# Patient Record
Sex: FEMALE | Race: BLACK OR AFRICAN AMERICAN | Employment: UNEMPLOYED | URBAN - METROPOLITAN AREA
[De-identification: names, ages, dates, MRNs, and addresses within clinical notes are randomized per-mention and may not be internally consistent; named-entity substitution may affect disease eponyms.]

---

## 2017-04-14 PROBLEM — S36.899A: Status: ACTIVE | Noted: 2017-04-14

## 2017-04-14 PROBLEM — S36.400A: Status: ACTIVE | Noted: 2017-04-14

## 2017-04-14 PROBLEM — T79.4XXA TRAUMATIC HEMORRHAGIC SHOCK (HCC): Status: ACTIVE | Noted: 2017-04-14

## 2017-04-14 PROBLEM — D62 ACUTE BLOOD LOSS ANEMIA: Status: ACTIVE | Noted: 2017-04-14

## 2017-04-14 PROBLEM — S36.899A INJURY OF MESENTERY: Status: ACTIVE | Noted: 2017-04-14

## 2017-04-14 PROBLEM — S36.501A: Status: ACTIVE | Noted: 2017-04-14
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
## 2017-04-14 PROBLEM — S32.009A CLOSED FRACTURE OF LUMBAR VERTEBRAL BODY (HCC): Status: ACTIVE | Noted: 2017-04-14

## 2017-06-11 PROBLEM — I26.92 SADDLE EMBOLUS OF PULMONARY ARTERY WITHOUT ACUTE COR PULMONALE (HCC): Status: ACTIVE | Noted: 2017-06-11

## 2017-06-11 PROBLEM — I82.402 DEEP VEIN THROMBOSIS (DVT) OF LEFT LOWER EXTREMITY (HCC): Status: ACTIVE | Noted: 2017-06-11

## 2017-06-24 PROBLEM — S37.10XA RIGHT URETERAL INJURY: Status: ACTIVE | Noted: 2017-06-24

## 2017-07-25 PROBLEM — K56.609 BOWEL OBSTRUCTION (HCC): Status: ACTIVE | Noted: 2017-07-25

## 2017-07-26 PROBLEM — I82.409 DEEP VENOUS THROMBOSIS (HCC): Status: ACTIVE | Noted: 2017-06-11

## 2017-08-25 PROBLEM — E43 SEVERE MALNUTRITION (HCC): Status: ACTIVE | Noted: 2017-08-25

## 2017-09-07 PROBLEM — Z98.890 POST-OPERATIVE STATE: Status: ACTIVE | Noted: 2017-09-07

## 2018-01-29 PROBLEM — Z93.3 COLOSTOMY IN PLACE (HCC): Status: ACTIVE | Noted: 2018-01-29
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
## 2018-02-02 PROBLEM — S36.400A: Status: RESOLVED | Noted: 2017-04-14 | Resolved: 2018-02-02

## 2018-02-02 PROBLEM — S32.009A CLOSED FRACTURE OF LUMBAR VERTEBRAL BODY (HCC): Status: RESOLVED | Noted: 2017-04-14 | Resolved: 2018-02-02

## 2018-02-02 PROBLEM — S36.899A: Status: RESOLVED | Noted: 2017-04-14 | Resolved: 2018-02-02

## 2018-02-02 PROBLEM — T79.4XXA TRAUMATIC HEMORRHAGIC SHOCK (HCC): Status: RESOLVED | Noted: 2017-04-14 | Resolved: 2018-02-02

## 2018-02-02 PROBLEM — I26.92 SADDLE EMBOLUS OF PULMONARY ARTERY WITHOUT ACUTE COR PULMONALE (HCC): Status: RESOLVED | Noted: 2017-06-11 | Resolved: 2018-02-02

## 2018-02-02 PROBLEM — S37.10XA RIGHT URETERAL INJURY: Status: RESOLVED | Noted: 2017-06-24 | Resolved: 2018-02-02

## 2018-02-02 PROBLEM — S36.899A INJURY OF MESENTERY: Status: RESOLVED | Noted: 2017-04-14 | Resolved: 2018-02-02

## 2018-02-02 PROBLEM — K56.609 BOWEL OBSTRUCTION (HCC): Status: RESOLVED | Noted: 2017-07-25 | Resolved: 2018-02-02

## 2018-02-02 PROBLEM — Z93.3 COLOSTOMY IN PLACE (HCC): Status: RESOLVED | Noted: 2018-01-29 | Resolved: 2018-02-02

## 2018-02-02 PROBLEM — S36.501A: Status: RESOLVED | Noted: 2017-04-14 | Resolved: 2018-02-02

## 2018-02-02 PROBLEM — R00.0 SINUS TACHYCARDIA: Status: ACTIVE | Noted: 2018-02-02

## 2018-02-02 PROBLEM — I82.409 DEEP VENOUS THROMBOSIS (HCC): Status: RESOLVED | Noted: 2017-06-11 | Resolved: 2018-02-02

## 2018-03-15 ENCOUNTER — OFFICE VISIT (OUTPATIENT)
Dept: SURGERY | Age: 28
End: 2018-03-15
Payer: MEDICAID

## 2018-03-15 VITALS
TEMPERATURE: 98 F | BODY MASS INDEX: 23.07 KG/M2 | RESPIRATION RATE: 12 BRPM | SYSTOLIC BLOOD PRESSURE: 112 MMHG | OXYGEN SATURATION: 99 % | HEIGHT: 67 IN | DIASTOLIC BLOOD PRESSURE: 76 MMHG | WEIGHT: 147 LBS | HEART RATE: 101 BPM

## 2018-03-15 DIAGNOSIS — Z98.890 POST-OPERATIVE STATE: Primary | ICD-10-CM

## 2018-03-15 PROCEDURE — 99211 OFF/OP EST MAY X REQ PHY/QHP: CPT

## 2018-03-15 PROCEDURE — 99024 POSTOP FOLLOW-UP VISIT: CPT | Performed by: SURGERY

## 2018-04-11 PROBLEM — Z98.890 POST-OPERATIVE STATE: Status: RESOLVED | Noted: 2017-09-07 | Resolved: 2018-04-11

## 2018-07-10 ENCOUNTER — TELEPHONE (OUTPATIENT)
Dept: FAMILY MEDICINE CLINIC | Age: 28
End: 2018-07-10

## 2018-07-16 ENCOUNTER — OFFICE VISIT (OUTPATIENT)
Dept: FAMILY MEDICINE CLINIC | Age: 28
End: 2018-07-16
Payer: MEDICAID

## 2018-07-16 ENCOUNTER — HOSPITAL ENCOUNTER (OUTPATIENT)
Age: 28
Discharge: HOME | End: 2018-07-18
Payer: MEDICAID

## 2018-07-16 VITALS
SYSTOLIC BLOOD PRESSURE: 115 MMHG | RESPIRATION RATE: 18 BRPM | OXYGEN SATURATION: 98 % | TEMPERATURE: 98.4 F | BODY MASS INDEX: 21.97 KG/M2 | HEIGHT: 67 IN | DIASTOLIC BLOOD PRESSURE: 80 MMHG | WEIGHT: 140 LBS | HEART RATE: 84 BPM

## 2018-07-16 DIAGNOSIS — R60.0 EDEMA OF LEG: ICD-10-CM

## 2018-07-16 DIAGNOSIS — I10 HYPERTENSION, UNSPECIFIED TYPE: ICD-10-CM

## 2018-07-16 DIAGNOSIS — R25.2 MUSCLE CRAMP: Primary | ICD-10-CM

## 2018-07-16 DIAGNOSIS — R25.2 MUSCLE CRAMP: ICD-10-CM

## 2018-07-16 LAB
ANION GAP SERPL CALCULATED.3IONS-SCNC: 13 MMOL/L (ref 7–16)
BUN BLDV-MCNC: 11 MG/DL (ref 6–20)
CALCIUM SERPL-MCNC: 9.1 MG/DL (ref 8.6–10.2)
CHLORIDE BLD-SCNC: 103 MMOL/L (ref 98–107)
CO2: 24 MMOL/L (ref 22–29)
CREAT SERPL-MCNC: 0.8 MG/DL (ref 0.5–1)
GFR AFRICAN AMERICAN: >60
GFR NON-AFRICAN AMERICAN: >60 ML/MIN/1.73
GLUCOSE BLD-MCNC: 84 MG/DL (ref 74–109)
MAGNESIUM: 2.3 MG/DL (ref 1.6–2.6)
POTASSIUM SERPL-SCNC: 4.6 MMOL/L (ref 3.5–5)
SODIUM BLD-SCNC: 140 MMOL/L (ref 132–146)
TOTAL CK: 196 U/L (ref 20–180)

## 2018-07-16 PROCEDURE — 36415 COLL VENOUS BLD VENIPUNCTURE: CPT | Performed by: FAMILY MEDICINE

## 2018-07-16 PROCEDURE — G8427 DOCREV CUR MEDS BY ELIG CLIN: HCPCS | Performed by: STUDENT IN AN ORGANIZED HEALTH CARE EDUCATION/TRAINING PROGRAM

## 2018-07-16 PROCEDURE — 80048 BASIC METABOLIC PNL TOTAL CA: CPT

## 2018-07-16 PROCEDURE — 1036F TOBACCO NON-USER: CPT | Performed by: STUDENT IN AN ORGANIZED HEALTH CARE EDUCATION/TRAINING PROGRAM

## 2018-07-16 PROCEDURE — 83735 ASSAY OF MAGNESIUM: CPT

## 2018-07-16 PROCEDURE — 82550 ASSAY OF CK (CPK): CPT

## 2018-07-16 PROCEDURE — 99213 OFFICE O/P EST LOW 20 MIN: CPT | Performed by: STUDENT IN AN ORGANIZED HEALTH CARE EDUCATION/TRAINING PROGRAM

## 2018-07-16 PROCEDURE — 99212 OFFICE O/P EST SF 10 MIN: CPT | Performed by: STUDENT IN AN ORGANIZED HEALTH CARE EDUCATION/TRAINING PROGRAM

## 2018-07-16 PROCEDURE — G8420 CALC BMI NORM PARAMETERS: HCPCS | Performed by: STUDENT IN AN ORGANIZED HEALTH CARE EDUCATION/TRAINING PROGRAM

## 2018-07-16 RX ORDER — NAPROXEN 500 MG/1
500 TABLET ORAL 2 TIMES DAILY WITH MEALS
Qty: 60 TABLET | Refills: 3 | Status: SHIPPED | OUTPATIENT
Start: 2018-07-16 | End: 2019-01-11

## 2018-07-16 RX ORDER — METOPROLOL TARTRATE 50 MG/1
50 TABLET, FILM COATED ORAL 2 TIMES DAILY
Qty: 60 TABLET | Refills: 3 | Status: CANCELLED | OUTPATIENT
Start: 2018-07-16

## 2018-07-16 ASSESSMENT — PATIENT HEALTH QUESTIONNAIRE - PHQ9
SUM OF ALL RESPONSES TO PHQ QUESTIONS 1-9: 0
1. LITTLE INTEREST OR PLEASURE IN DOING THINGS: 0
SUM OF ALL RESPONSES TO PHQ9 QUESTIONS 1 & 2: 0
2. FEELING DOWN, DEPRESSED OR HOPELESS: 0

## 2018-07-16 ASSESSMENT — ENCOUNTER SYMPTOMS
COUGH: 0
BLURRED VISION: 0
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
## 2018-07-18 PROBLEM — E43 SEVERE MALNUTRITION (HCC): Status: RESOLVED | Noted: 2017-08-25 | Resolved: 2018-07-18

## 2018-09-05 ENCOUNTER — TELEPHONE (OUTPATIENT)
Dept: ADMINISTRATIVE | Age: 28
End: 2018-09-05

## 2018-09-18 ENCOUNTER — OFFICE VISIT (OUTPATIENT)
Dept: FAMILY MEDICINE CLINIC | Age: 28
End: 2018-09-18
Payer: MEDICAID

## 2018-09-18 VITALS
HEART RATE: 87 BPM | TEMPERATURE: 98.6 F | HEIGHT: 67 IN | DIASTOLIC BLOOD PRESSURE: 84 MMHG | SYSTOLIC BLOOD PRESSURE: 126 MMHG | BODY MASS INDEX: 20.72 KG/M2 | OXYGEN SATURATION: 99 % | WEIGHT: 132 LBS

## 2018-09-18 DIAGNOSIS — M54.50 ACUTE MIDLINE LOW BACK PAIN WITHOUT SCIATICA: ICD-10-CM

## 2018-09-18 DIAGNOSIS — Z32.00 POSSIBLE PREGNANCY: Primary | ICD-10-CM

## 2018-09-18 DIAGNOSIS — R11.0 NAUSEA: ICD-10-CM

## 2018-09-18 LAB
CONTROL: POSITIVE
PREGNANCY TEST URINE, POC: NEGATIVE

## 2018-09-18 PROCEDURE — 1036F TOBACCO NON-USER: CPT | Performed by: STUDENT IN AN ORGANIZED HEALTH CARE EDUCATION/TRAINING PROGRAM

## 2018-09-18 PROCEDURE — 99212 OFFICE O/P EST SF 10 MIN: CPT | Performed by: STUDENT IN AN ORGANIZED HEALTH CARE EDUCATION/TRAINING PROGRAM

## 2018-09-18 PROCEDURE — 81025 URINE PREGNANCY TEST: CPT | Performed by: STUDENT IN AN ORGANIZED HEALTH CARE EDUCATION/TRAINING PROGRAM

## 2018-09-18 PROCEDURE — G8427 DOCREV CUR MEDS BY ELIG CLIN: HCPCS | Performed by: STUDENT IN AN ORGANIZED HEALTH CARE EDUCATION/TRAINING PROGRAM

## 2018-09-18 PROCEDURE — 99213 OFFICE O/P EST LOW 20 MIN: CPT | Performed by: STUDENT IN AN ORGANIZED HEALTH CARE EDUCATION/TRAINING PROGRAM

## 2018-09-18 PROCEDURE — G8420 CALC BMI NORM PARAMETERS: HCPCS | Performed by: STUDENT IN AN ORGANIZED HEALTH CARE EDUCATION/TRAINING PROGRAM

## 2018-09-18 RX ORDER — IBUPROFEN 400 MG/1
400 TABLET ORAL EVERY 6 HOURS PRN
COMMUNITY
End: 2018-09-18 | Stop reason: SDUPTHER

## 2018-09-18 RX ORDER — IBUPROFEN 400 MG/1
400 TABLET ORAL EVERY 6 HOURS PRN
Qty: 120 TABLET | Refills: 0 | Status: SHIPPED | OUTPATIENT
Start: 2018-09-18 | End: 2019-01-11

## 2018-09-18 RX ORDER — ONDANSETRON 4 MG/1
4 TABLET, FILM COATED ORAL DAILY PRN
Qty: 30 TABLET | Refills: 0 | Status: SHIPPED | OUTPATIENT
Start: 2018-09-18 | End: 2019-01-11

## 2018-09-18 RX ORDER — TIZANIDINE 4 MG/1
4 TABLET ORAL 3 TIMES DAILY
Qty: 60 TABLET | Refills: 0 | Status: SHIPPED | OUTPATIENT
Start: 2018-09-18 | End: 2019-01-11
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
## 2018-09-18 NOTE — PROGRESS NOTES
SpO2 99%   BMI 20.67 kg/m²   General Appearance: Well developed, awake, alert, oriented, no acute distress  HEENT: Normocephalic, atraumatic. Chest wall/Lung: Clear to auscultation bilaterally,  respirations unlabored. No ronchi/wheezing/rales  Heart: Regular rate and rhythm, S1 and S2 normal, no murmur, rub or gallop. Abdomen: Soft, Tender over surgical area including midline scar. RUQ scar noted. Extremities:    No edema. Neurologic:   Alert&Oriented. Assessment and Plan     Acute midline low back pain without sciatica  -Secondary to trauma. No red flag symptoms. Orders below. - ibuprofen (IBU) 400 MG tablet; Take 1 tablet by mouth every 6 hours as needed for Pain  Dispense: 120 tablet; Refill: 0  - tiZANidine (ZANAFLEX) 4 MG tablet; Take 1 tablet by mouth three times daily  Dispense: 60 tablet; Refill: 0  - XR THORACIC SPINE (2 VIEWS); Future  - XR LUMBAR SPINE (2-3 VIEWS); Future    Nausea  - Pregnancy test is negative. Food intolerance vs exacerbation of previous abdominal injury and colostomy reversal.  -If does not get better will do CT Abdomen  -Will recheck pregnancy test during her next visit  - ondansetron (ZOFRAN) 4 MG tablet; Take 1 tablet by mouth daily as needed for Nausea or Vomiting  Dispense: 30 tablet; Refill: 0    Possible pregnancy  - POCT urine pregnancy today-Negative  -Will repeat pregnancy test during her next visit    Left leg Pain  -secondary to chronic DVT  -can follow with Dr. Sunny Olmstead if needed        Return to Office: Return in about 2 weeks (around 10/2/2018) for Nausea, Back Pain.       Medication List:    Current Outpatient Prescriptions   Medication Sig Dispense Refill    ibuprofen (IBU) 400 MG tablet Take 400 mg by mouth every 6 hours as needed for Pain      naproxen (NAPROXEN) 500 MG EC tablet Take 1 tablet by mouth 2 times daily (with meals) 60 tablet 3    acetaminophen (TYLENOL) 325 MG tablet Take 1 tablet by mouth every 4 hours as needed for Pain 120 tablet

## 2018-09-25 ASSESSMENT — ENCOUNTER SYMPTOMS: SHORTNESS OF BREATH: 0

## 2018-10-18 ENCOUNTER — TELEPHONE (OUTPATIENT)
Dept: ADMINISTRATIVE | Age: 28
End: 2018-10-18

## 2018-10-22 ENCOUNTER — TELEPHONE (OUTPATIENT)
Dept: ADMINISTRATIVE | Age: 28
End: 2018-10-22

## 2019-01-11 ENCOUNTER — HOSPITAL ENCOUNTER (EMERGENCY)
Age: 29
Discharge: HOME | End: 2019-01-11
Payer: MEDICAID

## 2019-01-11 VITALS
SYSTOLIC BLOOD PRESSURE: 139 MMHG | RESPIRATION RATE: 14 BRPM | HEART RATE: 80 BPM | HEIGHT: 67 IN | DIASTOLIC BLOOD PRESSURE: 96 MMHG | BODY MASS INDEX: 20.72 KG/M2 | OXYGEN SATURATION: 100 % | WEIGHT: 132 LBS | TEMPERATURE: 97.8 F

## 2019-01-11 DIAGNOSIS — J02.9 ACUTE PHARYNGITIS, UNSPECIFIED ETIOLOGY: Primary | ICD-10-CM

## 2019-01-11 LAB — STREP GRP A PCR: NEGATIVE

## 2019-01-11 PROCEDURE — 99283 EMERGENCY DEPT VISIT LOW MDM: CPT

## 2019-01-11 PROCEDURE — 87880 STREP A ASSAY W/OPTIC: CPT

## 2019-01-11 RX ORDER — IBUPROFEN 800 MG/1
800 TABLET ORAL EVERY 6 HOURS PRN
Qty: 21 TABLET | Refills: 0 | Status: SHIPPED | OUTPATIENT
Start: 2019-01-11 | End: 2019-06-01

## 2019-01-11 RX ORDER — BROMPHENIRAMINE MALEATE, PSEUDOEPHEDRINE HYDROCHLORIDE, AND DEXTROMETHORPHAN HYDROBROMIDE 2; 30; 10 MG/5ML; MG/5ML; MG/5ML
5 SYRUP ORAL 4 TIMES DAILY PRN
Qty: 120 ML | Refills: 0 | Status: SHIPPED | OUTPATIENT
Start: 2019-01-11 | End: 2019-01-16

## 2019-01-11 ASSESSMENT — PAIN DESCRIPTION - FREQUENCY: FREQUENCY: CONTINUOUS

## 2019-01-11 ASSESSMENT — PAIN DESCRIPTION - PAIN TYPE: TYPE: ACUTE PAIN

## 2019-01-11 ASSESSMENT — PAIN DESCRIPTION - PROGRESSION: CLINICAL_PROGRESSION: GRADUALLY WORSENING

## 2019-01-11 ASSESSMENT — PAIN DESCRIPTION - LOCATION: LOCATION: CHEST

## 2019-01-11 ASSESSMENT — PAIN SCALES - GENERAL: PAINLEVEL_OUTOF10: 5

## 2019-01-11 ASSESSMENT — PAIN DESCRIPTION - DESCRIPTORS: DESCRIPTORS: ACHING

## 2019-02-25 ENCOUNTER — HOSPITAL ENCOUNTER (OUTPATIENT)
Age: 29
Discharge: HOME | End: 2019-02-27
Payer: MEDICAID

## 2019-02-25 ENCOUNTER — OFFICE VISIT (OUTPATIENT)
Dept: FAMILY MEDICINE CLINIC | Age: 29
End: 2019-02-25
Payer: MEDICAID

## 2019-02-25 VITALS
WEIGHT: 132 LBS | OXYGEN SATURATION: 100 % | DIASTOLIC BLOOD PRESSURE: 83 MMHG | HEART RATE: 70 BPM | BODY MASS INDEX: 20.72 KG/M2 | TEMPERATURE: 98.3 F | HEIGHT: 67 IN | RESPIRATION RATE: 18 BRPM | SYSTOLIC BLOOD PRESSURE: 120 MMHG

## 2019-02-25 DIAGNOSIS — J06.9 VIRAL URI: ICD-10-CM

## 2019-02-25 DIAGNOSIS — N76.0 ACUTE VAGINITIS: ICD-10-CM

## 2019-02-25 DIAGNOSIS — N76.0 ACUTE VAGINITIS: Primary | ICD-10-CM

## 2019-02-25 PROCEDURE — G8484 FLU IMMUNIZE NO ADMIN: HCPCS | Performed by: FAMILY MEDICINE

## 2019-02-25 PROCEDURE — G8420 CALC BMI NORM PARAMETERS: HCPCS | Performed by: FAMILY MEDICINE

## 2019-02-25 PROCEDURE — 4004F PT TOBACCO SCREEN RCVD TLK: CPT | Performed by: FAMILY MEDICINE

## 2019-02-25 PROCEDURE — 99213 OFFICE O/P EST LOW 20 MIN: CPT | Performed by: FAMILY MEDICINE

## 2019-02-25 PROCEDURE — 99212 OFFICE O/P EST SF 10 MIN: CPT | Performed by: FAMILY MEDICINE

## 2019-02-25 PROCEDURE — G8427 DOCREV CUR MEDS BY ELIG CLIN: HCPCS | Performed by: FAMILY MEDICINE

## 2019-02-25 RX ORDER — FLUCONAZOLE 150 MG/1
150 TABLET ORAL ONCE
Qty: 2 TABLET | Refills: 1 | Status: SHIPPED | OUTPATIENT
Start: 2019-02-25 | End: 2019-02-25

## 2019-02-25 RX ORDER — BENZONATATE 100 MG/1
100-200 CAPSULE ORAL 3 TIMES DAILY PRN
Qty: 60 CAPSULE | Refills: 0 | Status: SHIPPED | OUTPATIENT
Start: 2019-02-25 | End: 2019-03-04

## 2019-02-25 RX ORDER — FLUTICASONE PROPIONATE 50 MCG
2 SPRAY, SUSPENSION (ML) NASAL DAILY
Qty: 1 BOTTLE | Refills: 1 | Status: SHIPPED | OUTPATIENT
Start: 2019-02-25 | End: 2019-06-01

## 2019-02-25 ASSESSMENT — PATIENT HEALTH QUESTIONNAIRE - PHQ9
SUM OF ALL RESPONSES TO PHQ QUESTIONS 1-9: 0
SUM OF ALL RESPONSES TO PHQ9 QUESTIONS 1 & 2: 0
SUM OF ALL RESPONSES TO PHQ QUESTIONS 1-9: 0
1. LITTLE INTEREST OR PLEASURE IN DOING THINGS: 0
2. FEELING DOWN, DEPRESSED OR HOPELESS: 0

## 2019-02-26 ENCOUNTER — TELEPHONE (OUTPATIENT)
Dept: FAMILY MEDICINE CLINIC | Age: 29
End: 2019-02-26

## 2019-02-26 ASSESSMENT — ENCOUNTER SYMPTOMS
VOMITING: 0
ABDOMINAL PAIN: 0
SORE THROAT: 1
CHEST TIGHTNESS: 0
NAUSEA: 0
CONSTIPATION: 0
SHORTNESS OF BREATH: 1
DIARRHEA: 0
SINUS PRESSURE: 0
PHOTOPHOBIA: 0
SINUS PAIN: 0
ABDOMINAL DISTENTION: 0
COUGH: 1
WHEEZING: 0
RHINORRHEA: 1

## 2019-06-01 ENCOUNTER — HOSPITAL ENCOUNTER (EMERGENCY)
Age: 29
Discharge: HOME | End: 2019-06-01
Attending: EMERGENCY MEDICINE
Payer: MEDICAID

## 2019-06-01 ENCOUNTER — APPOINTMENT (OUTPATIENT)
Dept: GENERAL RADIOLOGY | Age: 29
End: 2019-06-01
Payer: MEDICAID

## 2019-06-01 VITALS
BODY MASS INDEX: 20.09 KG/M2 | OXYGEN SATURATION: 98 % | HEART RATE: 70 BPM | SYSTOLIC BLOOD PRESSURE: 109 MMHG | HEIGHT: 67 IN | RESPIRATION RATE: 16 BRPM | TEMPERATURE: 98.4 F | WEIGHT: 128 LBS | DIASTOLIC BLOOD PRESSURE: 64 MMHG

## 2019-06-01 DIAGNOSIS — R07.81 PLEURITIC CHEST PAIN: Primary | ICD-10-CM

## 2019-06-01 LAB
ANION GAP SERPL CALCULATED.3IONS-SCNC: 10 MMOL/L (ref 7–16)
BASOPHILS ABSOLUTE: 0.03 E9/L (ref 0–0.2)
BASOPHILS RELATIVE PERCENT: 0.8 % (ref 0–2)
BUN BLDV-MCNC: 9 MG/DL (ref 6–20)
CALCIUM SERPL-MCNC: 8.8 MG/DL (ref 8.6–10.2)
CHLORIDE BLD-SCNC: 103 MMOL/L (ref 98–107)
CO2: 27 MMOL/L (ref 22–29)
CREAT SERPL-MCNC: 0.7 MG/DL (ref 0.5–1)
D DIMER: <200 NG/ML DDU
EKG ATRIAL RATE: 79 BPM
EKG P AXIS: 65 DEGREES
EKG P-R INTERVAL: 156 MS
EKG Q-T INTERVAL: 366 MS
EKG QRS DURATION: 80 MS
EKG QTC CALCULATION (BAZETT): 419 MS
EKG R AXIS: 85 DEGREES
EKG T AXIS: 61 DEGREES
EKG VENTRICULAR RATE: 79 BPM
EOSINOPHILS ABSOLUTE: 0.05 E9/L (ref 0.05–0.5)
EOSINOPHILS RELATIVE PERCENT: 1.3 % (ref 0–6)
GFR AFRICAN AMERICAN: >60
GFR NON-AFRICAN AMERICAN: >60 ML/MIN/1.73
GLUCOSE BLD-MCNC: 100 MG/DL (ref 74–99)
HCT VFR BLD CALC: 38.8 % (ref 34–48)
HEMOGLOBIN: 12.7 G/DL (ref 11.5–15.5)
IMMATURE GRANULOCYTES #: 0.01 E9/L
IMMATURE GRANULOCYTES %: 0.3 % (ref 0–5)
LYMPHOCYTES ABSOLUTE: 2.18 E9/L (ref 1.5–4)
LYMPHOCYTES RELATIVE PERCENT: 54.6 % (ref 20–42)
MCH RBC QN AUTO: 30.6 PG (ref 26–35)
MCHC RBC AUTO-ENTMCNC: 32.7 % (ref 32–34.5)
MCV RBC AUTO: 93.5 FL (ref 80–99.9)
MONOCYTES ABSOLUTE: 0.37 E9/L (ref 0.1–0.95)
MONOCYTES RELATIVE PERCENT: 9.3 % (ref 2–12)
NEUTROPHILS ABSOLUTE: 1.35 E9/L (ref 1.8–7.3)
NEUTROPHILS RELATIVE PERCENT: 33.7 % (ref 43–80)
PDW BLD-RTO: 14 FL (ref 11.5–15)
PLATELET # BLD: 198 E9/L (ref 130–450)
PMV BLD AUTO: 11.9 FL (ref 7–12)
POTASSIUM SERPL-SCNC: 4 MMOL/L (ref 3.5–5)
RBC # BLD: 4.15 E12/L (ref 3.5–5.5)
SODIUM BLD-SCNC: 140 MMOL/L (ref 132–146)
TROPONIN: <0.01 NG/ML (ref 0–0.03)
WBC # BLD: 4 E9/L (ref 4.5–11.5)

## 2019-06-01 PROCEDURE — 80048 BASIC METABOLIC PNL TOTAL CA: CPT

## 2019-06-01 PROCEDURE — 85378 FIBRIN DEGRADE SEMIQUANT: CPT

## 2019-06-01 PROCEDURE — 84484 ASSAY OF TROPONIN QUANT: CPT

## 2019-06-01 PROCEDURE — 99285 EMERGENCY DEPT VISIT HI MDM: CPT

## 2019-06-01 PROCEDURE — 71045 X-RAY EXAM CHEST 1 VIEW: CPT

## 2019-06-01 PROCEDURE — 6360000002 HC RX W HCPCS: Performed by: EMERGENCY MEDICINE

## 2019-06-01 PROCEDURE — 93010 ELECTROCARDIOGRAM REPORT: CPT | Performed by: INTERNAL MEDICINE

## 2019-06-01 PROCEDURE — 85025 COMPLETE CBC W/AUTO DIFF WBC: CPT

## 2019-06-01 PROCEDURE — 93005 ELECTROCARDIOGRAM TRACING: CPT | Performed by: EMERGENCY MEDICINE

## 2019-06-01 PROCEDURE — 96374 THER/PROPH/DIAG INJ IV PUSH: CPT

## 2019-06-01 RX ORDER — HALOPERIDOL 5 MG/ML
2.5 INJECTION INTRAMUSCULAR ONCE
Status: DISCONTINUED | OUTPATIENT
Start: 2019-06-01 | End: 2019-06-01

## 2019-06-01 RX ORDER — KETOROLAC TROMETHAMINE 10 MG/1
10 TABLET, FILM COATED ORAL EVERY 6 HOURS PRN
Qty: 20 TABLET | Refills: 0 | Status: SHIPPED | OUTPATIENT
Start: 2019-06-01 | End: 2019-09-13

## 2019-06-01 RX ORDER — KETOROLAC TROMETHAMINE 30 MG/ML
15 INJECTION, SOLUTION INTRAMUSCULAR; INTRAVENOUS ONCE
Status: COMPLETED | OUTPATIENT
Start: 2019-06-01 | End: 2019-06-01

## 2019-06-01 RX ORDER — ONDANSETRON 4 MG/1
8 TABLET, ORALLY DISINTEGRATING ORAL EVERY 8 HOURS PRN
Qty: 10 TABLET | Refills: 0 | Status: SHIPPED | OUTPATIENT
Start: 2019-06-01 | End: 2019-09-13

## 2019-06-01 RX ADMIN — KETOROLAC TROMETHAMINE 15 MG: 30 INJECTION, SOLUTION INTRAMUSCULAR; INTRAVENOUS at 10:22

## 2019-06-01 RX ADMIN — HALOPERIDOL LACTATE 2.5 MG: 5 INJECTION, SOLUTION INTRAMUSCULAR at 07:45

## 2019-06-01 ASSESSMENT — ENCOUNTER SYMPTOMS
NAUSEA: 1
SORE THROAT: 0
SINUS PRESSURE: 0
COUGH: 0
SHORTNESS OF BREATH: 0
EYE REDNESS: 0
ABDOMINAL DISTENTION: 0
BACK PAIN: 0
WHEEZING: 0
VOMITING: 1
DIARRHEA: 0
EYE PAIN: 0
EYE DISCHARGE: 0

## 2019-06-01 ASSESSMENT — PAIN SCALES - GENERAL
PAINLEVEL_OUTOF10: 5
PAINLEVEL_OUTOF10: 5

## 2019-06-01 ASSESSMENT — PAIN DESCRIPTION - PROGRESSION: CLINICAL_PROGRESSION: GRADUALLY WORSENING

## 2019-06-01 ASSESSMENT — PAIN DESCRIPTION - ORIENTATION: ORIENTATION: LEFT;UPPER

## 2019-06-01 ASSESSMENT — PAIN DESCRIPTION - PAIN TYPE: TYPE: ACUTE PAIN

## 2019-06-01 ASSESSMENT — PAIN DESCRIPTION - LOCATION: LOCATION: CHEST

## 2019-06-01 ASSESSMENT — PAIN DESCRIPTION - ONSET: ONSET: GRADUAL

## 2019-06-01 ASSESSMENT — PAIN - FUNCTIONAL ASSESSMENT: PAIN_FUNCTIONAL_ASSESSMENT: ACTIVITIES ARE NOT PREVENTED

## 2019-06-01 ASSESSMENT — PAIN DESCRIPTION - FREQUENCY: FREQUENCY: CONTINUOUS

## 2019-06-01 ASSESSMENT — PAIN DESCRIPTION - DESCRIPTORS: DESCRIPTORS: BURNING;DISCOMFORT
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
## 2019-09-13 ENCOUNTER — OFFICE VISIT (OUTPATIENT)
Dept: FAMILY MEDICINE CLINIC | Age: 29
End: 2019-09-13
Payer: MEDICAID

## 2019-09-13 VITALS
TEMPERATURE: 98.7 F | OXYGEN SATURATION: 96 % | HEART RATE: 100 BPM | RESPIRATION RATE: 12 BRPM | DIASTOLIC BLOOD PRESSURE: 70 MMHG | HEIGHT: 67 IN | SYSTOLIC BLOOD PRESSURE: 106 MMHG | BODY MASS INDEX: 20.25 KG/M2 | WEIGHT: 129 LBS

## 2019-09-13 DIAGNOSIS — R20.2 NUMBNESS AND TINGLING OF LEFT LOWER EXTREMITY: ICD-10-CM

## 2019-09-13 DIAGNOSIS — L74.510 AXILLARY HYPERHIDROSIS: ICD-10-CM

## 2019-09-13 DIAGNOSIS — R20.0 NUMBNESS AND TINGLING OF LEFT LOWER EXTREMITY: ICD-10-CM

## 2019-09-13 DIAGNOSIS — Z86.718 HISTORY OF DVT (DEEP VEIN THROMBOSIS): ICD-10-CM

## 2019-09-13 DIAGNOSIS — M79.2 NEUROPATHIC PAIN: Primary | ICD-10-CM

## 2019-09-13 PROCEDURE — G8420 CALC BMI NORM PARAMETERS: HCPCS | Performed by: STUDENT IN AN ORGANIZED HEALTH CARE EDUCATION/TRAINING PROGRAM

## 2019-09-13 PROCEDURE — 99212 OFFICE O/P EST SF 10 MIN: CPT | Performed by: STUDENT IN AN ORGANIZED HEALTH CARE EDUCATION/TRAINING PROGRAM

## 2019-09-13 PROCEDURE — 1036F TOBACCO NON-USER: CPT | Performed by: STUDENT IN AN ORGANIZED HEALTH CARE EDUCATION/TRAINING PROGRAM

## 2019-09-13 PROCEDURE — 99213 OFFICE O/P EST LOW 20 MIN: CPT | Performed by: STUDENT IN AN ORGANIZED HEALTH CARE EDUCATION/TRAINING PROGRAM

## 2019-09-13 PROCEDURE — G8427 DOCREV CUR MEDS BY ELIG CLIN: HCPCS | Performed by: STUDENT IN AN ORGANIZED HEALTH CARE EDUCATION/TRAINING PROGRAM

## 2019-09-13 RX ORDER — GABAPENTIN 100 MG/1
100 CAPSULE ORAL 3 TIMES DAILY PRN
Qty: 90 CAPSULE | Refills: 0 | Status: SHIPPED
Start: 2019-09-13 | End: 2021-03-01 | Stop reason: CLARIF
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
## 2019-09-16 ASSESSMENT — ENCOUNTER SYMPTOMS
SHORTNESS OF BREATH: 0
ABDOMINAL PAIN: 0
COUGH: 0

## 2019-09-20 ENCOUNTER — HOSPITAL ENCOUNTER (OUTPATIENT)
Dept: ULTRASOUND IMAGING | Age: 29
Discharge: HOME | End: 2019-09-22
Payer: MEDICAID

## 2019-09-20 DIAGNOSIS — R20.2 NUMBNESS AND TINGLING OF LEFT LOWER EXTREMITY: ICD-10-CM

## 2019-09-20 DIAGNOSIS — R20.0 NUMBNESS AND TINGLING OF LEFT LOWER EXTREMITY: ICD-10-CM

## 2019-09-20 DIAGNOSIS — Z86.718 HISTORY OF DVT (DEEP VEIN THROMBOSIS): ICD-10-CM

## 2019-09-20 PROCEDURE — 93971 EXTREMITY STUDY: CPT

## 2019-10-02 ENCOUNTER — TELEPHONE (OUTPATIENT)
Dept: FAMILY MEDICINE CLINIC | Age: 29
End: 2019-10-02

## 2019-10-02 DIAGNOSIS — I82.512 CHRONIC DEEP VEIN THROMBOSIS (DVT) OF LEFT FEMORAL VEIN (HCC): Primary | ICD-10-CM

## 2019-10-29 ENCOUNTER — OFFICE VISIT (OUTPATIENT)
Dept: VASCULAR SURGERY | Age: 29
End: 2019-10-29
Payer: MEDICAID

## 2019-10-29 VITALS
WEIGHT: 130 LBS | BODY MASS INDEX: 20.4 KG/M2 | HEART RATE: 93 BPM | HEIGHT: 67 IN | SYSTOLIC BLOOD PRESSURE: 110 MMHG | OXYGEN SATURATION: 99 % | DIASTOLIC BLOOD PRESSURE: 70 MMHG

## 2019-10-29 DIAGNOSIS — I82.512 CHRONIC DEEP VEIN THROMBOSIS (DVT) OF FEMORAL VEIN OF LEFT LOWER EXTREMITY (HCC): Primary | ICD-10-CM

## 2019-10-29 PROCEDURE — G8484 FLU IMMUNIZE NO ADMIN: HCPCS | Performed by: SURGERY

## 2019-10-29 PROCEDURE — 99213 OFFICE O/P EST LOW 20 MIN: CPT | Performed by: SURGERY

## 2019-10-29 PROCEDURE — 4004F PT TOBACCO SCREEN RCVD TLK: CPT | Performed by: SURGERY

## 2019-10-29 PROCEDURE — G8420 CALC BMI NORM PARAMETERS: HCPCS | Performed by: SURGERY

## 2019-10-29 PROCEDURE — G8427 DOCREV CUR MEDS BY ELIG CLIN: HCPCS | Performed by: SURGERY

## 2019-10-29 RX ORDER — AMOXICILLIN 500 MG/1
CAPSULE ORAL
Refills: 0 | COMMUNITY
Start: 2019-08-20 | End: 2021-03-01 | Stop reason: CLARIF

## 2021-01-20 ENCOUNTER — CLINICAL DOCUMENTATION (OUTPATIENT)
Dept: GENERAL RADIOLOGY | Age: 31
End: 2021-01-20

## 2021-03-01 ENCOUNTER — NURSE TRIAGE (OUTPATIENT)
Dept: OTHER | Facility: CLINIC | Age: 31
End: 2021-03-01

## 2021-03-01 ENCOUNTER — OFFICE VISIT (OUTPATIENT)
Dept: FAMILY MEDICINE CLINIC | Age: 31
End: 2021-03-01
Payer: MEDICAID

## 2021-03-01 ENCOUNTER — TELEPHONE (OUTPATIENT)
Dept: ADMINISTRATIVE | Age: 31
End: 2021-03-01

## 2021-03-01 VITALS
WEIGHT: 122 LBS | OXYGEN SATURATION: 98 % | DIASTOLIC BLOOD PRESSURE: 82 MMHG | HEART RATE: 103 BPM | TEMPERATURE: 98.7 F | SYSTOLIC BLOOD PRESSURE: 108 MMHG | BODY MASS INDEX: 19.15 KG/M2 | HEIGHT: 67 IN

## 2021-03-01 DIAGNOSIS — R68.89 COLD INTOLERANCE: Primary | ICD-10-CM

## 2021-03-01 DIAGNOSIS — R73.9 HYPERGLYCEMIA: ICD-10-CM

## 2021-03-01 DIAGNOSIS — R68.89 COLD INTOLERANCE: ICD-10-CM

## 2021-03-01 DIAGNOSIS — D64.9 ANEMIA, UNSPECIFIED TYPE: ICD-10-CM
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
## 2021-03-01 LAB
BASOPHILS ABSOLUTE: 0.02 E9/L (ref 0–0.2)
BASOPHILS RELATIVE PERCENT: 0.4 % (ref 0–2)
EOSINOPHILS ABSOLUTE: 0.02 E9/L (ref 0.05–0.5)
EOSINOPHILS RELATIVE PERCENT: 0.4 % (ref 0–6)
HCT VFR BLD CALC: 40.4 % (ref 34–48)
HEMOGLOBIN: 13.5 G/DL (ref 11.5–15.5)
IMMATURE GRANULOCYTES #: 0.01 E9/L
IMMATURE GRANULOCYTES %: 0.2 % (ref 0–5)
LYMPHOCYTES ABSOLUTE: 1.66 E9/L (ref 1.5–4)
LYMPHOCYTES RELATIVE PERCENT: 31.6 % (ref 20–42)
MCH RBC QN AUTO: 31.9 PG (ref 26–35)
MCHC RBC AUTO-ENTMCNC: 33.4 % (ref 32–34.5)
MCV RBC AUTO: 95.5 FL (ref 80–99.9)
MONOCYTES ABSOLUTE: 0.41 E9/L (ref 0.1–0.95)
MONOCYTES RELATIVE PERCENT: 7.8 % (ref 2–12)
NEUTROPHILS ABSOLUTE: 3.14 E9/L (ref 1.8–7.3)
NEUTROPHILS RELATIVE PERCENT: 59.6 % (ref 43–80)
PDW BLD-RTO: 13.1 FL (ref 11.5–15)
PLATELET # BLD: 175 E9/L (ref 130–450)
PMV BLD AUTO: 12.7 FL (ref 7–12)
RBC # BLD: 4.23 E12/L (ref 3.5–5.5)
WBC # BLD: 5.3 E9/L (ref 4.5–11.5)

## 2021-03-01 PROCEDURE — G8484 FLU IMMUNIZE NO ADMIN: HCPCS | Performed by: STUDENT IN AN ORGANIZED HEALTH CARE EDUCATION/TRAINING PROGRAM

## 2021-03-01 PROCEDURE — 36415 COLL VENOUS BLD VENIPUNCTURE: CPT | Performed by: STUDENT IN AN ORGANIZED HEALTH CARE EDUCATION/TRAINING PROGRAM

## 2021-03-01 PROCEDURE — G8420 CALC BMI NORM PARAMETERS: HCPCS | Performed by: STUDENT IN AN ORGANIZED HEALTH CARE EDUCATION/TRAINING PROGRAM

## 2021-03-01 PROCEDURE — 4004F PT TOBACCO SCREEN RCVD TLK: CPT | Performed by: STUDENT IN AN ORGANIZED HEALTH CARE EDUCATION/TRAINING PROGRAM

## 2021-03-01 PROCEDURE — 99213 OFFICE O/P EST LOW 20 MIN: CPT | Performed by: STUDENT IN AN ORGANIZED HEALTH CARE EDUCATION/TRAINING PROGRAM

## 2021-03-01 PROCEDURE — G8427 DOCREV CUR MEDS BY ELIG CLIN: HCPCS | Performed by: STUDENT IN AN ORGANIZED HEALTH CARE EDUCATION/TRAINING PROGRAM

## 2021-03-01 SDOH — ECONOMIC STABILITY: TRANSPORTATION INSECURITY
IN THE PAST 12 MONTHS, HAS THE LACK OF TRANSPORTATION KEPT YOU FROM MEDICAL APPOINTMENTS OR FROM GETTING MEDICATIONS?: NO

## 2021-03-01 SDOH — ECONOMIC STABILITY: FOOD INSECURITY: WITHIN THE PAST 12 MONTHS, THE FOOD YOU BOUGHT JUST DIDN'T LAST AND YOU DIDN'T HAVE MONEY TO GET MORE.: NEVER TRUE

## 2021-03-01 ASSESSMENT — PATIENT HEALTH QUESTIONNAIRE - PHQ9
1. LITTLE INTEREST OR PLEASURE IN DOING THINGS: 0
SUM OF ALL RESPONSES TO PHQ QUESTIONS 1-9: 0
2. FEELING DOWN, DEPRESSED OR HOPELESS: 0
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
## 2021-03-01 NOTE — LETTER
[facility]  [address] [place]  Phone: [phone]  Fax: [phone]    Raj Paige MD        March 1, 2021     Patient: Bill Lopez   YOB: 1990   Date of Visit: 3/1/2021       To Whom It May Concern:    Ms. Bill Lopez should be excused from work on 2/26/2021, and 3/1/2021. If you have any questions or concerns, please don't hesitate to call.     Sincerely,        Raj Paige MD

## 2021-03-01 NOTE — TELEPHONE ENCOUNTER
Patient called in c/o of being lightheaded and a fall. Scheduled same day appt in office for 3/1/21 at 3:40 Tx to nurse Devine for review.

## 2021-03-01 NOTE — TELEPHONE ENCOUNTER
Reason for Disposition   [1] MILD dizziness (e.g., walking normally) AND [2] has NOT been evaluated by physician for this  (Exception: dizziness caused by heat exposure, sudden standing, or poor fluid intake)    Answer Assessment - Initial Assessment Questions  1. DESCRIPTION: \"Describe your dizziness. \"      experienced 3 days ago and has since passed. Caller did fall on Friday. 2. LIGHTHEADED: \"Do you feel lightheaded? \" (e.g., somewhat faint, woozy, weak upon standing)      No longer experiencing    3. VERTIGO: \"Do you feel like either you or the room is spinning or tilting? \" (i.e. vertigo)      No    4. SEVERITY: \"How bad is it? \"  \"Do you feel like you are going to faint? \" \"Can you stand and walk? \"    - MILD - walking normally    - MODERATE - interferes with normal activities (e.g., work, school)     - SEVERE - unable to stand, requires support to walk, feels like passing out now. Was severe when experiencing has now passed    5. ONSET:  \"When did the dizziness begin? \"      3 days ago    6. AGGRAVATING FACTORS: \"Does anything make it worse? \" (e.g., standing, change in head position)      No    7. HEART RATE: \"Can you tell me your heart rate? \" \"How many beats in 15 seconds? \"  (Note: not all patients can do this)        NA    8. CAUSE: \"What do you think is causing the dizziness? \"      Unknown    9. RECURRENT SYMPTOM: \"Have you had dizziness before? \" If so, ask: \"When was the last time? \" \"What happened that time? \"      No    10. OTHER SYMPTOMS: \"Do you have any other symptoms? \" (e.g., fever, chest pain, vomiting, diarrhea, bleeding)        None    11. PREGNANCY: \"Is there any chance you are pregnant? \" \"When was your last menstrual period? \"        No    Protocols used: [facility]    Patient [facility])  with red flag complaint. Brief description of triage: Caller feeling dizzy 3 days ago and fell, no injuries.  Caller reports no longer experiencing dizziness. Triage indicates for patient to be seen in 3 days. Caller does have a same day appointment scheduled/     Care advice provided, patient verbalizes understanding; denies any other questions or concerns; instructed to call back for any new or worsening symptoms. Attention Provider: Thank you for allowing me to participate in the care of your patient. The patient was connected to triage in response to information provided to the E[facility]. Please do not respond through this encounter as the response is not directed to a shared pool.
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
## 2021-03-02 LAB
ALBUMIN SERPL-MCNC: 4.4 G/DL (ref 3.5–5.2)
ALP BLD-CCNC: 66 U/L (ref 35–104)
ALT SERPL-CCNC: 24 U/L (ref 0–32)
ANION GAP SERPL CALCULATED.3IONS-SCNC: 7 MMOL/L (ref 7–16)
AST SERPL-CCNC: 28 U/L (ref 0–31)
BILIRUB SERPL-MCNC: 0.4 MG/DL (ref 0–1.2)
BUN BLDV-MCNC: 18 MG/DL (ref 6–20)
CALCIUM SERPL-MCNC: 9.2 MG/DL (ref 8.6–10.2)
CHLORIDE BLD-SCNC: 102 MMOL/L (ref 98–107)
CO2: 28 MMOL/L (ref 22–29)
CREAT SERPL-MCNC: 0.7 MG/DL (ref 0.5–1)
GFR AFRICAN AMERICAN: >60
GFR NON-AFRICAN AMERICAN: >60 ML/MIN/1.73
GLUCOSE BLD-MCNC: 98 MG/DL (ref 74–99)
HBA1C MFR BLD: 5.5 % (ref 4–5.6)
POTASSIUM SERPL-SCNC: 4.2 MMOL/L (ref 3.5–5)
SODIUM BLD-SCNC: 137 MMOL/L (ref 132–146)
TOTAL PROTEIN: 7.2 G/DL (ref 6.4–8.3)
TSH SERPL DL<=0.05 MIU/L-ACNC: 1.64 UIU/ML (ref 0.27–4.2)

## 2021-03-08 ENCOUNTER — OFFICE VISIT (OUTPATIENT)
Dept: FAMILY MEDICINE CLINIC | Age: 31
End: 2021-03-08
Payer: MEDICAID

## 2021-03-08 VITALS
HEART RATE: 89 BPM | SYSTOLIC BLOOD PRESSURE: 126 MMHG | BODY MASS INDEX: 19.46 KG/M2 | WEIGHT: 124 LBS | OXYGEN SATURATION: 99 % | HEIGHT: 67 IN | TEMPERATURE: 98.1 F | DIASTOLIC BLOOD PRESSURE: 83 MMHG

## 2021-03-08 DIAGNOSIS — M54.50 ACUTE MIDLINE LOW BACK PAIN WITHOUT SCIATICA: ICD-10-CM

## 2021-03-08 DIAGNOSIS — M79.605 CHRONIC PAIN OF LEFT LOWER EXTREMITY: Primary | ICD-10-CM

## 2021-03-08 DIAGNOSIS — G89.29 CHRONIC PAIN OF LEFT LOWER EXTREMITY: Primary | ICD-10-CM

## 2021-03-08 PROCEDURE — 99212 OFFICE O/P EST SF 10 MIN: CPT | Performed by: STUDENT IN AN ORGANIZED HEALTH CARE EDUCATION/TRAINING PROGRAM

## 2021-03-08 PROCEDURE — G8420 CALC BMI NORM PARAMETERS: HCPCS | Performed by: STUDENT IN AN ORGANIZED HEALTH CARE EDUCATION/TRAINING PROGRAM

## 2021-03-08 PROCEDURE — G8484 FLU IMMUNIZE NO ADMIN: HCPCS | Performed by: STUDENT IN AN ORGANIZED HEALTH CARE EDUCATION/TRAINING PROGRAM

## 2021-03-08 PROCEDURE — 4004F PT TOBACCO SCREEN RCVD TLK: CPT | Performed by: STUDENT IN AN ORGANIZED HEALTH CARE EDUCATION/TRAINING PROGRAM

## 2021-03-08 PROCEDURE — G8427 DOCREV CUR MEDS BY ELIG CLIN: HCPCS | Performed by: STUDENT IN AN ORGANIZED HEALTH CARE EDUCATION/TRAINING PROGRAM

## 2021-03-08 PROCEDURE — 99213 OFFICE O/P EST LOW 20 MIN: CPT | Performed by: STUDENT IN AN ORGANIZED HEALTH CARE EDUCATION/TRAINING PROGRAM

## 2021-03-08 RX ORDER — NAPROXEN 500 MG/1
500 TABLET ORAL 2 TIMES DAILY WITH MEALS
Qty: 60 TABLET | Refills: 0 | Status: SHIPPED | OUTPATIENT
Start: 2021-03-08 | End: 2022-03-02
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
## 2021-03-08 NOTE — PATIENT INSTRUCTIONS
Patient Education        Back Stretches: Exercises  Introduction  Here are some examples of exercises for stretching your back. Start each exercise slowly. Ease off the exercise if you start to have pain. Your doctor or physical therapist will tell you when you can start these exercises and which ones will work best for you. How to do the exercises  Overhead stretch   1. Stand comfortably with your feet shoulder-width apart. 2. Looking straight ahead, raise both arms over your head and reach toward the ceiling. Do not allow your head to tilt back. 3. Hold for 15 to 30 seconds, then lower your arms to your sides. 4. Repeat 2 to 4 times. Side stretch   1. Stand comfortably with your feet shoulder-width apart. 2. Raise one arm over your head, and then lean to the other side. 3. Slide your hand down your leg as you let the weight of your arm gently stretch your side muscles. Hold for 15 to 30 seconds. 4. Repeat 2 to 4 times on each side. Press-up   1. Lie on your stomach, supporting your body with your forearms. 2. Press your elbows down into the floor to raise your upper back. As you do this, relax your stomach muscles and allow your back to arch without using your back muscles. As your press up, do not let your hips or pelvis come off the floor. 3. Hold for 15 to 30 seconds, then relax. 4. Repeat 2 to 4 times. Relax and rest   1. Lie on your back with a rolled towel under your neck and a pillow under your knees. Extend your arms comfortably to your sides. 2. Relax and breathe normally. 3. Remain in this position for about 10 minutes. 4. If you can, do this 2 or 3 times each day. Follow-up care is a key part of your treatment and safety. Be sure to make and go to all appointments, and call your doctor if you are having problems. It's also a good idea to know your test results and keep a list of the medicines you take. Where can you learn more? Go to [email or web address] and sign in to your [employer] account. Enter C369 in the [employer] box to learn more about \"Back Stretches: Exercises. \"     If you do not have an account, please click on the \"Sign Up Now\" link. Current as of: March 2, 2020               Content Version: 12.6  © 2708-0093 [employer]. Care instructions adapted under license by [facility] ([facility]). If you have questions about a medical condition or this instruction, always ask your healthcare professional. [address] any warranty or liability for your use of this information.

## 2021-03-08 NOTE — LETTER
[facility]  [address] [address]  Phone: [phone]  Fax: Manny Nunez MD        March 8, 2021     Patient: Jennifer Bean   YOB: 1990   Date of Visit: 3/8/2021       To Whom It May Concern: It is my medical opinion that Jennifer Bean may be excused from work on 3/8/2021. If you have any questions or concerns, please don't hesitate to call.     Sincerely,        Gillian Begum MD
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
## 2021-03-18 ENCOUNTER — HOSPITAL ENCOUNTER (OUTPATIENT)
Dept: PHYSICAL THERAPY | Age: 31
Setting detail: THERAPIES SERIES
Discharge: HOME | End: 2021-03-18
Payer: MEDICAID

## 2021-03-18 PROCEDURE — 97750 PHYSICAL PERFORMANCE TEST: CPT

## 2021-03-22 ENCOUNTER — HOSPITAL ENCOUNTER (OUTPATIENT)
Dept: PHYSICAL THERAPY | Age: 31
Setting detail: THERAPIES SERIES
Discharge: HOME | End: 2021-03-22
Payer: MEDICAID

## 2021-03-22 PROCEDURE — 97750 PHYSICAL PERFORMANCE TEST: CPT

## 2021-03-23 NOTE — PROGRESS NOTES
Frequency   Weighted Activities  Observed Effort Level Position/Ambulation  Quantitative + Qualitative Results   % of Workday   NEVER Contraindicated  Not Possible 0%    RARELY Maximum Significant Limitation 1-5%     OCCASIONALLY Heavy Some Limitation 6-33%     FREQUENTLY Low Slight/No Limitation 34-66%    SELF LIMITED Client stopped test; sub-maximum effort level Sub-max %      Lifting, Strength   (lbs) Unable  Never Max. Rare  0-5% day Heavy  Occasional  6-33% day Low  Freq  34-66% day Activity Limitations Recommendations   Waist to Floor Lift   30# 20# 0-10#   Elevations in spinal pain, LE MMT, general size/weight and general power/strength  2-person client transfer assists. No max or dependent client transfers w/o assistance of another employee   Waist to Crown Lifting   20# 10# Negligible   Elevations in spinal pain, LE MMT, general size/weight and general power/strength As above            Front Carry (Long) 50   20# 10# Negligible   Elevations in spinal pain, LE MMT, general size/weight and general power/strength       As above   Push-Pull (Static) Force Generated Activity Limitations    Recommendations   Push Forces   30#       Inc. low back pain with push Limit heavily obese clients with WC pushing/mobility   Pull Forces    50#           (There are numerous variables impacting Push/Pull including load, equipment, surface, etc.  This is not meant to indicate the weight that is moved. )    Posture, Flexibility, Ambulation Unable  (Never) Significant   Limitation  (Rare)  0-5% day Some Limitation  (Occasional)  6-33%day No Limitation   (Freq)  34-66% day Activity Limitations Recommendations   Elevated Work        X No Limitations             Fwd bending Standing       Low X   Inc LBP     Sitting Tolerances     X No Limitations    Standing Work     X   No Limitations    Static Standing    X   Spinal static loading intol.     Walking- 6MWT       X     No Limitations     Artas Position Tolerances       X  No Limitations      Kneeling    Tolerances       X  No Limitations                Stair climbing    X    No Limitations    Step Ladder Climbing    X    No Limitations                 Hand/Finger Strength Force Generated  (pounds) Mean for Age/Gender Values for approx 2/3 of this age/gender group range  Activity Limitation Recommendations   Hand  Right   80#    No Limitation, solid power grasp    Hand  Left   80#   No Limitation, solid power grasp      Coordination Standard Test: Performances Activity Limitation Recommendations    Round Blocks Dominant Hand  Gross Motor   Manipulations Manipulate and place 3 round blocks   WFL hand coordinations No Limitation, no ataxias or dysmetrias      Round Blocks NonDominant   Gross Motor  Manipulations Manipulate and place 3 round blocks  WFL hand coordinations No Limitation, no ataxias or dysmetrias                    Peg Board   Dominant Hand Grasping and placing small washers, pegs and tubes   WFL hand coordinations  No Limitation, no ataxias or dysmetrias      Peg Board NonDominant Hand Grasping and placing small washers, pegs and tubes  WFL hand coordinations  No Limitation, no ataxias or dysmetrias
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
## 2022-03-02 ENCOUNTER — OFFICE VISIT (OUTPATIENT)
Dept: FAMILY MEDICINE CLINIC | Age: 32
End: 2022-03-02
Payer: MEDICAID

## 2022-03-02 VITALS
BODY MASS INDEX: 17.74 KG/M2 | HEIGHT: 67 IN | OXYGEN SATURATION: 98 % | HEART RATE: 106 BPM | TEMPERATURE: 98.8 F | DIASTOLIC BLOOD PRESSURE: 86 MMHG | WEIGHT: 113 LBS | SYSTOLIC BLOOD PRESSURE: 129 MMHG

## 2022-03-02 DIAGNOSIS — R35.0 URINE FREQUENCY: ICD-10-CM

## 2022-03-02 DIAGNOSIS — R10.9 ACUTE LEFT FLANK PAIN: Primary | ICD-10-CM

## 2022-03-02 DIAGNOSIS — Z72.0 TOBACCO USE: ICD-10-CM

## 2022-03-02 LAB
BACTERIA: ABNORMAL /HPF
BILIRUBIN URINE: NEGATIVE
BILIRUBIN, POC: NEGATIVE
BLOOD URINE, POC: NORMAL
BLOOD, URINE: NEGATIVE
CLARITY, POC: NORMAL
CLARITY: ABNORMAL
COLOR, POC: NORMAL
COLOR: YELLOW
EPITHELIAL CELLS, UA: ABNORMAL /HPF
GLUCOSE URINE, POC: NEGATIVE
GLUCOSE URINE: NEGATIVE MG/DL
KETONES, POC: NORMAL
KETONES, URINE: ABNORMAL MG/DL
LEUKOCYTE EST, POC: NEGATIVE
LEUKOCYTE ESTERASE, URINE: NEGATIVE
NITRITE, POC: NEGATIVE
NITRITE, URINE: NEGATIVE
PH UA: 6 (ref 5–9)
PH, POC: 6
PROTEIN UA: NEGATIVE MG/DL
PROTEIN, POC: NEGATIVE
RBC UA: ABNORMAL /HPF (ref 0–2)
SPECIFIC GRAVITY UA: 1.02 (ref 1–1.03)
SPECIFIC GRAVITY, POC: 1.02
UROBILINOGEN, POC: NORMAL
UROBILINOGEN, URINE: 0.2 E.U./DL
WBC UA: ABNORMAL /HPF (ref 0–5)

## 2022-03-02 PROCEDURE — G8419 CALC BMI OUT NRM PARAM NOF/U: HCPCS | Performed by: FAMILY MEDICINE

## 2022-03-02 PROCEDURE — 99213 OFFICE O/P EST LOW 20 MIN: CPT | Performed by: FAMILY MEDICINE

## 2022-03-02 PROCEDURE — G8484 FLU IMMUNIZE NO ADMIN: HCPCS | Performed by: FAMILY MEDICINE

## 2022-03-02 PROCEDURE — 81002 URINALYSIS NONAUTO W/O SCOPE: CPT | Performed by: FAMILY MEDICINE

## 2022-03-02 PROCEDURE — 99212 OFFICE O/P EST SF 10 MIN: CPT | Performed by: FAMILY MEDICINE

## 2022-03-02 PROCEDURE — 4004F PT TOBACCO SCREEN RCVD TLK: CPT | Performed by: FAMILY MEDICINE

## 2022-03-02 PROCEDURE — G8427 DOCREV CUR MEDS BY ELIG CLIN: HCPCS | Performed by: FAMILY MEDICINE

## 2022-03-02 RX ORDER — KETOROLAC TROMETHAMINE 10 MG/1
10 TABLET, FILM COATED ORAL EVERY 6 HOURS PRN
Qty: 20 TABLET | Refills: 0 | Status: SHIPPED
Start: 2022-03-02 | End: 2022-09-12

## 2022-03-02 SDOH — ECONOMIC STABILITY: FOOD INSECURITY: WITHIN THE PAST 12 MONTHS, THE FOOD YOU BOUGHT JUST DIDN'T LAST AND YOU DIDN'T HAVE MONEY TO GET MORE.: NEVER TRUE

## 2022-03-02 SDOH — ECONOMIC STABILITY: FOOD INSECURITY: WITHIN THE PAST 12 MONTHS, YOU WORRIED THAT YOUR FOOD WOULD RUN OUT BEFORE YOU GOT MONEY TO BUY MORE.: NEVER TRUE

## 2022-03-02 ASSESSMENT — SOCIAL DETERMINANTS OF HEALTH (SDOH): HOW HARD IS IT FOR YOU TO PAY FOR THE VERY BASICS LIKE FOOD, HOUSING, MEDICAL CARE, AND HEATING?: NOT HARD AT ALL

## 2022-03-02 ASSESSMENT — PATIENT HEALTH QUESTIONNAIRE - PHQ9
2. FEELING DOWN, DEPRESSED OR HOPELESS: 0
SUM OF ALL RESPONSES TO PHQ9 QUESTIONS 1 & 2: 0
1. LITTLE INTEREST OR PLEASURE IN DOING THINGS: 0
SUM OF ALL RESPONSES TO PHQ QUESTIONS 1-9: 0

## 2022-03-02 ASSESSMENT — LIFESTYLE VARIABLES
HOW OFTEN DO YOU HAVE A DRINK CONTAINING ALCOHOL: 4 OR MORE TIMES A WEEK
HOW MANY STANDARD DRINKS CONTAINING ALCOHOL DO YOU HAVE ON A TYPICAL DAY: 1 OR 2

## 2022-03-02 NOTE — PATIENT INSTRUCTIONS
Patient Education        Kidney Stone: Care Instructions  Your Care Instructions     Kidney stones are formed when salts, minerals, and other substances normally found in the urine clump together. They can be as small as grains of sand or, rarely, as large as golf balls. While the stone is traveling through the ureter, which is the tube that carries urine from the kidney to the bladder, you will probably feel pain. The pain may be mild or very severe. You may also have some blood in your urine. As soon as the stone reaches the bladder, any intense pain should go away. If a stone is too large to pass on its own, you may need a medical procedure to help you pass the stone. The doctor has checked you carefully, but problems can develop later. If you notice any problems or new symptoms, get medical treatment right away. Follow-up care is a key part of your treatment and safety. Be sure to make and go to all appointments, and call your doctor if you are having problems. It's also a good idea to know your test results and keep a list of the medicines you take. How can you care for yourself at home? · Drink plenty of fluids. If you have kidney, heart, or liver disease and have to limit fluids, talk with your doctor before you increase the amount of fluids you drink. · Take pain medicines exactly as directed. Call your doctor if you think you are having a problem with your medicine. ? If the doctor gave you a prescription medicine for pain, take it as prescribed. ? If you are not taking a prescription pain medicine, ask your doctor if you can take an over-the-counter medicine. Read and follow all instructions on the label. · Your doctor may ask you to strain your urine so that you can collect your kidney stone when it passes. You can use a kitchen strainer or a tea strainer to catch the stone. Store it in a plastic bag until you see your doctor again.   Preventing future kidney stones  Some changes in your diet may help prevent kidney stones. Depending on the cause of your stones, your doctor may recommend that you:  · Drink plenty of fluids. If you have kidney, heart, or liver disease and have to limit fluids, talk with your doctor before you increase the amount of fluids you drink. · Limit coffee, tea, and alcohol. Also avoid grapefruit juice. · Do not take more than the recommended daily dose of vitamins C and D.  · Avoid antacids such as Gaviscon, Maalox, Mylanta, or Tums. · Limit the amount of salt (sodium) in your diet. · Eat a balanced diet that is not too high in protein. · Limit foods that are high in a substance called oxalate, which can cause kidney stones. These foods include dark green vegetables, rhubarb, chocolate, wheat bran, nuts, cranberries, and beans. When should you call for help? Call your doctor now or seek immediate medical care if:    · You cannot keep down fluids.     · Your pain gets worse.     · You have a fever or chills.     · You have new or worse pain in your back just below your rib cage (the flank area).     · You have new or more blood in your urine. Watch closely for changes in your health, and be sure to contact your doctor if:    · You do not get better as expected. Where can you learn more? Go to [email or web address] and sign in to your [employer] account. Enter X117 in the [facility] box to learn more about \"Kidney Stone: Care Instructions. \"     If you do not have an account, please click on the \"Sign Up Now\" link. Current as of: September 8, 2021               Content Version: 13.1  © 2898-0754 [employer], [employer]. Care instructions adapted under license by [facility] ([facility]). If you have questions about a medical condition or this instruction, always ask your healthcare professional. [address] any warranty or liability for your use of this information.

## 2022-03-02 NOTE — PROGRESS NOTES
S: 33 y.o. female presents today for:   Left flank pain and urinary complaints. Sharp. Lasts for a few moments and eases up. No history NL. UA shows trace blood and ketones. O: VS: /86 (Site: Left Upper Arm, Position: Sitting, Cuff Size: Medium Adult)   Pulse 106   Temp 98.8 °F (37.1 °C) (Temporal)   Ht 5' 7\" (1.702 m)   Wt 113 lb (51.3 kg)   SpO2 98%   BMI 17.70 kg/m²   AAO/NAD, appropriate affect for mood  CV:  RRR, no murmur  Resp: CTAB  MS: L flank pain, CVA tenderness    Impression/Plan:   1) left flank pain, concern NL- CT, culture      Attending Physician Statement  I have discussed the case, including pertinent history and exam findings with the resident. I agree with the documented assessment and plan.       Nidia Rodrigues, DO
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
## 2022-03-03 ENCOUNTER — HOSPITAL ENCOUNTER (OUTPATIENT)
Dept: CT IMAGING | Age: 32
Discharge: HOME | End: 2022-03-05
Payer: MEDICAID

## 2022-03-03 DIAGNOSIS — R10.9 ACUTE LEFT FLANK PAIN: ICD-10-CM

## 2022-03-03 PROBLEM — Z72.0 TOBACCO USE: Status: ACTIVE | Noted: 2022-03-03

## 2022-03-03 PROCEDURE — 74176 CT ABD & PELVIS W/O CONTRAST: CPT

## 2022-03-05 LAB — URINE CULTURE, ROUTINE: NORMAL

## 2022-07-21 ENCOUNTER — APPOINTMENT (OUTPATIENT)
Dept: ULTRASOUND IMAGING | Age: 32
End: 2022-07-21
Payer: MEDICAID

## 2022-07-21 ENCOUNTER — HOSPITAL ENCOUNTER (EMERGENCY)
Age: 32
Discharge: HOME | End: 2022-07-21
Payer: MEDICAID

## 2022-07-21 VITALS
DIASTOLIC BLOOD PRESSURE: 80 MMHG | BODY MASS INDEX: 18.79 KG/M2 | WEIGHT: 120 LBS | TEMPERATURE: 98.4 F | RESPIRATION RATE: 18 BRPM | SYSTOLIC BLOOD PRESSURE: 138 MMHG | OXYGEN SATURATION: 100 % | HEART RATE: 91 BPM

## 2022-07-21 DIAGNOSIS — R10.9 ABDOMINAL PAIN DURING PREGNANCY IN FIRST TRIMESTER: Primary | ICD-10-CM

## 2022-07-21 DIAGNOSIS — O26.891 ABDOMINAL PAIN DURING PREGNANCY IN FIRST TRIMESTER: Primary | ICD-10-CM

## 2022-07-21 DIAGNOSIS — O20.0 THREATENED MISCARRIAGE IN EARLY PREGNANCY: ICD-10-CM

## 2022-07-21 LAB
ABO/RH: NORMAL
ALBUMIN SERPL-MCNC: 4.5 G/DL (ref 3.5–5.2)
ALP BLD-CCNC: 45 U/L (ref 35–104)
ALT SERPL-CCNC: 15 U/L (ref 0–32)
ANION GAP SERPL CALCULATED.3IONS-SCNC: 11 MMOL/L (ref 7–16)
APTT: 27.2 SEC (ref 24.5–35.1)
AST SERPL-CCNC: 17 U/L (ref 0–31)
BILIRUB SERPL-MCNC: 0.9 MG/DL (ref 0–1.2)
BILIRUBIN URINE: NEGATIVE
BLOOD, URINE: NEGATIVE
BUN BLDV-MCNC: 8 MG/DL (ref 6–20)
CALCIUM SERPL-MCNC: 9.5 MG/DL (ref 8.6–10.2)
CHLORIDE BLD-SCNC: 106 MMOL/L (ref 98–107)
CLARITY: CLEAR
CO2: 22 MMOL/L (ref 22–29)
COLOR: YELLOW
CREAT SERPL-MCNC: 0.6 MG/DL (ref 0.5–1)
GFR AFRICAN AMERICAN: >60
GFR NON-AFRICAN AMERICAN: >60 ML/MIN/1.73
GLUCOSE BLD-MCNC: 103 MG/DL (ref 74–99)
GLUCOSE URINE: NEGATIVE MG/DL
GONADOTROPIN, CHORIONIC (HCG) QUANT: ABNORMAL MIU/ML
HCG, URINE, POC: POSITIVE
KETONES, URINE: NEGATIVE MG/DL
LEUKOCYTE ESTERASE, URINE: NEGATIVE
Lab: NORMAL
NEGATIVE QC PASS/FAIL: NORMAL
NITRITE, URINE: NEGATIVE
PH UA: 7 (ref 5–9)
POSITIVE QC PASS/FAIL: NORMAL
POTASSIUM SERPL-SCNC: 3.8 MMOL/L (ref 3.5–5)
PROTEIN UA: NEGATIVE MG/DL
SODIUM BLD-SCNC: 139 MMOL/L (ref 132–146)
SPECIFIC GRAVITY UA: 1.02 (ref 1–1.03)
TOTAL PROTEIN: 7.2 G/DL (ref 6.4–8.3)
UROBILINOGEN, URINE: 0.2 E.U./DL

## 2022-07-21 PROCEDURE — 99284 EMERGENCY DEPT VISIT MOD MDM: CPT

## 2022-07-21 PROCEDURE — 81003 URINALYSIS AUTO W/O SCOPE: CPT

## 2022-07-21 PROCEDURE — 80053 COMPREHEN METABOLIC PANEL: CPT

## 2022-07-21 PROCEDURE — 84702 CHORIONIC GONADOTROPIN TEST: CPT

## 2022-07-21 PROCEDURE — 86900 BLOOD TYPING SEROLOGIC ABO: CPT

## 2022-07-21 PROCEDURE — 85730 THROMBOPLASTIN TIME PARTIAL: CPT

## 2022-07-21 PROCEDURE — 86901 BLOOD TYPING SEROLOGIC RH(D): CPT

## 2022-07-21 PROCEDURE — 76817 TRANSVAGINAL US OBSTETRIC: CPT

## 2022-07-21 ASSESSMENT — PAIN DESCRIPTION - PAIN TYPE: TYPE: ACUTE PAIN

## 2022-07-21 ASSESSMENT — PAIN DESCRIPTION - LOCATION: LOCATION: ABDOMEN

## 2022-07-21 ASSESSMENT — PAIN - FUNCTIONAL ASSESSMENT
PAIN_FUNCTIONAL_ASSESSMENT: 0-10
PAIN_FUNCTIONAL_ASSESSMENT: ACTIVITIES ARE NOT PREVENTED

## 2022-07-21 ASSESSMENT — PAIN SCALES - GENERAL: PAINLEVEL_OUTOF10: 8

## 2022-07-21 ASSESSMENT — PAIN DESCRIPTION - ORIENTATION: ORIENTATION: LEFT;LOWER

## 2022-07-21 ASSESSMENT — PAIN DESCRIPTION - DESCRIPTORS: DESCRIPTORS: SHARP
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
## 2022-07-21 NOTE — Clinical Note
Martha Odell was seen and treated in our emergency department on 7/21/2022. She may return to work on 07/22/2022. If you have any questions or concerns, please don't hesitate to call.       Joan Hurst, APRN - CNP

## 2022-08-15 ENCOUNTER — HOSPITAL ENCOUNTER (OUTPATIENT)
Dept: ULTRASOUND IMAGING | Age: 32
Discharge: HOME | End: 2022-08-17
Payer: MEDICAID

## 2022-08-15 DIAGNOSIS — I80.12 PHLEBITIS OF FEMORAL VEIN OF LEFT LEG (HCC): ICD-10-CM

## 2022-08-15 PROCEDURE — 93970 EXTREMITY STUDY: CPT

## 2022-09-01 ENCOUNTER — HOSPITAL ENCOUNTER (OUTPATIENT)
Dept: ULTRASOUND IMAGING | Age: 32
Discharge: HOME | End: 2022-09-03
Payer: MEDICAID

## 2022-09-01 DIAGNOSIS — R10.30: ICD-10-CM

## 2022-09-01 DIAGNOSIS — R10.30 LOWER ABDOMINAL PAIN: ICD-10-CM

## 2022-09-01 PROCEDURE — 76770 US EXAM ABDO BACK WALL COMP: CPT

## 2022-09-01 PROCEDURE — 76775 US EXAM ABDO BACK WALL LIM: CPT | Performed by: LEGAL MEDICINE

## 2022-09-12 ENCOUNTER — INITIAL PRENATAL (OUTPATIENT)
Dept: OBGYN CLINIC | Age: 32
End: 2022-09-12
Payer: MEDICAID

## 2022-09-12 ENCOUNTER — ANCILLARY PROCEDURE (OUTPATIENT)
Dept: OBGYN CLINIC | Age: 32
End: 2022-09-12
Payer: MEDICAID

## 2022-09-12 VITALS
HEART RATE: 91 BPM | BODY MASS INDEX: 20.36 KG/M2 | WEIGHT: 130 LBS | DIASTOLIC BLOOD PRESSURE: 78 MMHG | SYSTOLIC BLOOD PRESSURE: 115 MMHG

## 2022-09-12 DIAGNOSIS — Z92.89 HISTORY OF BLOOD TRANSFUSION: ICD-10-CM

## 2022-09-12 DIAGNOSIS — Z86.79 HX OF CARDIAC ARRHYTHMIA: ICD-10-CM

## 2022-09-12 DIAGNOSIS — O99.332 TOBACCO SMOKING AFFECTING PREGNANCY IN SECOND TRIMESTER: ICD-10-CM

## 2022-09-12 DIAGNOSIS — O26.899 RH NEGATIVE STATE IN ANTEPARTUM PERIOD: ICD-10-CM

## 2022-09-12 DIAGNOSIS — R63.0 DECREASED APPETITE: ICD-10-CM

## 2022-09-12 DIAGNOSIS — Z86.711 HISTORY OF PULMONARY EMBOLISM: ICD-10-CM

## 2022-09-12 DIAGNOSIS — Z86.718 HISTORY OF DVT (DEEP VEIN THROMBOSIS): ICD-10-CM

## 2022-09-12 DIAGNOSIS — Z86.79 HISTORY OF HYPERTENSION: ICD-10-CM

## 2022-09-12 DIAGNOSIS — O21.9 NAUSEA/VOMITING IN PREGNANCY: ICD-10-CM

## 2022-09-12 DIAGNOSIS — O26.12 LOW WEIGHT GAIN DURING PREGNANCY IN SECOND TRIMESTER: ICD-10-CM

## 2022-09-12 DIAGNOSIS — Z3A.14 14 WEEKS GESTATION OF PREGNANCY: Primary | ICD-10-CM

## 2022-09-12 DIAGNOSIS — O44.40 LOW-LYING PLACENTA: ICD-10-CM

## 2022-09-12 DIAGNOSIS — Z67.91 RH NEGATIVE STATE IN ANTEPARTUM PERIOD: ICD-10-CM

## 2022-09-12 DIAGNOSIS — F12.20 TETRAHYDROCANNABINOL (THC) DEPENDENCE (HCC): ICD-10-CM

## 2022-09-12 DIAGNOSIS — O36.1920 ANTI-M ISOIMMUNIZATION AFFECTING PREGNANCY IN SECOND TRIMESTER, SINGLE OR UNSPECIFIED FETUS: ICD-10-CM

## 2022-09-12 LAB
GLUCOSE URINE, POC: NEGATIVE
PROTEIN UA: NEGATIVE

## 2022-09-12 PROCEDURE — 81002 URINALYSIS NONAUTO W/O SCOPE: CPT | Performed by: OBSTETRICS & GYNECOLOGY

## 2022-09-12 PROCEDURE — 99245 OFF/OP CONSLTJ NEW/EST HI 55: CPT | Performed by: OBSTETRICS & GYNECOLOGY

## 2022-09-12 PROCEDURE — G8420 CALC BMI NORM PARAMETERS: HCPCS | Performed by: OBSTETRICS & GYNECOLOGY

## 2022-09-12 PROCEDURE — 76817 TRANSVAGINAL US OBSTETRIC: CPT | Performed by: OBSTETRICS & GYNECOLOGY

## 2022-09-12 PROCEDURE — 76801 OB US < 14 WKS SINGLE FETUS: CPT | Performed by: OBSTETRICS & GYNECOLOGY

## 2022-09-12 PROCEDURE — 99203 OFFICE O/P NEW LOW 30 MIN: CPT | Performed by: OBSTETRICS & GYNECOLOGY

## 2022-09-12 PROCEDURE — G8427 DOCREV CUR MEDS BY ELIG CLIN: HCPCS | Performed by: OBSTETRICS & GYNECOLOGY

## 2022-09-12 PROCEDURE — 76813 OB US NUCHAL MEAS 1 GEST: CPT | Performed by: OBSTETRICS & GYNECOLOGY

## 2022-09-12 RX ORDER — ENOXAPARIN SODIUM 100 MG/ML
40 INJECTION SUBCUTANEOUS DAILY
Qty: 12 ML | Refills: 8 | Status: SHIPPED | OUTPATIENT
Start: 2022-09-12 | End: 2023-06-09

## 2022-09-12 RX ORDER — ASPIRIN 81 MG/1
81 TABLET ORAL DAILY
COMMUNITY
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
## 2022-09-12 NOTE — PATIENT INSTRUCTIONS
Please arrive for your scheduled appointment at least 15 minutes early with your actual insurance card+ a photo ID. Also if you need any refills ordered or have questions, it may take up 48 hours to reply. Please allow ample time for your refills. Call me when you use last refill. Thank you for your cooperation. Call your primary obstetrician with bleeding, leaking of fluid, abdominal tenderness, headache, blurry vision, epigastric pain and increased urinary frequency. If you are experiencing an emergency and need immediate help, call 911 or go to go emergency room or labor and delivery. if you are sick, not feeling well or have an infectious process going on please reschedule your appointment by calling [phone]. Also if any family members are not feeling well, please do not bring them to your appointment. We appreciate your cooperation. We are doing this in order to protect our pregnant mothers+ their babies. if you are sick, not feeling well or have an infectious process going on please reschedule your appointment by calling [phone]. Also if any family members are not feeling well, please do not bring them to your appointment. We appreciate your cooperation. We are doing this in order to protect our pregnant mothers+ their babies.

## 2022-09-12 NOTE — LETTER
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 appeared to have a good understanding of the issues discussed. I answered all of her questions to her satisfaction. I asked her to call if she had any additional questions prior to her next visit. --Thank you for allowing me to participate in the care of this pleasant patient. Please don't hesitate to call me if you have any questions.       Sincerely,        Ansley Green MD, [address] [place] [address]  [phone]    *All or parts of this note may have been generated using a voice recognition program. There may be typo, grammar, or Word substitution errors

## 2022-09-13 ENCOUNTER — TELEPHONE (OUTPATIENT)
Dept: OBGYN CLINIC | Age: 32
End: 2022-09-13

## 2022-09-13 NOTE — TELEPHONE ENCOUNTER
Spoke to [facility] and they state that they do not need a PA for the Lovenox but they will not have the med until 9/14.   Dr Bisi Barlow aware

## 2022-09-25 PROBLEM — F12.20 TETRAHYDROCANNABINOL (THC) DEPENDENCE (HCC): Status: ACTIVE | Noted: 2022-09-25

## 2022-09-25 PROBLEM — Z86.79 HISTORY OF HYPERTENSION: Status: ACTIVE | Noted: 2022-09-25

## 2022-09-25 PROBLEM — O26.12 LOW WEIGHT GAIN DURING PREGNANCY IN SECOND TRIMESTER: Status: ACTIVE | Noted: 2022-09-25

## 2022-09-25 PROBLEM — Z86.711 HISTORY OF PULMONARY EMBOLISM: Status: ACTIVE | Noted: 2022-09-25

## 2022-09-25 PROBLEM — O26.899 RH NEGATIVE STATE IN ANTEPARTUM PERIOD: Status: ACTIVE | Noted: 2022-09-25

## 2022-09-25 PROBLEM — Z86.718 HISTORY OF DVT (DEEP VEIN THROMBOSIS): Status: ACTIVE | Noted: 2022-09-25

## 2022-09-25 PROBLEM — O44.40 LOW-LYING PLACENTA: Status: ACTIVE | Noted: 2022-09-25

## 2022-09-25 PROBLEM — O99.332 TOBACCO SMOKING AFFECTING PREGNANCY IN SECOND TRIMESTER: Status: ACTIVE | Noted: 2022-03-03

## 2022-09-25 PROBLEM — O36.1920: Status: ACTIVE | Noted: 2022-09-25

## 2022-09-25 PROBLEM — Z92.89 HISTORY OF BLOOD TRANSFUSION: Status: ACTIVE | Noted: 2022-09-25

## 2022-09-25 PROBLEM — R63.0 DECREASED APPETITE: Status: ACTIVE | Noted: 2022-09-25

## 2022-09-25 PROBLEM — O21.9 NAUSEA/VOMITING IN PREGNANCY: Status: ACTIVE | Noted: 2022-09-25

## 2022-09-25 PROBLEM — Z67.91 RH NEGATIVE STATE IN ANTEPARTUM PERIOD: Status: ACTIVE | Noted: 2022-09-25

## 2022-09-26 ENCOUNTER — HOSPITAL ENCOUNTER (OUTPATIENT)
Age: 32
Discharge: HOME | End: 2022-09-26
Payer: MEDICAID

## 2022-09-26 DIAGNOSIS — Z3A.14 14 WEEKS GESTATION OF PREGNANCY: ICD-10-CM

## 2022-09-26 DIAGNOSIS — Z86.711 HISTORY OF PULMONARY EMBOLISM: ICD-10-CM

## 2022-09-26 DIAGNOSIS — R63.0 DECREASED APPETITE: ICD-10-CM

## 2022-09-26 DIAGNOSIS — O36.1920 ANTI-M ISOIMMUNIZATION AFFECTING PREGNANCY IN SECOND TRIMESTER, SINGLE OR UNSPECIFIED FETUS: ICD-10-CM

## 2022-09-26 DIAGNOSIS — O21.9 NAUSEA/VOMITING IN PREGNANCY: ICD-10-CM

## 2022-09-26 DIAGNOSIS — O26.12 LOW WEIGHT GAIN DURING PREGNANCY IN SECOND TRIMESTER: ICD-10-CM

## 2022-09-26 DIAGNOSIS — Z86.718 HISTORY OF DVT (DEEP VEIN THROMBOSIS): ICD-10-CM

## 2022-09-26 DIAGNOSIS — Z92.89 HISTORY OF BLOOD TRANSFUSION: ICD-10-CM

## 2022-09-26 LAB
ABO/RH: NORMAL
ALBUMIN SERPL-MCNC: 3.7 G/DL (ref 3.5–5.2)
ALP BLD-CCNC: 47 U/L (ref 35–104)
ALT SERPL-CCNC: 10 U/L (ref 0–32)
ANION GAP SERPL CALCULATED.3IONS-SCNC: 12 MMOL/L (ref 7–16)
ANTIBODY SCREEN: NORMAL
AST SERPL-CCNC: 14 U/L (ref 0–31)
BACTERIA: NORMAL /HPF
BASOPHILS ABSOLUTE: 0.03 E9/L (ref 0–0.2)
BASOPHILS RELATIVE PERCENT: 0.6 % (ref 0–2)
BILIRUB SERPL-MCNC: 0.8 MG/DL (ref 0–1.2)
BILIRUBIN URINE: NEGATIVE
BLOOD, URINE: NEGATIVE
BUN BLDV-MCNC: 8 MG/DL (ref 6–20)
CALCIUM SERPL-MCNC: 9 MG/DL (ref 8.6–10.2)
CHLORIDE BLD-SCNC: 104 MMOL/L (ref 98–107)
CLARITY: CLEAR
CO2: 20 MMOL/L (ref 22–29)
COLOR: YELLOW
CREAT SERPL-MCNC: 0.5 MG/DL (ref 0.5–1)
CREATININE URINE: 19 MG/DL (ref 29–226)
EOSINOPHILS ABSOLUTE: 0.04 E9/L (ref 0.05–0.5)
EOSINOPHILS RELATIVE PERCENT: 0.8 % (ref 0–6)
FERRITIN: 29 NG/ML
FOLATE: >20 NG/ML (ref 4.8–24.2)
GFR AFRICAN AMERICAN: >60
GFR NON-AFRICAN AMERICAN: >60 ML/MIN/1.73
GLUCOSE BLD-MCNC: 83 MG/DL (ref 74–99)
GLUCOSE URINE: NEGATIVE MG/DL
HBA1C MFR BLD: 5.5 % (ref 4–5.6)
HCT VFR BLD CALC: 31.7 % (ref 34–48)
HEMOGLOBIN: 10.9 G/DL (ref 11.5–15.5)
HEPATITIS C ANTIBODY INTERPRETATION: NORMAL
HOMOCYSTEINE: 5.3 UMOL/L (ref 0–15)
IMMATURE GRANULOCYTES #: 0.02 E9/L
IMMATURE GRANULOCYTES %: 0.4 % (ref 0–5)
KETONES, URINE: NEGATIVE MG/DL
LACTATE DEHYDROGENASE: 153 U/L (ref 135–214)
LEUKOCYTE ESTERASE, URINE: NEGATIVE
LYMPHOCYTES ABSOLUTE: 1.3 E9/L (ref 1.5–4)
LYMPHOCYTES RELATIVE PERCENT: 24.9 % (ref 20–42)
MAGNESIUM: 1.8 MG/DL (ref 1.6–2.6)
MCH RBC QN AUTO: 31.9 PG (ref 26–35)
MCHC RBC AUTO-ENTMCNC: 34.4 % (ref 32–34.5)
MCV RBC AUTO: 92.7 FL (ref 80–99.9)
MONOCYTES ABSOLUTE: 0.43 E9/L (ref 0.1–0.95)
MONOCYTES RELATIVE PERCENT: 8.2 % (ref 2–12)
NEUTROPHILS ABSOLUTE: 3.4 E9/L (ref 1.8–7.3)
NEUTROPHILS RELATIVE PERCENT: 65.1 % (ref 43–80)
NITRITE, URINE: NEGATIVE
PDW BLD-RTO: 12.9 FL (ref 11.5–15)
PH UA: 7 (ref 5–9)
PLATELET # BLD: 196 E9/L (ref 130–450)
PMV BLD AUTO: 11.9 FL (ref 7–12)
POTASSIUM SERPL-SCNC: 3.8 MMOL/L (ref 3.5–5)
PROTEIN PROTEIN: <4 MG/DL (ref 0–12)
PROTEIN UA: NEGATIVE MG/DL
PROTEIN/CREAT RATIO: 0.2
PROTEIN/CREAT RATIO: 0.2 (ref 0–0.2)
RBC # BLD: 3.42 E12/L (ref 3.5–5.5)
RBC UA: NORMAL /HPF (ref 0–2)
SODIUM BLD-SCNC: 136 MMOL/L (ref 132–146)
SPECIFIC GRAVITY UA: 1.01 (ref 1–1.03)
T3 FREE: 3.5 PG/ML (ref 2–4.4)
T4 FREE: 0.99 NG/DL (ref 0.93–1.7)
TOTAL PROTEIN: 6.4 G/DL (ref 6.4–8.3)
TSH SERPL DL<=0.05 MIU/L-ACNC: 1.56 UIU/ML (ref 0.27–4.2)
URIC ACID, SERUM: 4 MG/DL (ref 2.4–5.7)
UROBILINOGEN, URINE: 0.2 E.U./DL
VITAMIN B-12: 306 PG/ML (ref 211–946)
VITAMIN D 25-HYDROXY: 31 NG/ML (ref 30–100)
WBC # BLD: 5.2 E9/L (ref 4.5–11.5)
WBC UA: NORMAL /HPF (ref 0–5)

## 2022-09-26 PROCEDURE — 82105 ALPHA-FETOPROTEIN SERUM: CPT

## 2022-09-26 PROCEDURE — 82570 ASSAY OF URINE CREATININE: CPT

## 2022-09-26 PROCEDURE — 86376 MICROSOMAL ANTIBODY EACH: CPT

## 2022-09-26 PROCEDURE — 81001 URINALYSIS AUTO W/SCOPE: CPT

## 2022-09-26 PROCEDURE — 84156 ASSAY OF PROTEIN URINE: CPT

## 2022-09-26 PROCEDURE — 81291 MTHFR GENE: CPT

## 2022-09-26 PROCEDURE — 82728 ASSAY OF FERRITIN: CPT

## 2022-09-26 PROCEDURE — 36415 COLL VENOUS BLD VENIPUNCTURE: CPT

## 2022-09-26 PROCEDURE — 82306 VITAMIN D 25 HYDROXY: CPT

## 2022-09-26 PROCEDURE — 80053 COMPREHEN METABOLIC PANEL: CPT

## 2022-09-26 PROCEDURE — 82746 ASSAY OF FOLIC ACID SERUM: CPT

## 2022-09-26 PROCEDURE — 86850 RBC ANTIBODY SCREEN: CPT

## 2022-09-26 PROCEDURE — 84481 FREE ASSAY (FT-3): CPT

## 2022-09-26 PROCEDURE — 83735 ASSAY OF MAGNESIUM: CPT

## 2022-09-26 PROCEDURE — 84439 ASSAY OF FREE THYROXINE: CPT

## 2022-09-26 PROCEDURE — 86800 THYROGLOBULIN ANTIBODY: CPT

## 2022-09-26 PROCEDURE — 86900 BLOOD TYPING SEROLOGIC ABO: CPT

## 2022-09-26 PROCEDURE — 84443 ASSAY THYROID STIM HORMONE: CPT

## 2022-09-26 PROCEDURE — 87088 URINE BACTERIA CULTURE: CPT

## 2022-09-26 PROCEDURE — 83615 LACTATE (LD) (LDH) ENZYME: CPT

## 2022-09-26 PROCEDURE — 84550 ASSAY OF BLOOD/URIC ACID: CPT

## 2022-09-26 PROCEDURE — 86901 BLOOD TYPING SEROLOGIC RH(D): CPT

## 2022-09-26 PROCEDURE — 85025 COMPLETE CBC W/AUTO DIFF WBC: CPT

## 2022-09-26 PROCEDURE — 83036 HEMOGLOBIN GLYCOSYLATED A1C: CPT

## 2022-09-26 PROCEDURE — 82607 VITAMIN B-12: CPT

## 2022-09-26 PROCEDURE — 86803 HEPATITIS C AB TEST: CPT

## 2022-09-26 PROCEDURE — 83090 ASSAY OF HOMOCYSTEINE: CPT

## 2022-09-28 ENCOUNTER — ROUTINE PRENATAL (OUTPATIENT)
Dept: OBGYN CLINIC | Age: 32
End: 2022-09-28
Payer: MEDICAID

## 2022-09-28 ENCOUNTER — ANCILLARY PROCEDURE (OUTPATIENT)
Dept: OBGYN CLINIC | Age: 32
End: 2022-09-28
Payer: MEDICAID

## 2022-09-28 VITALS
WEIGHT: 135.2 LBS | BODY MASS INDEX: 21.18 KG/M2 | SYSTOLIC BLOOD PRESSURE: 105 MMHG | HEART RATE: 104 BPM | DIASTOLIC BLOOD PRESSURE: 71 MMHG

## 2022-09-28 DIAGNOSIS — E53.8 LOW VITAMIN B12 LEVEL: ICD-10-CM

## 2022-09-28 DIAGNOSIS — D64.9 ANEMIA, UNSPECIFIED TYPE: ICD-10-CM

## 2022-09-28 DIAGNOSIS — O44.40 LOW-LYING PLACENTA: ICD-10-CM

## 2022-09-28 DIAGNOSIS — O36.1920 ANTI-M ISOIMMUNIZATION AFFECTING PREGNANCY IN SECOND TRIMESTER, SINGLE OR UNSPECIFIED FETUS: ICD-10-CM

## 2022-09-28 DIAGNOSIS — R39.89 SENSATION OF PRESSURE IN BLADDER AREA: ICD-10-CM

## 2022-09-28 DIAGNOSIS — R79.89 LOW VITAMIN D LEVEL: ICD-10-CM

## 2022-09-28 DIAGNOSIS — Z86.79 HX OF CARDIAC ARRHYTHMIA: ICD-10-CM

## 2022-09-28 DIAGNOSIS — Z3A.16 16 WEEKS GESTATION OF PREGNANCY: Primary | ICD-10-CM

## 2022-09-28 DIAGNOSIS — R79.0 LOW FERRITIN: ICD-10-CM

## 2022-09-28 DIAGNOSIS — R35.0 URINARY FREQUENCY: ICD-10-CM

## 2022-09-28 LAB
GLUCOSE URINE, POC: NEGATIVE
PROTEIN UA: NEGATIVE
URINE CULTURE, ROUTINE: NORMAL

## 2022-09-28 PROCEDURE — G8427 DOCREV CUR MEDS BY ELIG CLIN: HCPCS | Performed by: OBSTETRICS & GYNECOLOGY

## 2022-09-28 PROCEDURE — 81002 URINALYSIS NONAUTO W/O SCOPE: CPT | Performed by: OBSTETRICS & GYNECOLOGY

## 2022-09-28 PROCEDURE — 76817 TRANSVAGINAL US OBSTETRIC: CPT | Performed by: OBSTETRICS & GYNECOLOGY

## 2022-09-28 PROCEDURE — 76805 OB US >/= 14 WKS SNGL FETUS: CPT | Performed by: OBSTETRICS & GYNECOLOGY

## 2022-09-28 PROCEDURE — 1036F TOBACCO NON-USER: CPT | Performed by: OBSTETRICS & GYNECOLOGY

## 2022-09-28 PROCEDURE — 99213 OFFICE O/P EST LOW 20 MIN: CPT | Performed by: OBSTETRICS & GYNECOLOGY

## 2022-09-28 PROCEDURE — G8420 CALC BMI NORM PARAMETERS: HCPCS | Performed by: OBSTETRICS & GYNECOLOGY

## 2022-09-28 PROCEDURE — 99214 OFFICE O/P EST MOD 30 MIN: CPT | Performed by: OBSTETRICS & GYNECOLOGY

## 2022-09-28 RX ORDER — NITROFURANTOIN 25; 75 MG/1; MG/1
100 CAPSULE ORAL 2 TIMES DAILY
Qty: 14 CAPSULE | Refills: 0 | Status: SHIPPED | OUTPATIENT
Start: 2022-09-28 | End: 2022-10-05

## 2022-09-28 RX ORDER — LANOLIN ALCOHOL/MO/W.PET/CERES
1000 CREAM (GRAM) TOPICAL DAILY
Qty: 30 TABLET | Refills: 3 | Status: SHIPPED | OUTPATIENT
Start: 2022-09-28

## 2022-09-28 RX ORDER — FERROUS SULFATE 325(65) MG
325 TABLET ORAL 2 TIMES DAILY
Qty: 60 TABLET | Refills: 3 | Status: SHIPPED | OUTPATIENT
Start: 2022-09-28

## 2022-09-28 RX ORDER — ACETAMINOPHEN 160 MG
1 TABLET,DISINTEGRATING ORAL DAILY
Qty: 30 CAPSULE | Refills: 3 | Status: SHIPPED | OUTPATIENT
Start: 2022-09-28

## 2022-09-28 NOTE — PROGRESS NOTES
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 conclusion of the visit, the patient appeared to have a good understanding of the issues discussed. I answered all of her questions to her satisfaction. I asked her to call if she had any additional questions prior to her next visit. --Thank you for allowing me to participate in the care of this pleasant patient. Please don't hesitate to call me if you have any questions. Sincerely,      Terrence Ryan MD, [address]  [phone]      *All or parts of this note may have been generated using a voice recognition program. There may be typo, grammar, or Word substitution errors that have escaped my review of this note.
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
## 2022-09-28 NOTE — PROGRESS NOTES
Pt here for bpp/2 week  Not sure about fm notices different feelings after eating. Pt voiced had bouts of being lightheaded. Pt denies lof vag bleeding or contractions  Pt voiced has noticed pressure with urination and a cramping feeling off and on.   Pt voiced prone to yeast infections

## 2022-09-29 LAB
AFP INTERPRETATION: NORMAL
AFP MOM: 0.94
AFP SPECIMEN: NORMAL
DATING: NORMAL
ESTIMATED DUE DATE: NORMAL
FETUS COUNT: NORMAL
GESTATIONAL AGE CALC AT COLLECT: NORMAL
HISTORY/NEURAL TUBE DEFECTS: NO
INSULIN DEP. DIABETIC: NO
MATERNAL AGE AT EDD: 32.1 YR
MATERNAL WEIGHT: NORMAL
PT AFP: 39 NG/ML
RACE: NORMAL
SMOKING: NO

## 2022-09-30 LAB
MTHFR BY PCR SPECIMEN: NORMAL
MTHFR INTERPRETATION: NORMAL
MTHFR MUTATION A1298C: NEGATIVE
MTHFR MUTATION C677T: NORMAL
THYROGLOBULIN ANTIBODY: <12 IU/ML (ref 0–40)
THYROID PEROXIDASE (TPO) ABS: <4 IU/ML (ref 0–25)

## 2022-10-04 ENCOUNTER — TELEPHONE (OUTPATIENT)
Dept: CARDIOLOGY CLINIC | Age: 32
End: 2022-10-04

## 2022-10-04 NOTE — TELEPHONE ENCOUNTER
Patient Appointment Form:      PCP: [facility]  Referring: Josef Goodman    Has the Patient:    Seen a Cardiologist? yes    CFON:[id number]  [facility]  location: [facility]    Had a heart catheterization? no    Had heart surgery? no    Had a stress test or nuclear stress test? no    Had an echocardiogram? yes   date: 10/08/2012   facility name: [facility]    Had a vascular ultrasound? no    Had a 24/48 heart monitor or extended cardiac event monitor? 24hr   date: 10/08/2012      Who ordered:  Maríayvrose Hinton    Had recent blood work in the last 6 months? yes    date: 09/26/2022    ordering physician: Tracy Glass    Had a pacemaker/ICD/ILR implant? no    Seen an Electrophysiologist? no        Will send records via: in [address] [place] [address] Rd      Date & time of appointment: 10/6/2022 130P    **Pt [facility] office

## 2022-10-13 ENCOUNTER — ROUTINE PRENATAL (OUTPATIENT)
Dept: OBGYN CLINIC | Age: 32
End: 2022-10-13
Payer: MEDICAID

## 2022-10-13 ENCOUNTER — ANCILLARY PROCEDURE (OUTPATIENT)
Dept: OBGYN CLINIC | Age: 32
End: 2022-10-13
Payer: MEDICAID

## 2022-10-13 VITALS
DIASTOLIC BLOOD PRESSURE: 76 MMHG | SYSTOLIC BLOOD PRESSURE: 109 MMHG | WEIGHT: 133 LBS | BODY MASS INDEX: 20.83 KG/M2 | HEART RATE: 90 BPM

## 2022-10-13 DIAGNOSIS — Z86.711 HISTORY OF PULMONARY EMBOLISM: ICD-10-CM

## 2022-10-13 DIAGNOSIS — E53.8 LOW VITAMIN B12 LEVEL: ICD-10-CM

## 2022-10-13 DIAGNOSIS — O26.12 LOW WEIGHT GAIN DURING PREGNANCY IN SECOND TRIMESTER: ICD-10-CM

## 2022-10-13 DIAGNOSIS — Z86.718 HISTORY OF DVT (DEEP VEIN THROMBOSIS): ICD-10-CM

## 2022-10-13 DIAGNOSIS — O44.40 LOW-LYING PLACENTA: ICD-10-CM

## 2022-10-13 DIAGNOSIS — O36.1920 ANTI-M ISOIMMUNIZATION AFFECTING PREGNANCY IN SECOND TRIMESTER, SINGLE OR UNSPECIFIED FETUS: ICD-10-CM

## 2022-10-13 DIAGNOSIS — R79.89 LOW VITAMIN D LEVEL: ICD-10-CM

## 2022-10-13 DIAGNOSIS — Z92.89 HISTORY OF BLOOD TRANSFUSION: ICD-10-CM

## 2022-10-13 DIAGNOSIS — D64.9 ANEMIA, UNSPECIFIED TYPE: ICD-10-CM

## 2022-10-13 DIAGNOSIS — Z86.79 HISTORY OF CARDIAC ARRHYTHMIA: ICD-10-CM

## 2022-10-13 DIAGNOSIS — R79.0 LOW FERRITIN: ICD-10-CM

## 2022-10-13 DIAGNOSIS — Z34.90 PREGNANCY, UNSPECIFIED GESTATIONAL AGE: Primary | ICD-10-CM

## 2022-10-13 DIAGNOSIS — R63.0 DECREASED APPETITE: ICD-10-CM

## 2022-10-13 PROBLEM — R35.0 URINARY FREQUENCY: Status: ACTIVE | Noted: 2022-10-13

## 2022-10-13 PROBLEM — R39.89 SENSATION OF PRESSURE IN BLADDER AREA: Status: ACTIVE | Noted: 2022-10-13

## 2022-10-13 LAB
GLUCOSE URINE, POC: NORMAL
PROTEIN UA: NEGATIVE

## 2022-10-13 PROCEDURE — G8484 FLU IMMUNIZE NO ADMIN: HCPCS | Performed by: OBSTETRICS & GYNECOLOGY

## 2022-10-13 PROCEDURE — 99215 OFFICE O/P EST HI 40 MIN: CPT | Performed by: OBSTETRICS & GYNECOLOGY

## 2022-10-13 PROCEDURE — 81002 URINALYSIS NONAUTO W/O SCOPE: CPT | Performed by: OBSTETRICS & GYNECOLOGY

## 2022-10-13 PROCEDURE — 99213 OFFICE O/P EST LOW 20 MIN: CPT | Performed by: OBSTETRICS & GYNECOLOGY

## 2022-10-13 PROCEDURE — 76817 TRANSVAGINAL US OBSTETRIC: CPT | Performed by: OBSTETRICS & GYNECOLOGY

## 2022-10-13 PROCEDURE — 1036F TOBACCO NON-USER: CPT | Performed by: OBSTETRICS & GYNECOLOGY

## 2022-10-13 PROCEDURE — 76821 MIDDLE CEREBRAL ARTERY ECHO: CPT | Performed by: OBSTETRICS & GYNECOLOGY

## 2022-10-13 PROCEDURE — 76811 OB US DETAILED SNGL FETUS: CPT | Performed by: OBSTETRICS & GYNECOLOGY

## 2022-10-13 PROCEDURE — G8420 CALC BMI NORM PARAMETERS: HCPCS | Performed by: OBSTETRICS & GYNECOLOGY

## 2022-10-13 PROCEDURE — G8427 DOCREV CUR MEDS BY ELIG CLIN: HCPCS | Performed by: OBSTETRICS & GYNECOLOGY

## 2022-10-13 NOTE — PROGRESS NOTES
Patient is here today for 2 wk f/u. Patient states she has headaches and wants to know what to take if she gets a cold. Denies any vaginal bleeding, cramping, or leakage of fluids. Patient reports good fetal movement.

## 2022-10-13 NOTE — LETTER
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 nutrition panel q4-6 weeks (CBC, CMP, magnesium, ferritin, folate, vitamin B12, vitamin D25OH), on/after 10/24/2022  --Follow up with PCP for long term monitoring and management     19. Pressure with voiding/dysuria -- The patient previously had complaints of dysuria and increased pressure with voiding. She denied any associated fevers, chills, nausea, vomiting, and or back pain. The patient had a urine culture on 9/26/2022 that was reported as mixed. Secondary to the patient's symptoms, a prescription for Macrobid was provided.       Today, the patient reports that her symptoms have improved somewhat. She still has intermittent symptoms of pressure but feels it is related to the pregnancy. Infection precautions were reviewed. Precautions were reviewed and the patient was counseled to go to the hospital with persistent or worsening symptoms or the development of any associated fevers, chills, nausea, vomiting, and/or back pain. 20.  Upper respiratory infection -- Today the patient had complaints of upper respiratory infection symptoms. She reports that her symptoms started on Tuesday, 10/4/2022. She has complaints of congestion and a nonproductive cough. She also reports having a scratchy throat. She denied having any associated fevers, chills, nausea, and or vomiting. She denied having any loss of taste or smell. Supportive measures were reviewed including using Tylenol as needed for pain and fever, saline nasal spray for congestion, Robitussin (plain) for cough, a humidifier or vaporizer, and throat lozenges and/or spray. A handout reviewing medication safety in pregnancy was provided. Precautions were reviewed with the patient and she was counseled that if she develops a fever greater than 100.4 F, chest pain and/or shortness of breath to present immediately for evaluation. The patient expressed verbal understanding of this counseling. 21.  Lump under skin -- The patient had concerns regarding a small, pea-sized lump along the right side of her abdominal wall. The patient reports that the lump is at the site of a Lovenox injection. The patient was counseled that sometimes small knots or lumps can develop at the site of Lovenox injections. She was counseled to avoid massaging the area after administering the Lovenox. She was counseled to hold pressure after the injection. 22.  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   She reports that the area occasionally bulges and is sometimes tender. The patient was counseled that she may have a hernia in that area. With persistent or worsening symptoms, I would recommend referral to general surgery for further evaluation. She was counseled that she can wear gentle compression to help minimize the risk for bowel herniation. Precautions were reviewed and she was counseled to present to the hospital with the development of persistent pain, fever, nausea, and or vomiting. 23. Occasional headaches -- The patient reports that she is experiencing occasional headaches. She denies having any associated vision change, chest pain, shortness of breath, nausea, and or vomiting. She denies having the worst headache of her life or any associated neurologic deficits. The patient was counseled to stay well-hydrated. She can use Tylenol as needed. She was counseled regarding the use of magnesium oxide 400 mg twice daily and riboflavin 100 mg daily in reducing the frequency and intensity of migraine headaches. Precautions were reviewed, and she was counseled that if she develops the worst headache of her life or a headache with neurological deficits, to present immediately for evaluation. She expressed verbal understanding of this counseling. --I requested the patient return for a follow-up assessment in 2 weeks unless there is a clinical reason for her to return prior to that time. She is to call if she has any problems or questions prior to her next visit. Further evaluation and management will be dependent on her clinical presentation and the results of her testing. --The patient was advised to call if she has any increased vaginal discharge, vaginal bleeding, contractions, abdominal pain, back pain or any new significant symptomatology prior to her next visit. I advised her that these are signs and symptoms of cervical change and require follow-up assessment when they occur.   Preeclampsia precautions were also reviewed with the patient. --The patient was also counseled to call and/or return with any concerns for decreased fetal activity. --The patient is to continue to follow with you in your office for ongoing obstetric care. --The total time spent on today's visit was 40 minutes. This included preparation for the visit (i.e. reviewing prior external notes and test results), performance of a medically appropriate history and examination, counseling, orders for medications, tests or other procedures, and coordination of care. Greater than 50% of the time was spent face-to-face with the patient. This time is exclusive of procedures performed. I answered all of  the patient's questions to her satisfaction. I asked her to call if she had any additional questions prior to her next visit. --At the conclusion of the visit, the patient appeared to have a good understanding of the issues discussed. I answered all of her questions to her satisfaction. I asked her to call if she had any additional questions prior to her next visit. --Thank you for allowing me to participate in the care of this pleasant patient. Please don't hesitate to call me if you have any questions. Sincerely,      Joseph Boykin MD, [facility]  [phone]      *All or parts of this note may have been generated using a voice recognition program. There may be typo, grammar, or Word substitution errors that have escaped my review of this note.

## 2022-10-13 NOTE — PROGRESS NOTES
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 related to the pregnancy. Infection precautions were reviewed. Precautions were reviewed and the patient was counseled to go to the hospital with persistent or worsening symptoms or the development of any associated fevers, chills, nausea, vomiting, and/or back pain. 20.  Upper respiratory infection -- Today the patient had complaints of upper respiratory infection symptoms. She reports that her symptoms started on Tuesday, 10/4/2022. She has complaints of congestion and a nonproductive cough. She also reports having a scratchy throat. She denied having any associated fevers, chills, nausea, and or vomiting.   She denied having any loss of taste or smell. Supportive measures were reviewed including using Tylenol as needed for pain and fever, saline nasal spray for congestion, Robitussin (plain) for cough, a humidifier or vaporizer, and throat lozenges and/or spray. A handout reviewing medication safety in pregnancy was provided. Precautions were reviewed with the patient and she was counseled that if she develops a fever greater than 100.4 F, chest pain and/or shortness of breath to present immediately for evaluation. The patient expressed verbal understanding of this counseling. 21.  Lump under skin -- The patient had concerns regarding a small, pea-sized lump along the right side of her abdominal wall. The patient reports that the lump is at the site of a Lovenox injection. The patient was counseled that sometimes small knots or lumps can develop at the site of Lovenox injections. She was counseled to avoid massaging the area after administering the Lovenox. She was counseled to hold pressure after the injection. 22.  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 She reports that the area occasionally bulges and is sometimes tender. The patient was counseled that she may have a hernia in that area. With persistent or worsening symptoms, I would recommend referral to general surgery for further evaluation. She was counseled that she can wear gentle compression to help minimize the risk for bowel herniation. Precautions were reviewed and she was counseled to present to the hospital with the development of persistent pain, fever, nausea, and or vomiting. 23. Occasional headaches -- The patient reports that she is experiencing occasional headaches. She denies having any associated vision change, chest pain, shortness of breath, nausea, and or vomiting. She denies having the worst headache of her life or any associated neurologic deficits.     The patient was counseled to stay well-hydrated. She can use Tylenol as needed. She was counseled regarding the use of magnesium oxide 400 mg twice daily and riboflavin 100 mg daily in reducing the frequency and intensity of migraine headaches. Precautions were reviewed, and she was counseled that if she develops the worst headache of her life or a headache with neurological deficits, to present immediately for evaluation. She expressed verbal understanding of this counseling. --I requested the patient return for a follow-up assessment in 2 weeks unless there is a clinical reason for her to return prior to that time. She is to call if she has any problems or questions prior to her next visit. Further evaluation and management will be dependent on her clinical presentation and the results of her testing. --The patient was advised to call if she has any increased vaginal discharge, vaginal bleeding, contractions, abdominal pain, back pain or any new significant symptomatology prior to her next visit. I advised her that these are signs and symptoms of cervical change and require follow-up assessment when they occur. Preeclampsia precautions were also reviewed with the patient. --The patient was also counseled to call and/or return with any concerns for decreased fetal activity. --The patient is to continue to follow with you in your office for ongoing obstetric care. --The total time spent on today's visit was 40 minutes. This included preparation for the visit (i.e. reviewing prior external notes and test results), performance of a medically appropriate history and examination, counseling, orders for medications, tests or other procedures, and coordination of care. Greater than 50% of the time was spent face-to-face with the patient. This time is exclusive of procedures performed. I answered all of  the patient's questions to her satisfaction.  I asked her to call if she had any additional questions prior to her next visit. --At the conclusion of the visit, the patient appeared to have a good understanding of the issues discussed. I answered all of her questions to her satisfaction. I asked her to call if she had any additional questions prior to her next visit. --Thank you for allowing me to participate in the care of this pleasant patient. Please don't hesitate to call me if you have any questions. Sincerely,      Keesha Tam MD, [facility]  [phone]      *All or parts of this note may have been generated using a voice recognition program. There may be typo, grammar, or Word substitution errors that have escaped my review of this note.

## 2022-10-26 ENCOUNTER — ROUTINE PRENATAL (OUTPATIENT)
Dept: OBGYN CLINIC | Age: 32
End: 2022-10-26
Payer: MEDICAID

## 2022-10-26 ENCOUNTER — ANCILLARY PROCEDURE (OUTPATIENT)
Dept: OBGYN CLINIC | Age: 32
End: 2022-10-26
Payer: MEDICAID

## 2022-10-26 VITALS
HEART RATE: 98 BPM | DIASTOLIC BLOOD PRESSURE: 79 MMHG | SYSTOLIC BLOOD PRESSURE: 114 MMHG | BODY MASS INDEX: 22.08 KG/M2 | WEIGHT: 141 LBS

## 2022-10-26 DIAGNOSIS — R79.0 LOW FERRITIN: ICD-10-CM

## 2022-10-26 DIAGNOSIS — R79.89 LOW VITAMIN D LEVEL: ICD-10-CM

## 2022-10-26 DIAGNOSIS — Z86.718 HISTORY OF DVT (DEEP VEIN THROMBOSIS): ICD-10-CM

## 2022-10-26 DIAGNOSIS — Z3A.20 20 WEEKS GESTATION OF PREGNANCY: Primary | ICD-10-CM

## 2022-10-26 DIAGNOSIS — O44.40 LOW-LYING PLACENTA: ICD-10-CM

## 2022-10-26 DIAGNOSIS — O36.1920 ANTI-M ISOIMMUNIZATION AFFECTING PREGNANCY IN SECOND TRIMESTER, SINGLE OR UNSPECIFIED FETUS: ICD-10-CM

## 2022-10-26 DIAGNOSIS — D64.9 ANEMIA, UNSPECIFIED TYPE: ICD-10-CM

## 2022-10-26 DIAGNOSIS — E53.8 LOW VITAMIN B12 LEVEL: ICD-10-CM

## 2022-10-26 DIAGNOSIS — K43.9 ABDOMINAL WALL HERNIA: ICD-10-CM

## 2022-10-26 LAB
GLUCOSE URINE, POC: NEGATIVE
PROTEIN UA: NEGATIVE

## 2022-10-26 PROCEDURE — 76815 OB US LIMITED FETUS(S): CPT | Performed by: OBSTETRICS & GYNECOLOGY

## 2022-10-26 PROCEDURE — G8420 CALC BMI NORM PARAMETERS: HCPCS | Performed by: OBSTETRICS & GYNECOLOGY

## 2022-10-26 PROCEDURE — 81002 URINALYSIS NONAUTO W/O SCOPE: CPT | Performed by: OBSTETRICS & GYNECOLOGY

## 2022-10-26 PROCEDURE — 1036F TOBACCO NON-USER: CPT | Performed by: OBSTETRICS & GYNECOLOGY

## 2022-10-26 PROCEDURE — 36415 COLL VENOUS BLD VENIPUNCTURE: CPT | Performed by: OBSTETRICS & GYNECOLOGY

## 2022-10-26 PROCEDURE — G8484 FLU IMMUNIZE NO ADMIN: HCPCS | Performed by: OBSTETRICS & GYNECOLOGY

## 2022-10-26 PROCEDURE — G8427 DOCREV CUR MEDS BY ELIG CLIN: HCPCS | Performed by: OBSTETRICS & GYNECOLOGY

## 2022-10-26 PROCEDURE — 76817 TRANSVAGINAL US OBSTETRIC: CPT | Performed by: OBSTETRICS & GYNECOLOGY

## 2022-10-26 PROCEDURE — 76821 MIDDLE CEREBRAL ARTERY ECHO: CPT | Performed by: OBSTETRICS & GYNECOLOGY

## 2022-10-26 PROCEDURE — 99214 OFFICE O/P EST MOD 30 MIN: CPT | Performed by: OBSTETRICS & GYNECOLOGY

## 2022-10-26 PROCEDURE — 99213 OFFICE O/P EST LOW 20 MIN: CPT | Performed by: OBSTETRICS & GYNECOLOGY

## 2022-10-26 NOTE — LETTER
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 vitamin D 25 OH) in 4 weeks, on/after 10/24/2022  --Repeat testing ordered  --Long-term follow-up with PCP for monitoring and management     17. Low ferritin -- The patient's ferritin was low at 29 (considered low if <15 in absence of anemia or <40 in setting of anemia)  --Ferrous sulfate 325 mg BID prescribed  --Monitor levels serially  --Monitor nutrition panel q4-6 weeks (CBC, CMP, magnesium, ferritin, folate, vitamin B12, vitamin D25OH), on/after 10/24/2022  --Repeat testing ordered  --Follow up with PCP for long term monitoring and management     18.   Low vitamin D -- The patient's vitamin D was low at 31  --Recommend vitamin D3 2000 IU daily  --Monitor levels serially  --Monitor nutrition panel q4-6 weeks (CBC, CMP, magnesium, ferritin, folate, vitamin B12, vitamin D25OH), on/after 10/24/2022  --Repeat testing ordered  --Follow up with PCP for long term monitoring and management     19. Pressure with voiding/dysuria, improved -- The patient previously had complaints of dysuria and increased pressure with voiding. She denied any associated fevers, chills, nausea, vomiting, and or back pain. The patient had a urine culture on 9/26/2022 that was reported as mixed. Secondary to the patient's symptoms, a prescription for Macrobid was provided. Today, the patient reports that her symptoms have improved. She again reports intermittent symptoms of pressure but feels it is related to the pregnancy. Infection precautions were reviewed. Precautions were reviewed and the patient was counseled to go to the hospital with persistent or worsening symptoms or the development of any associated fevers, chills, nausea, vomiting, and/or back pain. 20.  Upper respiratory infection, resolved -- Previously, on 10/13/2022, the patient had complaints of upper respiratory infection symptoms. She reports that her symptoms started on Tuesday, 10/4/2022. She has complaints of congestion and a nonproductive cough. She also reports having a scratchy throat. She denied having any associated fevers, chills, nausea, and or vomiting. She denied having any loss of taste or smell. Supportive measures were reviewed including using Tylenol as needed for pain and fever, saline nasal spray for congestion, Robitussin (plain) for cough, a humidifier or vaporizer, and throat lozenges and/or spray. A handout reviewing medication safety in pregnancy was provided.      Precautions were reviewed with the patient and she was counseled that if she develops a fever greater than 100.4 F, chest pain and/or shortness of breath to present immediately for evaluation. The patient expressed verbal understanding of this counseling. Today, the patient reported her symptoms have improved. 21.  Lump under skin -- The patient again had concerns regarding a small, pea-sized lump along the right side of her abdominal wall. The patient reports that the lump is at the site of a Lovenox injection. The patient was counseled that sometimes small knots or lumps can develop at the site of Lovenox injections. She was counseled to avoid massaging the area after administering the Lovenox. She was counseled to hold pressure after the injection. 22.  Abdominal wall hernia -- The patient again had concerns regarding a possible hernia at the site of her prior ileostomy. She reports that the area occasionally bulges and is sometimes tender. The patient was counseled that she may have a hernia in that area. With persistent or worsening symptoms, I would recommend referral to general surgery for further evaluation. She was counseled that she can wear gentle compression to help minimize the risk for bowel herniation. Precautions were reviewed and she was counseled to present to the hospital with the development of persistent pain, fever, nausea, and or vomiting. The patient was provided with a referral to general surgery. 23. Occasional headaches, stable -- The patient again reports that she is experiencing occasional headaches. She denies having any associated vision change, chest pain, shortness of breath, nausea, and or vomiting. She denies having the worst headache of her life or any associated neurologic deficits. Today, the patient ports that her symptoms are stable. The patient was again counseled to stay well-hydrated. She can use Tylenol as needed. She was counseled regarding the use of magnesium oxide 400 mg twice daily and riboflavin 100 mg daily in reducing the frequency and intensity of migraine headaches. Precautions were reviewed, and she was counseled that if she develops the worst headache of her life or a headache with neurological deficits, to present immediately for evaluation. She expressed verbal understanding of this counseling. 24. MTHFR C677T, heterozygote --  The patient had a thrombophilia panel secondary to a history of a DVT. She was found to be heterozygous for MTHFR C677T. Counseling was provided today regarding the implications and management of this gene mutation in pregnancy. She was counseled that this gene mutation affects folic acid metabolism. It is fairly common and found in 20-30% of the population. It is generally only a problem if homocysteine levels are elevated. The patient's homocystine level was normal at 5.3 on 9/26/2022. In the past, this gene mutation was thought to be associated with increased obstetric risks including thrombosis, early recurrent pregnancy loss, placental abruption, hypertensive disorders of pregnancy, poor fetal growth, and fetal loss. More recent studies did not report strong associations with these risks. Because this genetic mutation affects folic acid metabolism, there is an increased risk for folic acid deficiency and other nutritional deficiencies in women with this condition. There is also an increased risk for having a child with an open neural tube defect in women with this mutation, especially if they're homozygous for the C677T mutation. Generally, additional vitamin supplementation is recommended with folic acid or methyl folate, vitamin B6, and vitamin B12. The patient was counseled to take a low-dose aspirin. Fetal growth should be followed serially. She was counseled that there is a 50% chance that she will pass this mutation off to her child(joana). She should relay this information to the pediatrician who cares for her child(joana). She was also encouraged to review this diagnosis with her PCP.      The patient was counseled that this condition is no longer thought to be clinically significant. --I requested the patient return for a follow-up assessment in 2 weeks unless there is a clinical reason for her to return prior to that time. She is to call if she has any problems or questions prior to her next visit. Further evaluation and management will be dependent on her clinical presentation and the results of her testing. --The patient was advised to call if she has any increased vaginal discharge, vaginal bleeding, contractions, abdominal pain, back pain or any new significant symptomatology prior to her next visit. I advised her that these are signs and symptoms of cervical change and require follow-up assessment when they occur. Preeclampsia precautions were also reviewed with the patient. --The patient was also counseled to call and/or return with any concerns for decreased fetal activity. --The patient is to continue to follow with you in your office for ongoing obstetric care. --The total time spent on today's visit was 30 minutes. This included preparation for the visit (i.e. reviewing prior external notes and test results), performance of a medically appropriate history and examination, counseling, orders for medications, tests or other procedures, and coordination of care. Greater than 50% of the time was spent face-to-face with the patient. This time is exclusive of procedures performed. I answered all of  the patient's questions to her satisfaction. I asked her to call if she had any additional questions prior to her next visit. --At the conclusion of the visit, the patient appeared to have a good understanding of the issues discussed. I answered all of her questions to her satisfaction. I asked her to call if she had any additional questions prior to her next visit. --Thank you for allowing me to participate in the care of this pleasant patient.   Please don't hesitate to call me if you have any questions. Sincerely,      Summer Elam MD, [id number]  [phone]      *All or parts of this note may have been generated using a voice recognition program. There may be typo, grammar, or Word substitution errors that have escaped my review of this note.

## 2022-10-26 NOTE — PROGRESS NOTES
Pt here for pregnancy ultrasound  Pt denies any bleeding/cramping/lof  Pt states good fetal movement    Horizon drawn from left antecubital after verification of  and confirmation of information on form.

## 2022-10-26 NOTE — PROGRESS NOTES
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  --Monitor levels serially  --Monitor nutrition panel q4-6 weeks (CBC, CMP, magnesium, ferritin, folate, vitamin B12, vitamin D25OH), on/after 10/24/2022  --Repeat testing ordered  --Follow up with PCP for long term monitoring and management     19. Pressure with voiding/dysuria, improved -- The patient previously had complaints of dysuria and increased pressure with voiding. She denied any associated fevers, chills, nausea, vomiting, and or back pain. The patient had a urine culture on 9/26/2022 that was reported as mixed. Secondary to the patient's symptoms, a prescription for Macrobid was provided. Today, the patient reports that her symptoms have improved. She again reports intermittent symptoms of pressure but feels it is related to the pregnancy. Infection precautions were reviewed. Precautions were reviewed and the patient was counseled to go to the hospital with persistent or worsening symptoms or the development of any associated fevers, chills, nausea, vomiting, and/or back pain. 20.  Upper respiratory infection, resolved -- Previously, on 10/13/2022, the patient had complaints of upper respiratory infection symptoms. She reports that her symptoms started on Tuesday, 10/4/2022. She has complaints of congestion and a nonproductive cough. She also reports having a scratchy throat. She denied having any associated fevers, chills, nausea, and or vomiting. She denied having any loss of taste or smell. Supportive measures were reviewed including using Tylenol as needed for pain and fever, saline nasal spray for congestion, Robitussin (plain) for cough, a humidifier or vaporizer, and throat lozenges and/or spray. A handout reviewing medication safety in pregnancy was provided. Precautions were reviewed with the patient and she was counseled that if she develops a fever greater than 100.4 F, chest pain and/or shortness of breath to present immediately for evaluation. The patient expressed verbal understanding of this counseling. Today, the patient reported her symptoms have improved. 21.  Lump under skin -- The patient again had concerns regarding a small, pea-sized lump along the right side of her abdominal wall. The patient reports that the lump is at the site of a Lovenox injection. The patient was counseled that sometimes small knots or lumps can develop at the site of Lovenox injections. She was counseled to avoid massaging the area after administering the Lovenox. She was counseled to hold pressure after the injection.      22.  Abdominal wall hernia -- The patient again had concerns regarding a possible hernia at the site of her prior ileostomy. She reports that the area occasionally bulges and is sometimes tender. The patient was counseled that she may have a hernia in that area. With persistent or worsening symptoms, I would recommend referral to general surgery for further evaluation. She was counseled that she can wear gentle compression to help minimize the risk for bowel herniation. Precautions were reviewed and she was counseled to present to the hospital with the development of persistent pain, fever, nausea, and or vomiting. The patient was provided with a referral to general surgery. 23. Occasional headaches, stable -- The patient again reports that she is experiencing occasional headaches. She denies having any associated vision change, chest pain, shortness of breath, nausea, and or vomiting. She denies having the worst headache of her life or any associated neurologic deficits. Today, the patient ports that her symptoms are stable. The patient was again counseled to stay well-hydrated. She can use Tylenol as needed. She was counseled regarding the use of magnesium oxide 400 mg twice daily and riboflavin 100 mg daily in reducing the frequency and intensity of migraine headaches. Precautions were reviewed, and she was counseled that if she develops the worst headache of her life or a headache with neurological deficits, to present immediately for evaluation. She expressed verbal understanding of this counseling. 24. MTHFR C677T, heterozygote --  The patient had a thrombophilia panel secondary to a history of a DVT. She was found to be heterozygous for MTHFR C677T. Counseling was provided today regarding the implications and management of this gene mutation in pregnancy. She was counseled that this gene mutation affects folic acid metabolism. It is fairly common and found in 20-30% of the population. It is generally only a problem if homocysteine levels are elevated.   The patient's homocystine level was normal at 5.3 on 9/26/2022. In the past, this gene mutation was thought to be associated with increased obstetric risks including thrombosis, early recurrent pregnancy loss, placental abruption, hypertensive disorders of pregnancy, poor fetal growth, and fetal loss. More recent studies did not report strong associations with these risks. Because this genetic mutation affects folic acid metabolism, there is an increased risk for folic acid deficiency and other nutritional deficiencies in women with this condition. There is also an increased risk for having a child with an open neural tube defect in women with this mutation, especially if they're homozygous for the C677T mutation. Generally, additional vitamin supplementation is recommended with folic acid or methyl folate, vitamin B6, and vitamin B12. The patient was counseled to take a low-dose aspirin. Fetal growth should be followed serially. She was counseled that there is a 50% chance that she will pass this mutation off to her child(joana). She should relay this information to the pediatrician who cares for her child(joana). She was also encouraged to review this diagnosis with her PCP. The patient was counseled that this condition is no longer thought to be clinically significant. --I requested the patient return for a follow-up assessment in 2 weeks unless there is a clinical reason for her to return prior to that time. She is to call if she has any problems or questions prior to her next visit. Further evaluation and management will be dependent on her clinical presentation and the results of her testing. --The patient was advised to call if she has any increased vaginal discharge, vaginal bleeding, contractions, abdominal pain, back pain or any new significant symptomatology prior to her next visit. I advised her that these are signs and symptoms of cervical change and require follow-up assessment when they occur. Preeclampsia precautions were also reviewed with the patient. --The patient was also counseled to call and/or return with any concerns for decreased fetal activity. --The patient is to continue to follow with you in your office for ongoing obstetric care. --The total time spent on today's visit was 30 minutes. This included preparation for the visit (i.e. reviewing prior external notes and test results), performance of a medically appropriate history and examination, counseling, orders for medications, tests or other procedures, and coordination of care. Greater than 50% of the time was spent face-to-face with the patient. This time is exclusive of procedures performed. I answered all of  the patient's questions to her satisfaction. I asked her to call if she had any additional questions prior to her next visit. --At the conclusion of the visit, the patient appeared to have a good understanding of the issues discussed. I answered all of her questions to her satisfaction. I asked her to call if she had any additional questions prior to her next visit. --Thank you for allowing me to participate in the care of this pleasant patient. Please don't hesitate to call me if you have any questions. Sincerely,      Cristobal Gross MD, [address]  [phone]      *All or parts of this note may have been generated using a voice recognition program. There may be typo, grammar, or Word substitution errors that have escaped my review of this note.

## 2022-10-26 NOTE — PATIENT INSTRUCTIONS
Please arrive for your scheduled appointment at least 15 minutes early with your actual insurance card+ a photo ID. Also if you need any refills ordered or have questions, it may take up 48 hours to reply. Please allow ample time for your refills. Call me when you use last refill. Thank you for your cooperation. Call your primary obstetrician with bleeding, leaking of fluid, abdominal tenderness, headache, blurry vision, epigastric pain and increased urinary frequency. If you are experiencing an emergency and need immediate help, call 911 or go to go emergency room or labor and delivery. if you are sick, not feeling well or have an infectious process going on please reschedule your appointment by calling [phone]. Also if any family members are not feeling well, please do not bring them to your appointment. We appreciate your cooperation. We are doing this in order to protect our pregnant mothers+ their babies. if you are sick, not feeling well or have an infectious process going on please reschedule your appointment by calling [phone]. Also if any family members are not feeling well, please do not bring them to your appointment. We appreciate your cooperation. We are doing this in order to protect our pregnant mothers+ their babies.

## 2022-11-01 ENCOUNTER — CLINICAL DOCUMENTATION (OUTPATIENT)
Dept: OBGYN CLINIC | Age: 32
End: 2022-11-01

## 2022-11-01 ENCOUNTER — TELEPHONE (OUTPATIENT)
Dept: SURGERY | Age: 32
End: 2022-11-01

## 2022-11-01 NOTE — LETTER
[facility] Maternal Fetal Medicine  [address] [address]  Phone: [phone]  Fax: [phone]    Summer Elam MD        November 1, 2022     Patient: Zina Hitchcock   YOB: 1990   Date of Visit: 11/1/2022       To Whom It May Concern: It is my medical opinion that Emmy Lopez is unable to perform jury duty at this time. If you have any questions or concerns, please don't hesitate to call.     Sincerely,        Summer Elam MD

## 2022-11-01 NOTE — TELEPHONE ENCOUNTER
Per Dr. Armando Benton, the patient is complaining of a lump over the area of there prior ileostomy site. There is concern for a hernia. I was hoping for Dr. Myrtle Gorman to evaluate and set up for surgery postpartum if necessary. I had noted that in the referral order. \"    MA left message to schedule appointment with Dr. Myrtle Gorman.   Electronically signed by Timbo Moya MA on 11/1/22 at 11:53 AM EDT

## 2022-11-01 NOTE — PROGRESS NOTES
MFM Note    The patient called the office stating that she needed a note to be excused from [employer] duty. A letter was created and faxed to [phone].

## 2022-11-03 ENCOUNTER — OFFICE VISIT (OUTPATIENT)
Dept: SURGERY | Age: 32
End: 2022-11-03
Payer: MEDICAID

## 2022-11-03 VITALS
WEIGHT: 141 LBS | OXYGEN SATURATION: 100 % | BODY MASS INDEX: 22.13 KG/M2 | HEIGHT: 67 IN | RESPIRATION RATE: 16 BRPM | HEART RATE: 94 BPM | TEMPERATURE: 97.5 F | DIASTOLIC BLOOD PRESSURE: 79 MMHG | SYSTOLIC BLOOD PRESSURE: 112 MMHG

## 2022-11-03 DIAGNOSIS — O09.92 HIGH-RISK PREGNANCY IN SECOND TRIMESTER: ICD-10-CM

## 2022-11-03 DIAGNOSIS — K43.2 INCISIONAL HERNIA, WITHOUT OBSTRUCTION OR GANGRENE: Primary | ICD-10-CM

## 2022-11-03 PROCEDURE — G8484 FLU IMMUNIZE NO ADMIN: HCPCS | Performed by: SURGERY

## 2022-11-03 PROCEDURE — 99204 OFFICE O/P NEW MOD 45 MIN: CPT | Performed by: SURGERY

## 2022-11-03 PROCEDURE — 1036F TOBACCO NON-USER: CPT | Performed by: SURGERY

## 2022-11-03 PROCEDURE — G8420 CALC BMI NORM PARAMETERS: HCPCS | Performed by: SURGERY

## 2022-11-03 PROCEDURE — G8427 DOCREV CUR MEDS BY ELIG CLIN: HCPCS | Performed by: SURGERY
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
## 2022-11-07 PROBLEM — K43.9 ABDOMINAL WALL HERNIA: Status: ACTIVE | Noted: 2022-11-07

## 2022-11-08 ENCOUNTER — TELEPHONE (OUTPATIENT)
Dept: OBGYN CLINIC | Age: 32
End: 2022-11-08

## 2022-11-09 ENCOUNTER — HOSPITAL ENCOUNTER (OUTPATIENT)
Age: 32
Discharge: HOME | End: 2022-11-09
Payer: MEDICAID

## 2022-11-09 DIAGNOSIS — Z86.718 HISTORY OF DVT (DEEP VEIN THROMBOSIS): ICD-10-CM

## 2022-11-09 DIAGNOSIS — D64.9 ANEMIA, UNSPECIFIED TYPE: ICD-10-CM

## 2022-11-09 DIAGNOSIS — R79.89 LOW VITAMIN D LEVEL: ICD-10-CM

## 2022-11-09 DIAGNOSIS — Z3A.20 20 WEEKS GESTATION OF PREGNANCY: ICD-10-CM

## 2022-11-09 DIAGNOSIS — E53.8 LOW VITAMIN B12 LEVEL: ICD-10-CM

## 2022-11-09 DIAGNOSIS — O36.1920 ANTI-M ISOIMMUNIZATION AFFECTING PREGNANCY IN SECOND TRIMESTER, SINGLE OR UNSPECIFIED FETUS: ICD-10-CM

## 2022-11-09 DIAGNOSIS — R79.0 LOW FERRITIN: ICD-10-CM

## 2022-11-09 LAB
ALBUMIN SERPL-MCNC: 3.7 G/DL (ref 3.5–5.2)
ALP BLD-CCNC: 59 U/L (ref 35–104)
ALT SERPL-CCNC: 10 U/L (ref 0–32)
ANION GAP SERPL CALCULATED.3IONS-SCNC: 14 MMOL/L (ref 7–16)
AST SERPL-CCNC: 13 U/L (ref 0–31)
BACTERIA: ABNORMAL /HPF
BASOPHILS ABSOLUTE: 0.01 E9/L (ref 0–0.2)
BASOPHILS RELATIVE PERCENT: 0.2 % (ref 0–2)
BILIRUB SERPL-MCNC: 0.4 MG/DL (ref 0–1.2)
BILIRUBIN URINE: NEGATIVE
BLOOD, URINE: NEGATIVE
BUN BLDV-MCNC: 7 MG/DL (ref 6–20)
CALCIUM SERPL-MCNC: 9.6 MG/DL (ref 8.6–10.2)
CHLORIDE BLD-SCNC: 103 MMOL/L (ref 98–107)
CLARITY: CLEAR
CO2: 21 MMOL/L (ref 22–29)
COLOR: YELLOW
CREAT SERPL-MCNC: 0.6 MG/DL (ref 0.5–1)
CREATININE URINE: 80 MG/DL (ref 29–226)
EOSINOPHILS ABSOLUTE: 0.09 E9/L (ref 0.05–0.5)
EOSINOPHILS RELATIVE PERCENT: 1.5 % (ref 0–6)
EPITHELIAL CELLS, UA: ABNORMAL /HPF
FERRITIN: 26 NG/ML
FOLATE: >20 NG/ML (ref 4.8–24.2)
GFR SERPL CREATININE-BSD FRML MDRD: >60 ML/MIN/1.73
GLUCOSE BLD-MCNC: 86 MG/DL (ref 74–99)
GLUCOSE URINE: NEGATIVE MG/DL
HBA1C MFR BLD: 4.8 % (ref 4–5.6)
HCT VFR BLD CALC: 32.8 % (ref 34–48)
HEMOGLOBIN: 11.3 G/DL (ref 11.5–15.5)
IMMATURE GRANULOCYTES #: 0.03 E9/L
IMMATURE GRANULOCYTES %: 0.5 % (ref 0–5)
KETONES, URINE: NEGATIVE MG/DL
LACTATE DEHYDROGENASE: 167 U/L (ref 135–214)
LEUKOCYTE ESTERASE, URINE: ABNORMAL
LYMPHOCYTES ABSOLUTE: 1.39 E9/L (ref 1.5–4)
LYMPHOCYTES RELATIVE PERCENT: 22.6 % (ref 20–42)
MAGNESIUM: 1.5 MG/DL (ref 1.6–2.6)
MCH RBC QN AUTO: 31.8 PG (ref 26–35)
MCHC RBC AUTO-ENTMCNC: 34.5 % (ref 32–34.5)
MCV RBC AUTO: 92.4 FL (ref 80–99.9)
MONOCYTES ABSOLUTE: 0.44 E9/L (ref 0.1–0.95)
MONOCYTES RELATIVE PERCENT: 7.1 % (ref 2–12)
NEUTROPHILS ABSOLUTE: 4.2 E9/L (ref 1.8–7.3)
NEUTROPHILS RELATIVE PERCENT: 68.1 % (ref 43–80)
NITRITE, URINE: NEGATIVE
PDW BLD-RTO: 13.4 FL (ref 11.5–15)
PH UA: 6 (ref 5–9)
PLATELET # BLD: 180 E9/L (ref 130–450)
PMV BLD AUTO: 12.4 FL (ref 7–12)
POTASSIUM SERPL-SCNC: 3.8 MMOL/L (ref 3.5–5)
PROTEIN PROTEIN: 6 MG/DL (ref 0–12)
PROTEIN UA: NEGATIVE MG/DL
PROTEIN/CREAT RATIO: 0.1
PROTEIN/CREAT RATIO: 0.1 (ref 0–0.2)
RBC # BLD: 3.55 E12/L (ref 3.5–5.5)
RBC UA: ABNORMAL /HPF (ref 0–2)
SODIUM BLD-SCNC: 138 MMOL/L (ref 132–146)
SPECIFIC GRAVITY UA: >=1.03 (ref 1–1.03)
T3 FREE: 2.8 PG/ML (ref 2–4.4)
T4 FREE: 0.89 NG/DL (ref 0.93–1.7)
TOTAL PROTEIN: 6.6 G/DL (ref 6.4–8.3)
TSH SERPL DL<=0.05 MIU/L-ACNC: 1 UIU/ML (ref 0.27–4.2)
URIC ACID, SERUM: 4.8 MG/DL (ref 2.4–5.7)
UROBILINOGEN, URINE: 0.2 E.U./DL
VITAMIN B-12: 595 PG/ML (ref 211–946)
VITAMIN D 25-HYDROXY: 30 NG/ML (ref 30–100)
WBC # BLD: 6.2 E9/L (ref 4.5–11.5)
WBC UA: >20 /HPF (ref 0–5)

## 2022-11-09 PROCEDURE — 84439 ASSAY OF FREE THYROXINE: CPT

## 2022-11-09 PROCEDURE — 86900 BLOOD TYPING SEROLOGIC ABO: CPT

## 2022-11-09 PROCEDURE — 84156 ASSAY OF PROTEIN URINE: CPT

## 2022-11-09 PROCEDURE — 36415 COLL VENOUS BLD VENIPUNCTURE: CPT

## 2022-11-09 PROCEDURE — 83615 LACTATE (LD) (LDH) ENZYME: CPT

## 2022-11-09 PROCEDURE — 87088 URINE BACTERIA CULTURE: CPT

## 2022-11-09 PROCEDURE — 85025 COMPLETE CBC W/AUTO DIFF WBC: CPT

## 2022-11-09 PROCEDURE — 80053 COMPREHEN METABOLIC PANEL: CPT

## 2022-11-09 PROCEDURE — 84550 ASSAY OF BLOOD/URIC ACID: CPT

## 2022-11-09 PROCEDURE — 82607 VITAMIN B-12: CPT

## 2022-11-09 PROCEDURE — 83735 ASSAY OF MAGNESIUM: CPT

## 2022-11-09 PROCEDURE — 82306 VITAMIN D 25 HYDROXY: CPT

## 2022-11-09 PROCEDURE — 81001 URINALYSIS AUTO W/SCOPE: CPT

## 2022-11-09 PROCEDURE — 86901 BLOOD TYPING SEROLOGIC RH(D): CPT

## 2022-11-09 PROCEDURE — 86850 RBC ANTIBODY SCREEN: CPT

## 2022-11-09 PROCEDURE — 84443 ASSAY THYROID STIM HORMONE: CPT

## 2022-11-09 PROCEDURE — 82570 ASSAY OF URINE CREATININE: CPT

## 2022-11-09 PROCEDURE — 82728 ASSAY OF FERRITIN: CPT

## 2022-11-09 PROCEDURE — 84481 FREE ASSAY (FT-3): CPT

## 2022-11-09 PROCEDURE — 83036 HEMOGLOBIN GLYCOSYLATED A1C: CPT

## 2022-11-09 PROCEDURE — 82746 ASSAY OF FOLIC ACID SERUM: CPT

## 2022-11-10 ENCOUNTER — ANCILLARY PROCEDURE (OUTPATIENT)
Dept: OBGYN CLINIC | Age: 32
End: 2022-11-10
Payer: MEDICAID

## 2022-11-10 ENCOUNTER — ROUTINE PRENATAL (OUTPATIENT)
Dept: OBGYN CLINIC | Age: 32
End: 2022-11-10
Payer: MEDICAID

## 2022-11-10 VITALS
SYSTOLIC BLOOD PRESSURE: 117 MMHG | BODY MASS INDEX: 22.24 KG/M2 | DIASTOLIC BLOOD PRESSURE: 77 MMHG | HEART RATE: 86 BPM | WEIGHT: 142 LBS

## 2022-11-10 DIAGNOSIS — O26.892 VAGINAL DISCHARGE DURING PREGNANCY IN SECOND TRIMESTER: ICD-10-CM

## 2022-11-10 DIAGNOSIS — R79.0 LOW MAGNESIUM LEVEL: ICD-10-CM

## 2022-11-10 DIAGNOSIS — K43.9 ABDOMINAL WALL HERNIA: ICD-10-CM

## 2022-11-10 DIAGNOSIS — Z86.79 HISTORY OF CARDIAC ARRHYTHMIA: ICD-10-CM

## 2022-11-10 DIAGNOSIS — D64.9 ANEMIA, UNSPECIFIED TYPE: ICD-10-CM

## 2022-11-10 DIAGNOSIS — O36.1920 ANTI-M ISOIMMUNIZATION AFFECTING PREGNANCY IN SECOND TRIMESTER, SINGLE OR UNSPECIFIED FETUS: ICD-10-CM

## 2022-11-10 DIAGNOSIS — E53.8 LOW VITAMIN B12 LEVEL: ICD-10-CM

## 2022-11-10 DIAGNOSIS — N89.8 VAGINAL DISCHARGE DURING PREGNANCY IN SECOND TRIMESTER: ICD-10-CM

## 2022-11-10 DIAGNOSIS — R79.89 LOW VITAMIN D LEVEL: ICD-10-CM

## 2022-11-10 DIAGNOSIS — Z86.718 HISTORY OF DVT (DEEP VEIN THROMBOSIS): ICD-10-CM

## 2022-11-10 DIAGNOSIS — Z34.90 PREGNANCY, UNSPECIFIED GESTATIONAL AGE: Primary | ICD-10-CM

## 2022-11-10 LAB
ABO/RH: NORMAL
ANTIBODY SCREEN: NORMAL
GLUCOSE URINE, POC: NORMAL
PROTEIN UA: NEGATIVE

## 2022-11-10 PROCEDURE — 81002 URINALYSIS NONAUTO W/O SCOPE: CPT | Performed by: OBSTETRICS & GYNECOLOGY

## 2022-11-10 PROCEDURE — G8427 DOCREV CUR MEDS BY ELIG CLIN: HCPCS | Performed by: OBSTETRICS & GYNECOLOGY

## 2022-11-10 PROCEDURE — 99215 OFFICE O/P EST HI 40 MIN: CPT | Performed by: OBSTETRICS & GYNECOLOGY

## 2022-11-10 PROCEDURE — 1036F TOBACCO NON-USER: CPT | Performed by: OBSTETRICS & GYNECOLOGY

## 2022-11-10 PROCEDURE — G8420 CALC BMI NORM PARAMETERS: HCPCS | Performed by: OBSTETRICS & GYNECOLOGY

## 2022-11-10 PROCEDURE — 76821 MIDDLE CEREBRAL ARTERY ECHO: CPT | Performed by: OBSTETRICS & GYNECOLOGY

## 2022-11-10 PROCEDURE — 99213 OFFICE O/P EST LOW 20 MIN: CPT

## 2022-11-10 PROCEDURE — 76816 OB US FOLLOW-UP PER FETUS: CPT | Performed by: OBSTETRICS & GYNECOLOGY

## 2022-11-10 PROCEDURE — 76817 TRANSVAGINAL US OBSTETRIC: CPT | Performed by: OBSTETRICS & GYNECOLOGY

## 2022-11-10 PROCEDURE — G8484 FLU IMMUNIZE NO ADMIN: HCPCS | Performed by: OBSTETRICS & GYNECOLOGY

## 2022-11-10 RX ORDER — MAGNESIUM OXIDE 400 MG/1
400 TABLET ORAL DAILY
Qty: 60 TABLET | Refills: 2 | Status: SHIPPED | OUTPATIENT
Start: 2022-11-10

## 2022-11-10 NOTE — PROGRESS NOTES
Patient is here today 4 wk f/u. Denies any vaginal bleeding, cramping, or leakage of fluids. Does have a vaginal discharge feels like it may be yeast.   Patient reports good fetal movement.

## 2022-11-10 NOTE — LETTER
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 as needed     16. Low vitamin B12 -- The patient's vitamin B12 level was borderline at 306. Individuals with vitamin B12 level between 200 and 400 are increased risk for anemia and side effects related to low vitamin B12.   Thus, supplementation is recommended  --Recommend vitamin B12, 1000 mcg daily  --Monitor levels serially  --Repeat nutrition panel (CBC, CMP, magnesium, ferritin, folate, vitamin B12, vitamin D 25 OH) in 4 weeks, on/after 10/24/2022    --Repeat testing completed 11/9/2022, results included: H/H 11.3/32.8, MCV 92.4, platelet count 265,099, potassium 3.8, creatinine 0.6, calcium 9.6, ALT 10, AST 13, ferritin 26, folate >20, vitamin B12 595, vitamin D 30, hemoglobin A1c 4.8%, TSH 0.998, free T4 0.89, free T3 2.8, uric acid 4.8, , magnesium 1.5, urinalysis contaminated, urine protein creatinine ratio 0.1, urine culture mixed, blood type B-, antibody screen negative. -- The patient's vitamin B12 was improved at 595. She was counseled to continue her vitamin B12 supplement. --Recommend repeat testing in 4 to 6 weeks, on/after 12/7/2022  --Long-term follow-up with PCP for monitoring and management     17. Low ferritin -- The patient's ferritin was low at 29 (considered low if <15 in absence of anemia or <40 in setting of anemia)  --Ferrous sulfate 325 mg BID prescribed  --Monitor levels serially  --Monitor nutrition panel q4-6 weeks (CBC, CMP, magnesium, ferritin, folate, vitamin B12, vitamin D25OH), on/after 10/24/2022    --Repeat testing completed 11/9/2022, ferritin 26. Her H/H was stable at 11.3/32.  -- The patient was counseled to continue her oral iron supplement. --Recommend repeat testing in 4 to 6 weeks, on/after 12/7/2022  --Follow up with PCP for long term monitoring and management     18. Low vitamin D -- The patient's vitamin D was low at 31  --Recommend vitamin D3 2000 IU daily  --Monitor levels serially  --Monitor nutrition panel q4-6 weeks (CBC, CMP, magnesium, ferritin, folate, vitamin B12, vitamin D25OH)    --Repeat testing completed 11/9/2022, vitamin D 30  --The patient was encouraged to take her vitamin D supplement. --Recommend repeat testing in 4 to 6 weeks, on/after 12/7/2022. --Follow up with PCP for long term monitoring and management     19. Low magnesium -- The patient's magnesium was mildly decreased at 1.5 (1.6-2.6). Her potassium was normal at 3.8. She was prescribed magnesium oxide, 400 mg daily. Recommend repeat testing with next set of labs.     20.   Hypothyroxinemia -- The patient's lab results were reviewed. Her free T4 was mildly decreased at 0.89. Her TSH was normal at 0.998 and free T3 normal at 2.8. Screening for thyroid peroxidase antibody and antithyroglobulin antibody was previously negative on 2022. Counseling was provided to the patient. Isolated maternal hypothyroxinemia (low T4) is defined as a maternal free T4 concentration in the lower 2.5th to 5th percentile of the reference range, in conjunction with a normal TSH. The effect of isolated maternal hypothyroxinemia on  and  outcome is unclear. Study outcomes regarding pregnancy outcomes is conflicting. In one study, isolated hypothyroxinemia was not associated with adverse pregnancy outcomes. Another study, women with a low free T4 and normal TSH at increased risk for  labor, macrosomia, and gestational diabetes. Regarding cognitive outcomes, some studies have reported that hypothyroxinemia between 12 and 20 weeks gestation was associated with a lower mean intelligence, psychomotor, and/or behavioral scores compared to children born to women with normal thyroid function. Alternatively, another study looking at children at age 3 did not find any differences in neurocognitive development. Studies have also evaluated the benefit of thyroid hormone replacement. There was no significant difference in neurodevelopmental or behavioral outcomes and children at 11years of age between the thyroid replacement group and placebo group. Additionally, there were no significant differences in the rate of  delivery, preeclampsia, gestational hypertension, pregnancy loss, and/or any other maternal or fetal outcomes. Per the American thyroid Association, treatment is not recommended for women with isolated hypothyroxinemia. However, thyroid function study should be monitored closely, every 4 weeks throughout the pregnancy.   --Recommend repeat thyroid function studies in 4 weeks, on/after 2022  --Long-term follow-up with PCP for monitoring management      21. Pressure with voiding/dysuria, improved -- The patient previously had complaints of dysuria and increased pressure with voiding. She denied any associated fevers, chills, nausea, vomiting, and or back pain. The patient had a urine culture on 9/26/2022 that was reported as mixed. Secondary to the patient's symptoms, a prescription for Macrobid was provided. Today, the patient reports that her symptoms have improved. She again reports intermittent symptoms of pressure but feels it is related to the pregnancy. Infection precautions were reviewed. Precautions were reviewed and the patient was counseled to go to the hospital with persistent or worsening symptoms or the development of any associated fevers, chills, nausea, vomiting, and/or back pain. 22.  Upper respiratory infection, resolved -- Previously, on 10/13/2022, the patient had complaints of upper respiratory infection symptoms. She reports that her symptoms started on Tuesday, 10/4/2022. She has complaints of congestion and a nonproductive cough. She also reports having a scratchy throat. She denied having any associated fevers, chills, nausea, and or vomiting. She denied having any loss of taste or smell. Supportive measures were reviewed including using Tylenol as needed for pain and fever, saline nasal spray for congestion, Robitussin (plain) for cough, a humidifier or vaporizer, and throat lozenges and/or spray. A handout reviewing medication safety in pregnancy was provided. Precautions were reviewed with the patient and she was counseled that if she develops a fever greater than 100.4 F, chest pain and/or shortness of breath to present immediately for evaluation. The patient expressed verbal understanding of this counseling. Today, the patient again reports that her symptoms have improved.      23.  Lump under skin -- The patient again had concerns regarding a small, pea-sized lump along the right side of her abdominal wall. The patient reports that the lump is at the site of a Lovenox injection. The patient was counseled that sometimes small knots or lumps can develop at the site of Lovenox injections. She was counseled to avoid massaging the area after administering the Lovenox. She was counseled to hold pressure after the injection. 24.  Abdominal wall hernia -- The patient again had concerns regarding a possible hernia at the site of her prior ileostomy. She reports that the area occasionally bulges and is sometimes tender. The patient was counseled that she may have a hernia in that area. With persistent or worsening symptoms, I would recommend referral to general surgery for further evaluation. She was counseled that she can wear gentle compression to help minimize the risk for bowel herniation. Precautions were reviewed and she was counseled to present to the hospital with the development of persistent pain, fever, nausea, and or vomiting. The patient was provided with a referral to general surgery. The patient met with Dr. Scotty Henao on 11/3/2022. Per his consultation note, she has a small incisional hernia at the site of her prior ileostomy. No obstruction was noted. Precautions were reviewed. Dr. 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 Based on her history, she may be at increased risk for having a bowel obstruction. Precautions were reviewed. 25. Occasional headaches, stable -- The patient again reports that she is experiencing occasional headaches. She denies having any associated vision change, chest pain, shortness of breath, nausea, and or vomiting. She denies having the worst headache of her life or any associated neurologic deficits. Today, the patient again reports that her symptoms are stable. The patient was again counseled to stay well-hydrated. She can use Tylenol as needed.      She was counseled regarding the use of magnesium oxide 400 mg twice daily and riboflavin 100 mg daily in reducing the frequency and intensity of migraine headaches. Precautions were reviewed, and she was counseled that if she develops the worst headache of her life or a headache with neurological deficits, to present immediately for evaluation. She expressed verbal understanding of this counseling. 26. MTHFR C677T, heterozygote --  The patient had a thrombophilia panel secondary to a history of a DVT. She was found to be heterozygous for MTHFR C677T. Counseling was previously provided regarding the implications and management of this gene mutation in pregnancy. Reviewed. She did not have any additional questions or concerns. She was counseled that this gene mutation affects folic acid metabolism. It is fairly common and found in 20-30% of the population. It is generally only a problem if homocysteine levels are elevated. The patient's homocystine level was normal at 5.3 on 9/26/2022. In the past, this gene mutation was thought to be associated with increased obstetric risks including thrombosis, early recurrent pregnancy loss, placental abruption, hypertensive disorders of pregnancy, poor fetal growth, and fetal loss. More recent studies did not report strong associations with these risks. Because this genetic mutation affects folic acid metabolism, there is an increased risk for folic acid deficiency and other nutritional deficiencies in women with this condition. There is also an increased risk for having a child with an open neural tube defect in women with this mutation, especially if they're homozygous for the C677T mutation. Generally, additional vitamin supplementation is recommended with folic acid or methyl folate, vitamin B6, and vitamin B12. The patient was counseled to take a low-dose aspirin. Fetal growth should be followed serially.  She was counseled that there is a 50% chance that she will pass this mutation off to her child(joana). She should relay this information to the pediatrician who cares for her child(joana). She was also encouraged to review this diagnosis with her PCP. The patient was counseled that this condition is no longer thought to be clinically significant. 27.  Vaginal discharge -- The patient had complaints of increased vaginal discharge. She denied having any associated itching or irritation. She felt that she may have a yeast infection. Thus, a sterile speculum exam was completed. Her cervix appeared closed. Copious discharge was noted. A genital culture was collected and sent to the lab. 28.  Borderline cervical length -- The ultrasound results were reviewed with the patient. Today, at 22 weeks 3 days, the patient's cervical length was borderline and measured 2.8-3.5 cm without funneling. No dynamic change was noted. Secondary to the patient's complaints of increased discharge, A sterile speculum exam was done prior to her transvaginal ultrasound. The patient's cervix was visually closed. Only a general culture was collected. She notes good fetal movement and denies any symptoms of discharge, leaking of fluid, vaginal bleeding, and/or contractions. At this time, close follow-up was recommended. The patient was scheduled to return in 1 week for a follow-up cervical length. The patient was counseled to avoid heavy lifting, prolonged standing, and sexual intercourse. The patient was scheduled to return for a follow-up assessment in 1 week. Precautions to call and/or return sooner were reviewed. --I requested the patient return for a follow-up assessment in 1 week unless there is a clinical reason for her to return prior to that time. She is to call if she has any problems or questions prior to her next visit. Further evaluation and management will be dependent on her clinical presentation and the results of her testing. --The patient was advised to call if she has any increased vaginal discharge, vaginal bleeding, contractions, abdominal pain, back pain or any new significant symptomatology prior to her next visit. I advised her that these are signs and symptoms of cervical change and require follow-up assessment when they occur. Preeclampsia precautions were also reviewed with the patient. --The patient was also counseled to call and/or return with any concerns for decreased fetal activity. --The patient is to continue to follow with you in your office for ongoing obstetric care. --The total time spent on today's visit was 40 minutes. This included preparation for the visit (i.e. reviewing prior external notes and test results), performance of a medically appropriate history and examination, counseling, orders for medications, tests or other procedures, and coordination of care. Greater than 50% of the time was spent face-to-face with the patient. This time is exclusive of procedures performed. I answered all of  the patient's questions to her satisfaction. I asked her to call if she had any additional questions prior to her next visit. --At the conclusion of the visit, the patient appeared to have a good understanding of the issues discussed. I answered all of her questions to her satisfaction. I asked her to call if she had any additional questions prior to her next visit. --Thank you for allowing me to participate in the care of this pleasant patient. Please don't hesitate to call me if you have any questions. Sincerely,      Abi Bourgeois MD, [id number]  [phone]      *All or parts of this note may have been generated using a voice recognition program. There may be typo, grammar, or Word substitution errors that have escaped my review of this note.

## 2022-11-10 NOTE — PROGRESS NOTES
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 calcium 9.6, ALT 10, AST 13, ferritin 26, folate >20, vitamin B12 595, vitamin D 30, hemoglobin A1c 4.8%, TSH 0.998, free T4 0.89, free T3 2.8, uric acid 4.8, , magnesium 1.5, urinalysis contaminated, urine protein creatinine ratio 0.1, urine culture mixed, blood type B-, antibody screen negative. -- The patient's vitamin B12 was improved at 595. She was counseled to continue her vitamin B12 supplement. --Recommend repeat testing in 4 to 6 weeks, on/after 12/7/2022  --Long-term follow-up with PCP for monitoring and management     17.   Low ferritin -- The patient's ferritin was low at 29 (considered low if <15 in absence of anemia or <40 in setting of anemia)  --Ferrous sulfate 325 mg BID and normal TSH at increased risk for  labor, macrosomia, and gestational diabetes. Regarding cognitive outcomes, some studies have reported that hypothyroxinemia between 12 and 20 weeks gestation was associated with a lower mean intelligence, psychomotor, and/or behavioral scores compared to children born to women with normal thyroid function. Alternatively, another study looking at children at age 3 did not find any differences in neurocognitive development. Studies have also evaluated the benefit of thyroid hormone replacement. There was no significant difference in neurodevelopmental or behavioral outcomes and children at 11years of age between the thyroid replacement group and placebo group. Additionally, there were no significant differences in the rate of  delivery, preeclampsia, gestational hypertension, pregnancy loss, and/or any other maternal or fetal outcomes. Per the American thyroid Association, treatment is not recommended for women with isolated hypothyroxinemia. However, thyroid function study should be monitored closely, every 4 weeks throughout the pregnancy. --Recommend repeat thyroid function studies in 4 weeks, on/after 2022  --Long-term follow-up with PCP for monitoring management      21. Pressure with voiding/dysuria, improved -- The patient previously had complaints of dysuria and increased pressure with voiding. She denied any associated fevers, chills, nausea, vomiting, and or back pain. The patient had a urine culture on 2022 that was reported as mixed. Secondary to the patient's symptoms, a prescription for Macrobid was provided. Today, the patient reports that her symptoms have improved. She again reports intermittent symptoms of pressure but feels it is related to the pregnancy. Infection precautions were reviewed.      Precautions were reviewed and the patient was counseled to go to the hospital with persistent or worsening symptoms or the development of any associated fevers, chills, nausea, vomiting, and/or back pain. 22.  Upper respiratory infection, resolved -- Previously, on 10/13/2022, the patient had complaints of upper respiratory infection symptoms. She reports that her symptoms started on Tuesday, 10/4/2022. She has complaints of congestion and a nonproductive cough. She also reports having a scratchy throat. She denied having any associated fevers, chills, nausea, and or vomiting. She denied having any loss of taste or smell. Supportive measures were reviewed including using Tylenol as needed for pain and fever, saline nasal spray for congestion, Robitussin (plain) for cough, a humidifier or vaporizer, and throat lozenges and/or spray. A handout reviewing medication safety in pregnancy was provided. Precautions were reviewed with the patient and she was counseled that if she develops a fever greater than 100.4 F, chest pain and/or shortness of breath to present immediately for evaluation. The patient expressed verbal understanding of this counseling. Today, the patient again reports that her symptoms have improved. 23.  Lump under skin -- The patient again had concerns regarding a small, pea-sized lump along the right side of her abdominal wall. The patient reports that the lump is at the site of a Lovenox injection. The patient was counseled that sometimes small knots or lumps can develop at the site of Lovenox injections. She was counseled to avoid massaging the area after administering the Lovenox. She was counseled to hold pressure after the injection. 24.  Abdominal wall hernia -- The patient again had concerns regarding a possible hernia at the site of her prior ileostomy. She reports that the area occasionally bulges and is sometimes tender. The patient was counseled that she may have a hernia in that area.   With persistent or worsening symptoms, I would recommend referral to general surgery for further evaluation. She was counseled that she can wear gentle compression to help minimize the risk for bowel herniation. Precautions were reviewed and she was counseled to present to the hospital with the development of persistent pain, fever, nausea, and or vomiting. The patient was provided with a referral to general surgery. The patient met with Dr. Rodri Guy on 11/3/2022. Per his consultation note, she has a small incisional hernia at the site of her prior ileostomy. No obstruction was noted. Precautions were reviewed. Dr. Rodri Guy also mentioned the patient's history of dense abdominal and pelvic adhesions. Based on her history, she may be at increased risk for having a bowel obstruction. Precautions were reviewed. 25. Occasional headaches, stable -- The patient again reports that she is experiencing occasional headaches. She denies having any associated vision change, chest pain, shortness of breath, nausea, and or vomiting. She denies having the worst headache of her life or any associated neurologic deficits. Today, the patient again reports that her symptoms are stable. The patient was again counseled to stay well-hydrated. She can use Tylenol as needed. She was counseled regarding the use of magnesium oxide 400 mg twice daily and riboflavin 100 mg daily in reducing the frequency and intensity of migraine headaches. Precautions were reviewed, and she was counseled that if she develops the worst headache of her life or a headache with neurological deficits, to present immediately for evaluation. She expressed verbal understanding of this counseling. 26. MTHFR C677T, heterozygote --  The patient had a thrombophilia panel secondary to a history of a DVT. She was found to be heterozygous for MTHFR C677T. Counseling was previously provided regarding the implications and management of this gene mutation in pregnancy. Reviewed.   She did not have any additional questions or concerns. She was counseled that this gene mutation affects folic acid metabolism. It is fairly common and found in 20-30% of the population. It is generally only a problem if homocysteine levels are elevated. The patient's homocystine level was normal at 5.3 on 9/26/2022. In the past, this gene mutation was thought to be associated with increased obstetric risks including thrombosis, early recurrent pregnancy loss, placental abruption, hypertensive disorders of pregnancy, poor fetal growth, and fetal loss. More recent studies did not report strong associations with these risks. Because this genetic mutation affects folic acid metabolism, there is an increased risk for folic acid deficiency and other nutritional deficiencies in women with this condition. There is also an increased risk for having a child with an open neural tube defect in women with this mutation, especially if they're homozygous for the C677T mutation. Generally, additional vitamin supplementation is recommended with folic acid or methyl folate, vitamin B6, and vitamin B12. The patient was counseled to take a low-dose aspirin. Fetal growth should be followed serially. She was counseled that there is a 50% chance that she will pass this mutation off to her child(joana). She should relay this information to the pediatrician who cares for her child(joana). She was also encouraged to review this diagnosis with her PCP. The patient was counseled that this condition is no longer thought to be clinically significant. 27.  Vaginal discharge -- The patient had complaints of increased vaginal discharge. She denied having any associated itching or irritation. She felt that she may have a yeast infection. Thus, a sterile speculum exam was completed. Her cervix appeared closed. Copious discharge was noted. A genital culture was collected and sent to the lab.       28.  Borderline cervical length -- The ultrasound results were reviewed with the patient. Today, at 22 weeks 3 days, the patient's cervical length was borderline and measured 2.8-3.5 cm without funneling. No dynamic change was noted. Secondary to the patient's complaints of increased discharge, A sterile speculum exam was done prior to her transvaginal ultrasound. The patient's cervix was visually closed. Only a general culture was collected. She notes good fetal movement and denies any symptoms of discharge, leaking of fluid, vaginal bleeding, and/or contractions. At this time, close follow-up was recommended. The patient was scheduled to return in 1 week for a follow-up cervical length. The patient was counseled to avoid heavy lifting, prolonged standing, and sexual intercourse. The patient was scheduled to return for a follow-up assessment in 1 week. Precautions to call and/or return sooner were reviewed. --I requested the patient return for a follow-up assessment in 1 week unless there is a clinical reason for her to return prior to that time. She is to call if she has any problems or questions prior to her next visit. Further evaluation and management will be dependent on her clinical presentation and the results of her testing. --The patient was advised to call if she has any increased vaginal discharge, vaginal bleeding, contractions, abdominal pain, back pain or any new significant symptomatology prior to her next visit. I advised her that these are signs and symptoms of cervical change and require follow-up assessment when they occur. Preeclampsia precautions were also reviewed with the patient. --The patient was also counseled to call and/or return with any concerns for decreased fetal activity. --The patient is to continue to follow with you in your office for ongoing obstetric care. --The total time spent on today's visit was 40 minutes.   This included preparation for the visit (i.e. reviewing prior external notes and test results), performance of a medically appropriate history and examination, counseling, orders for medications, tests or other procedures, and coordination of care. Greater than 50% of the time was spent face-to-face with the patient. This time is exclusive of procedures performed. I answered all of  the patient's questions to her satisfaction. I asked her to call if she had any additional questions prior to her next visit. --At the conclusion of the visit, the patient appeared to have a good understanding of the issues discussed. I answered all of her questions to her satisfaction. I asked her to call if she had any additional questions prior to her next visit. --Thank you for allowing me to participate in the care of this pleasant patient. Please don't hesitate to call me if you have any questions. Sincerely,      Lenny Tripathi MD, [place] [address]  [phone]      *All or parts of this note may have been generated using a voice recognition program. There may be typo, grammar, or Word substitution errors that have escaped my review of this note.

## 2022-11-11 LAB — URINE CULTURE, ROUTINE: NORMAL

## 2022-11-14 LAB — GENITAL CULTURE, ROUTINE: NORMAL

## 2022-11-16 ENCOUNTER — OFFICE VISIT (OUTPATIENT)
Dept: ONCOLOGY | Age: 32
End: 2022-11-16
Payer: MEDICAID

## 2022-11-16 ENCOUNTER — HOSPITAL ENCOUNTER (OUTPATIENT)
Dept: INFUSION THERAPY | Age: 32
Discharge: HOME | End: 2022-11-16
Payer: MEDICAID

## 2022-11-16 ENCOUNTER — INITIAL PRENATAL (OUTPATIENT)
Dept: OBGYN | Age: 32
End: 2022-11-16
Payer: MEDICAID

## 2022-11-16 VITALS
DIASTOLIC BLOOD PRESSURE: 77 MMHG | SYSTOLIC BLOOD PRESSURE: 114 MMHG | BODY MASS INDEX: 22.4 KG/M2 | HEART RATE: 89 BPM | WEIGHT: 143 LBS

## 2022-11-16 VITALS
TEMPERATURE: 97.2 F | WEIGHT: 145.8 LBS | HEART RATE: 92 BPM | HEIGHT: 67 IN | BODY MASS INDEX: 22.88 KG/M2 | DIASTOLIC BLOOD PRESSURE: 72 MMHG | OXYGEN SATURATION: 100 % | SYSTOLIC BLOOD PRESSURE: 112 MMHG

## 2022-11-16 DIAGNOSIS — Z86.711 HISTORY OF PULMONARY EMBOLISM: Primary | ICD-10-CM

## 2022-11-16 DIAGNOSIS — O09.92 ENCOUNTER FOR SUPERVISION OF HIGH RISK PREGNANCY IN SECOND TRIMESTER, ANTEPARTUM: Primary | ICD-10-CM

## 2022-11-16 PROBLEM — O21.9 NAUSEA/VOMITING IN PREGNANCY: Status: RESOLVED | Noted: 2022-09-25 | Resolved: 2022-11-16

## 2022-11-16 LAB
PROTEIN UA: NEGATIVE
PROTEIN UA: NEGATIVE

## 2022-11-16 PROCEDURE — 81002 URINALYSIS NONAUTO W/O SCOPE: CPT | Performed by: OBSTETRICS & GYNECOLOGY

## 2022-11-16 PROCEDURE — G8427 DOCREV CUR MEDS BY ELIG CLIN: HCPCS | Performed by: INTERNAL MEDICINE

## 2022-11-16 PROCEDURE — 99214 OFFICE O/P EST MOD 30 MIN: CPT | Performed by: INTERNAL MEDICINE

## 2022-11-16 PROCEDURE — 99213 OFFICE O/P EST LOW 20 MIN: CPT | Performed by: OBSTETRICS & GYNECOLOGY

## 2022-11-16 PROCEDURE — G8420 CALC BMI NORM PARAMETERS: HCPCS | Performed by: INTERNAL MEDICINE

## 2022-11-16 PROCEDURE — G8484 FLU IMMUNIZE NO ADMIN: HCPCS | Performed by: OBSTETRICS & GYNECOLOGY

## 2022-11-16 PROCEDURE — G8420 CALC BMI NORM PARAMETERS: HCPCS | Performed by: OBSTETRICS & GYNECOLOGY

## 2022-11-16 PROCEDURE — 1036F TOBACCO NON-USER: CPT | Performed by: OBSTETRICS & GYNECOLOGY

## 2022-11-16 PROCEDURE — G8484 FLU IMMUNIZE NO ADMIN: HCPCS | Performed by: INTERNAL MEDICINE

## 2022-11-16 PROCEDURE — 99204 OFFICE O/P NEW MOD 45 MIN: CPT | Performed by: OBSTETRICS & GYNECOLOGY

## 2022-11-16 PROCEDURE — G8427 DOCREV CUR MEDS BY ELIG CLIN: HCPCS | Performed by: OBSTETRICS & GYNECOLOGY

## 2022-11-16 PROCEDURE — 99205 OFFICE O/P NEW HI 60 MIN: CPT | Performed by: INTERNAL MEDICINE

## 2022-11-16 PROCEDURE — 1036F TOBACCO NON-USER: CPT | Performed by: INTERNAL MEDICINE

## 2022-11-16 RX ORDER — FOLIC ACID 1 MG/1
1 TABLET ORAL DAILY
Qty: 90 TABLET | Refills: 1 | Status: SHIPPED | OUTPATIENT
Start: 2022-11-16

## 2022-11-16 NOTE — PROGRESS NOTES
Initial ob  +fm  Pt denies lof vag bleeding or contractions  Pt voiced low pelvic pain with fm  Pt refused flu shot  Pt changed ob  d/t wanted to deliver in  [place].
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
## 2022-11-16 NOTE — PROGRESS NOTES
Yvette Casillas is a 73-year-old G2, P1 female at 23 weeks and 2 days transferring care to us today. She has a history of an ileostomy and reversal with multiple surgeries. 1 month after that surgery she had a DVT PE hematology recommends antepartum Lovenox through 6 to 8 weeks postpartum. FOB is involved and she feels safe with him. No further episodes of nausea or vomiting first pregnancy was uncomplicated. She is following with [facility]. She has an appointment with them tomorrow. Abdomen is soft nontender no guarding no rebound large vertical scar from above the umbilicus to the symphysis. RhoGAM ordered along with 26/28-week labs explained the glucose tolerance test is fasting. Declines flu shot will get Tdap next visit.
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
## 2022-11-17 ENCOUNTER — ROUTINE PRENATAL (OUTPATIENT)
Dept: OBGYN CLINIC | Age: 32
End: 2022-11-17
Payer: MEDICAID

## 2022-11-17 ENCOUNTER — ANCILLARY PROCEDURE (OUTPATIENT)
Dept: OBGYN CLINIC | Age: 32
End: 2022-11-17
Payer: MEDICAID

## 2022-11-17 VITALS
DIASTOLIC BLOOD PRESSURE: 82 MMHG | SYSTOLIC BLOOD PRESSURE: 123 MMHG | WEIGHT: 143.25 LBS | HEART RATE: 110 BPM | BODY MASS INDEX: 22.44 KG/M2

## 2022-11-17 DIAGNOSIS — O26.12 LOW WEIGHT GAIN DURING PREGNANCY IN SECOND TRIMESTER: ICD-10-CM

## 2022-11-17 DIAGNOSIS — O36.1920 ANTI-M ISOIMMUNIZATION AFFECTING PREGNANCY IN SECOND TRIMESTER, SINGLE OR UNSPECIFIED FETUS: ICD-10-CM

## 2022-11-17 DIAGNOSIS — Z3A.23 23 WEEKS GESTATION OF PREGNANCY: ICD-10-CM

## 2022-11-17 DIAGNOSIS — O26.892 VAGINAL DISCHARGE DURING PREGNANCY IN SECOND TRIMESTER: ICD-10-CM

## 2022-11-17 DIAGNOSIS — O26.899 RH NEGATIVE STATE IN ANTEPARTUM PERIOD: ICD-10-CM

## 2022-11-17 DIAGNOSIS — O26.872 SHORT CERVICAL LENGTH DURING PREGNANCY IN SECOND TRIMESTER: ICD-10-CM

## 2022-11-17 DIAGNOSIS — Z3A.23 23 WEEKS GESTATION OF PREGNANCY: Primary | ICD-10-CM

## 2022-11-17 DIAGNOSIS — Z67.91 RH NEGATIVE STATE IN ANTEPARTUM PERIOD: ICD-10-CM

## 2022-11-17 DIAGNOSIS — O99.332 TOBACCO SMOKING AFFECTING PREGNANCY IN SECOND TRIMESTER: ICD-10-CM

## 2022-11-17 DIAGNOSIS — E53.8 LOW VITAMIN B12 LEVEL: ICD-10-CM

## 2022-11-17 DIAGNOSIS — K43.9 ABDOMINAL WALL HERNIA: ICD-10-CM

## 2022-11-17 DIAGNOSIS — R79.0 LOW MAGNESIUM LEVEL: ICD-10-CM

## 2022-11-17 DIAGNOSIS — R79.89 LOW VITAMIN D LEVEL: ICD-10-CM

## 2022-11-17 DIAGNOSIS — Z86.711 HISTORY OF PULMONARY EMBOLISM: ICD-10-CM

## 2022-11-17 DIAGNOSIS — Z86.718 HISTORY OF DVT (DEEP VEIN THROMBOSIS): ICD-10-CM

## 2022-11-17 DIAGNOSIS — N89.8 VAGINAL DISCHARGE DURING PREGNANCY IN SECOND TRIMESTER: ICD-10-CM

## 2022-11-17 DIAGNOSIS — D64.9 ANEMIA, UNSPECIFIED TYPE: ICD-10-CM

## 2022-11-17 LAB
GLUCOSE URINE, POC: NEGATIVE
PROTEIN UA: NEGATIVE

## 2022-11-17 PROCEDURE — 76821 MIDDLE CEREBRAL ARTERY ECHO: CPT | Performed by: OBSTETRICS & GYNECOLOGY

## 2022-11-17 PROCEDURE — G8484 FLU IMMUNIZE NO ADMIN: HCPCS | Performed by: OBSTETRICS & GYNECOLOGY

## 2022-11-17 PROCEDURE — 81002 URINALYSIS NONAUTO W/O SCOPE: CPT | Performed by: OBSTETRICS & GYNECOLOGY

## 2022-11-17 PROCEDURE — 99215 OFFICE O/P EST HI 40 MIN: CPT | Performed by: OBSTETRICS & GYNECOLOGY

## 2022-11-17 PROCEDURE — 99213 OFFICE O/P EST LOW 20 MIN: CPT | Performed by: OBSTETRICS & GYNECOLOGY

## 2022-11-17 PROCEDURE — 76815 OB US LIMITED FETUS(S): CPT | Performed by: OBSTETRICS & GYNECOLOGY

## 2022-11-17 PROCEDURE — G8420 CALC BMI NORM PARAMETERS: HCPCS | Performed by: OBSTETRICS & GYNECOLOGY

## 2022-11-17 PROCEDURE — G8427 DOCREV CUR MEDS BY ELIG CLIN: HCPCS | Performed by: OBSTETRICS & GYNECOLOGY

## 2022-11-17 PROCEDURE — 1036F TOBACCO NON-USER: CPT | Performed by: OBSTETRICS & GYNECOLOGY

## 2022-11-17 PROCEDURE — 76817 TRANSVAGINAL US OBSTETRIC: CPT | Performed by: OBSTETRICS & GYNECOLOGY

## 2022-11-17 RX ORDER — METRONIDAZOLE 500 MG/1
500 TABLET ORAL 2 TIMES DAILY
Qty: 14 TABLET | Refills: 0 | Status: SHIPPED | OUTPATIENT
Start: 2022-11-17 | End: 2022-11-24

## 2022-11-17 RX ORDER — PROGESTERONE 200 MG/1
CAPSULE ORAL
Qty: 30 CAPSULE | Refills: 3 | Status: SHIPPED | OUTPATIENT
Start: 2022-11-17

## 2022-11-17 NOTE — PATIENT INSTRUCTIONS
Please arrive for your scheduled appointment at least 15 minutes early with your actual insurance card+ a photo ID. Also if you need any refills ordered or have questions, it may take up 48 hours to reply. Please allow ample time for your refills. Call me when you use last refill. Thank you for your cooperation. Call your primary obstetrician with bleeding, leaking of fluid, abdominal tenderness, headache, blurry vision, epigastric pain and increased urinary frequency. If you are experiencing an emergency and need immediate help, call 911 or go to go emergency room or labor and delivery. if you are sick, not feeling well or have an infectious process going on please reschedule your appointment by calling [phone]. Also if any family members are not feeling well, please do not bring them to your appointment. We appreciate your cooperation. We are doing this in order to protect our pregnant mothers+ their babies. if you are sick, not feeling well or have an infectious process going on please reschedule your appointment by calling [phone]. Also if any family members are not feeling well, please do not bring them to your appointment. We appreciate your cooperation. We are doing this in order to protect our pregnant mothers+ their babies.

## 2022-11-17 NOTE — PROGRESS NOTES
Pt here for pregnancy ultrasound  Pt denies any bleeding/cramping  Pt states good fetal movement  Pt saw Dr. Luciana Reddy with speculum exam and cultures obtained.   Vag exam done by Dr. Sania Reddy and cervix closed

## 2022-11-17 NOTE — PROGRESS NOTES
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  --Long-term follow-up with PCP for monitoring and management     17. Low ferritin -- The patient's ferritin was low at 29 (considered low if <15 in absence of anemia or <40 in setting of anemia)  --Ferrous sulfate 325 mg BID prescribed  --Monitor levels serially  --Monitor nutrition panel q4-6 weeks (CBC, CMP, magnesium, ferritin, folate, vitamin B12, vitamin D25OH), on/after 10/24/2022     --Repeat testing completed 11/9/2022, ferritin 26. Her H/H was stable at 11.3/32.  -- The patient was counseled to continue her oral iron supplement.   --Recommend repeat testing in 4 to 6 weeks, on/after 12/7/2022  --Follow up with PCP for long term monitoring and management     18. Low vitamin D -- The patient's vitamin D was low at 31  --Recommend vitamin D3 2000 IU daily  --Monitor levels serially  --Monitor nutrition panel q4-6 weeks (CBC, CMP, magnesium, ferritin, folate, vitamin B12, vitamin D25OH)     --Repeat testing completed 11/9/2022, vitamin D 30  --The patient was encouraged to take her vitamin D supplement. --Recommend repeat testing in 4 to 6 weeks, on/after 12/7/2022. --Follow up with PCP for long term monitoring and management     19. Low magnesium -- The patient's magnesium was mildly decreased at 1.5 (1.6-2.6). Her potassium was normal at 3.8. She was prescribed magnesium oxide, 400 mg daily. Recommend repeat testing with next set of labs. 20.   Hypothyroxinemia -- The patient's lab results were reviewed. Her free T4 was mildly decreased at 0.89. Her TSH was normal at 0.998 and free T3 normal at 2.8. Screening for thyroid peroxidase antibody and antithyroglobulin antibody was previously negative on 9/26/2022. Counseling was provided to the patient. --Counseling was previously provided to the patient. Counseling was reviewed. She did not have any additional questions or concerns. Per the [employer] thyroid Association, treatment is not recommended for women with isolated hypothyroxinemia. However, thyroid function study should be monitored closely, every 4 weeks throughout the pregnancy. --Recommend repeat thyroid function studies in 4 weeks, on/after 12/7/2022  --Long-term follow-up with PCP for monitoring management        21. Pressure with voiding/dysuria, improved -- The patient previously had complaints of dysuria and increased pressure with voiding. She denied any associated fevers, chills, nausea, vomiting, and or back pain. The patient had a urine culture on 9/26/2022 that was reported as mixed.   Secondary to the patient's symptoms, a prescription for Macrobid was provided. Today, the patient again reports that her symptoms have improved. She again reports intermittent symptoms of pressure but feels it is related to the pregnancy. Infection precautions were reviewed. Precautions were reviewed and the patient was counseled to go to the hospital with persistent or worsening symptoms or the development of any associated fevers, chills, nausea, vomiting, and/or back pain. 22.  Upper respiratory infection, resolved -- Previously, on 10/13/2022, the patient had complaints of upper respiratory infection symptoms. She reports that her symptoms started on Tuesday, 10/4/2022. She has complaints of congestion and a nonproductive cough. She also reports having a scratchy throat. She denied having any associated fevers, chills, nausea, and or vomiting. She denied having any loss of taste or smell. Supportive measures were reviewed including using Tylenol as needed for pain and fever, saline nasal spray for congestion, Robitussin (plain) for cough, a humidifier or vaporizer, and throat lozenges and/or spray. A handout reviewing medication safety in pregnancy was provided. Precautions were reviewed with the patient and she was counseled that if she develops a fever greater than 100.4 F, chest pain and/or shortness of breath to present immediately for evaluation. The patient expressed verbal understanding of this counseling. Today, the patient again reports that her symptoms have improved. 23.  Lump under skin -- The patient previously had concerns regarding a small, pea-sized lump along the right side of her abdominal wall. The patient reports that the lump is at the site of a Lovenox injection. The patient reports that her symptoms are stable. The patient was counseled that sometimes small knots or lumps can develop at the site of Lovenox injections. She was counseled to avoid massaging the area after administering the Lovenox.   She was counseled to hold pressure after the injection. 24.  Abdominal wall hernia -- The patient again had concerns regarding a possible hernia at the site of her prior ileostomy. She reports that the area occasionally bulges and is sometimes tender. The patient was counseled that she may have a hernia in that area. With persistent or worsening symptoms, I would recommend referral to general surgery for further evaluation. She was counseled that she can wear gentle compression to help minimize the risk for bowel herniation. Precautions were reviewed and she was counseled to present to the hospital with the development of persistent pain, fever, nausea, and or vomiting. The patient was provided with a referral to general surgery. The patient met with Dr. Aspen Wakefield on 11/3/2022. Per his consultation note, she has a small incisional hernia at the site of her prior ileostomy. No obstruction was noted. Precautions were reviewed. Dr. 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 Based on her history, she may be at increased risk for having a bowel obstruction. Precautions were reviewed. 25. Occasional headaches, stable -- The patient again reports that she is experiencing occasional headaches. She denies having any associated vision change, chest pain, shortness of breath, nausea, and or vomiting. She denies having the worst headache of her life or any associated neurologic deficits. Today, the patient again reports that her symptoms are stable. The patient was again counseled to stay well-hydrated. She can use Tylenol as needed. She was counseled regarding the use of magnesium oxide 400 mg twice daily and riboflavin 100 mg daily in reducing the frequency and intensity of migraine headaches.        Precautions were reviewed, and she was counseled that if she develops the worst headache of her life or a headache with neurological deficits, to present immediately for evaluation. She expressed verbal understanding of this counseling. 26. MTHFR C677T, heterozygote --  The patient had a thrombophilia panel secondary to a history of a DVT. She was found to be heterozygous for MTHFR C677T. Counseling was previously provided regarding the implications and management of this gene mutation in pregnancy. Reviewed. She did not have any additional questions or concerns. She was counseled that this gene mutation affects folic acid metabolism. It is fairly common and found in 20-30% of the population. The patient was counseled that this condition is no longer thought to be clinically significant. It is generally only a problem if homocysteine levels are elevated. The patient's homocystine level was normal at 5.3 on 9/26/2022. In the past, this gene mutation was thought to be associated with increased obstetric risks including thrombosis, early recurrent pregnancy loss, placental abruption, hypertensive disorders of pregnancy, poor fetal growth, and fetal loss. More recent studies did not report strong associations with these risks. Because this genetic mutation affects folic acid metabolism, there is an increased risk for folic acid deficiency and other nutritional deficiencies in women with this condition. There is also an increased risk for having a child with an open neural tube defect in women with this mutation, especially if they're homozygous for the C677T mutation. Generally, additional vitamin supplementation is recommended with folic acid or methyl folate, vitamin B6, and vitamin B12. The patient was counseled to take a low-dose aspirin. Fetal growth should be followed serially. She was counseled that there is a 50% chance that she will pass this mutation off to her child(joana). She should relay this information to the pediatrician who cares for her child(joana). She was also encouraged to review this diagnosis with her PCP.          27.  Vaginal discharge -- The patient again had complaints of increased vaginal discharge. She denied having any associated itching or irritation. She felt that she may have a yeast infection. Thus, a sterile speculum exam was completed on 11/10/2022. Her cervix appeared closed. Copious discharge was noted. A genital culture was collected and noted to be negative. The patient again had complaints of copious vaginal discharge. A sterile speculum exam was repeated. Infection screening was completed with GC/chlamydia, Ureaplasma, and a repeat genital culture. Secondary to the continued vaginal discharge and borderline cervical length, the patient was empirically treated with a course of Flagyl. A prescription was provided. 28.  Borderline cervical length -- The ultrasound results were reviewed with the patient. At 22 weeks 3 days, the patient's cervical length was borderline and measured 2.8-3.5 cm without funneling. No dynamic change was noted. Secondary to the patient's complaints of increased discharge, A sterile speculum exam was done prior to her transvaginal ultrasound. The patient's cervix was visually closed. Only a general culture was collected. Today, at 23 weeks 3 days, the patient's cervix appeared stable and measured 2.6-2.9 cm without funneling. A sterile speculum exam was repeated today at 23 weeks 3 days. The patient's cervix was visually closed. Copious discharge was noted. Infection screening was completed. On digital exam, her cervix was closed with approximately 1-2 cm of length palpable. Her cervix was firm and posterior. She notes good fetal movement and denies any symptoms of discharge, leaking of fluid, vaginal bleeding, and/or contractions. She was counseled that  cervical shortening is associated with a 30% increased risk for delivery prior to 37 weeks' gestation. Interventions and strategies to reduce this risk were reviewed.   The patient was counseled that with further cervical shortening, Prometrium would be indicated. The risks and benefits of use were reviewed. She was counseled that vaginal progesterone has been shown to reduce this risk by 30-40%. The risks and benefits of use were reviewed. --The patient requested treatment with vaginal progesterone. A prescription was provided. Instructions for use were reviewed. --She was provided with a prescription for 200 mg to be placed into the vagina at bedtime. She should continue this medication until 36-37 weeks' gestation. At this time, close follow-up was recommended. The patient was scheduled to return in 1 week for a follow-up cervical length. The patient was counseled to avoid heavy lifting, prolonged standing, and sexual intercourse. The patient was scheduled to return for a follow-up assessment in 1 week. Precautions to call and/or return sooner were reviewed. --I requested the patient return for a follow-up assessment in 1 week unless there is a clinical reason for her to return prior to that time. She is to call if she has any problems or questions prior to her next visit. Further evaluation and management will be dependent on her clinical presentation and the results of her testing. --The patient was advised to call if she has any increased vaginal discharge, vaginal bleeding, contractions, abdominal pain, back pain or any new significant symptomatology prior to her next visit. I advised her that these are signs and symptoms of cervical change and require follow-up assessment when they occur. Preeclampsia precautions were also reviewed with the patient. --The patient was also counseled to call and/or return with any concerns for decreased fetal activity. --The patient is to continue to follow with you in your office for ongoing obstetric care. --The total time spent on today's visit was 40 minutes.   This included preparation for the visit (i.e. reviewing prior

## 2022-11-17 NOTE — LETTER
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 3.8. She was prescribed magnesium oxide, 400 mg daily. Recommend repeat testing with next set of labs. 20.   Hypothyroxinemia -- The patient's lab results were reviewed. Her free T4 was mildly decreased at 0.89. Her TSH was normal at 0.998 and free T3 normal at 2.8. Screening for thyroid peroxidase antibody and antithyroglobulin antibody was previously negative on 9/26/2022. Counseling was provided to the patient. --Counseling was previously provided to the patient. Counseling was reviewed.   She did not have any additional questions or concerns. Per the [employer] thyroid Association, treatment is not recommended for women with isolated hypothyroxinemia. However, thyroid function study should be monitored closely, every 4 weeks throughout the pregnancy. --Recommend repeat thyroid function studies in 4 weeks, on/after 12/7/2022  --Long-term follow-up with PCP for monitoring management        21. Pressure with voiding/dysuria, improved -- The patient previously had complaints of dysuria and increased pressure with voiding. She denied any associated fevers, chills, nausea, vomiting, and or back pain. The patient had a urine culture on 9/26/2022 that was reported as mixed. Secondary to the patient's symptoms, a prescription for Macrobid was provided. Today, the patient again reports that her symptoms have improved. She again reports intermittent symptoms of pressure but feels it is related to the pregnancy. Infection precautions were reviewed. Precautions were reviewed and the patient was counseled to go to the hospital with persistent or worsening symptoms or the development of any associated fevers, chills, nausea, vomiting, and/or back pain. 22.  Upper respiratory infection, resolved -- Previously, on 10/13/2022, the patient had complaints of upper respiratory infection symptoms. She reports that her symptoms started on Tuesday, 10/4/2022. She has complaints of congestion and a nonproductive cough. She also reports having a scratchy throat. She denied having any associated fevers, chills, nausea, and or vomiting. She denied having any loss of taste or smell. Supportive measures were reviewed including using Tylenol as needed for pain and fever, saline nasal spray for congestion, Robitussin (plain) for cough, a humidifier or vaporizer, and throat lozenges and/or spray. A handout reviewing medication safety in pregnancy was provided.      Precautions were reviewed with the patient and she was counseled that if she experiencing occasional headaches. She denies having any associated vision change, chest pain, shortness of breath, nausea, and or vomiting. She denies having the worst headache of her life or any associated neurologic deficits. Today, the patient again reports that her symptoms are stable. The patient was again counseled to stay well-hydrated. She can use Tylenol as needed. She was counseled regarding the use of magnesium oxide 400 mg twice daily and riboflavin 100 mg daily in reducing the frequency and intensity of migraine headaches. Precautions were reviewed, and she was counseled that if she develops the worst headache of her life or a headache with neurological deficits, to present immediately for evaluation. She expressed verbal understanding of this counseling. 26. MTHFR C677T, heterozygote --  The patient had a thrombophilia panel secondary to a history of a DVT. She was found to be heterozygous for MTHFR C677T. Counseling was previously provided regarding the implications and management of this gene mutation in pregnancy. Reviewed. She did not have any additional questions or concerns. She was counseled that this gene mutation affects folic acid metabolism. It is fairly common and found in 20-30% of the population. The patient was counseled that this condition is no longer thought to be clinically significant. It is generally only a problem if homocysteine levels are elevated. The patient's homocystine level was normal at 5.3 on 9/26/2022. In the past, this gene mutation was thought to be associated with increased obstetric risks including thrombosis, early recurrent pregnancy loss, placental abruption, hypertensive disorders of pregnancy, poor fetal growth, and fetal loss. More recent studies did not report strong associations with these risks.  Because this genetic mutation affects folic acid metabolism, there is an increased risk for folic acid deficiency and other nutritional deficiencies in women with this condition. There is also an increased risk for having a child with an open neural tube defect in women with this mutation, especially if they're homozygous for the C677T mutation. Generally, additional vitamin supplementation is recommended with folic acid or methyl folate, vitamin B6, and vitamin B12. The patient was counseled to take a low-dose aspirin. Fetal growth should be followed serially. She was counseled that there is a 50% chance that she will pass this mutation off to her child(joana). She should relay this information to the pediatrician who cares for her child(joana). She was also encouraged to review this diagnosis with her PCP. 27.  Vaginal discharge -- The patient again had complaints of increased vaginal discharge. She denied having any associated itching or irritation. She felt that she may have a yeast infection. Thus, a sterile speculum exam was completed on 11/10/2022. Her cervix appeared closed. Copious discharge was noted. A genital culture was collected and noted to be negative. The patient again had complaints of copious vaginal discharge. A sterile speculum exam was repeated. Infection screening was completed with GC/chlamydia, Ureaplasma, and a repeat genital culture. Secondary to the continued vaginal discharge and borderline cervical length, the patient was empirically treated with a course of Flagyl. A prescription was provided. 28.  Borderline cervical length -- The ultrasound results were reviewed with the patient. At 22 weeks 3 days, the patient's cervical length was borderline and measured 2.8-3.5 cm without funneling. No dynamic change was noted. Secondary to the patient's complaints of increased discharge, A sterile speculum exam was done prior to her transvaginal ultrasound. The patient's cervix was visually closed. Only a general culture was collected.     Today, at 23 weeks 3 days, the patient's cervix appeared stable and measured 2.6-2.9 cm without funneling. A sterile speculum exam was repeated today at 23 weeks 3 days. The patient's cervix was visually closed. Copious discharge was noted. Infection screening was completed. On digital exam, her cervix was closed with approximately 1-2 cm of length palpable. Her cervix was firm and posterior. She notes good fetal movement and denies any symptoms of discharge, leaking of fluid, vaginal bleeding, and/or contractions. She was counseled that  cervical shortening is associated with a 30% increased risk for delivery prior to 37 weeks' gestation. Interventions and strategies to reduce this risk were reviewed. The patient was counseled that with further cervical shortening, Prometrium would be indicated. The risks and benefits of use were reviewed. She was counseled that vaginal progesterone has been shown to reduce this risk by 30-40%. The risks and benefits of use were reviewed. --The patient requested treatment with vaginal progesterone. A prescription was provided. Instructions for use were reviewed. --She was provided with a prescription for 200 mg to be placed into the vagina at bedtime. She should continue this medication until 36-37 weeks' gestation. At this time, close follow-up was recommended. The patient was scheduled to return in 1 week for a follow-up cervical length. The patient was counseled to avoid heavy lifting, prolonged standing, and sexual intercourse. The patient was scheduled to return for a follow-up assessment in 1 week. Precautions to call and/or return sooner were reviewed. --I requested the patient return for a follow-up assessment in 1 week unless there is a clinical reason for her to return prior to that time. She is to call if she has any problems or questions prior to her next visit.  Further evaluation and management will be dependent on her clinical presentation and the results of her testing. --The patient was advised to call if she has any increased vaginal discharge, vaginal bleeding, contractions, abdominal pain, back pain or any new significant symptomatology prior to her next visit. I advised her that these are signs and symptoms of cervical change and require follow-up assessment when they occur. Preeclampsia precautions were also reviewed with the patient. --The patient was also counseled to call and/or return with any concerns for decreased fetal activity. --The patient is to continue to follow with you in your office for ongoing obstetric care. --The total time spent on today's visit was 40 minutes. This included preparation for the visit (i.e. reviewing prior external notes and test results), performance of a medically appropriate history and examination, counseling, orders for medications, tests or other procedures, and coordination of care. Greater than 50% of the time was spent face-to-face with the patient. This time is exclusive of procedures performed. I answered all of  the patient's questions to her satisfaction. I asked her to call if she had any additional questions prior to her next visit. --At the conclusion of the visit, the patient appeared to have a good understanding of the issues discussed. I answered all of her questions to her satisfaction. I asked her to call if she had any additional questions prior to her next visit. --Thank you for allowing me to participate in the care of this pleasant patient. Please don't hesitate to call me if you have any questions. Sincerely,      Bettina Lora MD, [facility]  [phone]      *All or parts of this note may have been generated using a voice recognition program. There may be typo, grammar, or Word substitution errors that have escaped my review of this note.

## 2022-11-21 ENCOUNTER — ROUTINE PRENATAL (OUTPATIENT)
Dept: OBGYN CLINIC | Age: 32
End: 2022-11-21
Payer: MEDICAID

## 2022-11-21 ENCOUNTER — ANCILLARY PROCEDURE (OUTPATIENT)
Dept: OBGYN CLINIC | Age: 32
End: 2022-11-21
Payer: MEDICAID

## 2022-11-21 VITALS
WEIGHT: 145 LBS | BODY MASS INDEX: 22.71 KG/M2 | HEART RATE: 110 BPM | DIASTOLIC BLOOD PRESSURE: 85 MMHG | SYSTOLIC BLOOD PRESSURE: 129 MMHG

## 2022-11-21 DIAGNOSIS — O26.872 SHORT CERVICAL LENGTH DURING PREGNANCY IN SECOND TRIMESTER: ICD-10-CM

## 2022-11-21 DIAGNOSIS — E53.8 LOW VITAMIN B12 LEVEL: ICD-10-CM

## 2022-11-21 DIAGNOSIS — Z86.711 HISTORY OF PULMONARY EMBOLISM: ICD-10-CM

## 2022-11-21 DIAGNOSIS — O26.892 VAGINAL DISCHARGE DURING PREGNANCY IN SECOND TRIMESTER: ICD-10-CM

## 2022-11-21 DIAGNOSIS — R79.89 LOW VITAMIN D LEVEL: ICD-10-CM

## 2022-11-21 DIAGNOSIS — N89.8 VAGINAL DISCHARGE DURING PREGNANCY IN SECOND TRIMESTER: ICD-10-CM

## 2022-11-21 DIAGNOSIS — Z86.718 HISTORY OF DVT (DEEP VEIN THROMBOSIS): ICD-10-CM

## 2022-11-21 DIAGNOSIS — R79.0 LOW MAGNESIUM LEVEL: ICD-10-CM

## 2022-11-21 DIAGNOSIS — O99.332 TOBACCO SMOKING AFFECTING PREGNANCY IN SECOND TRIMESTER: ICD-10-CM

## 2022-11-21 DIAGNOSIS — O36.1920 ANTI-M ISOIMMUNIZATION AFFECTING PREGNANCY IN SECOND TRIMESTER, SINGLE OR UNSPECIFIED FETUS: Primary | ICD-10-CM

## 2022-11-21 LAB
C. TRACHOMATIS DNA ,URINE: NEGATIVE
GENITAL CULTURE, ROUTINE: NORMAL
GLUCOSE URINE, POC: NORMAL
N. GONORRHOEAE DNA, URINE: NEGATIVE
PROTEIN UA: POSITIVE
SOURCE: NORMAL

## 2022-11-21 PROCEDURE — G8484 FLU IMMUNIZE NO ADMIN: HCPCS | Performed by: OBSTETRICS & GYNECOLOGY

## 2022-11-21 PROCEDURE — 76815 OB US LIMITED FETUS(S): CPT | Performed by: OBSTETRICS & GYNECOLOGY

## 2022-11-21 PROCEDURE — 99213 OFFICE O/P EST LOW 20 MIN: CPT | Performed by: OBSTETRICS & GYNECOLOGY

## 2022-11-21 PROCEDURE — 1036F TOBACCO NON-USER: CPT | Performed by: OBSTETRICS & GYNECOLOGY

## 2022-11-21 PROCEDURE — G8420 CALC BMI NORM PARAMETERS: HCPCS | Performed by: OBSTETRICS & GYNECOLOGY

## 2022-11-21 PROCEDURE — 81002 URINALYSIS NONAUTO W/O SCOPE: CPT | Performed by: OBSTETRICS & GYNECOLOGY

## 2022-11-21 PROCEDURE — 76821 MIDDLE CEREBRAL ARTERY ECHO: CPT | Performed by: OBSTETRICS & GYNECOLOGY

## 2022-11-21 PROCEDURE — 76817 TRANSVAGINAL US OBSTETRIC: CPT | Performed by: OBSTETRICS & GYNECOLOGY

## 2022-11-21 PROCEDURE — 99214 OFFICE O/P EST MOD 30 MIN: CPT | Performed by: OBSTETRICS & GYNECOLOGY

## 2022-11-21 PROCEDURE — G8428 CUR MEDS NOT DOCUMENT: HCPCS | Performed by: OBSTETRICS & GYNECOLOGY

## 2022-11-21 NOTE — PROGRESS NOTES
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 vitamin B12 supplement. --Recommend repeat testing in 4 to 6 weeks, on/after 12/7/2022  --Long-term follow-up with PCP for monitoring and management     17. Low ferritin -- The patient's ferritin was low at 29 (considered low if <15 in absence of anemia or <40 in setting of anemia)  --Ferrous sulfate 325 mg BID prescribed  --Monitor levels serially  --Monitor nutrition panel q4-6 weeks (CBC, CMP, magnesium, ferritin, folate, vitamin B12, vitamin D25OH), on/after 10/24/2022     --Repeat testing completed 11/9/2022, ferritin 26. Her H/H was stable at 11.3/32.  -- The patient was counseled to continue her oral iron supplement.   --Recommend repeat testing in 4 to 6 weeks, on/after 12/7/2022  --Follow up with PCP for long term monitoring and management     18. Low vitamin D -- The patient's vitamin D was low at 31  --Recommend vitamin D3 2000 IU daily  --Monitor levels serially  --Monitor nutrition panel q4-6 weeks (CBC, CMP, magnesium, ferritin, folate, vitamin B12, vitamin D25OH)     --Repeat testing completed 11/9/2022, vitamin D 30  --The patient was encouraged to take her vitamin D supplement. --Recommend repeat testing in 4 to 6 weeks, on/after 12/7/2022. --Follow up with PCP for long term monitoring and management     19. Low magnesium -- The patient's magnesium was mildly decreased at 1.5 (1.6-2.6). Her potassium was normal at 3.8. She was prescribed magnesium oxide, 400 mg daily. Recommend repeat testing with next set of labs. 20.   Hypothyroxinemia -- The patient's lab results were reviewed. Her free T4 was mildly decreased at 0.89. Her TSH was normal at 0.998 and free T3 normal at 2.8. Screening for thyroid peroxidase antibody and antithyroglobulin antibody was previously negative on 9/26/2022. Counseling was provided to the patient. --Counseling was previously provided to the patient. Counseling was reviewed. She did not have any additional questions or concerns. Per the [employer] thyroid Association, treatment is not recommended for women with isolated hypothyroxinemia. However, thyroid function study should be monitored closely, every 4 weeks throughout the pregnancy. --Recommend repeat thyroid function studies in 4 weeks, on/after 12/7/2022  --Long-term follow-up with PCP for monitoring management        21. Pressure with voiding/dysuria, improved -- The patient previously had complaints of dysuria and increased pressure with voiding. She denied any associated fevers, chills, nausea, vomiting, and or back pain. The patient had a urine culture on 9/26/2022 that was reported as mixed.   Secondary to the patient's symptoms, a prescription for Macrobid was provided. Today, the patient again reports that her symptoms have improved. She again reports intermittent symptoms of pressure but feels it is related to the pregnancy. Infection precautions were reviewed. Precautions were reviewed and the patient was counseled to go to the hospital with persistent or worsening symptoms or the development of any associated fevers, chills, nausea, vomiting, and/or back pain. 22.  Upper respiratory infection, recurrent -- Previously, on 10/13/2022, the patient had complaints of upper respiratory infection symptoms. She reports that her symptoms started on Tuesday, 10/4/2022. She has complaints of congestion and a nonproductive cough. She also reports having a scratchy throat. She denied having any associated fevers, chills, nausea, and or vomiting. She denied having any loss of taste or smell. Today, at 24 weeks station, the patient again had complaints of congestion. She reports her symptoms started 2 days ago. She denied having any associated fevers, chills, nausea, vomiting, chest pain, and/or shortness of breath. She denied having any associated loss of taste or smell. Supportive measures were again reviewed including using Tylenol as needed for pain and fever, saline nasal spray for congestion, Robitussin (plain) for cough, a humidifier or vaporizer, and throat lozenges and/or spray. A handout reviewing medication safety in pregnancy was provided. Precautions were reviewed with the patient and she was counseled that if she develops a fever greater than 100.4 F, chest pain and/or shortness of breath to present immediately for evaluation. The patient expressed verbal understanding of this counseling. 23.  Lump under skin -- The patient previously had concerns regarding a small, pea-sized lump along the right side of her abdominal wall.   The patient reports that the lump is at the site of a Lovenox injection. The patient reports that her symptoms are stable. The patient was counseled that sometimes small knots or lumps can develop at the site of Lovenox injections. She was counseled to avoid massaging the area after administering the Lovenox. She was counseled to hold pressure after the injection. 24.  Abdominal wall hernia -- The patient again had concerns regarding a possible hernia at the site of her prior ileostomy. She reports that the area occasionally bulges and is sometimes tender. The patient was counseled that she may have a hernia in that area. With persistent or worsening symptoms, I would recommend referral to general surgery for further evaluation. She was counseled that she can wear gentle compression to help minimize the risk for bowel herniation. Precautions were reviewed and she was counseled to present to the hospital with the development of persistent pain, fever, nausea, and or vomiting. The patient was provided with a referral to general surgery. The patient met with Dr. Florentino Johnson on 11/3/2022. Per his consultation note, she has a small incisional hernia at the site of her prior ileostomy. No obstruction was noted. Precautions were reviewed. Dr. 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 Based on her history, she may be at increased risk for having a bowel obstruction. Precautions were reviewed. 25. Occasional headaches, stable -- The patient again reports that she is experiencing occasional headaches. She denies having any associated vision change, chest pain, shortness of breath, nausea, and or vomiting. She denies having the worst headache of her life or any associated neurologic deficits. Today, the patient again reports that her symptoms are stable. The patient was again counseled to stay well-hydrated. She can use Tylenol as needed.      She was counseled regarding the use of magnesium oxide 400 mg twice daily and riboflavin 100 mg daily in reducing the frequency and intensity of migraine headaches. Precautions were reviewed, and she was counseled that if she develops the worst headache of her life or a headache with neurological deficits, to present immediately for evaluation. She expressed verbal understanding of this counseling. 26. MTHFR C677T, heterozygote --  The patient had a thrombophilia panel secondary to a history of a DVT. She was found to be heterozygous for MTHFR C677T. Counseling was previously provided regarding the implications and management of this gene mutation in pregnancy. Reviewed. She did not have any additional questions or concerns. She was counseled that this gene mutation affects folic acid metabolism. It is fairly common and found in 20-30% of the population. The patient was counseled that this condition is no longer thought to be clinically significant. It is generally only a problem if homocysteine levels are elevated. The patient's homocystine level was normal at 5.3 on 9/26/2022. In the past, this gene mutation was thought to be associated with increased obstetric risks including thrombosis, early recurrent pregnancy loss, placental abruption, hypertensive disorders of pregnancy, poor fetal growth, and fetal loss. More recent studies did not report strong associations with these risks. Because this genetic mutation affects folic acid metabolism, there is an increased risk for folic acid deficiency and other nutritional deficiencies in women with this condition. There is also an increased risk for having a child with an open neural tube defect in women with this mutation, especially if they're homozygous for the C677T mutation. Generally, additional vitamin supplementation is recommended with folic acid or methyl folate, vitamin B6, and vitamin B12. The patient was counseled to take a low-dose aspirin. Fetal growth should be followed serially. She was counseled that there is a 50% chance that she will pass this mutation off to her child(joana). She should relay this information to the pediatrician who cares for her child(joana). She was also encouraged to review this diagnosis with her PCP. 27.  Vaginal discharge, improved -- The patient again had complaints of increased vaginal discharge during her visit at 22 weeks 3 days. She denied having any associated itching or irritation. She felt that she may have a yeast infection. Thus, a sterile speculum exam was completed on 11/10/2022. Her cervix appeared closed. Copious discharge was noted. A genital culture was collected and noted to be negative. The patient again had complaints of copious vaginal discharge. A sterile speculum exam was repeated. Infection screening was completed with GC/chlamydia, Ureaplasma, and a repeat genital culture. --Screening for GC and chlamydia was negative. A genital culture was negative. Screening for Ureaplasma and mycoplasma was pending. Secondary to the continued vaginal discharge and borderline cervical length, the patient was empirically treated with a course of Flagyl. A prescription was previously provided. The patient reported that she tolerated the medication well. To completion with her symptoms are stable/improving. 28.  Borderline cervical length -- The ultrasound results were reviewed with the patient. At 22 weeks 3 days, the patient's cervical length was borderline and measured 2.8-3.5 cm without funneling. No dynamic change was noted. Secondary to the patient's complaints of increased discharge, A sterile speculum exam was done prior to her transvaginal ultrasound. The patient's cervix was visually closed. Only a general culture was collected. At 23 weeks 3 days, the patient's cervix appeared stable and measured 2.6-2.9 cm without funneling. A sterile speculum exam was repeated today at 23 weeks 3 days.   The patient's cervix was visually closed. Copious discharge was noted. Infection screening was completed. On digital exam, her cervix was closed with approximately 1-2 cm of length palpable. Her cervix was firm and posterior. A transvaginal ultrasound was repeated today at 24 weeks gestation. The patient's cervix appeared normal and measured 2.8-3.6 cm without funneling. She again notes good fetal movement and denies any symptoms of discharge, leaking of fluid, vaginal bleeding, and/or contractions. She was counseled that  cervical shortening is associated with a 30% increased risk for delivery prior to 37 weeks' gestation. Interventions and strategies to reduce this risk were reviewed. The patient was counseled that with further cervical shortening, Prometrium would be indicated. The risks and benefits of use were reviewed. She was counseled that vaginal progesterone has been shown to reduce this risk by 30-40%. The risks and benefits of use were reviewed. --The patient requested treatment with vaginal progesterone. A prescription was provided. Instructions for use were reviewed. --She was provided with a prescription for 200 mg to be placed into the vagina at bedtime. She should continue this medication until 36-37 weeks' gestation. --She reports that she is tolerating the vaginal progesterone well. At this time, close follow-up was recommended. The patient was scheduled to return in 2 weeks for a follow-up cervical length. The patient was counseled to avoid heavy lifting, prolonged standing, and sexual intercourse. The patient was scheduled to return for a follow-up assessment in 2 weeks. Precautions to call and/or return sooner were reviewed. --I requested the patient return for a follow-up assessment in 2 weeks unless there is a clinical reason for her to return prior to that time.  She is to call if she has any problems or questions prior to her next visit. Further evaluation and management will be dependent on her clinical presentation and the results of her testing. --The patient was advised to call if she has any increased vaginal discharge, vaginal bleeding, contractions, abdominal pain, back pain or any new significant symptomatology prior to her next visit. I advised her that these are signs and symptoms of cervical change and require follow-up assessment when they occur. Preeclampsia precautions were also reviewed with the patient. --The patient was also counseled to call and/or return with any concerns for decreased fetal activity. --The patient is to continue to follow with you in your office for ongoing obstetric care. --The total time spent on today's visit was 30 minutes. This included preparation for the visit (i.e. reviewing prior external notes and test results), performance of a medically appropriate history and examination, counseling, orders for medications, tests or other procedures, and coordination of care. Greater than 50% of the time was spent face-to-face with the patient. This time is exclusive of procedures performed. I answered all of  the patient's questions to her satisfaction. I asked her to call if she had any additional questions prior to her next visit. --At the conclusion of the visit, the patient appeared to have a good understanding of the issues discussed. I answered all of her questions to her satisfaction. I asked her to call if she had any additional questions prior to her next visit. --Thank you for allowing me to participate in the care of this pleasant patient. Please don't hesitate to call me if you have any questions.       Sincerely,      Magdaleno Garvin MD, [address]  [phone]      *All or parts of this note may have been generated using a voice recognition program. There may be typo, grammar, or Word substitution errors that have escaped my review of this note.

## 2022-11-21 NOTE — LETTER
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 magnesium was mildly decreased at 1.5 (1.6-2.6). Her potassium was normal at 3.8. She was prescribed magnesium oxide, 400 mg daily. Recommend repeat testing with next set of labs. 20.   Hypothyroxinemia -- The patient's lab results were reviewed. Her free T4 was mildly decreased at 0.89. Her TSH was normal at 0.998 and free T3 normal at 2.8. Screening for thyroid peroxidase antibody and antithyroglobulin antibody was previously negative on 9/26/2022. Counseling was provided to the patient. --Counseling was previously provided to the patient. Counseling was reviewed.   She did not have any additional questions or concerns. Per the American thyroid Association, treatment is not recommended for women with isolated hypothyroxinemia. However, thyroid function study should be monitored closely, every 4 weeks throughout the pregnancy. --Recommend repeat thyroid function studies in 4 weeks, on/after 12/7/2022  --Long-term follow-up with PCP for monitoring management        21. Pressure with voiding/dysuria, improved -- The patient previously had complaints of dysuria and increased pressure with voiding. She denied any associated fevers, chills, nausea, vomiting, and or back pain. The patient had a urine culture on 9/26/2022 that was reported as mixed. Secondary to the patient's symptoms, a prescription for Macrobid was provided. Today, the patient again reports that her symptoms have improved. She again reports intermittent symptoms of pressure but feels it is related to the pregnancy. Infection precautions were reviewed. Precautions were reviewed and the patient was counseled to go to the hospital with persistent or worsening symptoms or the development of any associated fevers, chills, nausea, vomiting, and/or back pain. 22.  Upper respiratory infection, recurrent -- Previously, on 10/13/2022, the patient had complaints of upper respiratory infection symptoms. She reports that her symptoms started on Tuesday, 10/4/2022. She has complaints of congestion and a nonproductive cough. She also reports having a scratchy throat. She denied having any associated fevers, chills, nausea, and or vomiting. She denied having any loss of taste or smell. Today, at 24 weeks station, the patient again had complaints of congestion. She reports her symptoms started 2 days ago. She denied having any associated fevers, chills, nausea, vomiting, chest pain, and/or shortness of breath. She denied having any associated loss of taste or smell.      Supportive measures were again reviewed including using Tylenol as needed for pain and fever, saline nasal spray for congestion, Robitussin (plain) for cough, a humidifier or vaporizer, and throat lozenges and/or spray. A handout reviewing medication safety in pregnancy was provided. Precautions were reviewed with the patient and she was counseled that if she develops a fever greater than 100.4 F, chest pain and/or shortness of breath to present immediately for evaluation. The patient expressed verbal understanding of this counseling. 23.  Lump under skin -- The patient previously had concerns regarding a small, pea-sized lump along the right side of her abdominal wall. The patient reports that the lump is at the site of a Lovenox injection. The patient reports that her symptoms are stable. The patient was counseled that sometimes small knots or lumps can develop at the site of Lovenox injections. She was counseled to avoid massaging the area after administering the Lovenox. She was counseled to hold pressure after the injection. 24.  Abdominal wall hernia -- The patient again had concerns regarding a possible hernia at the site of her prior ileostomy. She reports that the area occasionally bulges and is sometimes tender. The patient was counseled that she may have a hernia in that area. With persistent or worsening symptoms, I would recommend referral to general surgery for further evaluation. She was counseled that she can wear gentle compression to help minimize the risk for bowel herniation. Precautions were reviewed and she was counseled to present to the hospital with the development of persistent pain, fever, nausea, and or vomiting. The patient was provided with a referral to general surgery. The patient met with Dr. Nataly Xie on 11/3/2022. Per his consultation note, she has a small incisional hernia at the site of her prior ileostomy. No obstruction was noted. Precautions were reviewed.      Dr. Nataly Xie also mentioned the patient's history of dense abdominal and pelvic adhesions. Based on her history, she may be at increased risk for having a bowel obstruction. Precautions were reviewed. 25. Occasional headaches, stable -- The patient again reports that she is experiencing occasional headaches. She denies having any associated vision change, chest pain, shortness of breath, nausea, and or vomiting. She denies having the worst headache of her life or any associated neurologic deficits. Today, the patient again reports that her symptoms are stable. The patient was again counseled to stay well-hydrated. She can use Tylenol as needed. She was counseled regarding the use of magnesium oxide 400 mg twice daily and riboflavin 100 mg daily in reducing the frequency and intensity of migraine headaches. Precautions were reviewed, and she was counseled that if she develops the worst headache of her life or a headache with neurological deficits, to present immediately for evaluation. She expressed verbal understanding of this counseling. 26. MTHFR C677T, heterozygote --  The patient had a thrombophilia panel secondary to a history of a DVT. She was found to be heterozygous for MTHFR C677T. Counseling was previously provided regarding the implications and management of this gene mutation in pregnancy. Reviewed. She did not have any additional questions or concerns. She was counseled that this gene mutation affects folic acid metabolism. It is fairly common and found in 20-30% of the population. The patient was counseled that this condition is no longer thought to be clinically significant. It is generally only a problem if homocysteine levels are elevated. The patient's homocystine level was normal at 5.3 on 9/26/2022.   In the past, this gene mutation was thought to be associated with increased obstetric risks including thrombosis, early recurrent pregnancy loss, placental abruption, hypertensive disorders of pregnancy, poor fetal growth, and fetal loss. More recent studies did not report strong associations with these risks. Because this genetic mutation affects folic acid metabolism, there is an increased risk for folic acid deficiency and other nutritional deficiencies in women with this condition. There is also an increased risk for having a child with an open neural tube defect in women with this mutation, especially if they're homozygous for the C677T mutation. Generally, additional vitamin supplementation is recommended with folic acid or methyl folate, vitamin B6, and vitamin B12. The patient was counseled to take a low-dose aspirin. Fetal growth should be followed serially. She was counseled that there is a 50% chance that she will pass this mutation off to her child(joana). She should relay this information to the pediatrician who cares for her child(joana). She was also encouraged to review this diagnosis with her PCP. 27.  Vaginal discharge, improved -- The patient again had complaints of increased vaginal discharge during her visit at 22 weeks 3 days. She denied having any associated itching or irritation. She felt that she may have a yeast infection. Thus, a sterile speculum exam was completed on 11/10/2022. Her cervix appeared closed. Copious discharge was noted. A genital culture was collected and noted to be negative. The patient again had complaints of copious vaginal discharge. A sterile speculum exam was repeated. Infection screening was completed with GC/chlamydia, Ureaplasma, and a repeat genital culture. --Screening for GC and chlamydia was negative. A genital culture was negative. Screening for Ureaplasma and mycoplasma was pending. Secondary to the continued vaginal discharge and borderline cervical length, the patient was empirically treated with a course of Flagyl. A prescription was previously provided.   The patient reported that she tolerated the medication well. To completion with her symptoms are stable/improving. 28.  Borderline cervical length -- The ultrasound results were reviewed with the patient. At 22 weeks 3 days, the patient's cervical length was borderline and measured 2.8-3.5 cm without funneling. No dynamic change was noted. Secondary to the patient's complaints of increased discharge, A sterile speculum exam was done prior to her transvaginal ultrasound. The patient's cervix was visually closed. Only a general culture was collected. At 23 weeks 3 days, the patient's cervix appeared stable and measured 2.6-2.9 cm without funneling. A sterile speculum exam was repeated today at 23 weeks 3 days. The patient's cervix was visually closed. Copious discharge was noted. Infection screening was completed. On digital exam, her cervix was closed with approximately 1-2 cm of length palpable. Her cervix was firm and posterior. A transvaginal ultrasound was repeated today at 24 weeks gestation. The patient's cervix appeared normal and measured 2.8-3.6 cm without funneling. She again notes good fetal movement and denies any symptoms of discharge, leaking of fluid, vaginal bleeding, and/or contractions. She was counseled that  cervical shortening is associated with a 30% increased risk for delivery prior to 37 weeks' gestation. Interventions and strategies to reduce this risk were reviewed. The patient was counseled that with further cervical shortening, Prometrium would be indicated. The risks and benefits of use were reviewed. She was counseled that vaginal progesterone has been shown to reduce this risk by 30-40%. The risks and benefits of use were reviewed. --The patient requested treatment with vaginal progesterone. A prescription was provided. Instructions for use were reviewed. --She was provided with a prescription for 200 mg to be placed into the vagina at bedtime.   She had any additional questions prior to her next visit. --At the conclusion of the visit, the patient appeared to have a good understanding of the issues discussed. I answered all of her questions to her satisfaction. I asked her to call if she had any additional questions prior to her next visit. --Thank you for allowing me to participate in the care of this pleasant patient. Please don't hesitate to call me if you have any questions. Sincerely,      Gene Jason MD, [address]  [phone]      *All or parts of this note may have been generated using a voice recognition program. There may be typo, grammar, or Word substitution errors that have escaped my review of this note.

## 2022-11-22 ENCOUNTER — OFFICE VISIT (OUTPATIENT)
Dept: CARDIOLOGY CLINIC | Age: 32
End: 2022-11-22
Payer: MEDICAID

## 2022-11-22 VITALS
SYSTOLIC BLOOD PRESSURE: 110 MMHG | HEIGHT: 67 IN | BODY MASS INDEX: 23.23 KG/M2 | WEIGHT: 148 LBS | RESPIRATION RATE: 18 BRPM | DIASTOLIC BLOOD PRESSURE: 60 MMHG | HEART RATE: 96 BPM

## 2022-11-22 DIAGNOSIS — Z86.718 HISTORY OF DVT (DEEP VEIN THROMBOSIS): Primary | ICD-10-CM

## 2022-11-22 DIAGNOSIS — Z86.79 HISTORY OF CARDIAC ARRHYTHMIA: ICD-10-CM

## 2022-11-22 DIAGNOSIS — R00.2 PALPITATIONS: Primary | ICD-10-CM

## 2022-11-22 DIAGNOSIS — Z86.718 HISTORY OF DVT (DEEP VEIN THROMBOSIS): ICD-10-CM

## 2022-11-22 DIAGNOSIS — Z86.711 HISTORY OF PULMONARY EMBOLISM: ICD-10-CM

## 2022-11-22 PROCEDURE — G8427 DOCREV CUR MEDS BY ELIG CLIN: HCPCS | Performed by: INTERNAL MEDICINE

## 2022-11-22 PROCEDURE — G8420 CALC BMI NORM PARAMETERS: HCPCS | Performed by: INTERNAL MEDICINE

## 2022-11-22 PROCEDURE — G8484 FLU IMMUNIZE NO ADMIN: HCPCS | Performed by: INTERNAL MEDICINE

## 2022-11-22 PROCEDURE — 93000 ELECTROCARDIOGRAM COMPLETE: CPT | Performed by: INTERNAL MEDICINE

## 2022-11-22 PROCEDURE — 99214 OFFICE O/P EST MOD 30 MIN: CPT | Performed by: INTERNAL MEDICINE

## 2022-11-22 NOTE — PROGRESS NOTES
Applied 14  day ZIO XT per order of Dr. Erik Mariano. Pt instructed in use/recording of events as well as removal/return of device. Pt verbalized understanding.  Serial # [id number]
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
## 2022-11-22 NOTE — PATIENT INSTRUCTIONS
Continue all your medications at current doses. We will schedule an echocardiogram.   Check a 2 week holter monitor. Restrict sodium intake to less than 2-2.5 g/day. Restrict fluid intake to less than 2.2 L/day. Goal BP is less than 130/80. Please try to exercise for 150 minutes a week. Follow up as needed.

## 2022-11-23 LAB
Lab: NORMAL
REPORT: NORMAL
THIS TEST SENT TO: NORMAL

## 2022-12-05 ENCOUNTER — HOSPITAL ENCOUNTER (OUTPATIENT)
Age: 32
Discharge: HOME | End: 2022-12-05
Payer: MEDICAID

## 2022-12-05 ENCOUNTER — ROUTINE PRENATAL (OUTPATIENT)
Dept: OBGYN | Age: 32
End: 2022-12-05
Payer: MEDICAID

## 2022-12-05 VITALS
DIASTOLIC BLOOD PRESSURE: 82 MMHG | WEIGHT: 148.9 LBS | BODY MASS INDEX: 23.32 KG/M2 | SYSTOLIC BLOOD PRESSURE: 124 MMHG | HEART RATE: 87 BPM

## 2022-12-05 DIAGNOSIS — Z34.80 SUPERVISION OF OTHER NORMAL PREGNANCY, ANTEPARTUM: Primary | ICD-10-CM

## 2022-12-05 DIAGNOSIS — O09.92 ENCOUNTER FOR SUPERVISION OF HIGH RISK PREGNANCY IN SECOND TRIMESTER, ANTEPARTUM: ICD-10-CM

## 2022-12-05 LAB
GLUCOSE TOLERANCE TEST 1 HOUR: 143 MG/DL
GLUCOSE TOLERANCE TEST 2 HOUR: 141 MG/DL
GLUCOSE TOLERANCE TEST FASTING: 113 MG/DL
GLUCOSE URINE, POC: NORMAL
PROTEIN UA: NEGATIVE

## 2022-12-05 PROCEDURE — G8427 DOCREV CUR MEDS BY ELIG CLIN: HCPCS | Performed by: MIDWIFE

## 2022-12-05 PROCEDURE — 1036F TOBACCO NON-USER: CPT | Performed by: MIDWIFE

## 2022-12-05 PROCEDURE — G8420 CALC BMI NORM PARAMETERS: HCPCS | Performed by: MIDWIFE

## 2022-12-05 PROCEDURE — 81002 URINALYSIS NONAUTO W/O SCOPE: CPT | Performed by: MIDWIFE

## 2022-12-05 PROCEDURE — 99213 OFFICE O/P EST LOW 20 MIN: CPT | Performed by: MIDWIFE

## 2022-12-05 PROCEDURE — 36415 COLL VENOUS BLD VENIPUNCTURE: CPT

## 2022-12-05 PROCEDURE — 82951 GLUCOSE TOLERANCE TEST (GTT): CPT

## 2022-12-05 PROCEDURE — G8484 FLU IMMUNIZE NO ADMIN: HCPCS | Performed by: MIDWIFE

## 2022-12-05 NOTE — PROGRESS NOTES
Routine ob  +fm  Pt denies lof vag bleeding or contractions  Pt has not eaten and needs lab work for glucose test.

## 2022-12-05 NOTE — PROGRESS NOTES
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, single or unspecified fetus    History of blood transfusion    Low weight gain during pregnancy in second trimester    History of DVT (deep vein thrombosis)    History of pulmonary embolism    Decreased appetite    Tetrahydrocannabinol (THC) dependence (HCC)    Rh negative state in antepartum period    Low-lying placenta    Urinary frequency    Sensation of pressure in bladder area    Low magnesium level    Low vitamin D level    Low vitamin B12 level    Anemia    Abdominal wall hernia    Vaginal discharge during pregnancy in second trimester    Short cervical length during pregnancy in second trimester        Subjective:  doing well    Bleeding no   Leaking of fluid no   Painful cramping/contractions no   Headache no   Epigastric pain no   Edema no     Fetal movement good, patient reports 10 movements in 2 hours  Scheduled for rhogam on   Gestting glucose test today  Taking all her meds as prescribed    Objective:  See flowsheet    Vitals:    22 1024   BP: 124/82   Pulse: 87       FH 26      1 hour GTT and CBC was not done, getting done today      Assessment:   at 26w0d   Blood pressure WNL  Size equals dates  Total weight gain appropriate  Rhogam ordered    ICD-10-CM    1. Supervision of other normal pregnancy, antepartum  Z34.80 POCT urine qual dipstick glucose     POCT urine qual dipstick protein           Plan:    RTO 2 weeks  Continue seeing MFM per their recommendations    Orders Placed This Encounter   Procedures    POCT urine qual dipstick glucose    POCT urine qual dipstick protein      Fetal movement:  Baby should move 10 times every 2 hours.   If movements decrease below 10 in 2 hours, or decrease from what is typical for that pregnancy, patient should eat something, drink something, and lay down to count movements. If she does not feel 10 movements after doing these things, she is to immediately proceed to L&D for NST. She should NOT WAIT until the next day.  labor precautions discussed with patient. Patient should report >4 contractions/tightenings in 1 hour after first emptying bladder, laying down, drinking 2 large glasses of water. Should also promptly report any vaginal bleeding, menstrual-type cramping, dysuria, urgency or frequency.

## 2022-12-06 ENCOUNTER — HOSPITAL ENCOUNTER (OUTPATIENT)
Age: 32
Discharge: HOME | End: 2022-12-06
Payer: MEDICAID

## 2022-12-06 DIAGNOSIS — O09.92 ENCOUNTER FOR SUPERVISION OF HIGH RISK PREGNANCY IN SECOND TRIMESTER, ANTEPARTUM: ICD-10-CM

## 2022-12-06 LAB
ABO/RH: NORMAL
ANTIBODY SCREEN: NORMAL
HCT VFR BLD CALC: 35.6 % (ref 34–48)
HEMOGLOBIN: 11.7 G/DL (ref 11.5–15.5)
MCH RBC QN AUTO: 30.3 PG (ref 26–35)
MCHC RBC AUTO-ENTMCNC: 32.9 % (ref 32–34.5)
MCV RBC AUTO: 92.2 FL (ref 80–99.9)
PDW BLD-RTO: 13.2 FL (ref 11.5–15)
PLATELET # BLD: 189 E9/L (ref 130–450)
PMV BLD AUTO: 11.3 FL (ref 7–12)
RBC # BLD: 3.86 E12/L (ref 3.5–5.5)
WBC # BLD: 6.1 E9/L (ref 4.5–11.5)

## 2022-12-06 PROCEDURE — 86901 BLOOD TYPING SEROLOGIC RH(D): CPT

## 2022-12-06 PROCEDURE — 86900 BLOOD TYPING SEROLOGIC ABO: CPT

## 2022-12-06 PROCEDURE — 86592 SYPHILIS TEST NON-TREP QUAL: CPT

## 2022-12-06 PROCEDURE — 86850 RBC ANTIBODY SCREEN: CPT

## 2022-12-06 PROCEDURE — 36415 COLL VENOUS BLD VENIPUNCTURE: CPT

## 2022-12-06 PROCEDURE — 85027 COMPLETE CBC AUTOMATED: CPT

## 2022-12-07 ENCOUNTER — HOSPITAL ENCOUNTER (OUTPATIENT)
Dept: INFUSION THERAPY | Age: 32
Setting detail: INFUSION SERIES
Discharge: HOME | End: 2022-12-07
Payer: MEDICAID

## 2022-12-07 VITALS
RESPIRATION RATE: 16 BRPM | TEMPERATURE: 97.1 F | OXYGEN SATURATION: 98 % | DIASTOLIC BLOOD PRESSURE: 81 MMHG | SYSTOLIC BLOOD PRESSURE: 116 MMHG | HEART RATE: 96 BPM

## 2022-12-07 LAB
RHIG LOT NUMBER: NORMAL
RPR: NORMAL

## 2022-12-07 PROCEDURE — 6360000002 HC RX W HCPCS: Performed by: OBSTETRICS & GYNECOLOGY

## 2022-12-07 PROCEDURE — 96372 THER/PROPH/DIAG INJ SC/IM: CPT

## 2022-12-07 RX ADMIN — HUMAN RHO(D) IMMUNE GLOBULIN 300 MCG: 300 INJECTION, SOLUTION INTRAMUSCULAR at 09:50

## 2022-12-09 ENCOUNTER — ANCILLARY PROCEDURE (OUTPATIENT)
Dept: OBGYN CLINIC | Age: 32
End: 2022-12-09
Payer: MEDICAID

## 2022-12-09 ENCOUNTER — ROUTINE PRENATAL (OUTPATIENT)
Dept: OBGYN CLINIC | Age: 32
End: 2022-12-09
Payer: MEDICAID

## 2022-12-09 VITALS
WEIGHT: 148 LBS | SYSTOLIC BLOOD PRESSURE: 118 MMHG | HEART RATE: 90 BPM | BODY MASS INDEX: 23.18 KG/M2 | DIASTOLIC BLOOD PRESSURE: 77 MMHG

## 2022-12-09 DIAGNOSIS — R79.89 LOW VITAMIN D LEVEL: ICD-10-CM

## 2022-12-09 DIAGNOSIS — E53.8 LOW VITAMIN B12 LEVEL: ICD-10-CM

## 2022-12-09 DIAGNOSIS — O36.1920 ANTI-M ISOIMMUNIZATION AFFECTING PREGNANCY IN SECOND TRIMESTER, SINGLE OR UNSPECIFIED FETUS: Primary | ICD-10-CM

## 2022-12-09 DIAGNOSIS — R79.0 LOW MAGNESIUM LEVEL: ICD-10-CM

## 2022-12-09 DIAGNOSIS — R79.0 LOW FERRITIN: ICD-10-CM

## 2022-12-09 DIAGNOSIS — O26.872 SHORT CERVICAL LENGTH DURING PREGNANCY IN SECOND TRIMESTER: ICD-10-CM

## 2022-12-09 DIAGNOSIS — O26.12 LOW WEIGHT GAIN DURING PREGNANCY IN SECOND TRIMESTER: ICD-10-CM

## 2022-12-09 DIAGNOSIS — Z86.79 HISTORY OF HYPERTENSION: ICD-10-CM

## 2022-12-09 DIAGNOSIS — O44.40 LOW-LYING PLACENTA: ICD-10-CM

## 2022-12-09 PROBLEM — F12.20 TETRAHYDROCANNABINOL (THC) DEPENDENCE (HCC): Status: RESOLVED | Noted: 2022-09-25 | Resolved: 2022-12-09

## 2022-12-09 PROBLEM — O99.332 TOBACCO SMOKING AFFECTING PREGNANCY IN SECOND TRIMESTER: Status: RESOLVED | Noted: 2022-03-03 | Resolved: 2022-12-09

## 2022-12-09 LAB
GLUCOSE URINE, POC: NORMAL
PROTEIN UA: NEGATIVE

## 2022-12-09 PROCEDURE — 76820 UMBILICAL ARTERY ECHO: CPT | Performed by: OBSTETRICS & GYNECOLOGY

## 2022-12-09 PROCEDURE — 1036F TOBACCO NON-USER: CPT | Performed by: OBSTETRICS & GYNECOLOGY

## 2022-12-09 PROCEDURE — G8420 CALC BMI NORM PARAMETERS: HCPCS | Performed by: OBSTETRICS & GYNECOLOGY

## 2022-12-09 PROCEDURE — 76819 FETAL BIOPHYS PROFIL W/O NST: CPT | Performed by: OBSTETRICS & GYNECOLOGY

## 2022-12-09 PROCEDURE — 76821 MIDDLE CEREBRAL ARTERY ECHO: CPT | Performed by: OBSTETRICS & GYNECOLOGY

## 2022-12-09 PROCEDURE — 81002 URINALYSIS NONAUTO W/O SCOPE: CPT | Performed by: OBSTETRICS & GYNECOLOGY

## 2022-12-09 PROCEDURE — 76816 OB US FOLLOW-UP PER FETUS: CPT | Performed by: OBSTETRICS & GYNECOLOGY

## 2022-12-09 PROCEDURE — 99213 OFFICE O/P EST LOW 20 MIN: CPT | Performed by: OBSTETRICS & GYNECOLOGY

## 2022-12-09 PROCEDURE — 99214 OFFICE O/P EST MOD 30 MIN: CPT | Performed by: OBSTETRICS & GYNECOLOGY

## 2022-12-09 PROCEDURE — G8484 FLU IMMUNIZE NO ADMIN: HCPCS | Performed by: OBSTETRICS & GYNECOLOGY

## 2022-12-09 PROCEDURE — G8427 DOCREV CUR MEDS BY ELIG CLIN: HCPCS | Performed by: OBSTETRICS & GYNECOLOGY

## 2022-12-09 PROCEDURE — 76817 TRANSVAGINAL US OBSTETRIC: CPT | Performed by: OBSTETRICS & GYNECOLOGY

## 2022-12-09 NOTE — PROGRESS NOTES
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 30, hemoglobin A1c 4.8%, TSH 0.998, free T4 0.89, free T3 2.8, uric acid 4.8, , magnesium 1.5, urinalysis contaminated, urine protein creatinine ratio 0.1, urine culture mixed, blood type B-, antibody screen negative. -- The patient's vitamin B12 was improved at 595. She was counseled to continue her vitamin B12 supplement. --Recommend repeat testing in 4 to 6 weeks, on/after 12/7/2022    -- Repeat testing ordered  --Long-term follow-up with PCP for monitoring and management     17.   Low ferritin -- The patient's ferritin was low at 29 (considered low if <15 in absence of anemia or <40 in setting of anemia)  --Ferrous sulfate 325 mg BID prescribed  --Monitor levels serially  --Monitor nutrition panel q4-6 weeks (CBC, CMP, magnesium, ferritin, folate, vitamin B12, vitamin D25OH), on/after 10/24/2022     --Repeat testing completed 11/9/2022, ferritin 26. Her H/H was stable at 11.3/32.  -- The patient was counseled to continue her oral iron supplement. --Recommend repeat testing in 4 to 6 weeks, on/after 12/7/2022    --Repeat testing ordered  --Follow up with PCP for long term monitoring and management     18. Low vitamin D -- The patient's vitamin D was low at 31  --Recommend vitamin D3 2000 IU daily  --Monitor levels serially  --Monitor nutrition panel q4-6 weeks (CBC, CMP, magnesium, ferritin, folate, vitamin B12, vitamin D25OH)     --Repeat testing completed 11/9/2022, vitamin D 30  --The patient was encouraged to take her vitamin D supplement. --Recommend repeat testing in 4 to 6 weeks, on/after 12/7/2022. -- Repeat testing ordered  --Follow up with PCP for long term monitoring and management     19. Low magnesium -- The patient's magnesium was mildly decreased at 1.5 (1.6-2.6). Her potassium was normal at 3.8. She was prescribed magnesium oxide, 400 mg daily. Recommend repeat testing with next set of labs. --Repeat testing ordered     20. Hypothyroxinemia -- The patient's lab results were reviewed. Her free T4 was mildly decreased at 0.89. Her TSH was normal at 0.998 and free T3 normal at 2.8. Screening for thyroid peroxidase antibody and antithyroglobulin antibody was previously negative on 9/26/2022. Counseling was provided to the patient. --Counseling was previously provided to the patient. Counseling was reviewed. She did not have any additional questions or concerns. Per the American thyroid Association, treatment is not recommended for women with isolated hypothyroxinemia.  However, thyroid function study should be monitored closely, every 4 weeks throughout the pregnancy. --Recommend repeat thyroid function studies in 4 weeks, on/after 12/7/2022    --Repeat testing ordered  --Long-term follow-up with PCP for monitoring management        21. Pressure with voiding/dysuria, improved -- The patient previously had complaints of dysuria and increased pressure with voiding. She denied any associated fevers, chills, nausea, vomiting, and or back pain. The patient had a urine culture on 9/26/2022 that was reported as mixed. Secondary to the patient's symptoms, a prescription for Macrobid was provided. Today, the patient again reports that her symptoms have improved. She again reports intermittent symptoms of pressure but feels it is related to the pregnancy. Infection precautions were reviewed. Precautions were reviewed and the patient was counseled to go to the hospital with persistent or worsening symptoms or the development of any associated fevers, chills, nausea, vomiting, and/or back pain. 22.  Upper respiratory infection, improved -- Previously, on 10/13/2022, the patient had complaints of upper respiratory infection symptoms. She reports that her symptoms started on Tuesday, 10/4/2022. She has complaints of congestion and a nonproductive cough. She also reports having a scratchy throat. She denied having any associated fevers, chills, nausea, and or vomiting. She denied having any loss of taste or smell. At 24 weeks station, the patient again had complaints of congestion. She reports her symptoms started 2 days ago. She denied having any associated fevers, chills, nausea, vomiting, chest pain, and/or shortness of breath. She denied having any associated loss of taste or smell. Supportive measures were again reviewed including using Tylenol as needed for pain and fever, saline nasal spray for congestion, Robitussin (plain) for cough, a humidifier or vaporizer, and throat lozenges and/or spray.   A handout reviewing medication safety in pregnancy was provided. Today, the patient reports that her symptoms have improved. Precautions were reviewed with the patient and she was counseled that if she develops a fever greater than 100.4 F, chest pain and/or shortness of breath to present immediately for evaluation. The patient expressed verbal understanding of this counseling. 23.  Lump under skin -- The patient previously had concerns regarding a small, pea-sized lump along the right side of her abdominal wall. The patient reports that the lump is at the site of a Lovenox injection. The patient reports that her symptoms are stable. The patient was counseled that sometimes small knots or lumps can develop at the site of Lovenox injections. She was counseled to avoid massaging the area after administering the Lovenox. She was counseled to hold pressure after the injection. 24.  Abdominal wall hernia -- The patient again had concerns regarding a possible hernia at the site of her prior ileostomy. She reports that the area occasionally bulges and is sometimes tender. The patient was counseled that she may have a hernia in that area. With persistent or worsening symptoms, I would recommend referral to general surgery for further evaluation. She was counseled that she can wear gentle compression to help minimize the risk for bowel herniation. Precautions were reviewed and she was counseled to present to the hospital with the development of persistent pain, fever, nausea, and or vomiting. The patient was provided with a referral to general surgery. The patient met with Dr. Lillie Morgan on 11/3/2022. Per his consultation note, she has a small incisional hernia at the site of her prior ileostomy. No obstruction was noted. Precautions were reviewed. Dr. 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 Based on her history, she may be at increased risk for having a bowel obstruction. Precautions were reviewed. 25. Occasional headaches, stable -- The patient again reports that she is experiencing occasional headaches. She denies having any associated vision change, chest pain, shortness of breath, nausea, and or vomiting. She denies having the worst headache of her life or any associated neurologic deficits. Today, the patient again reports that her symptoms are stable. The patient was again counseled to stay well-hydrated. She can use Tylenol as needed. She was counseled regarding the use of magnesium oxide 400 mg twice daily and riboflavin 100 mg daily in reducing the frequency and intensity of migraine headaches. Precautions were reviewed, and she was counseled that if she develops the worst headache of her life or a headache with neurological deficits, to present immediately for evaluation. She expressed verbal understanding of this counseling. 26. MTHFR C677T, heterozygote --  The patient had a thrombophilia panel secondary to a history of a DVT. She was found to be heterozygous for MTHFR C677T. Counseling was previously provided regarding the implications and management of this gene mutation in pregnancy. Reviewed. She did not have any additional questions or concerns. She was counseled that this gene mutation affects folic acid metabolism. It is fairly common and found in 20-30% of the population. The patient was counseled that this condition is no longer thought to be clinically significant. It is generally only a problem if homocysteine levels are elevated. The patient's homocystine level was normal at 5.3 on 9/26/2022. In the past, this gene mutation was thought to be associated with increased obstetric risks including thrombosis, early recurrent pregnancy loss, placental abruption, hypertensive disorders of pregnancy, poor fetal growth, and fetal loss. More recent studies did not report strong associations with these risks.  Because this genetic mutation affects folic acid metabolism, there is an increased risk for folic acid deficiency and other nutritional deficiencies in women with this condition. There is also an increased risk for having a child with an open neural tube defect in women with this mutation, especially if they're homozygous for the C677T mutation. Generally, additional vitamin supplementation is recommended with folic acid or methyl folate, vitamin B6, and vitamin B12. The patient was counseled to take a low-dose aspirin. Fetal growth should be followed serially. She was counseled that there is a 50% chance that she will pass this mutation off to her child(joana). She should relay this information to the pediatrician who cares for her child(joana). She was also encouraged to review this diagnosis with her PCP. 27.  Vaginal discharge, improved -- The patient again had complaints of increased vaginal discharge during her visit at 22 weeks 3 days. She denied having any associated itching or irritation. She felt that she may have a yeast infection. Thus, a sterile speculum exam was completed on 11/10/2022. Her cervix appeared closed. Copious discharge was noted. A genital culture was collected and noted to be negative. The patient again had complaints of copious vaginal discharge. A sterile speculum exam was repeated. Infection screening was completed with GC/chlamydia, Ureaplasma, and a repeat genital culture. --Screening for GC and chlamydia was negative. A genital culture was negative. Screening for Ureaplasma and mycoplasma was pending. Secondary to the continued vaginal discharge and borderline cervical length, the patient was empirically treated with a course of Flagyl. A prescription was previously provided. The patient reported that she tolerated the medication well. To completion with her symptoms are stable/improving.         28.  Borderline cervical length -- The ultrasound results were reviewed with the patient. At 22 weeks 3 days, the patient's cervical length was borderline and measured 2.8-3.5 cm without funneling. No dynamic change was noted. Secondary to the patient's complaints of increased discharge, A sterile speculum exam was done prior to her transvaginal ultrasound. The patient's cervix was visually closed. Only a general culture was collected. At 23 weeks 3 days, the patient's cervix appeared stable and measured 2.6-2.9 cm without funneling. A sterile speculum exam was repeated at 23 weeks 3 days. The patient's cervix was visually closed. Copious discharge was noted. Infection screening was completed. On digital exam, her cervix was closed with approximately 1-2 cm of length palpable. Her cervix was firm and posterior. A transvaginal ultrasound was repeated at 24 weeks gestation. The patient's cervix appeared normal and measured 2.8-3.6 cm without funneling. A transvaginal ultrasound was repeated today at 26 weeks 4 days. The patient's cervix appeared stable and measured 3.2-3.3 cm without funneling. She again notes good fetal movement and denies any symptoms of discharge, leaking of fluid, vaginal bleeding, and/or contractions. She was counseled that  cervical shortening is associated with a 30% increased risk for delivery prior to 37 weeks' gestation. Interventions and strategies to reduce this risk were reviewed. The patient was counseled that with further cervical shortening, Prometrium would be indicated. The risks and benefits of use were reviewed. She was counseled that vaginal progesterone has been shown to reduce this risk by 30-40%. The risks and benefits of use were reviewed. --The patient requested treatment with vaginal progesterone. A prescription was provided. Instructions for use were reviewed. --She was provided with a prescription for 200 mg to be placed into the vagina at bedtime.   She should continue this continue to follow with you in your office for ongoing obstetric care. --The total time spent on today's visit was 30 minutes. This included preparation for the visit (i.e. reviewing prior external notes and test results), performance of a medically appropriate history and examination, counseling, orders for medications, tests or other procedures, and coordination of care. Greater than 50% of the time was spent face-to-face with the patient. This time is exclusive of procedures performed. I answered all of  the patient's questions to her satisfaction. I asked her to call if she had any additional questions prior to her next visit. --At the conclusion of the visit, the patient appeared to have a good understanding of the issues discussed. I answered all of her questions to her satisfaction. I asked her to call if she had any additional questions prior to her next visit. --Thank you for allowing me to participate in the care of this pleasant patient. Please don't hesitate to call me if you have any questions. Sincerely,      Keesha Tam MD, [facility]  [phone]      *All or parts of this note may have been generated using a voice recognition program. There may be typo, grammar, or Word substitution errors that have escaped my review of this note.

## 2022-12-09 NOTE — LETTER
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 weeks (CBC, CMP, magnesium, ferritin, folate, vitamin B12, vitamin D25OH)     --Repeat testing completed 11/9/2022, vitamin D 30  --The patient was encouraged to take her vitamin D supplement. --Recommend repeat testing in 4 to 6 weeks, on/after 12/7/2022. -- Repeat testing ordered  --Follow up with PCP for long term monitoring and management     19. Low magnesium -- The patient's magnesium was mildly decreased at 1.5 (1.6-2.6). Her potassium was normal at 3.8. She was prescribed magnesium oxide, 400 mg daily.   Recommend repeat testing with next set of labs.  --Repeat testing ordered     20. Hypothyroxinemia -- The patient's lab results were reviewed. Her free T4 was mildly decreased at 0.89. Her TSH was normal at 0.998 and free T3 normal at 2.8. Screening for thyroid peroxidase antibody and antithyroglobulin antibody was previously negative on 9/26/2022. Counseling was provided to the patient. --Counseling was previously provided to the patient. Counseling was reviewed. She did not have any additional questions or concerns. Per the American thyroid Association, treatment is not recommended for women with isolated hypothyroxinemia. However, thyroid function study should be monitored closely, every 4 weeks throughout the pregnancy. --Recommend repeat thyroid function studies in 4 weeks, on/after 12/7/2022    --Repeat testing ordered  --Long-term follow-up with PCP for monitoring management        21. Pressure with voiding/dysuria, improved -- The patient previously had complaints of dysuria and increased pressure with voiding. She denied any associated fevers, chills, nausea, vomiting, and or back pain. The patient had a urine culture on 9/26/2022 that was reported as mixed. Secondary to the patient's symptoms, a prescription for Macrobid was provided. Today, the patient again reports that her symptoms have improved. She again reports intermittent symptoms of pressure but feels it is related to the pregnancy. Infection precautions were reviewed. Precautions were reviewed and the patient was counseled to go to the hospital with persistent or worsening symptoms or the development of any associated fevers, chills, nausea, vomiting, and/or back pain. 22.  Upper respiratory infection, improved -- Previously, on 10/13/2022, the patient had complaints of upper respiratory infection symptoms. She reports that her symptoms started on Tuesday, 10/4/2022. She has complaints of congestion and a nonproductive cough.   She also reports having a scratchy throat. She denied having any associated fevers, chills, nausea, and or vomiting. She denied having any loss of taste or smell. At 24 weeks station, the patient again had complaints of congestion. She reports her symptoms started 2 days ago. She denied having any associated fevers, chills, nausea, vomiting, chest pain, and/or shortness of breath. She denied having any associated loss of taste or smell. Supportive measures were again reviewed including using Tylenol as needed for pain and fever, saline nasal spray for congestion, Robitussin (plain) for cough, a humidifier or vaporizer, and throat lozenges and/or spray. A handout reviewing medication safety in pregnancy was provided. Today, the patient reports that her symptoms have improved. Precautions were reviewed with the patient and she was counseled that if she develops a fever greater than 100.4 F, chest pain and/or shortness of breath to present immediately for evaluation. The patient expressed verbal understanding of this counseling. 23.  Lump under skin -- The patient previously had concerns regarding a small, pea-sized lump along the right side of her abdominal wall. The patient reports that the lump is at the site of a Lovenox injection. The patient reports that her symptoms are stable. The patient was counseled that sometimes small knots or lumps can develop at the site of Lovenox injections. She was counseled to avoid massaging the area after administering the Lovenox. She was counseled to hold pressure after the injection. 24.  Abdominal wall hernia -- The patient again had concerns regarding a possible hernia at the site of her prior ileostomy. She reports that the area occasionally bulges and is sometimes tender. The patient was counseled that she may have a hernia in that area. With persistent or worsening symptoms, I would recommend referral to general surgery for further evaluation.   She was counseled that she can wear gentle compression to help minimize the risk for bowel herniation. Precautions were reviewed and she was counseled to present to the hospital with the development of persistent pain, fever, nausea, and or vomiting. The patient was provided with a referral to general surgery. The patient met with Dr. Tillie Lanes on 11/3/2022. Per his consultation note, she has a small incisional hernia at the site of her prior ileostomy. No obstruction was noted. Precautions were reviewed. Dr. 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 Based on her history, she may be at increased risk for having a bowel obstruction. Precautions were reviewed. 25. Occasional headaches, stable -- The patient again reports that she is experiencing occasional headaches. She denies having any associated vision change, chest pain, shortness of breath, nausea, and or vomiting. She denies having the worst headache of her life or any associated neurologic deficits. Today, the patient again reports that her symptoms are stable. The patient was again counseled to stay well-hydrated. She can use Tylenol as needed. She was counseled regarding the use of magnesium oxide 400 mg twice daily and riboflavin 100 mg daily in reducing the frequency and intensity of migraine headaches. Precautions were reviewed, and she was counseled that if she develops the worst headache of her life or a headache with neurological deficits, to present immediately for evaluation. She expressed verbal understanding of this counseling. 26. MTHFR C677T, heterozygote --  The patient had a thrombophilia panel secondary to a history of a DVT. She was found to be heterozygous for MTHFR C677T. Counseling was previously provided regarding the implications and management of this gene mutation in pregnancy. Reviewed. She did not have any additional questions or concerns.   She was counseled that this gene mutation affects folic acid metabolism. It is fairly common and found in 20-30% of the population. The patient was counseled that this condition is no longer thought to be clinically significant. It is generally only a problem if homocysteine levels are elevated. The patient's homocystine level was normal at 5.3 on 9/26/2022. In the past, this gene mutation was thought to be associated with increased obstetric risks including thrombosis, early recurrent pregnancy loss, placental abruption, hypertensive disorders of pregnancy, poor fetal growth, and fetal loss. More recent studies did not report strong associations with these risks. Because this genetic mutation affects folic acid metabolism, there is an increased risk for folic acid deficiency and other nutritional deficiencies in women with this condition. There is also an increased risk for having a child with an open neural tube defect in women with this mutation, especially if they're homozygous for the C677T mutation. Generally, additional vitamin supplementation is recommended with folic acid or methyl folate, vitamin B6, and vitamin B12. The patient was counseled to take a low-dose aspirin. Fetal growth should be followed serially. She was counseled that there is a 50% chance that she will pass this mutation off to her child(joana). She should relay this information to the pediatrician who cares for her child(joana). She was also encouraged to review this diagnosis with her PCP. 27.  Vaginal discharge, improved -- The patient again had complaints of increased vaginal discharge during her visit at 22 weeks 3 days. She denied having any associated itching or irritation. She felt that she may have a yeast infection. Thus, a sterile speculum exam was completed on 11/10/2022. Her cervix appeared closed. Copious discharge was noted. A genital culture was collected and noted to be negative.     The patient again had complaints of copious vaginal discharge. A sterile speculum exam was repeated. Infection screening was completed with GC/chlamydia, Ureaplasma, and a repeat genital culture. --Screening for GC and chlamydia was negative. A genital culture was negative. Screening for Ureaplasma and mycoplasma was pending. Secondary to the continued vaginal discharge and borderline cervical length, the patient was empirically treated with a course of Flagyl. A prescription was previously provided. The patient reported that she tolerated the medication well. To completion with her symptoms are stable/improving. 28.  Borderline cervical length -- The ultrasound results were reviewed with the patient. At 22 weeks 3 days, the patient's cervical length was borderline and measured 2.8-3.5 cm without funneling. No dynamic change was noted. Secondary to the patient's complaints of increased discharge, A sterile speculum exam was done prior to her transvaginal ultrasound. The patient's cervix was visually closed. Only a general culture was collected. At 23 weeks 3 days, the patient's cervix appeared stable and measured 2.6-2.9 cm without funneling. A sterile speculum exam was repeated at 23 weeks 3 days. The patient's cervix was visually closed. Copious discharge was noted. Infection screening was completed. On digital exam, her cervix was closed with approximately 1-2 cm of length palpable. Her cervix was firm and posterior. A transvaginal ultrasound was repeated at 24 weeks gestation. The patient's cervix appeared normal and measured 2.8-3.6 cm without funneling. A transvaginal ultrasound was repeated today at 26 weeks 4 days. The patient's cervix appeared stable and measured 3.2-3.3 cm without funneling. She again notes good fetal movement and denies any symptoms of discharge, leaking of fluid, vaginal bleeding, and/or contractions.        She was counseled that  cervical shortening is associated with a 30% increased risk for delivery prior to 37 weeks' gestation. Interventions and strategies to reduce this risk were reviewed. The patient was counseled that with further cervical shortening, Prometrium would be indicated. The risks and benefits of use were reviewed. She was counseled that vaginal progesterone has been shown to reduce this risk by 30-40%. The risks and benefits of use were reviewed. --The patient requested treatment with vaginal progesterone. A prescription was provided. Instructions for use were reviewed. --She was provided with a prescription for 200 mg to be placed into the vagina at bedtime. She should continue this medication until 36-37 weeks' gestation. --She reports that she is tolerating the vaginal progesterone well. --Continue vaginal progesterone until 36-37 weeks gestation. At this time, close follow-up was recommended. The patient was scheduled to return in 2 weeks for a follow-up cervical length. The patient was counseled to avoid heavy lifting, prolonged standing, and sexual intercourse. The patient was scheduled to return for a follow-up assessment in 2 weeks. Precautions to call and/or return sooner were reviewed. 29.  Abnormal glucose test -- The patient reports that she recently completed a 2-hour oral glucose tolerance test.  Her results included 113/143/141. Her fasting value was elevated. However, the patient reports that she was not truly fasting. --Blood work is ordered for the patient today. A CMP was ordered. The patient was counseled to fast for testing. --If her fasting blood sugar is normal, then it is unlikely that the gestational diabetes. --If there is any question, the patient can repeat the 2-hour oral glucose tolerance test.        --I requested the patient return for a follow-up assessment in 2 weeks unless there is a clinical reason for her to return prior to that time.  She is to call if she has any problems or questions prior to her next visit. Further evaluation and management will be dependent on her clinical presentation and the results of her testing. --The patient was advised to call if she has any increased vaginal discharge, vaginal bleeding, contractions, abdominal pain, back pain or any new significant symptomatology prior to her next visit. I advised her that these are signs and symptoms of cervical change and require follow-up assessment when they occur. Preeclampsia precautions were also reviewed with the patient. --The patient was also counseled to call and/or return with any concerns for decreased fetal activity. --The patient is to continue to follow with you in your office for ongoing obstetric care. --The total time spent on today's visit was 30 minutes. This included preparation for the visit (i.e. reviewing prior external notes and test results), performance of a medically appropriate history and examination, counseling, orders for medications, tests or other procedures, and coordination of care. Greater than 50% of the time was spent face-to-face with the patient. This time is exclusive of procedures performed. I answered all of  the patient's questions to her satisfaction. I asked her to call if she had any additional questions prior to her next visit. --At the conclusion of the visit, the patient appeared to have a good understanding of the issues discussed. I answered all of her questions to her satisfaction. I asked her to call if she had any additional questions prior to her next visit. --Thank you for allowing me to participate in the care of this pleasant patient. Please don't hesitate to call me if you have any questions.       Sincerely,      Smiley Michel MD, [facility] [address]  [phone]      *All or parts of this note may have been generated using a voice recognition program. There may be typo, grammar, or Word substitution errors that have escaped my review of this note.

## 2022-12-09 NOTE — PROGRESS NOTES
Patient is here today for f/u. Denies any vaginal bleeding, or leakage of fluids. Beginning of the week starting have cramping that lasted about 5 minutes. Patient reports good fetal movement.

## 2022-12-14 DIAGNOSIS — R00.2 PALPITATIONS: ICD-10-CM

## 2022-12-16 ENCOUNTER — HOSPITAL ENCOUNTER (OUTPATIENT)
Age: 32
Discharge: HOME | End: 2022-12-16

## 2022-12-16 DIAGNOSIS — O26.12 LOW WEIGHT GAIN DURING PREGNANCY IN SECOND TRIMESTER: ICD-10-CM

## 2022-12-16 DIAGNOSIS — O26.872 SHORT CERVICAL LENGTH DURING PREGNANCY IN SECOND TRIMESTER: ICD-10-CM

## 2022-12-16 DIAGNOSIS — R79.0 LOW FERRITIN: ICD-10-CM

## 2022-12-16 DIAGNOSIS — E53.8 LOW VITAMIN B12 LEVEL: ICD-10-CM

## 2022-12-16 DIAGNOSIS — O36.1920 ANTI-M ISOIMMUNIZATION AFFECTING PREGNANCY IN SECOND TRIMESTER, SINGLE OR UNSPECIFIED FETUS: ICD-10-CM

## 2022-12-16 DIAGNOSIS — R79.0 LOW MAGNESIUM LEVEL: ICD-10-CM

## 2022-12-16 DIAGNOSIS — Z86.79 HISTORY OF HYPERTENSION: ICD-10-CM

## 2022-12-16 DIAGNOSIS — R79.89 LOW VITAMIN D LEVEL: ICD-10-CM

## 2022-12-16 LAB
ABO/RH: NORMAL
ALBUMIN SERPL-MCNC: 3.7 G/DL (ref 3.5–5.2)
ALP BLD-CCNC: 85 U/L (ref 35–104)
ALT SERPL-CCNC: 29 U/L (ref 0–32)
ANION GAP SERPL CALCULATED.3IONS-SCNC: 8 MMOL/L (ref 7–16)
ANTIBODY IDENTIFICATION: NORMAL
ANTIBODY SCREEN: NORMAL
AST SERPL-CCNC: 20 U/L (ref 0–31)
BACTERIA: ABNORMAL /HPF
BASOPHILS ABSOLUTE: 0.01 E9/L (ref 0–0.2)
BASOPHILS RELATIVE PERCENT: 0.2 % (ref 0–2)
BILIRUB SERPL-MCNC: 0.4 MG/DL (ref 0–1.2)
BILIRUBIN URINE: NEGATIVE
BLOOD, URINE: NEGATIVE
BUN BLDV-MCNC: 7 MG/DL (ref 6–20)
CALCIUM SERPL-MCNC: 9 MG/DL (ref 8.6–10.2)
CHLORIDE BLD-SCNC: 103 MMOL/L (ref 98–107)
CLARITY: CLEAR
CO2: 23 MMOL/L (ref 22–29)
COLOR: YELLOW
CREAT SERPL-MCNC: 0.5 MG/DL (ref 0.5–1)
CREATININE URINE: 156 MG/DL (ref 29–226)
DAT POLYSPECIFIC: NORMAL
EOSINOPHILS ABSOLUTE: 0.07 E9/L (ref 0.05–0.5)
EOSINOPHILS RELATIVE PERCENT: 1.2 % (ref 0–6)
EPITHELIAL CELLS, UA: ABNORMAL /HPF
FERRITIN: 23 NG/ML
FOLATE: >20 NG/ML (ref 4.8–24.2)
GFR SERPL CREATININE-BSD FRML MDRD: >60 ML/MIN/1.73
GLUCOSE BLD-MCNC: 96 MG/DL (ref 74–99)
GLUCOSE URINE: NEGATIVE MG/DL
HBA1C MFR BLD: 5.4 % (ref 4–5.6)
HCT VFR BLD CALC: 35.8 % (ref 34–48)
HEMOGLOBIN: 12 G/DL (ref 11.5–15.5)
IMMATURE GRANULOCYTES #: 0.02 E9/L
IMMATURE GRANULOCYTES %: 0.4 % (ref 0–5)
IMMATURE RETIC FRACT: 13.9 % (ref 3–15.9)
KETONES, URINE: NEGATIVE MG/DL
LACTATE DEHYDROGENASE: 156 U/L (ref 135–214)
LEUKOCYTE ESTERASE, URINE: NEGATIVE
LYMPHOCYTES ABSOLUTE: 1.6 E9/L (ref 1.5–4)
LYMPHOCYTES RELATIVE PERCENT: 28.5 % (ref 20–42)
MAGNESIUM: 1.7 MG/DL (ref 1.6–2.6)
MCH RBC QN AUTO: 30.9 PG (ref 26–35)
MCHC RBC AUTO-ENTMCNC: 33.5 % (ref 32–34.5)
MCV RBC AUTO: 92.3 FL (ref 80–99.9)
MONOCYTES ABSOLUTE: 0.48 E9/L (ref 0.1–0.95)
MONOCYTES RELATIVE PERCENT: 8.6 % (ref 2–12)
NEUTROPHILS ABSOLUTE: 3.43 E9/L (ref 1.8–7.3)
NEUTROPHILS RELATIVE PERCENT: 61.1 % (ref 43–80)
NITRITE, URINE: NEGATIVE
PDW BLD-RTO: 13.1 FL (ref 11.5–15)
PH UA: 6 (ref 5–9)
PLATELET # BLD: 204 E9/L (ref 130–450)
PMV BLD AUTO: 11.8 FL (ref 7–12)
POTASSIUM SERPL-SCNC: 4 MMOL/L (ref 3.5–5)
PROTEIN PROTEIN: 15 MG/DL (ref 0–12)
PROTEIN UA: NEGATIVE MG/DL
PROTEIN/CREAT RATIO: 0.1
PROTEIN/CREAT RATIO: 0.1 (ref 0–0.2)
RBC # BLD: 3.88 E12/L (ref 3.5–5.5)
RBC UA: ABNORMAL /HPF (ref 0–2)
RETIC HGB EQUIVALENT: 35.1 PG (ref 28.2–36.6)
RETICULOCYTE ABSOLUTE COUNT: 0.07 E12/L
RETICULOCYTE COUNT PCT: 1.8 % (ref 0.4–1.9)
SODIUM BLD-SCNC: 134 MMOL/L (ref 132–146)
SPECIFIC GRAVITY UA: >=1.03 (ref 1–1.03)
T3 FREE: 3.1 PG/ML (ref 2–4.4)
T4 FREE: 0.86 NG/DL (ref 0.93–1.7)
TOTAL PROTEIN: 6.5 G/DL (ref 6.4–8.3)
TSH SERPL DL<=0.05 MIU/L-ACNC: 1.67 UIU/ML (ref 0.27–4.2)
URIC ACID, SERUM: 5 MG/DL (ref 2.4–5.7)
UROBILINOGEN, URINE: 0.2 E.U./DL
VITAMIN B-12: 621 PG/ML (ref 211–946)
VITAMIN D 25-HYDROXY: 38 NG/ML (ref 30–100)
WBC # BLD: 5.6 E9/L (ref 4.5–11.5)
WBC UA: ABNORMAL /HPF (ref 0–5)

## 2022-12-17 LAB
Lab: NORMAL
THIS TEST SENT TO: NORMAL

## 2022-12-18 DIAGNOSIS — O99.810 GLUCOSE INTOLERANCE OF PREGNANCY: Primary | ICD-10-CM

## 2022-12-18 LAB — URINE CULTURE, ROUTINE: NORMAL

## 2022-12-19 ENCOUNTER — ANCILLARY PROCEDURE (OUTPATIENT)
Dept: OBGYN CLINIC | Age: 32
End: 2022-12-19
Payer: MEDICAID

## 2022-12-19 ENCOUNTER — FOLLOWUP TELEPHONE ENCOUNTER (OUTPATIENT)
Dept: DIABETES SERVICES | Age: 32
End: 2022-12-19

## 2022-12-19 ENCOUNTER — ROUTINE PRENATAL (OUTPATIENT)
Dept: OBGYN CLINIC | Age: 32
End: 2022-12-19
Payer: MEDICAID

## 2022-12-19 VITALS
WEIGHT: 148 LBS | BODY MASS INDEX: 23.23 KG/M2 | HEIGHT: 67 IN | HEART RATE: 103 BPM | SYSTOLIC BLOOD PRESSURE: 143 MMHG | DIASTOLIC BLOOD PRESSURE: 84 MMHG

## 2022-12-19 DIAGNOSIS — R79.0 LOW MAGNESIUM LEVEL: ICD-10-CM

## 2022-12-19 DIAGNOSIS — Z86.718 HISTORY OF DVT (DEEP VEIN THROMBOSIS): ICD-10-CM

## 2022-12-19 DIAGNOSIS — O26.892 VAGINAL DISCHARGE DURING PREGNANCY IN SECOND TRIMESTER: ICD-10-CM

## 2022-12-19 DIAGNOSIS — Z86.711 HISTORY OF PULMONARY EMBOLISM: ICD-10-CM

## 2022-12-19 DIAGNOSIS — K43.9 ABDOMINAL WALL HERNIA: ICD-10-CM

## 2022-12-19 DIAGNOSIS — R79.89 LOW VITAMIN D LEVEL: ICD-10-CM

## 2022-12-19 DIAGNOSIS — Z67.91 RH NEGATIVE STATE IN ANTEPARTUM PERIOD: ICD-10-CM

## 2022-12-19 DIAGNOSIS — O26.899 RH NEGATIVE STATE IN ANTEPARTUM PERIOD: ICD-10-CM

## 2022-12-19 DIAGNOSIS — D64.9 ANEMIA, UNSPECIFIED TYPE: ICD-10-CM

## 2022-12-19 DIAGNOSIS — O26.872 SHORT CERVICAL LENGTH DURING PREGNANCY IN SECOND TRIMESTER: ICD-10-CM

## 2022-12-19 DIAGNOSIS — Z86.79 HISTORY OF CARDIAC ARRHYTHMIA: ICD-10-CM

## 2022-12-19 DIAGNOSIS — O36.1920 ANTI-M ISOIMMUNIZATION AFFECTING PREGNANCY IN SECOND TRIMESTER, SINGLE OR UNSPECIFIED FETUS: Primary | ICD-10-CM

## 2022-12-19 DIAGNOSIS — N89.8 VAGINAL DISCHARGE DURING PREGNANCY IN SECOND TRIMESTER: ICD-10-CM

## 2022-12-19 DIAGNOSIS — O26.12 LOW WEIGHT GAIN DURING PREGNANCY IN SECOND TRIMESTER: ICD-10-CM

## 2022-12-19 DIAGNOSIS — R35.0 URINARY FREQUENCY: ICD-10-CM

## 2022-12-19 DIAGNOSIS — E53.8 LOW VITAMIN B12 LEVEL: ICD-10-CM

## 2022-12-19 LAB
GLUCOSE URINE, POC: NORMAL
PROTEIN UA: POSITIVE

## 2022-12-19 PROCEDURE — 76821 MIDDLE CEREBRAL ARTERY ECHO: CPT | Performed by: OBSTETRICS & GYNECOLOGY

## 2022-12-19 PROCEDURE — 99213 OFFICE O/P EST LOW 20 MIN: CPT | Performed by: OBSTETRICS & GYNECOLOGY

## 2022-12-19 PROCEDURE — 76819 FETAL BIOPHYS PROFIL W/O NST: CPT | Performed by: OBSTETRICS & GYNECOLOGY

## 2022-12-19 PROCEDURE — 76815 OB US LIMITED FETUS(S): CPT | Performed by: OBSTETRICS & GYNECOLOGY

## 2022-12-19 PROCEDURE — 76817 TRANSVAGINAL US OBSTETRIC: CPT | Performed by: OBSTETRICS & GYNECOLOGY

## 2022-12-19 PROCEDURE — 76820 UMBILICAL ARTERY ECHO: CPT | Performed by: OBSTETRICS & GYNECOLOGY

## 2022-12-19 PROCEDURE — 81002 URINALYSIS NONAUTO W/O SCOPE: CPT | Performed by: OBSTETRICS & GYNECOLOGY

## 2022-12-19 NOTE — PROGRESS NOTES
12/19/22 Diabetes education contacted patient to schedule for glucose intolerance during pregnancy, Pt declined at this time, stating that the first glucose test she was not fasting, today she had another test with her high risk doctor and the test result was normal. Pt states she will call us back after she talks to Dr Julee Alvarenga.

## 2022-12-19 NOTE — LETTER
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 that this may be related to her anemia. Additional maternal evaluation was recommended with an EKG, echocardiogram, and consultation with cardiology as outlined above. --Precautions were reviewed with the patient. She was counseled to go to the hospital with persistent or worsening symptoms or the development of any chest pain, shortness of breath, tachycardia, or syncope. --Supplement as needed     16. Low vitamin B12 -- The patient's vitamin B12 level was borderline at 306.   Individuals with vitamin B12 level between 200 and 400 are increased risk for anemia and side effects related to low vitamin B12. Thus, supplementation is recommended  --Recommend vitamin B12, 1000 mcg daily  --Monitor levels serially  --Repeat nutrition panel (CBC, CMP, magnesium, ferritin, folate, vitamin B12, vitamin D 25 [place]) in 4 weeks, on/after 10/24/2022     --Repeat testing completed 11/9/2022, results included: H/H 11.3/32.8, MCV 92.4, platelet count 619,833, potassium 3.8, creatinine 0.6, calcium 9.6, ALT 10, AST 13, ferritin 26, folate >20, vitamin B12 595, vitamin D 30, hemoglobin A1c 4.8%, TSH 0.998, free T4 0.89, free T3 2.8, uric acid 4.8, , magnesium 1.5, urinalysis contaminated, urine protein creatinine ratio 0.1, urine culture mixed, blood type B-, antibody screen negative. -- The patient's vitamin B12 was improved at 595. She was counseled to continue her vitamin B12 supplement. --Recommend repeat testing in 4 to 6 weeks, on/after 12/7/2022     -- Repeat testing was completed on 12/16/2022, her results included: H/H 12/35.8, MCV 92.3, bili count 204,000, magnesium 1.7, potassium 4, creatinine 0.5, calcium 9, ALT 29, AST 20, ferritin 23, folate >20, vitamin B12 621, vitamin D 38, globin A1c 5.4%, TSH 1.67, free T4 0.6, free T3 3.1, uric acid 5, , TPO negative, antithyroglobulin antibody negative, urine protein creatinine ratio 0.1, urine culture mixed, hemoglobin electrophoresis pending, reticulocyte count normal.    -- The patient's vitamin B12 level has improved. She was counseled to continue her supplement. --Recommend repeat testing in 4 to 6 weeks, on/after 1/13/2023  --Long-term follow-up with PCP for monitoring and management     17.   Low ferritin -- The patient's ferritin was low at 29 (considered low if <15 in absence of anemia or <40 in setting of anemia)  --Ferrous sulfate 325 mg BID prescribed  --Monitor levels serially  --Monitor nutrition panel q4-6 weeks (CBC, CMP, magnesium, ferritin, folate, vitamin B12, vitamin Jose Toribio), on/after 10/24/2022     --Repeat testing completed 11/9/2022, ferritin 26. Her H/H was stable at 11.3/32.  -- The patient was counseled to continue her oral iron supplement. --Recommend repeat testing in 4 to 6 weeks, on/after 12/7/2022     --Repeat testing completed 12/16/2022. Her ferritin level was stable at 23. --She was counseled to continue her iron supplement. --Recommend repeat testing in 4 to 6 weeks, on/after 1/13/2023  --Follow up with PCP for long term monitoring and management     18. Low vitamin D -- The patient's vitamin D was low at 31  --Recommend vitamin D3 2000 IU daily  --Monitor levels serially  --Monitor nutrition panel q4-6 weeks (CBC, CMP, magnesium, ferritin, folate, vitamin B12, vitamin D25OH)     --Repeat testing completed 11/9/2022, vitamin D 30  --The patient was encouraged to take her vitamin D supplement. --Recommend repeat testing in 4 to 6 weeks, on/after 12/7/2022. -- Repeat testing completed 12/16/2022, her vitamin D level was stable at 38. She was counseled to continue her vitamin D supplement. --Recommend repeat testing in 4 to 6 weeks, on/after 1/13/2023  --Follow up with PCP for long term monitoring and management     19. Low magnesium -- The patient's magnesium was mildly decreased at 1.5 (1.6-2.6). Her potassium was normal at 3.8. She was prescribed magnesium oxide, 400 mg daily. Recommend repeat testing with next set of labs. --Repeat testing completed 12/16/2022. Her magnesium level was improved at 1.7. She was counseled to continue her supplement. 20.   Hypothyroxinemia -- The patient's lab results were reviewed. Her free T4 was mildly decreased at 0.89. Her TSH was normal at 0.998 and free T3 normal at 2.8. Screening for thyroid peroxidase antibody and antithyroglobulin antibody was previously negative on 9/26/2022. Counseling was provided to the patient. --Counseling was previously provided to the patient. Counseling was reviewed. She did not have any additional questions or concerns. Per the American thyroid Association, treatment is not recommended for women with isolated hypothyroxinemia. However, thyroid function study should be monitored closely, every 4 weeks throughout the pregnancy. --Recommend repeat thyroid function studies in 4 weeks, on/after 12/7/2022     --Repeat testing completed 12/16/2022, her results included: TSH 1.67, free T4 0.86, free T3 3.1.  --Her free T4 is again mildly decreased. Her TSH and free T3 are normal.  --Recommend repeat testing in 4 to 6 weeks, on/after 1/13/2023. --Long-term follow-up with PCP for monitoring management        21. Pressure with voiding/dysuria, improved -- The patient previously had complaints of dysuria and increased pressure with voiding. She denied any associated fevers, chills, nausea, vomiting, and or back pain. The patient had a urine culture on 9/26/2022 that was reported as mixed. Secondary to the patient's symptoms, a prescription for Macrobid was provided. Today, the patient again reports that her symptoms have improved. She again reports intermittent symptoms of pressure but feels it is related to the pregnancy. Infection precautions were reviewed. Precautions were reviewed and the patient was counseled to go to the hospital with persistent or worsening symptoms or the development of any associated fevers, chills, nausea, vomiting, and/or back pain. 22.  Upper respiratory infection, improved -- Previously, on 10/13/2022, the patient had complaints of upper respiratory infection symptoms. She reports that her symptoms started on Tuesday, 10/4/2022. She has complaints of congestion and a nonproductive cough. She also reports having a scratchy throat. She denied having any associated fevers, chills, nausea, and or vomiting. She denied having any loss of taste or smell. At 24 weeks gestation, the patient again had complaints of congestion.   She reports her symptoms started 2 days ago. She denied having any associated fevers, chills, nausea, vomiting, chest pain, and/or shortness of breath. She denied having any associated loss of taste or smell. Supportive measures were again reviewed including using Tylenol as needed for pain and fever, saline nasal spray for congestion, Robitussin (plain) for cough, a humidifier or vaporizer, and throat lozenges and/or spray. A handout reviewing medication safety in pregnancy was provided. Today, the patient reports that her symptoms have improved. Precautions were reviewed with the patient and she was counseled that if she develops a fever greater than 100.4 F, chest pain and/or shortness of breath to present immediately for evaluation. The patient expressed verbal understanding of this counseling. 23.  Lump under skin -- The patient previously had concerns regarding a small, pea-sized lump along the right side of her abdominal wall. The patient reports that the lump is at the site of a Lovenox injection. The patient reports that her symptoms are stable. The patient was counseled that sometimes small knots or lumps can develop at the site of Lovenox injections. She was counseled to avoid massaging the area after administering the Lovenox. She was counseled to hold pressure after the injection. 24.  Abdominal wall hernia -- The patient again had concerns regarding a possible hernia at the site of her prior ileostomy. She reports that the area occasionally bulges and is sometimes tender. The patient was counseled that she may have a hernia in that area. With persistent or worsening symptoms, I would recommend referral to general surgery for further evaluation. She was counseled that she can wear gentle compression to help minimize the risk for bowel herniation.   Precautions were reviewed and she was counseled to present to the hospital with the development of persistent pain, fever, nausea, and or vomiting. The patient was provided with a referral to general surgery. The patient met with Dr. Hernan Almanzar on 11/3/2022. Per his consultation note, she has a small incisional hernia at the site of her prior ileostomy. No obstruction was noted. Precautions were reviewed. Dr. 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 Based on her history, she may be at increased risk for having a bowel obstruction. Precautions were reviewed. 25. Occasional headaches, stable -- The patient again reports that she is experiencing occasional headaches. She denies having any associated vision change, chest pain, shortness of breath, nausea, and or vomiting. She denies having the worst headache of her life or any associated neurologic deficits. Today, the patient again reports that her symptoms are stable. The patient was again counseled to stay well-hydrated. She can use Tylenol as needed. She was counseled regarding the use of magnesium oxide 400 mg twice daily and riboflavin 100 mg daily in reducing the frequency and intensity of migraine headaches. Precautions were reviewed, and she was counseled that if she develops the worst headache of her life or a headache with neurological deficits, to present immediately for evaluation. She expressed verbal understanding of this counseling. 26. MTHFR C677T, heterozygote --  The patient had a thrombophilia panel secondary to a history of a DVT. She was found to be heterozygous for MTHFR C677T. Counseling was previously provided regarding the implications and management of this gene mutation in pregnancy. Reviewed. She did not have any additional questions or concerns. She was counseled that this gene mutation affects folic acid metabolism. It is fairly common and found in 20-30% of the population.  The patient was counseled that this condition is no longer thought to be clinically significant. It is generally only a problem if homocysteine levels are elevated. The patient's homocystine level was normal at 5.3 on 9/26/2022. In the past, this gene mutation was thought to be associated with increased obstetric risks including thrombosis, early recurrent pregnancy loss, placental abruption, hypertensive disorders of pregnancy, poor fetal growth, and fetal loss. More recent studies did not report strong associations with these risks. Because this genetic mutation affects folic acid metabolism, there is an increased risk for folic acid deficiency and other nutritional deficiencies in women with this condition. There is also an increased risk for having a child with an open neural tube defect in women with this mutation, especially if they're homozygous for the C677T mutation. Generally, additional vitamin supplementation is recommended with folic acid or methyl folate, vitamin B6, and vitamin B12. The patient was counseled to take a low-dose aspirin. Fetal growth should be followed serially. She was counseled that there is a 50% chance that she will pass this mutation off to her child(joana). She should relay this information to the pediatrician who cares for her child(joana). She was also encouraged to review this diagnosis with her PCP. 27.  Vaginal discharge, improved -- The patient again had complaints of increased vaginal discharge during her visit at 22 weeks 3 days. She denied having any associated itching or irritation. She felt that she may have a yeast infection. Thus, a sterile speculum exam was completed on 11/10/2022. Her cervix appeared closed. Copious discharge was noted. A genital culture was collected and noted to be negative. The patient again had complaints of copious vaginal discharge. A sterile speculum exam was repeated. Infection screening was completed with GC/chlamydia, Ureaplasma, and a repeat genital culture.     --Screening for GC and chlamydia was negative. A genital culture was negative. Screening for Ureaplasma and mycoplasma was pending. Secondary to the continued vaginal discharge and borderline cervical length, the patient was empirically treated with a course of Flagyl. A prescription was previously provided. The patient reported that she tolerated the medication well. To completion with her symptoms are stable/improving. 28.  Borderline cervical length -- The ultrasound results were reviewed with the patient. At 22 weeks 3 days, the patient's cervical length was borderline and measured 2.8-3.5 cm without funneling. No dynamic change was noted. Secondary to the patient's complaints of increased discharge, A sterile speculum exam was done prior to her transvaginal ultrasound. The patient's cervix was visually closed. Only a general culture was collected. At 23 weeks 3 days, the patient's cervix appeared stable and measured 2.6-2.9 cm without funneling. A sterile speculum exam was repeated at 23 weeks 3 days. The patient's cervix was visually closed. Copious discharge was noted. Infection screening was completed. On digital exam, her cervix was closed with approximately 1-2 cm of length palpable. Her cervix was firm and posterior. A transvaginal ultrasound was repeated at 24 weeks gestation. The patient's cervix appeared normal and measured 2.8-3.6 cm without funneling. A transvaginal ultrasound was repeated at 26 weeks 4 days. The patient's cervix appeared stable and measured 3.2-3.3 cm without funneling. A transvaginal ultrasound was repeated today at 28 weeks 0 days. The patient's cervix appeared stable and measured 2.7-2.9 cm without funneling. She again notes good fetal movement and denies any symptoms of discharge, leaking of fluid, vaginal bleeding, and/or contractions.        She was counseled that  cervical shortening is associated with a 30% increased risk for delivery prior to 37 weeks' gestation. Interventions and strategies to reduce this risk were reviewed. The patient was counseled that with further cervical shortening, Prometrium would be indicated. The risks and benefits of use were reviewed. She was counseled that vaginal progesterone has been shown to reduce this risk by 30-40%. The risks and benefits of use were reviewed. --The patient requested treatment with vaginal progesterone. A prescription was provided. Instructions for use were reviewed. --She was provided with a prescription for 200 mg to be placed into the vagina at bedtime. She should continue this medication until 36-37 weeks' gestation. --She reports that she is tolerating the vaginal progesterone well. --Continue vaginal progesterone until 36-37 weeks gestation. At this time, close follow-up was recommended. The patient was scheduled to return in 2 weeks for a follow-up cervical length. The patient was counseled to avoid heavy lifting, prolonged standing, and sexual intercourse. The patient was scheduled to return for a follow-up assessment in 2 weeks. Precautions to call and/or return sooner were reviewed. 29.  Abnormal glucose test -- The patient reports that she recently completed a 2-hour oral glucose tolerance test.  Her results included 113/143/141. Her fasting value was elevated. However, the patient reports that she was not truly fasting. --Blood work is ordered for the patient today. A CMP was ordered. The patient was counseled to fast for testing. --If her fasting blood sugar is normal, then it is unlikely that the gestational diabetes. --If there is any question, the patient can repeat the 2-hour oral glucose tolerance test.    --The patient completed a fasting CMP on 12/16/2022. I initially reviewed the results and saw a glucose value of 86. This would be normal.  However, after reviewing her results again, the result of 86 was from testing done in November. Her result on 12/16/2022 was 96 which would be elevated for a 2 or 3-hour oral glucose tolerance test.    Thus, I would recommend a referral for diabetic education and blood sugar monitoring. I will contact the patient regarding this error. We will provide a referral to diabetic education and testing supplies. 30.  Elevated blood pressure -- The patient's blood pressure was initially elevated at 143/84. Her blood pressure was repeated normal 113/73. The patient's urine dip was negative for protein. The patient denies having any other prior blood pressure elevations during this pregnancy. She denies having any symptoms of headache, vision change, chest pain, shortness of breath, nausea, vomiting, and or right upper quadrant pain. An exam was done in the office. The patient's heart had a regular rate and rhythm. Her lungs were clear to auscultation bilaterally. Her abdomen was soft, gravid, nontender, and with normal bowel sounds. She had +1 patellar reflexes bilaterally. No clonus was noted bilaterally. She had trace edema in her bilateral lower extremities. The patient was counseled that at this time, continue close monitoring is recommended. Again, the patient has a history of hypertension. Continue increased maternal and fetal surveillance as outlined above. --I requested the patient return for a follow-up assessment in 2 weeks unless there is a clinical reason for her to return prior to that time. She is to call if she has any problems or questions prior to her next visit. Further evaluation and management will be dependent on her clinical presentation and the results of her testing. --The patient was advised to call if she has any increased vaginal discharge, vaginal bleeding, contractions, abdominal pain, back pain or any new significant symptomatology prior to her next visit.  I advised her that these are signs and symptoms of cervical change and require follow-up assessment when they occur. Preeclampsia precautions were also reviewed with the patient. --The patient was also counseled to call and/or return with any concerns for decreased fetal activity. --The patient is to continue to follow with you in your office for ongoing obstetric care. --The total time spent on today's visit was 30 minutes. This included preparation for the visit (i.e. reviewing prior external notes and test results), performance of a medically appropriate history and examination, counseling, orders for medications, tests or other procedures, and coordination of care. Greater than 50% of the time was spent face-to-face with the patient. This time is exclusive of procedures performed. I answered all of  the patient's questions to her satisfaction. I asked her to call if she had any additional questions prior to her next visit. --At the conclusion of the visit, the patient appeared to have a good understanding of the issues discussed. I answered all of her questions to her satisfaction. I asked her to call if she had any additional questions prior to her next visit. --Thank you for allowing me to participate in the care of this pleasant patient. Please don't hesitate to call me if you have any questions. Sincerely,      Juliet Dunn MD, [id number]  [phone]      *All or parts of this note may have been generated using a voice recognition program. There may be typo, grammar, or Word substitution errors that have escaped my review of this note.

## 2022-12-19 NOTE — RESULT ENCOUNTER NOTE
Patient said that Dr. Milli Mackenzie redid it and it was normal when she saw her today. She said Dr. Milli Mackenzie told her to speak to you about if it needs redone again.

## 2022-12-19 NOTE — PROGRESS NOTES
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  --Recommend vitamin B12, 1000 mcg daily  --Monitor levels serially  --Repeat nutrition panel (CBC, CMP, magnesium, ferritin, folate, vitamin B12, vitamin D 25 [place]) in 4 weeks, on/after 10/24/2022     --Repeat testing completed 11/9/2022, results included: H/H 11.3/32.8, MCV 92.4, platelet count 018,235, potassium 3.8, creatinine 0.6, calcium 9.6, ALT 10, AST 13, ferritin 26, folate >20, vitamin B12 595, vitamin D 30, hemoglobin A1c 4.8%, TSH 0.998, free T4 0.89, free T3 2.8, uric acid 4.8, , magnesium 1.5, urinalysis contaminated, urine protein creatinine ratio 0.1, urine culture mixed, blood type B-, antibody screen negative. -- The patient's vitamin B12 was improved at 595. She was counseled to continue her vitamin B12 supplement. --Recommend repeat testing in 4 to 6 weeks, on/after 12/7/2022     -- Repeat testing was completed on 12/16/2022, her results included: H/H 12/35.8, MCV 92.3, bili count 204,000, magnesium 1.7, potassium 4, creatinine 0.5, calcium 9, ALT 29, AST 20, ferritin 23, folate >20, vitamin B12 621, vitamin D 38, globin A1c 5.4%, TSH 1.67, free T4 0.6, free T3 3.1, uric acid 5, , TPO negative, antithyroglobulin antibody negative, urine protein creatinine ratio 0.1, urine culture mixed, hemoglobin electrophoresis pending, reticulocyte count normal.    -- The patient's vitamin B12 level has improved. She was counseled to continue her supplement. --Recommend repeat testing in 4 to 6 weeks, on/after 1/13/2023  --Long-term follow-up with PCP for monitoring and management     17. Low ferritin -- The patient's ferritin was low at 29 (considered low if <15 in absence of anemia or <40 in setting of anemia)  --Ferrous sulfate 325 mg BID prescribed  --Monitor levels serially  --Monitor nutrition panel q4-6 weeks (CBC, CMP, magnesium, ferritin, folate, vitamin B12, vitamin D25OH), on/after 10/24/2022     --Repeat testing completed 11/9/2022, ferritin 26. Her H/H was stable at 11.3/32.  -- The patient was counseled to continue her oral iron supplement. --Recommend repeat testing in 4 to 6 weeks, on/after 12/7/2022     --Repeat testing completed 12/16/2022. Her ferritin level was stable at 23. --She was counseled to continue her iron supplement. --Recommend repeat testing in 4 to 6 weeks, on/after 1/13/2023  --Follow up with PCP for long term monitoring and management     18.   Low vitamin D -- The patient's vitamin D was low at 31  --Recommend vitamin D3 2000 IU daily  --Monitor levels serially  --Monitor nutrition panel q4-6 weeks (CBC, CMP, magnesium, ferritin, folate, vitamin B12, vitamin D25OH)     --Repeat increased pressure with voiding. She denied any associated fevers, chills, nausea, vomiting, and or back pain. The patient had a urine culture on 9/26/2022 that was reported as mixed. Secondary to the patient's symptoms, a prescription for Macrobid was provided. Today, the patient again reports that her symptoms have improved. She again reports intermittent symptoms of pressure but feels it is related to the pregnancy. Infection precautions were reviewed. Precautions were reviewed and the patient was counseled to go to the hospital with persistent or worsening symptoms or the development of any associated fevers, chills, nausea, vomiting, and/or back pain. 22.  Upper respiratory infection, improved -- Previously, on 10/13/2022, the patient had complaints of upper respiratory infection symptoms. She reports that her symptoms started on Tuesday, 10/4/2022. She has complaints of congestion and a nonproductive cough. She also reports having a scratchy throat. She denied having any associated fevers, chills, nausea, and or vomiting. She denied having any loss of taste or smell. At 24 weeks gestation, the patient again had complaints of congestion. She reports her symptoms started 2 days ago. She denied having any associated fevers, chills, nausea, vomiting, chest pain, and/or shortness of breath. She denied having any associated loss of taste or smell. Supportive measures were again reviewed including using Tylenol as needed for pain and fever, saline nasal spray for congestion, Robitussin (plain) for cough, a humidifier or vaporizer, and throat lozenges and/or spray. A handout reviewing medication safety in pregnancy was provided. Today, the patient reports that her symptoms have improved.      Precautions were reviewed with the patient and she was counseled that if she develops a fever greater than 100.4 F, chest pain and/or shortness of breath to present immediately for evaluation. The patient expressed verbal understanding of this counseling. 23.  Lump under skin -- The patient previously had concerns regarding a small, pea-sized lump along the right side of her abdominal wall. The patient reports that the lump is at the site of a Lovenox injection. The patient reports that her symptoms are stable. The patient was counseled that sometimes small knots or lumps can develop at the site of Lovenox injections. She was counseled to avoid massaging the area after administering the Lovenox. She was counseled to hold pressure after the injection. 24.  Abdominal wall hernia -- The patient again had concerns regarding a possible hernia at the site of her prior ileostomy. She reports that the area occasionally bulges and is sometimes tender. The patient was counseled that she may have a hernia in that area. With persistent or worsening symptoms, I would recommend referral to general surgery for further evaluation. She was counseled that she can wear gentle compression to help minimize the risk for bowel herniation. Precautions were reviewed and she was counseled to present to the hospital with the development of persistent pain, fever, nausea, and or vomiting. The patient was provided with a referral to general surgery. The patient met with Dr. Gabby Humphrey on 11/3/2022. Per his consultation note, she has a small incisional hernia at the site of her prior ileostomy. No obstruction was noted. Precautions were reviewed. Dr. 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 Based on her history, she may be at increased risk for having a bowel obstruction. Precautions were reviewed. 25. Occasional headaches, stable -- The patient again reports that she is experiencing occasional headaches. She denies having any associated vision change, chest pain, shortness of breath, nausea, and or vomiting.   She denies having the worst headache of her life or any associated neurologic deficits. Today, the patient again reports that her symptoms are stable. The patient was again counseled to stay well-hydrated. She can use Tylenol as needed. She was counseled regarding the use of magnesium oxide 400 mg twice daily and riboflavin 100 mg daily in reducing the frequency and intensity of migraine headaches. Precautions were reviewed, and she was counseled that if she develops the worst headache of her life or a headache with neurological deficits, to present immediately for evaluation. She expressed verbal understanding of this counseling. 26. MTHFR C677T, heterozygote --  The patient had a thrombophilia panel secondary to a history of a DVT. She was found to be heterozygous for MTHFR C677T. Counseling was previously provided regarding the implications and management of this gene mutation in pregnancy. Reviewed. She did not have any additional questions or concerns. She was counseled that this gene mutation affects folic acid metabolism. It is fairly common and found in 20-30% of the population. The patient was counseled that this condition is no longer thought to be clinically significant. It is generally only a problem if homocysteine levels are elevated. The patient's homocystine level was normal at 5.3 on 9/26/2022. In the past, this gene mutation was thought to be associated with increased obstetric risks including thrombosis, early recurrent pregnancy loss, placental abruption, hypertensive disorders of pregnancy, poor fetal growth, and fetal loss. More recent studies did not report strong associations with these risks. Because this genetic mutation affects folic acid metabolism, there is an increased risk for folic acid deficiency and other nutritional deficiencies in women with this condition.  There is also an increased risk for having a child with an open neural tube defect in women with this mutation, especially if they're homozygous for the C677T mutation. Generally, additional vitamin supplementation is recommended with folic acid or methyl folate, vitamin B6, and vitamin B12. The patient was counseled to take a low-dose aspirin. Fetal growth should be followed serially. She was counseled that there is a 50% chance that she will pass this mutation off to her child(joana). She should relay this information to the pediatrician who cares for her child(joana). She was also encouraged to review this diagnosis with her PCP. 27.  Vaginal discharge, improved -- The patient again had complaints of increased vaginal discharge during her visit at 22 weeks 3 days. She denied having any associated itching or irritation. She felt that she may have a yeast infection. Thus, a sterile speculum exam was completed on 11/10/2022. Her cervix appeared closed. Copious discharge was noted. A genital culture was collected and noted to be negative. The patient again had complaints of copious vaginal discharge. A sterile speculum exam was repeated. Infection screening was completed with GC/chlamydia, Ureaplasma, and a repeat genital culture. --Screening for GC and chlamydia was negative. A genital culture was negative. Screening for Ureaplasma and mycoplasma was pending. Secondary to the continued vaginal discharge and borderline cervical length, the patient was empirically treated with a course of Flagyl. A prescription was previously provided. The patient reported that she tolerated the medication well. To completion with her symptoms are stable/improving. 28.  Borderline cervical length -- The ultrasound results were reviewed with the patient. At 22 weeks 3 days, the patient's cervical length was borderline and measured 2.8-3.5 cm without funneling. No dynamic change was noted.   Secondary to the patient's complaints of increased discharge, A sterile speculum exam was done prior to her transvaginal ultrasound. The patient's cervix was visually closed. Only a general culture was collected. At 23 weeks 3 days, the patient's cervix appeared stable and measured 2.6-2.9 cm without funneling. A sterile speculum exam was repeated at 23 weeks 3 days. The patient's cervix was visually closed. Copious discharge was noted. Infection screening was completed. On digital exam, her cervix was closed with approximately 1-2 cm of length palpable. Her cervix was firm and posterior. A transvaginal ultrasound was repeated at 24 weeks gestation. The patient's cervix appeared normal and measured 2.8-3.6 cm without funneling. A transvaginal ultrasound was repeated at 26 weeks 4 days. The patient's cervix appeared stable and measured 3.2-3.3 cm without funneling. A transvaginal ultrasound was repeated today at 28 weeks 0 days. The patient's cervix appeared stable and measured 2.7-2.9 cm without funneling. She again notes good fetal movement and denies any symptoms of discharge, leaking of fluid, vaginal bleeding, and/or contractions. She was counseled that  cervical shortening is associated with a 30% increased risk for delivery prior to 37 weeks' gestation. Interventions and strategies to reduce this risk were reviewed. The patient was counseled that with further cervical shortening, Prometrium would be indicated. The risks and benefits of use were reviewed. She was counseled that vaginal progesterone has been shown to reduce this risk by 30-40%. The risks and benefits of use were reviewed. --The patient requested treatment with vaginal progesterone. A prescription was provided. Instructions for use were reviewed. --She was provided with a prescription for 200 mg to be placed into the vagina at bedtime. She should continue this medication until 36-37 weeks' gestation. --She reports that she is tolerating the vaginal progesterone well.   --Continue vaginal progesterone until 36-37 weeks gestation. At this time, close follow-up was recommended. The patient was scheduled to return in 2 weeks for a follow-up cervical length. The patient was counseled to avoid heavy lifting, prolonged standing, and sexual intercourse. The patient was scheduled to return for a follow-up assessment in 2 weeks. Precautions to call and/or return sooner were reviewed. 29.  Abnormal glucose test -- The patient reports that she recently completed a 2-hour oral glucose tolerance test.  Her results included 113/143/141. Her fasting value was elevated. However, the patient reports that she was not truly fasting. --Blood work is ordered for the patient today. A CMP was ordered. The patient was counseled to fast for testing. --If her fasting blood sugar is normal, then it is unlikely that the gestational diabetes. --If there is any question, the patient can repeat the 2-hour oral glucose tolerance test.    --The patient completed a fasting CMP on 12/16/2022. I initially reviewed the results and saw a glucose value of 86. This would be normal.  However, after reviewing her results again, the result of 86 was from testing done in November. Her result on 12/16/2022 was 96 which would be elevated for a 2 or 3-hour oral glucose tolerance test.    Thus, I would recommend a referral for diabetic education and blood sugar monitoring. I will contact the patient regarding this error. We will provide a referral to diabetic education and testing supplies. 30.  Elevated blood pressure -- The patient's blood pressure was initially elevated at 143/84. Her blood pressure was repeated normal 113/73. The patient's urine dip was negative for protein. The patient denies having any other prior blood pressure elevations during this pregnancy.   She denies having any symptoms of headache, vision change, chest pain, shortness of breath, nausea, vomiting, and or right upper quadrant pain. An exam was done in the office. The patient's heart had a regular rate and rhythm. Her lungs were clear to auscultation bilaterally. Her abdomen was soft, gravid, nontender, and with normal bowel sounds. She had +1 patellar reflexes bilaterally. No clonus was noted bilaterally. She had trace edema in her bilateral lower extremities. The patient was counseled that at this time, continue close monitoring is recommended. Again, the patient has a history of hypertension. Continue increased maternal and fetal surveillance as outlined above. --I requested the patient return for a follow-up assessment in 2 weeks unless there is a clinical reason for her to return prior to that time. She is to call if she has any problems or questions prior to her next visit. Further evaluation and management will be dependent on her clinical presentation and the results of her testing. --The patient was advised to call if she has any increased vaginal discharge, vaginal bleeding, contractions, abdominal pain, back pain or any new significant symptomatology prior to her next visit. I advised her that these are signs and symptoms of cervical change and require follow-up assessment when they occur. Preeclampsia precautions were also reviewed with the patient. --The patient was also counseled to call and/or return with any concerns for decreased fetal activity. --The patient is to continue to follow with you in your office for ongoing obstetric care. --The total time spent on today's visit was 30 minutes. This included preparation for the visit (i.e. reviewing prior external notes and test results), performance of a medically appropriate history and examination, counseling, orders for medications, tests or other procedures, and coordination of care. Greater than 50% of the time was spent face-to-face with the patient. This time is exclusive of procedures performed.    I answered all of the patient's questions to her satisfaction. I asked her to call if she had any additional questions prior to her next visit. --At the conclusion of the visit, the patient appeared to have a good understanding of the issues discussed. I answered all of her questions to her satisfaction. I asked her to call if she had any additional questions prior to her next visit. --Thank you for allowing me to participate in the care of this pleasant patient. Please don't hesitate to call me if you have any questions. Sincerely,      Epi Rowell MD, [place] [address]  [phone]      *All or parts of this note may have been generated using a voice recognition program. There may be typo, grammar, or Word substitution errors that have escaped my review of this note.

## 2022-12-20 ENCOUNTER — TELEPHONE (OUTPATIENT)
Dept: CARDIOLOGY CLINIC | Age: 32
End: 2022-12-20

## 2022-12-20 NOTE — TELEPHONE ENCOUNTER
----- Message from Michele Cadena MD sent at 12/15/2022  9:18 AM EST -----  Stable monitor findings. No symptoms reported. Lets ask about symptoms. No evidence of SVT.

## 2022-12-28 ENCOUNTER — ROUTINE PRENATAL (OUTPATIENT)
Dept: OBGYN | Age: 32
End: 2022-12-28
Payer: MEDICAID

## 2022-12-28 VITALS
WEIGHT: 153 LBS | SYSTOLIC BLOOD PRESSURE: 114 MMHG | DIASTOLIC BLOOD PRESSURE: 76 MMHG | BODY MASS INDEX: 23.96 KG/M2 | HEART RATE: 88 BPM

## 2022-12-28 DIAGNOSIS — O09.90 SUPERVISION OF HIGH RISK PREGNANCY, ANTEPARTUM: Primary | ICD-10-CM

## 2022-12-28 LAB
GLUCOSE URINE, POC: NEGATIVE
PROTEIN UA: POSITIVE

## 2022-12-28 PROCEDURE — G8484 FLU IMMUNIZE NO ADMIN: HCPCS | Performed by: OBSTETRICS & GYNECOLOGY

## 2022-12-28 PROCEDURE — 81002 URINALYSIS NONAUTO W/O SCOPE: CPT | Performed by: OBSTETRICS & GYNECOLOGY

## 2022-12-28 PROCEDURE — G8427 DOCREV CUR MEDS BY ELIG CLIN: HCPCS | Performed by: OBSTETRICS & GYNECOLOGY

## 2022-12-28 PROCEDURE — 99213 OFFICE O/P EST LOW 20 MIN: CPT | Performed by: OBSTETRICS & GYNECOLOGY

## 2022-12-28 PROCEDURE — 1036F TOBACCO NON-USER: CPT | Performed by: OBSTETRICS & GYNECOLOGY

## 2022-12-28 PROCEDURE — G8420 CALC BMI NORM PARAMETERS: HCPCS | Performed by: OBSTETRICS & GYNECOLOGY

## 2022-12-28 NOTE — PROGRESS NOTES
Good FM. No BLD or LO F. Positive occasional BH. Emotionally doing well. Had RhoGAM.  Tdap today. No problems with Lovenox. Meets with [facility] IN 1-4-2023.

## 2022-12-28 NOTE — PROGRESS NOTES
Routine ob  +fm  Pt denies ;lof vag bleeding or contractions  Pt voiced round ligament pain and pain that radiated up into abdomen  Pt stated failed 2 hr gtt because she could not fast  Dr Ismael Edwards did fasting lab draw and sugars were ok. Pt voiced was to see if she needed to do anything.

## 2022-12-30 ENCOUNTER — TELEPHONE (OUTPATIENT)
Dept: OBGYN CLINIC | Age: 32
End: 2022-12-30

## 2022-12-30 DIAGNOSIS — O24.419 GESTATIONAL DIABETES MELLITUS (GDM), ANTEPARTUM, GESTATIONAL DIABETES METHOD OF CONTROL UNSPECIFIED: Primary | ICD-10-CM

## 2022-12-30 RX ORDER — GLUCOSAMINE HCL/CHONDROITIN SU 500-400 MG
CAPSULE ORAL
Qty: 125 STRIP | Refills: 5 | Status: SHIPPED | OUTPATIENT
Start: 2022-12-30

## 2022-12-30 RX ORDER — LANCETS 30 GAUGE
EACH MISCELLANEOUS
Qty: 125 EACH | Refills: 5 | Status: SHIPPED | OUTPATIENT
Start: 2022-12-30

## 2022-12-30 RX ORDER — BLOOD-GLUCOSE METER
KIT MISCELLANEOUS
Qty: 1 KIT | Refills: 0 | Status: SHIPPED | OUTPATIENT
Start: 2022-12-30

## 2022-12-30 NOTE — TELEPHONE ENCOUNTER
I called patient and left message to call office back for recommendations from Dr. Queat Hayes for gestational diabetes supplies and education.

## 2022-12-30 NOTE — TELEPHONE ENCOUNTER
Sania called back and informed of checking blood sugars 4x daily-fasting blood sugars, and 2 hours after breakfast, 2 hours after luncy and 2 hours after dinner and writing down the numbers on a paper and bringing them to the office for next appt. Pt also instructed that diabetic education will be calling her to schedule 1 hour class for education.   Pt verbalizes understanding of instructions

## 2023-01-04 ENCOUNTER — ROUTINE PRENATAL (OUTPATIENT)
Dept: OBGYN CLINIC | Age: 33
End: 2023-01-04
Payer: MEDICAID

## 2023-01-04 ENCOUNTER — ANCILLARY PROCEDURE (OUTPATIENT)
Dept: OBGYN CLINIC | Age: 33
End: 2023-01-04
Payer: MEDICAID

## 2023-01-04 VITALS
DIASTOLIC BLOOD PRESSURE: 85 MMHG | HEART RATE: 104 BPM | BODY MASS INDEX: 23.57 KG/M2 | SYSTOLIC BLOOD PRESSURE: 118 MMHG | WEIGHT: 150.5 LBS

## 2023-01-04 DIAGNOSIS — O26.873 SHORT CERVIX DURING PREGNANCY IN THIRD TRIMESTER: ICD-10-CM

## 2023-01-04 DIAGNOSIS — Z3A.30 30 WEEKS GESTATION OF PREGNANCY: ICD-10-CM

## 2023-01-04 DIAGNOSIS — O36.5930 POOR CLINICAL FETAL GROWTH IN THIRD TRIMESTER, SINGLE OR UNSPECIFIED FETUS: ICD-10-CM

## 2023-01-04 DIAGNOSIS — O36.1920 ANTI-M ISOIMMUNIZATION AFFECTING PREGNANCY IN SECOND TRIMESTER, SINGLE OR UNSPECIFIED FETUS: ICD-10-CM

## 2023-01-04 DIAGNOSIS — O24.419 GESTATIONAL DIABETES MELLITUS (GDM), ANTEPARTUM, GESTATIONAL DIABETES METHOD OF CONTROL UNSPECIFIED: Primary | ICD-10-CM

## 2023-01-04 LAB
GLUCOSE URINE, POC: NEGATIVE
PROTEIN UA: NEGATIVE

## 2023-01-04 PROCEDURE — 1036F TOBACCO NON-USER: CPT | Performed by: OBSTETRICS & GYNECOLOGY

## 2023-01-04 PROCEDURE — 99213 OFFICE O/P EST LOW 20 MIN: CPT | Performed by: OBSTETRICS & GYNECOLOGY

## 2023-01-04 PROCEDURE — 76816 OB US FOLLOW-UP PER FETUS: CPT | Performed by: OBSTETRICS & GYNECOLOGY

## 2023-01-04 PROCEDURE — G8420 CALC BMI NORM PARAMETERS: HCPCS | Performed by: OBSTETRICS & GYNECOLOGY

## 2023-01-04 PROCEDURE — 76817 TRANSVAGINAL US OBSTETRIC: CPT | Performed by: OBSTETRICS & GYNECOLOGY

## 2023-01-04 PROCEDURE — G8427 DOCREV CUR MEDS BY ELIG CLIN: HCPCS | Performed by: OBSTETRICS & GYNECOLOGY

## 2023-01-04 PROCEDURE — 81002 URINALYSIS NONAUTO W/O SCOPE: CPT | Performed by: OBSTETRICS & GYNECOLOGY

## 2023-01-04 PROCEDURE — 76821 MIDDLE CEREBRAL ARTERY ECHO: CPT | Performed by: OBSTETRICS & GYNECOLOGY

## 2023-01-04 PROCEDURE — 99215 OFFICE O/P EST HI 40 MIN: CPT | Performed by: OBSTETRICS & GYNECOLOGY

## 2023-01-04 PROCEDURE — G8484 FLU IMMUNIZE NO ADMIN: HCPCS | Performed by: OBSTETRICS & GYNECOLOGY

## 2023-01-04 PROCEDURE — 76819 FETAL BIOPHYS PROFIL W/O NST: CPT | Performed by: OBSTETRICS & GYNECOLOGY

## 2023-01-04 PROCEDURE — 76820 UMBILICAL ARTERY ECHO: CPT | Performed by: OBSTETRICS & GYNECOLOGY

## 2023-01-04 NOTE — PATIENT INSTRUCTIONS
Please arrive for your scheduled appointment at least 15 minutes early with your actual insurance card+ a photo ID. Also if you need any refills ordered or have questions, it may take up 48 hours to reply. Please allow ample time for your refills. Call me when you use last refill. Thank you for your cooperation. Call your primary obstetrician with bleeding, leaking of fluid, abdominal tenderness, headache, blurry vision, epigastric pain and increased urinary frequency. If you are experiencing an emergency and need immediate help, call 911 or go to go emergency room or labor and delivery. Do kick counts after dinner. Call your primary obstetrician if less than 10 kicks in 2 hours after dinner. Call your primary obstetrician with bleeding, leaking of fluid, abdominal tenderness, headache, blurry vision, epigastric pain and increased urinary frequency. if you are sick, not feeling well or have an infectious process going on please reschedule your appointment by calling [phone]. Also if any family members are not feeling well, please do not bring them to your appointment. We appreciate your cooperation. We are doing this in order to protect our pregnant mothers+ their babies. You might be having an NST at your next appt. Please eat a large snack or breakfast before coming to office. Thank you You might be having an NST at your next appt. Please eat a large snack or breakfast before coming to office.  Thank you

## 2023-01-04 NOTE — PROGRESS NOTES
Pt here for 2 week follow up ultrasound  Pt c/o cramping and pain on right side and back  Pt just picked up glucometer and instructed on when to take blood sugars  Pt states good fetal movement

## 2023-01-04 NOTE — LETTER
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 the hospital with persistent or worsening symptoms or the development of any associated chest pain, shortness of breath, lightheadedness, dizziness, and/or syncope. 15. Anemia -- The patient's H/H on 9/26/2022 was noted to be 10.9/31.7. The patient reported having occasional symptoms of lightheadedness. -- A Baseline nutrition panel and thyroid function studies were completed on 9/26/2022. A hemoglobin electrophoresis, reticulocyte count were ordered.   Her reticulocyte count was normal.  The hemoglobin electrophoresis was normal.  --The patient previously reported having intermittent symptoms of lightheadedness. She was counseled that this may be related to her anemia. Additional maternal evaluation was recommended with an EKG, echocardiogram, and consultation with cardiology as outlined above. --Precautions were reviewed with the patient. She was counseled to go to the hospital with persistent or worsening symptoms or the development of any chest pain, shortness of breath, tachycardia, or syncope. --Supplement as needed     16. Low vitamin B12 -- The patient's vitamin B12 level was borderline at 306. Individuals with vitamin B12 level between 200 and 400 are increased risk for anemia and side effects related to low vitamin B12. Thus, supplementation is recommended  --Recommend vitamin B12, 1000 mcg daily  --Monitor levels serially  --Repeat nutrition panel (CBC, CMP, magnesium, ferritin, folate, vitamin B12, vitamin D 25 [place]) in 4 weeks, on/after 10/24/2022     --Repeat testing completed 11/9/2022, results included: H/H 11.3/32.8, MCV 92.4, platelet count 384,928, potassium 3.8, creatinine 0.6, calcium 9.6, ALT 10, AST 13, ferritin 26, folate >20, vitamin B12 595, vitamin D 30, hemoglobin A1c 4.8%, TSH 0.998, free T4 0.89, free T3 2.8, uric acid 4.8, , magnesium 1.5, urinalysis contaminated, urine protein creatinine ratio 0.1, urine culture mixed, blood type B-, antibody screen negative. -- The patient's vitamin B12 was improved at 595. She was counseled to continue her vitamin B12 supplement.   --Recommend repeat testing in 4 to 6 weeks, on/after 12/7/2022     -- Repeat testing was completed on 12/16/2022, her results included: H/H 12/35.8, MCV 92.3, bili count 204,000, magnesium 1.7, potassium 4, creatinine 0.5, calcium 9, ALT 29, AST 20, ferritin 23, folate >20, vitamin B12 621, vitamin D 38, globin A1c 5.4%, TSH 1.67, free T4 0.6, free T3 3.1, uric acid 5, , TPO negative, antithyroglobulin antibody negative, urine protein creatinine ratio 0.1, urine culture mixed, hemoglobin electrophoresis pending, reticulocyte count normal.     -- The patient's vitamin B12 level has improved. She was counseled to continue her supplement. --Recommend repeat testing in 4 to 6 weeks, on/after 1/13/2023  --Long-term follow-up with PCP for monitoring and management     17. Low ferritin -- The patient's ferritin was low at 29 (considered low if <15 in absence of anemia or <40 in setting of anemia)  --Ferrous sulfate 325 mg BID prescribed  --Monitor levels serially  --Monitor nutrition panel q4-6 weeks (CBC, CMP, magnesium, ferritin, folate, vitamin B12, vitamin D25OH), on/after 10/24/2022     --Repeat testing completed 11/9/2022, ferritin 26. Her H/H was stable at 11.3/32.  -- The patient was counseled to continue her oral iron supplement. --Recommend repeat testing in 4 to 6 weeks, on/after 12/7/2022     --Repeat testing completed 12/16/2022. Her ferritin level was stable at 23. --She was counseled to continue her iron supplement. --Recommend repeat testing in 4 to 6 weeks, on/after 1/13/2023  --Follow up with PCP for long term monitoring and management     18. Low vitamin D -- The patient's vitamin D was low at 31  --Recommend vitamin D3 2000 IU daily  --Monitor levels serially  --Monitor nutrition panel q4-6 weeks (CBC, CMP, magnesium, ferritin, folate, vitamin B12, vitamin D25OH)     --Repeat testing completed 11/9/2022, vitamin D 30  --The patient was encouraged to take her vitamin D supplement. --Recommend repeat testing in 4 to 6 weeks, on/after 12/7/2022. -- Repeat testing completed 12/16/2022, her vitamin D level was stable at 38. She was counseled to continue her vitamin D supplement. --Recommend repeat testing in 4 to 6 weeks, on/after 1/13/2023  --Follow up with PCP for long term monitoring and management     19. Low magnesium -- The patient's magnesium was mildly decreased at 1.5 (1.6-2.6). Her potassium was normal at 3.8.   She was prescribed magnesium oxide, 400 mg daily. Recommend repeat testing with next set of labs. --Repeat testing completed 12/16/2022. Her magnesium level was improved at 1.7. She was counseled to continue her supplement. 20.   Hypothyroxinemia -- The patient's lab results were reviewed. Her free T4 was mildly decreased at 0.89. Her TSH was normal at 0.998 and free T3 normal at 2.8. Screening for thyroid peroxidase antibody and antithyroglobulin antibody was previously negative on 9/26/2022. Counseling was provided to the patient. --Counseling was previously provided to the patient. Counseling was reviewed. She did not have any additional questions or concerns. Per the American thyroid Association, treatment is not recommended for women with isolated hypothyroxinemia. However, thyroid function study should be monitored closely, every 4 weeks throughout the pregnancy. --Recommend repeat thyroid function studies in 4 weeks, on/after 12/7/2022     --Repeat testing completed 12/16/2022, her results included: TSH 1.67, free T4 0.86, free T3 3.1.  --Her free T4 is again mildly decreased. Her TSH and free T3 are normal.  --Recommend repeat testing in 4 to 6 weeks, on/after 1/13/2023. --Long-term follow-up with PCP for monitoring management        21. Pressure with voiding/dysuria, improved -- The patient previously had complaints of dysuria and increased pressure with voiding. She denied any associated fevers, chills, nausea, vomiting, and or back pain. The patient had a urine culture on 9/26/2022 that was reported as mixed. Secondary to the patient's symptoms, a prescription for Macrobid was provided. Today, the patient again reports that her symptoms have improved. She again reports intermittent symptoms of pressure but feels it is related to the pregnancy. Infection precautions were reviewed.      Precautions were again reviewed and the patient was counseled to go to the hospital with persistent or worsening symptoms or the development of any associated fevers, chills, nausea, vomiting, and/or back pain.     22.  Upper respiratory infection, improved -- Previously, on 10/13/2022, the patient had complaints of upper respiratory infection symptoms. She reports that her symptoms started on Tuesday, 10/4/2022.  She has complaints of congestion and a nonproductive cough.  She also reports having a scratchy throat.  She denied having any associated fevers, chills, nausea, and or vomiting.  She denied having any loss of taste or smell.     At 24 weeks gestation, the patient again had complaints of congestion.  She reports her symptoms started 2 days ago.  She denied having any associated fevers, chills, nausea, vomiting, chest pain, and/or shortness of breath.  She denied having any associated loss of taste or smell.     Supportive measures were again reviewed including using Tylenol as needed for pain and fever, saline nasal spray for congestion, Robitussin (plain) for cough, a humidifier or vaporizer, and throat lozenges and/or spray.  A handout reviewing medication safety in pregnancy was provided.     Today, the patient did not reports that her symptoms have improved.     Precautions were reviewed with the patient and she was counseled that if she develops a fever greater than 100.4 F, chest pain and/or shortness of breath to present immediately for evaluation. The patient expressed verbal understanding of this counseling.        23.  Lump under skin -- The patient previously had concerns regarding a small, pea-sized lump along the right side of her abdominal wall.  The patient reports that the lump is at the site of a Lovenox injection.  The patient reports that her symptoms are stable.  The patient was counseled that sometimes small knots or lumps can develop at the site of Lovenox injections.  She was counseled to avoid massaging the area after administering the Lovenox.  She was counseled to  hold pressure after the injection. 24.  Abdominal wall hernia -- The patient again had concerns regarding a possible hernia at the site of her prior ileostomy. She reports that the area occasionally bulges and is sometimes tender. The patient was counseled that she may have a hernia in that area. With persistent or worsening symptoms, I would recommend referral to general surgery for further evaluation. She was counseled that she can wear gentle compression to help minimize the risk for bowel herniation. Precautions were reviewed and she was counseled to present to the hospital with the development of persistent pain, fever, nausea, and or vomiting. The patient was provided with a referral to general surgery. The patient met with Dr. Celso Garrison on 11/3/2022. Per his consultation note, she has a small incisional hernia at the site of her prior ileostomy. No obstruction was noted. Precautions were reviewed. Dr. 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 Based on her history, she may be at increased risk for having a bowel obstruction. Precautions were reviewed. 25. Occasional headaches, stable -- The patient again reports that she is experiencing occasional headaches. She denies having any associated vision change, chest pain, shortness of breath, nausea, and or vomiting. She denies having the worst headache of her life or any associated neurologic deficits. Today, the patient again reports that her symptoms are stable. The patient was again counseled to stay well-hydrated. She can use Tylenol as needed. She was counseled regarding the use of magnesium oxide 400 mg twice daily and riboflavin 100 mg daily in reducing the frequency and intensity of migraine headaches.        Precautions were reviewed, and she was counseled that if she develops the worst headache of her life or a headache with neurological deficits, to present immediately for evaluation. She expressed verbal understanding of this counseling. 26. MTHFR C677T, heterozygote --  The patient had a thrombophilia panel secondary to a history of a DVT. She was found to be heterozygous for MTHFR C677T. Counseling was previously provided regarding the implications and management of this gene mutation in pregnancy. Counseling was reviewed. She did not have any additional questions or concerns. She was counseled that this gene mutation affects folic acid metabolism. It is fairly common and found in 20-30% of the population. The patient was counseled that this condition is no longer thought to be clinically significant. It is generally only a problem if homocysteine levels are elevated. The patient's homocystine level was normal at 5.3 on 9/26/2022. In the past, this gene mutation was thought to be associated with increased obstetric risks including thrombosis, early recurrent pregnancy loss, placental abruption, hypertensive disorders of pregnancy, poor fetal growth, and fetal loss. More recent studies did not report strong associations with these risks. Because this genetic mutation affects folic acid metabolism, there is an increased risk for folic acid deficiency and other nutritional deficiencies in women with this condition. There is also an increased risk for having a child with an open neural tube defect in women with this mutation, especially if they're homozygous for the C677T mutation. Generally, additional vitamin supplementation is recommended with folic acid or methyl folate, vitamin B6, and vitamin B12. The patient was counseled to take a low-dose aspirin. Fetal growth should be followed serially. She was counseled that there is a 50% chance that she will pass this mutation off to her child(joana). She should relay this information to the pediatrician who cares for her child(joana). She was also encouraged to review this diagnosis with her PCP.          27. Vaginal discharge, improved -- The patient previously had complaints of increased vaginal discharge during her visit at 22 weeks 3 days.  She denied having any associated itching or irritation.  She felt that she may have a yeast infection.  Thus, a sterile speculum exam was completed on 11/10/2022.  Her cervix appeared closed.  Copious discharge was noted.  A genital culture was collected and noted to be negative.    The patient again had complaints of copious vaginal discharge.  A sterile speculum exam was repeated on 11/17/2022.  Infection screening was completed with GC/chlamydia, Ureaplasma, and a repeat genital culture.    --Screening for GC and chlamydia was negative.  A genital culture was negative.  Screening for Ureaplasma and mycoplasma was pending.     Secondary to the continued vaginal discharge and borderline cervical length, the patient was empirically treated with a course of Flagyl.  A prescription was previously provided.  The patient reported that she tolerated the medication well.     The patient again reports that her symptoms are stable/improving.        28.  Borderline cervical length -- The ultrasound results were reviewed with the patient.  At 22 weeks 3 days, the patient's cervical length was borderline and measured 2.8-3.5 cm without funneling.  No dynamic change was noted.  Secondary to the patient's complaints of increased discharge, A sterile speculum exam was done prior to her transvaginal ultrasound.  The patient's cervix was visually closed.  Only a general culture was collected.     At 23 weeks 3 days, the patient's cervix appeared stable and measured 2.6-2.9 cm without funneling.       A sterile speculum exam was repeated at 23 weeks 3 days.  The patient's cervix was visually closed.  Copious discharge was noted.  Infection screening was completed.  On digital exam, her cervix was closed with approximately 1-2 cm of length palpable.  Her cervix was firm and posterior.      A  transvaginal ultrasound was repeated at 24 weeks gestation. The patient's cervix appeared normal and measured 2.8-3.6 cm without funneling. A transvaginal ultrasound was repeated at 26 weeks 4 days. The patient's cervix appeared stable and measured 3.2-3.3 cm without funneling. A transvaginal ultrasound was repeated at 28 weeks gestation. The patient's cervix appeared stable and measured 2.7-2.9 cm without funneling. A transvaginal ultrasound was repeated today at 38 weeks 2 days. The patient's cervix appeared stable and measured 2.4-2.5 cm without funneling. She again notes good fetal movement and denies any symptoms of discharge, leaking of fluid, vaginal bleeding, and/or contractions. She was counseled that  cervical shortening is associated with a 30% increased risk for delivery prior to 37 weeks' gestation. Interventions and strategies to reduce this risk were reviewed. The patient was counseled that with further cervical shortening, Prometrium would be indicated. The risks and benefits of use were reviewed. She was counseled that vaginal progesterone has been shown to reduce this risk by 30-40%. The risks and benefits of use were reviewed. --The patient requested treatment with vaginal progesterone. A prescription was provided. Instructions for use were reviewed. --She was provided with a prescription for 200 mg to be placed into the vagina at bedtime. She should continue this medication until 36-37 weeks' gestation. --She reports that she is tolerating the vaginal progesterone well. --Continue vaginal progesterone until 36-37 weeks gestation. At this time, close follow-up was recommended. The patient was scheduled to return in 2 weeks for a follow-up cervical length. The patient was counseled to avoid heavy lifting, prolonged standing, and sexual intercourse. The patient was scheduled to return for a follow-up assessment in 2 weeks.      Precautions to call and/or return sooner were reviewed. 29.  Abnormal glucose test -- The patient reports that she recently completed a 2-hour oral glucose tolerance test.  Her results included 113/143/141. Her fasting value was elevated. However, the patient reports that she was not truly fasting. --Blood work is ordered for the patient today. A CMP was ordered. The patient was counseled to fast for testing. --If her fasting blood sugar is normal, then it is unlikely that the gestational diabetes. --If there is any question, the patient can repeat the 2-hour oral glucose tolerance test.     --The patient completed a fasting CMP on 2022. I initially reviewed the results and saw a glucose value of 86. This would be normal.  However, after reviewing her results again, the result of 86 was from testing done in November. Her result on 2022 was 96 which would be elevated for a 2 or 3-hour oral glucose tolerance test.     Thus, I would recommend a referral for diabetic education and blood sugar monitoring. I will contact the patient regarding this error. We will provide a referral to diabetic education and testing supplies. 27.  Gestational diabetes -- The patient glucose screening results were reviewed. This information is summarized above under issue #[id number]. Counseling was provided. She was counseled regarding the implications of gestational diabetes in pregnancy including an increased risk of hypertensive disorders of pregnancy, an increased risk for , fetal macrosomia, an increased risk for maternal and/or fetal trauma at time of delivery (in the setting of macrosomia),   delivery, and possibly and increased risk for fetal loss. Additional  risk were discussed including  hypoglycemia, hypocalcemia/hypomagnesemia, jaundice, and respiratory distress. She was counseled that these risks can be reduced with improved glycemic control.   Goals for glycemic control were reviewed including fasting of 60-95 mg/dL and a 2 hour postprandial value of <120 mg/dL. At this time, I recommend that the patient meets with diabetic education and implements a diabetic diet. If at any time >50% of her values are above the goals outlined, then medical therapy would be indicated. Marline Stein Options for medical treatment include insulin or glyburide or metformin. She should continue to check fingersticks four times daily, fasting and 2 hour postprandial.  She should review the values with a provider weekly. Secondary to the increased risk for hypertensive disorders of pregnancy, a daily low-dose aspirin was recommended. The risks and benefits of low-dose aspirin use in pregnancy were reviewed. The patient should take 81 mg of aspirin daily for the remainder of pregnancy and 6 to 8 weeks postpartum. The patient was counseled to monitor fetal kick counts daily. Instructions were reviewed. If the patient remains diet controlled, increased surveillance with twice weekly fetal testing is recommended at 34-36 weeks' gestation with delivery at 39-40 weeks' gestation. If she requires medical therapy, then twice weekly testing would be indicated sooner with delivery at 44 week's gestation. Fetal growth should be monitored every 3-4 weeks until delivery. During labor, the patient's blood sugars should be monitored closely and ideally be less than 105 mg/dL during labor in order to minimize the risk of  hypoglycemia. She should also have a fasting sugar checked postpartum. She was counseled that she has a 50% risk for the development of type 2 diabetes in the next 10 years. This risk can be modified with diet and lifestyle changes. She will need a 2 hour oral glucose tolerance test at 8-12 weeks postpartum. If this is normal, she should have yearly screening for diabetes.   If she becomes pregnant in the future, she is at increased risk for recurrent gestational diabetes, 60-70%, and should have early screening for gestational diabetes in the late first or early second trimester. 31.  History of elevated blood pressure -- The patient's blood pressure was initially elevated at 143/84 at 28 weeks gestation. Her blood pressure was repeated normal 113/73. The patient's urine dip was negative for protein. The patient denies having any other prior blood pressure elevations during this pregnancy. She denies having any symptoms of headache, vision change, chest pain, shortness of breath, nausea, vomiting, and or right upper quadrant pain. An exam was done in the office. The patient's heart had a regular rate and rhythm. Her lungs were clear to auscultation bilaterally. Her abdomen was soft, gravid, non tender, and with normal bowel sounds. She had +1 patellar reflexes bilaterally. No clonus was noted bilaterally. She had trace edema in her bilateral lower extremities. The patient's blood pressure was normal today at 118/85. The patient was counseled that at this time, continue close monitoring is recommended. Again, the patient has a history of hypertension. Continue increased maternal and fetal surveillance as outlined above. 32.  Poor fetal growth -- Today's ultrasound findings were reviewed with the patient. Overall, the estimated fetal weight is at the 40th percentile, however the abdominal circumference is measuring at the 7th percentile. Counseling was provided to patient. The overall estimated fetal weight is greater than the 10th percentile, however the abdominal circumference measures less than the 10th percentile. Reassuring findings included a normal amniotic fluid index, a biophysical profile score of 8/8, and normal Doppler studies. The patient was counseled that typically fetal growth restriction is formally diagnosed when the overall estimated fetal weight is less than the 10th percentile.  However, a decline in the overall estimated fetal weight of greater than 20% and/or an abdominal circumference that measures less then the 10th percentile are findings that are also concerning for and/or consistent with fetal growth restriction. In over 70% of cases, small fetal size is constitutional. In approximately 30% of cases, there is a secondary cause related to placental insufficiency, a fetal issue, and/or an underlying maternal condition. Risk factors were reviewed with the patient including malnutrition, maternal chronic diseases (ie SLE, renal disease, antiphospholipid antibodies, anemia, diabetes), placental disease (chorioangioma, mosaicism), infections, fetal aneuploidy, and teratogen exposure. She was counseled regarding general management plans and that mild IUGR can generally be expectantly managed until 37-39 weeks' gestation. If there is severe growth restriction, delivery timing is based on the point at which the risk of fetal death exceeds that of  death. There is an increased risk for fetal loss in the setting of growth restriction, thus, increased surveillance is indicated. The best predictors of outcome are fetal size and gestational age attained. Overall, the long term outcome depends on the etiology of the poor growth. At this time, I recommend increased fetal surveillance. The patient was counseled to monitor fetal kick counts daily. Instructions were reviewed. She was scheduled to return in 2 weeks for follow-up fetal growth assessment and testing. Additional recommendations will be made at that time. Given the concern for poor fetal growth, additional maternal evaluation was recommended. The following labs were ordered: Preeclampsia screening, antiphospholipid antibodies, thyroid function studies/thyroid peroxidase antibodies, a nutrition panel, and infection studies.    -- Testing will be done with her next set of labs    Her prenatal records were reviewed and she had early screening with cell free DNA that was low risk for aneuploidy. Given the lag in fetal growth, a low dose aspirin was recommended. She was counseled to start 81 mg of aspirin daily. The risks and benefits were discussed. The patient was counseled to continue a daily low-dose aspirin as outlined above. 33.  Episode of right-sided pain -- The patient reported having an episode of right-sided pain that lasted a few hours. She denied having any associated fevers, chills, nausea, and/or vomiting. The pain resolved with rest and position change. The patient was unsure if the pain is related to eating. The patient was counseled to monitor her symptoms and to return to the hospital with persistent or worsening pain. --I requested the patient return for a follow-up assessment in 2 weeks unless there is a clinical reason for her to return prior to that time. She is to call if she has any problems or questions prior to her next visit. Further evaluation and management will be dependent on her clinical presentation and the results of her testing. --The patient was advised to call if she has any increased vaginal discharge, vaginal bleeding, contractions, abdominal pain, back pain or any new significant symptomatology prior to her next visit. I advised her that these are signs and symptoms of cervical change and require follow-up assessment when they occur. Preeclampsia precautions were also reviewed with the patient. --The patient was also counseled to call and/or return with any concerns for decreased fetal activity. --The patient is to continue to follow with you in your office for ongoing obstetric care. --The total time spent on today's visit was 45 minutes.   This included preparation for the visit (i.e. reviewing prior external notes and test results), performance of a medically appropriate history and examination, counseling, orders for medications, tests or other procedures, and coordination of care. Greater than 50% of the time was spent face-to-face with the patient. This time is exclusive of procedures performed. I answered all of  the patient's questions to her satisfaction. I asked her to call if she had any additional questions prior to her next visit. --At the conclusion of the visit, the patient appeared to have a good understanding of the issues discussed. I answered all of her questions to her satisfaction. I asked her to call if she had any additional questions prior to her next visit. --Thank you for allowing me to participate in the care of this pleasant patient. Please don't hesitate to call me if you have any questions. Sincerely,      Yonatan Noonan MD, [id number]  [phone]      *All or parts of this note may have been generated using a voice recognition program. There may be typo, grammar, or Word substitution errors that have escaped my review of this note.

## 2023-01-04 NOTE — PROGRESS NOTES
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 counseled that this may be related to her anemia.  Additional maternal evaluation was recommended with an EKG, echocardiogram, and consultation with cardiology as outlined above.  --Precautions were reviewed with the patient.  She was counseled to go to the hospital with persistent or worsening symptoms or the development of any chest pain, shortness of breath, tachycardia, or syncope.  --Supplement as needed     16.   Low vitamin B12 -- The patient's vitamin B12 level was borderline at 306.  Individuals with vitamin B12 level between 200 and 400 are increased risk for anemia and side effects related to low vitamin B12.  Thus, supplementation is  recommended  --Recommend vitamin B12, 1000 mcg daily  --Monitor levels serially  --Repeat nutrition panel (CBC, CMP, magnesium, ferritin, folate, vitamin B12, vitamin D 25 [place]) in 4 weeks, on/after 10/24/2022     --Repeat testing completed 11/9/2022, results included: H/H 11.3/32.8, MCV 92.4, platelet count 180,000, potassium 3.8, creatinine 0.6, calcium 9.6, ALT 10, AST 13, ferritin 26, folate >20, vitamin B12 595, vitamin D 30, hemoglobin A1c 4.8%, TSH 0.998, free T4 0.89, free T3 2.8, uric acid 4.8, , magnesium 1.5, urinalysis contaminated, urine protein creatinine ratio 0.1, urine culture mixed, blood type B-, antibody screen negative.     -- The patient's vitamin B12 was improved at 595.  She was counseled to continue her vitamin B12 supplement.  --Recommend repeat testing in 4 to 6 weeks, on/after 12/7/2022     -- Repeat testing was completed on 12/16/2022, her results included: H/H 12/35.8, MCV 92.3, bili count 204,000, magnesium 1.7, potassium 4, creatinine 0.5, calcium 9, ALT 29, AST 20, ferritin 23, folate >20, vitamin B12 621, vitamin D 38, globin A1c 5.4%, TSH 1.67, free T4 0.6, free T3 3.1, uric acid 5, , TPO negative, antithyroglobulin antibody negative, urine protein creatinine ratio 0.1, urine culture mixed, hemoglobin electrophoresis pending, reticulocyte count normal.     -- The patient's vitamin B12 level has improved.  She was counseled to continue her supplement.  --Recommend repeat testing in 4 to 6 weeks, on/after 1/13/2023  --Long-term follow-up with PCP for monitoring and management     17.  Low ferritin -- The patient's ferritin was low at 29 (considered low if <15 in absence of anemia or <40 in setting of anemia)  --Ferrous sulfate 325 mg BID prescribed  --Monitor levels serially  --Monitor nutrition panel q4-6 weeks (CBC, CMP, magnesium, ferritin, folate, vitamin B12, vitamin D25OH), on/after 10/24/2022     --Repeat testing completed 11/9/2022, ferritin 26.  Her H/H was  stable at 11.3/32.  -- The patient was counseled to continue her oral iron supplement. --Recommend repeat testing in 4 to 6 weeks, on/after 12/7/2022     --Repeat testing completed 12/16/2022. Her ferritin level was stable at 23. --She was counseled to continue her iron supplement. --Recommend repeat testing in 4 to 6 weeks, on/after 1/13/2023  --Follow up with PCP for long term monitoring and management     18. Low vitamin D -- The patient's vitamin D was low at 31  --Recommend vitamin D3 2000 IU daily  --Monitor levels serially  --Monitor nutrition panel q4-6 weeks (CBC, CMP, magnesium, ferritin, folate, vitamin B12, vitamin D25OH)     --Repeat testing completed 11/9/2022, vitamin D 30  --The patient was encouraged to take her vitamin D supplement. --Recommend repeat testing in 4 to 6 weeks, on/after 12/7/2022. -- Repeat testing completed 12/16/2022, her vitamin D level was stable at 38. She was counseled to continue her vitamin D supplement. --Recommend repeat testing in 4 to 6 weeks, on/after 1/13/2023  --Follow up with PCP for long term monitoring and management     19. Low magnesium -- The patient's magnesium was mildly decreased at 1.5 (1.6-2.6). Her potassium was normal at 3.8. She was prescribed magnesium oxide, 400 mg daily. Recommend repeat testing with next set of labs. --Repeat testing completed 12/16/2022. Her magnesium level was improved at 1.7. She was counseled to continue her supplement. 20.   Hypothyroxinemia -- The patient's lab results were reviewed. Her free T4 was mildly decreased at 0.89. Her TSH was normal at 0.998 and free T3 normal at 2.8. Screening for thyroid peroxidase antibody and antithyroglobulin antibody was previously negative on 9/26/2022. Counseling was provided to the patient. --Counseling was previously provided to the patient. Counseling was reviewed. She did not have any additional questions or concerns.      Per the American thyroid Association, treatment is not recommended for women with isolated hypothyroxinemia. However, thyroid function study should be monitored closely, every 4 weeks throughout the pregnancy. --Recommend repeat thyroid function studies in 4 weeks, on/after 12/7/2022     --Repeat testing completed 12/16/2022, her results included: TSH 1.67, free T4 0.86, free T3 3.1.  --Her free T4 is again mildly decreased. Her TSH and free T3 are normal.  --Recommend repeat testing in 4 to 6 weeks, on/after 1/13/2023. --Long-term follow-up with PCP for monitoring management        21. Pressure with voiding/dysuria, improved -- The patient previously had complaints of dysuria and increased pressure with voiding. She denied any associated fevers, chills, nausea, vomiting, and or back pain. The patient had a urine culture on 9/26/2022 that was reported as mixed. Secondary to the patient's symptoms, a prescription for Macrobid was provided. Today, the patient again reports that her symptoms have improved. She again reports intermittent symptoms of pressure but feels it is related to the pregnancy. Infection precautions were reviewed. Precautions were again reviewed and the patient was counseled to go to the hospital with persistent or worsening symptoms or the development of any associated fevers, chills, nausea, vomiting, and/or back pain. 22.  Upper respiratory infection, improved -- Previously, on 10/13/2022, the patient had complaints of upper respiratory infection symptoms. She reports that her symptoms started on Tuesday, 10/4/2022. She has complaints of congestion and a nonproductive cough. She also reports having a scratchy throat. She denied having any associated fevers, chills, nausea, and or vomiting. She denied having any loss of taste or smell. At 24 weeks gestation, the patient again had complaints of congestion. She reports her symptoms started 2 days ago.   She denied having any associated fevers, chills, nausea, vomiting, chest pain, and/or shortness of breath. She denied having any associated loss of taste or smell. Supportive measures were again reviewed including using Tylenol as needed for pain and fever, saline nasal spray for congestion, Robitussin (plain) for cough, a humidifier or vaporizer, and throat lozenges and/or spray. A handout reviewing medication safety in pregnancy was provided. Today, the patient did not reports that her symptoms have improved. Precautions were reviewed with the patient and she was counseled that if she develops a fever greater than 100.4 F, chest pain and/or shortness of breath to present immediately for evaluation. The patient expressed verbal understanding of this counseling. 23.  Lump under skin -- The patient previously had concerns regarding a small, pea-sized lump along the right side of her abdominal wall. The patient reports that the lump is at the site of a Lovenox injection. The patient reports that her symptoms are stable. The patient was counseled that sometimes small knots or lumps can develop at the site of Lovenox injections. She was counseled to avoid massaging the area after administering the Lovenox. She was counseled to hold pressure after the injection. 24.  Abdominal wall hernia -- The patient again had concerns regarding a possible hernia at the site of her prior ileostomy. She reports that the area occasionally bulges and is sometimes tender. The patient was counseled that she may have a hernia in that area. With persistent or worsening symptoms, I would recommend referral to general surgery for further evaluation. She was counseled that she can wear gentle compression to help minimize the risk for bowel herniation. Precautions were reviewed and she was counseled to present to the hospital with the development of persistent pain, fever, nausea, and or vomiting.      The patient was provided with a referral to general surgery. The patient met with Dr. Elizabeth Vyas on 11/3/2022. Per his consultation note, she has a small incisional hernia at the site of her prior ileostomy. No obstruction was noted. Precautions were reviewed. Dr. 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 Based on her history, she may be at increased risk for having a bowel obstruction. Precautions were reviewed. 25. Occasional headaches, stable -- The patient again reports that she is experiencing occasional headaches. She denies having any associated vision change, chest pain, shortness of breath, nausea, and or vomiting. She denies having the worst headache of her life or any associated neurologic deficits. Today, the patient again reports that her symptoms are stable. The patient was again counseled to stay well-hydrated. She can use Tylenol as needed. She was counseled regarding the use of magnesium oxide 400 mg twice daily and riboflavin 100 mg daily in reducing the frequency and intensity of migraine headaches. Precautions were reviewed, and she was counseled that if she develops the worst headache of her life or a headache with neurological deficits, to present immediately for evaluation. She expressed verbal understanding of this counseling. 26. MTHFR C677T, heterozygote --  The patient had a thrombophilia panel secondary to a history of a DVT. She was found to be heterozygous for MTHFR C677T. Counseling was previously provided regarding the implications and management of this gene mutation in pregnancy. Counseling was reviewed. She did not have any additional questions or concerns. She was counseled that this gene mutation affects folic acid metabolism. It is fairly common and found in 20-30% of the population. The patient was counseled that this condition is no longer thought to be clinically significant.    It is generally only a problem if homocysteine levels are elevated. The patient's homocystine level was normal at 5.3 on 9/26/2022. In the past, this gene mutation was thought to be associated with increased obstetric risks including thrombosis, early recurrent pregnancy loss, placental abruption, hypertensive disorders of pregnancy, poor fetal growth, and fetal loss. More recent studies did not report strong associations with these risks. Because this genetic mutation affects folic acid metabolism, there is an increased risk for folic acid deficiency and other nutritional deficiencies in women with this condition. There is also an increased risk for having a child with an open neural tube defect in women with this mutation, especially if they're homozygous for the C677T mutation. Generally, additional vitamin supplementation is recommended with folic acid or methyl folate, vitamin B6, and vitamin B12. The patient was counseled to take a low-dose aspirin. Fetal growth should be followed serially. She was counseled that there is a 50% chance that she will pass this mutation off to her child(joana). She should relay this information to the pediatrician who cares for her child(joana). She was also encouraged to review this diagnosis with her PCP. 27.  Vaginal discharge, improved -- The patient previously had complaints of increased vaginal discharge during her visit at 22 weeks 3 days. She denied having any associated itching or irritation. She felt that she may have a yeast infection. Thus, a sterile speculum exam was completed on 11/10/2022. Her cervix appeared closed. Copious discharge was noted. A genital culture was collected and noted to be negative. The patient again had complaints of copious vaginal discharge. A sterile speculum exam was repeated on 11/17/2022. Infection screening was completed with GC/chlamydia, Ureaplasma, and a repeat genital culture. --Screening for GC and chlamydia was negative.   A genital culture was negative. Screening for Ureaplasma and mycoplasma was pending. Secondary to the continued vaginal discharge and borderline cervical length, the patient was empirically treated with a course of Flagyl. A prescription was previously provided. The patient reported that she tolerated the medication well. The patient again reports that her symptoms are stable/improving. 28.  Borderline cervical length -- The ultrasound results were reviewed with the patient. At 22 weeks 3 days, the patient's cervical length was borderline and measured 2.8-3.5 cm without funneling. No dynamic change was noted. Secondary to the patient's complaints of increased discharge, A sterile speculum exam was done prior to her transvaginal ultrasound. The patient's cervix was visually closed. Only a general culture was collected. At 23 weeks 3 days, the patient's cervix appeared stable and measured 2.6-2.9 cm without funneling. A sterile speculum exam was repeated at 23 weeks 3 days. The patient's cervix was visually closed. Copious discharge was noted. Infection screening was completed. On digital exam, her cervix was closed with approximately 1-2 cm of length palpable. Her cervix was firm and posterior. A transvaginal ultrasound was repeated at 24 weeks gestation. The patient's cervix appeared normal and measured 2.8-3.6 cm without funneling. A transvaginal ultrasound was repeated at 26 weeks 4 days. The patient's cervix appeared stable and measured 3.2-3.3 cm without funneling. A transvaginal ultrasound was repeated at 28 weeks gestation. The patient's cervix appeared stable and measured 2.7-2.9 cm without funneling. A transvaginal ultrasound was repeated today at 38 weeks 2 days. The patient's cervix appeared stable and measured 2.4-2.5 cm without funneling.       She again notes good fetal movement and denies any symptoms of discharge, leaking of fluid, vaginal bleeding, and/or contractions. She was counseled that  cervical shortening is associated with a 30% increased risk for delivery prior to 37 weeks' gestation. Interventions and strategies to reduce this risk were reviewed. The patient was counseled that with further cervical shortening, Prometrium would be indicated. The risks and benefits of use were reviewed. She was counseled that vaginal progesterone has been shown to reduce this risk by 30-40%. The risks and benefits of use were reviewed. --The patient requested treatment with vaginal progesterone. A prescription was provided. Instructions for use were reviewed. --She was provided with a prescription for 200 mg to be placed into the vagina at bedtime. She should continue this medication until 36-37 weeks' gestation. --She reports that she is tolerating the vaginal progesterone well. --Continue vaginal progesterone until 36-37 weeks gestation. At this time, close follow-up was recommended. The patient was scheduled to return in 2 weeks for a follow-up cervical length. The patient was counseled to avoid heavy lifting, prolonged standing, and sexual intercourse. The patient was scheduled to return for a follow-up assessment in 2 weeks. Precautions to call and/or return sooner were reviewed. 29.  Abnormal glucose test -- The patient reports that she recently completed a 2-hour oral glucose tolerance test.  Her results included 113/143/141. Her fasting value was elevated. However, the patient reports that she was not truly fasting. --Blood work is ordered for the patient today. A CMP was ordered. The patient was counseled to fast for testing. --If her fasting blood sugar is normal, then it is unlikely that the gestational diabetes. --If there is any question, the patient can repeat the 2-hour oral glucose tolerance test.     --The patient completed a fasting CMP on 2022.   I initially reviewed the results and saw a glucose value of 86. This would be normal.  However, after reviewing her results again, the result of 86 was from testing done in November. Her result on 2022 was 96 which would be elevated for a 2 or 3-hour oral glucose tolerance test.     Thus, I would recommend a referral for diabetic education and blood sugar monitoring. I will contact the patient regarding this error. We will provide a referral to diabetic education and testing supplies. 27.  Gestational diabetes -- The patient glucose screening results were reviewed. This information is summarized above under issue #[id number]. Counseling was provided. She was counseled regarding the implications of gestational diabetes in pregnancy including an increased risk of hypertensive disorders of pregnancy, an increased risk for , fetal macrosomia, an increased risk for maternal and/or fetal trauma at time of delivery (in the setting of macrosomia),   delivery, and possibly and increased risk for fetal loss. Additional  risk were discussed including  hypoglycemia, hypocalcemia/hypomagnesemia, jaundice, and respiratory distress. She was counseled that these risks can be reduced with improved glycemic control. Goals for glycemic control were reviewed including fasting of 60-95 mg/dL and a 2 hour postprandial value of <120 mg/dL. At this time, I recommend that the patient meets with diabetic education and implements a diabetic diet. If at any time >50% of her values are above the goals outlined, then medical therapy would be indicated. Huber Hartmann Options for medical treatment include insulin or glyburide or metformin. She should continue to check fingersticks four times daily, fasting and 2 hour postprandial.  She should review the values with a provider weekly. Secondary to the increased risk for hypertensive disorders of pregnancy, a daily low-dose aspirin was recommended.   The risks and benefits of low-dose aspirin use in pregnancy were reviewed. The patient should take 81 mg of aspirin daily for the remainder of pregnancy and 6 to 8 weeks postpartum. The patient was counseled to monitor fetal kick counts daily. Instructions were reviewed. If the patient remains diet controlled, increased surveillance with twice weekly fetal testing is recommended at 34-36 weeks' gestation with delivery at 39-40 weeks' gestation. If she requires medical therapy, then twice weekly testing would be indicated sooner with delivery at 44 week's gestation. Fetal growth should be monitored every 3-4 weeks until delivery. During labor, the patient's blood sugars should be monitored closely and ideally be less than 105 mg/dL during labor in order to minimize the risk of  hypoglycemia. She should also have a fasting sugar checked postpartum. She was counseled that she has a 50% risk for the development of type 2 diabetes in the next 10 years. This risk can be modified with diet and lifestyle changes. She will need a 2 hour oral glucose tolerance test at 8-12 weeks postpartum. If this is normal, she should have yearly screening for diabetes. If she becomes pregnant in the future, she is at increased risk for recurrent gestational diabetes, 60-70%, and should have early screening for gestational diabetes in the late first or early second trimester. 31.  History of elevated blood pressure -- The patient's blood pressure was initially elevated at 143/84 at 28 weeks gestation. Her blood pressure was repeated normal 113/73. The patient's urine dip was negative for protein. The patient denies having any other prior blood pressure elevations during this pregnancy. She denies having any symptoms of headache, vision change, chest pain, shortness of breath, nausea, vomiting, and or right upper quadrant pain. An exam was done in the office. The patient's heart had a regular rate and rhythm.   Her lungs were clear to auscultation bilaterally. Her abdomen was soft, gravid, non tender, and with normal bowel sounds. She had +1 patellar reflexes bilaterally. No clonus was noted bilaterally. She had trace edema in her bilateral lower extremities. The patient's blood pressure was normal today at 118/85. The patient was counseled that at this time, continue close monitoring is recommended. Again, the patient has a history of hypertension. Continue increased maternal and fetal surveillance as outlined above. 32.  Poor fetal growth -- Today's ultrasound findings were reviewed with the patient. Overall, the estimated fetal weight is at the 40th percentile, however the abdominal circumference is measuring at the 7th percentile. Counseling was provided to patient. The overall estimated fetal weight is greater than the 10th percentile, however the abdominal circumference measures less than the 10th percentile. Reassuring findings included a normal amniotic fluid index, a biophysical profile score of 8/8, and normal Doppler studies. The patient was counseled that typically fetal growth restriction is formally diagnosed when the overall estimated fetal weight is less than the 10th percentile. However, a decline in the overall estimated fetal weight of greater than 20% and/or an abdominal circumference that measures less then the 10th percentile are findings that are also concerning for and/or consistent with fetal growth restriction. In over 70% of cases, small fetal size is constitutional. In approximately 30% of cases, there is a secondary cause related to placental insufficiency, a fetal issue, and/or an underlying maternal condition. Risk factors were reviewed with the patient including malnutrition, maternal chronic diseases (ie SLE, renal disease, antiphospholipid antibodies, anemia, diabetes), placental disease (chorioangioma, mosaicism), infections, fetal aneuploidy, and teratogen exposure.       She was counseled regarding general management plans and that mild IUGR can generally be expectantly managed until 37-39 weeks' gestation. If there is severe growth restriction, delivery timing is based on the point at which the risk of fetal death exceeds that of  death. There is an increased risk for fetal loss in the setting of growth restriction, thus, increased surveillance is indicated. The best predictors of outcome are fetal size and gestational age attained. Overall, the long term outcome depends on the etiology of the poor growth. At this time, I recommend increased fetal surveillance. The patient was counseled to monitor fetal kick counts daily. Instructions were reviewed. She was scheduled to return in 2 weeks for follow-up fetal growth assessment and testing. Additional recommendations will be made at that time. Given the concern for poor fetal growth, additional maternal evaluation was recommended. The following labs were ordered: Preeclampsia screening, antiphospholipid antibodies, thyroid function studies/thyroid peroxidase antibodies, a nutrition panel, and infection studies. -- Testing will be done with her next set of labs    Her prenatal records were reviewed and she had early screening with cell free DNA that was low risk for aneuploidy. Given the lag in fetal growth, a low dose aspirin was recommended. She was counseled to start 81 mg of aspirin daily. The risks and benefits were discussed. The patient was counseled to continue a daily low-dose aspirin as outlined above. 33.  Episode of right-sided pain -- The patient reported having an episode of right-sided pain that lasted a few hours. She denied having any associated fevers, chills, nausea, and/or vomiting. The pain resolved with rest and position change. The patient was unsure if the pain is related to eating.   The patient was counseled to monitor her symptoms and to return to the hospital with persistent or worsening pain. --I requested the patient return for a follow-up assessment in 2 weeks unless there is a clinical reason for her to return prior to that time. She is to call if she has any problems or questions prior to her next visit. Further evaluation and management will be dependent on her clinical presentation and the results of her testing. --The patient was advised to call if she has any increased vaginal discharge, vaginal bleeding, contractions, abdominal pain, back pain or any new significant symptomatology prior to her next visit. I advised her that these are signs and symptoms of cervical change and require follow-up assessment when they occur. Preeclampsia precautions were also reviewed with the patient. --The patient was also counseled to call and/or return with any concerns for decreased fetal activity. --The patient is to continue to follow with you in your office for ongoing obstetric care. --The total time spent on today's visit was 45 minutes. This included preparation for the visit (i.e. reviewing prior external notes and test results), performance of a medically appropriate history and examination, counseling, orders for medications, tests or other procedures, and coordination of care. Greater than 50% of the time was spent face-to-face with the patient. This time is exclusive of procedures performed. I answered all of  the patient's questions to her satisfaction. I asked her to call if she had any additional questions prior to her next visit. --At the conclusion of the visit, the patient appeared to have a good understanding of the issues discussed. I answered all of her questions to her satisfaction. I asked her to call if she had any additional questions prior to her next visit. --Thank you for allowing me to participate in the care of this pleasant patient. Please don't hesitate to call me if you have any questions.       Sincerely,      Jose E Chandler, [address]  [phone]      *All or parts of this note may have been generated using a voice recognition program. There may be typo, grammar, or Word substitution errors that have escaped my review of this note.

## 2023-01-09 ENCOUNTER — HOSPITAL ENCOUNTER (OUTPATIENT)
Dept: DIABETES SERVICES | Age: 33
Setting detail: THERAPIES SERIES
Discharge: HOME | End: 2023-01-09
Payer: MEDICAID

## 2023-01-09 VITALS — BODY MASS INDEX: 23.65 KG/M2 | WEIGHT: 151 LBS

## 2023-01-09 PROCEDURE — G0108 DIAB MANAGE TRN  PER INDIV: HCPCS

## 2023-01-09 ASSESSMENT — PROBLEM AREAS IN DIABETES QUESTIONNAIRE (PAID)
COPING WITH COMPLICATIONS OF DIABETES: 0
PAID-5 TOTAL SCORE: 0
FEELING THAT DIABETES IS TAKING UP TOO MUCH OF YOUR MENTAL AND PHYSICAL ENERGY EVERY DAY: 0
FEELING SCARED WHEN YOU THINK ABOUT LIVING WITH DIABETES: 0
WORRYING ABOUT THE FUTURE AND THE POSSIBILITY OF SERIOUS COMPLICATIONS: 0
FEELING DEPRESSED WHEN YOU THINK ABOUT LIVING WITH DIABETES: 0

## 2023-01-09 NOTE — LETTER
[facility])  - Diabetes Education    2023     Re:     Rd Schmidt  :  1990    Dear Dr. Imelda Fraga: Thank you for referring your patient, Rd Schmidt, for diabetes education. Your patient has completed their personalized comprehensive diabetes education plan on the following topics: Disease Process, Healthy Eating, Exercise, Monitoring glucose, Acute/chronic complications, Lifestyle and healthy coping, Diabetes distress and support. The following services were also completed:    [x]  Carbohydrate controlled meal plan of 2100 calories. [] Glucometer instruction with return demonstration. []  Insulin instruction with return demonstration. Upon completion of these sessions, the diabetes teaching team made the following evaluation of your patient's progress:          ASSESSMENT    [x]  Competent in all subject areas. [x]  answered questions appropriately when asked  [x]  seems able to apply concepts to daily lifestyle  [x]  seems motivated to do well  [x]  verbalized an understanding of meal plan  [x]  expresses an intent to comply with meal plan  [x]  worked out meal timing adjustment according to work/schedule/lifestyle (patient states she is unsure if she will be able to comply, but will try her best). COMMENTS:  We will follow up with patient in a few weeks. Thank you for referring this patient to our program.  Please do not hesitate to call if you have any questions at ( (FREDI or KIRIT) or [phone] ([address] [place] [address]).         Sincerely,    [x]  Gia Matos, RN, BSN, CDCES  [x]  Azul Katz MS, RDN, LD      [facility] Diabetes Education Department  [employer] Recognized DSMES Program

## 2023-01-09 NOTE — PROGRESS NOTES
Diabetes Self-Management Education Record    Participant Name: Ana Hill  Referring Provider: Eusebio Sears MD  Assessment/Evaluation Ratings:  1=Needs Instruction   4=Demonstrates Understanding/Competency  2=Needs Review   NC=Not Covered    3=Comprehends Key Points  N/A=Not Applicable  Topics/Learning Objectives Pre-session Assess Date:  Instructor initials/date  1/9/23 KMS Instr. Date    Instructor initials/date  1/9/23 KMS Follow-up Post- session Eval Comments   Diabetes disease process & Treatment process:   -Define type of diabetes in simple terms.  - Describe the ABCs of  diabetes management  -Identify own type of diabetes  -Identify lifestyle changes/treatment options  -other:  1 [x] All     []  []  []  []  []  3 1/9/23 KMS  GDM, no previous h/o diabetes    Developing strategies for Healthy coping/psychosocial issues:    -Describe feelings about living with diabetes  -Identify coping strategies and sources of stress  -Identify support needed & support network available  -Complete PAID-5 Diabetes questionnaire 1 [x] All     []  []  []    []  4 Date: 1/9/23 KMS  PAID-5 Score: 0  Confidence Score: 5           Prevention, detection & treatment of Chronic complications:    -Identify the prevention, detection and treatment for complications including immunizations, preventive eye, foot, dental and renal exams as indicated per the participant's duration of diabetes and health status.  -Define the natural course of diabetes and the relationship of blood glucose levels to long term complications of diabetes.   1 [] All     []            [x]  4    Prevention, detection & treatment of acute complications:    -State the causes,signs & symptoms of hyper & hypoglycemia, and prevention & treatment strategies.   -Describe sick day guidelines  DKA /indications for ketone testing &  when to call physician  1 [x] All     []      []    4              -Identify severe weather/situation crisis  & diabetes supplies management 1 []      Using medications safely:   -State effects of diabetes medicines on blood glucose levels;  -List diabetes medication taken, action & side effects 1 [] All     []  []   N/A   Insulin/Injectables/glucagon  -Name appropriate injection sites; proper storage; supplies needed;  1   []   N/A    Demonstrate proper technique 1 []   N/A   Monitoring blood glucose, interpreting and using results:   -Identify the purpose of testing   -Identify recommended & personal blood glucose targets & HgbA1C target levels  -State the Importance of logging blood glucose levels for pattern recognition;   -State benefits of reading/using pt generated health data  -Verbalize safe lancet disposal 2 [x] All     []  []    []  []  []  4 1/9/23 KMS  Monitoring 4 times daily, fasting and 2 hours after each meal.  Target glucose ranges reviewed. Patient keeping a log of all results to share with her doctor. Understands to notify doctor of any sustained high/low patterns.    -Demonstrate proper testing technique  []      Incorporating physical activity into lifestyle:   -State effect of exercise on blood glucose levels;   -State benefits of regular exercise;   -Define safety considerations/food choices if needed.  -Describe contraindications/maintenance of activity. 1 [x] All     []  []    []  []  4 1/9/23 KMS  Not physically active--currently has a lifting restriction d/t shortened cervix. Plans to start exercising once this restriction is removed.     Incorporating nutritional management into lifestyle:   -Describe effect of type, amount & timing of food on blood glucose  -Describe methods for preparing and planning   healthy meals  -Correctly read food labels for nutritional values  -Name 3 foods high in Carbohydrate  -Plan a sample 4 carbohydrate-controlled meal using Diabetes Plate Method  -Verbalized ability to measure and count carbohydrate gram servings using food labels and carbohydrate food list.    -Plan a carbohydrate-controlled meal based on individual needs/preferences from a Registered Dietitian.   1 [x] All       []    []    []  []      []        []  3/4   1/9/23 KMS  Patient enjoys pop, juice, and sweet tea. Upon dietary recall, she is also eating carbohydrate food amounts that would exceed her meal/snack allowances. Patient is able to correctly identify carbohydrate foods, measure carbohydrate portions, and read nutrition labels to determine carbohydrate servings. Patient will aim to eat 3 meals daily (spaced out every 4.5 to 5 hours) with snacks 2 hours after each meal according to 2100 calorie meal plan prepared by Shin Hoyt, MS, RDN, LD. Patient states she may have difficulty sticking to her designated meal time schedule, but will try her best. She plans to switch to naturally flavored venegas or carbonated sparkling water, and limit her sweetened beverages. Developing strategies for problem solving to promote health/change behavior. -Identify 7 self-care behaviors; Personal health risk factors; Benefits, challenges & strategies for behavioral change and set an individualized goal selection. 1   [x]  4 1/9/23 KMS  [x]Nutrition: Follow meal plan. [x]Monitoring: Check glucose levels 4 times daily.    []Exercise  []Medication  []Other     Identified Barriers to learning/adherence to self management plan:    None  []  other    Instruction Method:  Lecture/Discussion and Handouts    Supporting Education Materials/Equipment Provided: Meal Plan and Gestational Pathway Booklet   []Swedish materials       [] services     []Other:      Encounter Type Date Attended Start Time End Time Comments No Show Dates   Assessment          Session 1         Session 2        1:1 DSMES          In person Follow-up         Gestational Diabetes 1/9/23 KMS 65 1200      DSMES #3        Meter Instrx        Insulin Instrx           Additional Comments:         Date:   Follow-up goal attainment based on patients initial DSMES goal    Dr Notified by [] EMR []Fax        []Post class Hgb A1C  []Medication compliance   []Plate method/meal plan compliance   []Able to state the number of Carbohydrate servings eaten at B,L,D   []Testing blood glucose as prescribed by PCP   []Exercise Routine   []Other:   []Other:     []Patient lost to follow-up   Notified by []EMR []Fax

## 2023-01-11 ENCOUNTER — ROUTINE PRENATAL (OUTPATIENT)
Dept: OBGYN | Age: 33
End: 2023-01-11
Payer: MEDICAID

## 2023-01-11 VITALS
BODY MASS INDEX: 24.03 KG/M2 | DIASTOLIC BLOOD PRESSURE: 77 MMHG | SYSTOLIC BLOOD PRESSURE: 122 MMHG | HEART RATE: 92 BPM | WEIGHT: 153.44 LBS

## 2023-01-11 DIAGNOSIS — O09.90 SUPERVISION OF HIGH RISK PREGNANCY, ANTEPARTUM: Primary | ICD-10-CM

## 2023-01-11 DIAGNOSIS — O23.593 VAGINITIS AFFECTING PREGNANCY IN THIRD TRIMESTER, ANTEPARTUM: ICD-10-CM

## 2023-01-11 LAB
GLUCOSE URINE, POC: NEGATIVE
PROTEIN UA: NEGATIVE

## 2023-01-11 PROCEDURE — G8420 CALC BMI NORM PARAMETERS: HCPCS | Performed by: OBSTETRICS & GYNECOLOGY

## 2023-01-11 PROCEDURE — 1036F TOBACCO NON-USER: CPT | Performed by: OBSTETRICS & GYNECOLOGY

## 2023-01-11 PROCEDURE — 81002 URINALYSIS NONAUTO W/O SCOPE: CPT | Performed by: OBSTETRICS & GYNECOLOGY

## 2023-01-11 PROCEDURE — G8427 DOCREV CUR MEDS BY ELIG CLIN: HCPCS | Performed by: OBSTETRICS & GYNECOLOGY

## 2023-01-11 PROCEDURE — 99213 OFFICE O/P EST LOW 20 MIN: CPT | Performed by: OBSTETRICS & GYNECOLOGY

## 2023-01-11 PROCEDURE — G8484 FLU IMMUNIZE NO ADMIN: HCPCS | Performed by: OBSTETRICS & GYNECOLOGY

## 2023-01-11 NOTE — PROGRESS NOTES
Reports [facility] now called her and told her she was a gestational diabetic she had her initial diabetic education on Monday her numbers have ranged from 83-91 fasting and 10 1-1 28 2 hours postprandial.  She continues to meet with [facility]. Reports no SS PIH. Does note vaginal vulvar pruritus requesting treatment. Culture was performed we will call with any positive results.

## 2023-01-15 LAB — GENITAL CULTURE, ROUTINE: NORMAL

## 2023-01-16 ENCOUNTER — ANCILLARY PROCEDURE (OUTPATIENT)
Dept: OBGYN CLINIC | Age: 33
End: 2023-01-16
Payer: MEDICAID

## 2023-01-16 ENCOUNTER — ROUTINE PRENATAL (OUTPATIENT)
Dept: OBGYN CLINIC | Age: 33
End: 2023-01-16
Payer: MEDICAID

## 2023-01-16 ENCOUNTER — HOSPITAL ENCOUNTER (OUTPATIENT)
Age: 33
Discharge: HOME | End: 2023-01-16
Payer: MEDICAID

## 2023-01-16 VITALS
HEART RATE: 101 BPM | SYSTOLIC BLOOD PRESSURE: 126 MMHG | DIASTOLIC BLOOD PRESSURE: 82 MMHG | BODY MASS INDEX: 24.12 KG/M2 | WEIGHT: 154 LBS

## 2023-01-16 DIAGNOSIS — R79.0 LOW MAGNESIUM LEVEL: ICD-10-CM

## 2023-01-16 DIAGNOSIS — Z86.79 HISTORY OF HYPERTENSION: ICD-10-CM

## 2023-01-16 DIAGNOSIS — R79.0 LOW FERRITIN: ICD-10-CM

## 2023-01-16 DIAGNOSIS — O36.5930 POOR CLINICAL FETAL GROWTH IN THIRD TRIMESTER, SINGLE OR UNSPECIFIED FETUS: ICD-10-CM

## 2023-01-16 DIAGNOSIS — E53.8 LOW VITAMIN B12 LEVEL: ICD-10-CM

## 2023-01-16 DIAGNOSIS — R79.89 HYPOTHYROXINEMIA: ICD-10-CM

## 2023-01-16 DIAGNOSIS — R79.89 LOW VITAMIN D LEVEL: ICD-10-CM

## 2023-01-16 DIAGNOSIS — O24.419 GESTATIONAL DIABETES MELLITUS (GDM), ANTEPARTUM, GESTATIONAL DIABETES METHOD OF CONTROL UNSPECIFIED: Primary | ICD-10-CM

## 2023-01-16 DIAGNOSIS — Z3A.32 32 WEEKS GESTATION OF PREGNANCY: ICD-10-CM

## 2023-01-16 DIAGNOSIS — O24.419 GESTATIONAL DIABETES MELLITUS (GDM), ANTEPARTUM, GESTATIONAL DIABETES METHOD OF CONTROL UNSPECIFIED: ICD-10-CM

## 2023-01-16 DIAGNOSIS — O26.873 SHORT CERVIX DURING PREGNANCY IN THIRD TRIMESTER: ICD-10-CM

## 2023-01-16 PROBLEM — O36.5990 POOR CLINICAL FETAL GROWTH: Status: ACTIVE | Noted: 2023-01-16

## 2023-01-16 LAB
ABO/RH: NORMAL
ABO/RH: NORMAL
ALBUMIN SERPL-MCNC: 3.8 G/DL (ref 3.5–5.2)
ALP BLD-CCNC: 122 U/L (ref 35–104)
ALT SERPL-CCNC: 26 U/L (ref 0–32)
ANION GAP SERPL CALCULATED.3IONS-SCNC: 12 MMOL/L (ref 7–16)
ANTIBODY IDENTIFICATION: NORMAL
ANTIBODY IDENTIFICATION: NORMAL
ANTIBODY SCREEN: NORMAL
AST SERPL-CCNC: 21 U/L (ref 0–31)
BACTERIA: ABNORMAL /HPF
BASOPHILS ABSOLUTE: 0.01 E9/L (ref 0–0.2)
BASOPHILS RELATIVE PERCENT: 0.2 % (ref 0–2)
BILIRUB SERPL-MCNC: 0.4 MG/DL (ref 0–1.2)
BILIRUBIN URINE: NEGATIVE
BLOOD, URINE: NEGATIVE
BUN BLDV-MCNC: 7 MG/DL (ref 6–20)
CALCIUM SERPL-MCNC: 9.6 MG/DL (ref 8.6–10.2)
CHLORIDE BLD-SCNC: 100 MMOL/L (ref 98–107)
CLARITY: CLEAR
CO2: 22 MMOL/L (ref 22–29)
COLOR: YELLOW
CREAT SERPL-MCNC: 0.5 MG/DL (ref 0.5–1)
CREATININE URINE: 37 MG/DL (ref 29–226)
DAT POLYSPECIFIC: NORMAL
EOSINOPHILS ABSOLUTE: 0.07 E9/L (ref 0.05–0.5)
EOSINOPHILS RELATIVE PERCENT: 1.2 % (ref 0–6)
EPITHELIAL CELLS, UA: ABNORMAL /HPF
FERRITIN: 23 NG/ML
FOLATE: >20 NG/ML (ref 4.8–24.2)
GFR SERPL CREATININE-BSD FRML MDRD: >60 ML/MIN/1.73
GLUCOSE BLD-MCNC: 99 MG/DL (ref 74–99)
GLUCOSE URINE, POC: NEGATIVE
GLUCOSE URINE: NEGATIVE MG/DL
HBA1C MFR BLD: 5.9 % (ref 4–5.6)
HCT VFR BLD CALC: 36.3 % (ref 34–48)
HEMOGLOBIN: 12.3 G/DL (ref 11.5–15.5)
IMMATURE GRANULOCYTES #: 0.02 E9/L
IMMATURE GRANULOCYTES %: 0.4 % (ref 0–5)
KETONES, URINE: NEGATIVE MG/DL
LACTATE DEHYDROGENASE: 168 U/L (ref 135–214)
LEUKOCYTE ESTERASE, URINE: ABNORMAL
LYMPHOCYTES ABSOLUTE: 1.2 E9/L (ref 1.5–4)
LYMPHOCYTES RELATIVE PERCENT: 21.2 % (ref 20–42)
MAGNESIUM: 1.9 MG/DL (ref 1.6–2.6)
MCH RBC QN AUTO: 31 PG (ref 26–35)
MCHC RBC AUTO-ENTMCNC: 33.9 % (ref 32–34.5)
MCV RBC AUTO: 91.4 FL (ref 80–99.9)
MONOCYTES ABSOLUTE: 0.33 E9/L (ref 0.1–0.95)
MONOCYTES RELATIVE PERCENT: 5.8 % (ref 2–12)
NEUTROPHILS ABSOLUTE: 4.04 E9/L (ref 1.8–7.3)
NEUTROPHILS RELATIVE PERCENT: 71.2 % (ref 43–80)
NITRITE, URINE: NEGATIVE
PDW BLD-RTO: 13.2 FL (ref 11.5–15)
PH UA: 6.5 (ref 5–9)
PLATELET # BLD: 201 E9/L (ref 130–450)
PMV BLD AUTO: 12.2 FL (ref 7–12)
POTASSIUM SERPL-SCNC: 3.9 MMOL/L (ref 3.5–5)
PROTEIN PROTEIN: <4 MG/DL (ref 0–12)
PROTEIN UA: NEGATIVE
PROTEIN UA: NEGATIVE MG/DL
PROTEIN/CREAT RATIO: 0.1
PROTEIN/CREAT RATIO: 0.1 (ref 0–0.2)
RBC # BLD: 3.97 E12/L (ref 3.5–5.5)
RBC UA: ABNORMAL /HPF (ref 0–2)
SODIUM BLD-SCNC: 134 MMOL/L (ref 132–146)
SPECIFIC GRAVITY UA: 1.01 (ref 1–1.03)
T3 FREE: 2.6 PG/ML (ref 2–4.4)
T4 FREE: 0.85 NG/DL (ref 0.93–1.7)
TOTAL PROTEIN: 7.5 G/DL (ref 6.4–8.3)
TSH SERPL DL<=0.05 MIU/L-ACNC: 1.22 UIU/ML (ref 0.27–4.2)
URIC ACID, SERUM: 5.2 MG/DL (ref 2.4–5.7)
UROBILINOGEN, URINE: 0.2 E.U./DL
VITAMIN B-12: 676 PG/ML (ref 211–946)
VITAMIN D 25-HYDROXY: 42 NG/ML (ref 30–100)
WBC # BLD: 5.7 E9/L (ref 4.5–11.5)
WBC UA: ABNORMAL /HPF (ref 0–5)

## 2023-01-16 PROCEDURE — 86880 COOMBS TEST DIRECT: CPT

## 2023-01-16 PROCEDURE — 86800 THYROGLOBULIN ANTIBODY: CPT

## 2023-01-16 PROCEDURE — 80053 COMPREHEN METABOLIC PANEL: CPT

## 2023-01-16 PROCEDURE — 81001 URINALYSIS AUTO W/SCOPE: CPT

## 2023-01-16 PROCEDURE — 86901 BLOOD TYPING SEROLOGIC RH(D): CPT

## 2023-01-16 PROCEDURE — 82570 ASSAY OF URINE CREATININE: CPT

## 2023-01-16 PROCEDURE — 86146 BETA-2 GLYCOPROTEIN ANTIBODY: CPT

## 2023-01-16 PROCEDURE — 82607 VITAMIN B-12: CPT

## 2023-01-16 PROCEDURE — 86870 RBC ANTIBODY IDENTIFICATION: CPT

## 2023-01-16 PROCEDURE — 86376 MICROSOMAL ANTIBODY EACH: CPT

## 2023-01-16 PROCEDURE — 86645 CMV ANTIBODY IGM: CPT

## 2023-01-16 PROCEDURE — 86850 RBC ANTIBODY SCREEN: CPT

## 2023-01-16 PROCEDURE — 99213 OFFICE O/P EST LOW 20 MIN: CPT | Performed by: OBSTETRICS & GYNECOLOGY

## 2023-01-16 PROCEDURE — 84550 ASSAY OF BLOOD/URIC ACID: CPT

## 2023-01-16 PROCEDURE — 86147 CARDIOLIPIN ANTIBODY EA IG: CPT

## 2023-01-16 PROCEDURE — 76817 TRANSVAGINAL US OBSTETRIC: CPT | Performed by: OBSTETRICS & GYNECOLOGY

## 2023-01-16 PROCEDURE — 84439 ASSAY OF FREE THYROXINE: CPT

## 2023-01-16 PROCEDURE — 86644 CMV ANTIBODY: CPT

## 2023-01-16 PROCEDURE — 86778 TOXOPLASMA ANTIBODY IGM: CPT

## 2023-01-16 PROCEDURE — 83735 ASSAY OF MAGNESIUM: CPT

## 2023-01-16 PROCEDURE — 84481 FREE ASSAY (FT-3): CPT

## 2023-01-16 PROCEDURE — 87088 URINE BACTERIA CULTURE: CPT

## 2023-01-16 PROCEDURE — 76821 MIDDLE CEREBRAL ARTERY ECHO: CPT | Performed by: OBSTETRICS & GYNECOLOGY

## 2023-01-16 PROCEDURE — 86900 BLOOD TYPING SEROLOGIC ABO: CPT

## 2023-01-16 PROCEDURE — 85613 RUSSELL VIPER VENOM DILUTED: CPT

## 2023-01-16 PROCEDURE — 76818 FETAL BIOPHYS PROFILE W/NST: CPT | Performed by: OBSTETRICS & GYNECOLOGY

## 2023-01-16 PROCEDURE — 86747 PARVOVIRUS ANTIBODY: CPT

## 2023-01-16 PROCEDURE — 81002 URINALYSIS NONAUTO W/O SCOPE: CPT | Performed by: OBSTETRICS & GYNECOLOGY

## 2023-01-16 PROCEDURE — 84443 ASSAY THYROID STIM HORMONE: CPT

## 2023-01-16 PROCEDURE — 76816 OB US FOLLOW-UP PER FETUS: CPT | Performed by: OBSTETRICS & GYNECOLOGY

## 2023-01-16 PROCEDURE — 36415 COLL VENOUS BLD VENIPUNCTURE: CPT

## 2023-01-16 PROCEDURE — 82306 VITAMIN D 25 HYDROXY: CPT

## 2023-01-16 PROCEDURE — 85025 COMPLETE CBC W/AUTO DIFF WBC: CPT

## 2023-01-16 PROCEDURE — 84156 ASSAY OF PROTEIN URINE: CPT

## 2023-01-16 PROCEDURE — 83036 HEMOGLOBIN GLYCOSYLATED A1C: CPT

## 2023-01-16 PROCEDURE — 86777 TOXOPLASMA ANTIBODY: CPT

## 2023-01-16 PROCEDURE — 82746 ASSAY OF FOLIC ACID SERUM: CPT

## 2023-01-16 PROCEDURE — 82728 ASSAY OF FERRITIN: CPT

## 2023-01-16 PROCEDURE — 76820 UMBILICAL ARTERY ECHO: CPT | Performed by: OBSTETRICS & GYNECOLOGY

## 2023-01-16 PROCEDURE — 83615 LACTATE (LD) (LDH) ENZYME: CPT

## 2023-01-16 NOTE — PATIENT INSTRUCTIONS

## 2023-01-16 NOTE — PROGRESS NOTES
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 patient reported having occasional symptoms of lightheadedness. -- A Baseline nutrition panel and thyroid function studies were completed on 9/26/2022. A hemoglobin electrophoresis, reticulocyte count were ordered. Her reticulocyte count was normal.  The hemoglobin electrophoresis was normal.  --The patient previously reported having intermittent symptoms of lightheadedness. She was counseled that this may be related to her anemia.   Additional maternal evaluation was recommended with an EKG, echocardiogram, and consultation with cardiology as outlined above.  --Precautions were reviewed with the patient. She was counseled to go to the hospital with persistent or worsening symptoms or the development of any chest pain, shortness of breath, tachycardia, or syncope. --Supplement as needed     16. Low vitamin B12 -- The patient's vitamin B12 level was borderline at 306. Individuals with vitamin B12 level between 200 and 400 are increased risk for anemia and side effects related to low vitamin B12. Thus, supplementation is recommended  --Recommend vitamin B12, 1000 mcg daily  --Monitor levels serially  --Repeat nutrition panel (CBC, CMP, magnesium, ferritin, folate, vitamin B12, vitamin D 25 [place]) in 4 weeks, on/after 10/24/2022     --Repeat testing completed 11/9/2022, results included: H/H 11.3/32.8, MCV 92.4, platelet count 648,758, potassium 3.8, creatinine 0.6, calcium 9.6, ALT 10, AST 13, ferritin 26, folate >20, vitamin B12 595, vitamin D 30, hemoglobin A1c 4.8%, TSH 0.998, free T4 0.89, free T3 2.8, uric acid 4.8, , magnesium 1.5, urinalysis contaminated, urine protein creatinine ratio 0.1, urine culture mixed, blood type B-, antibody screen negative. -- The patient's vitamin B12 was improved at 595. She was counseled to continue her vitamin B12 supplement. --Recommend repeat testing in 4 to 6 weeks, on/after 12/7/2022     -- Repeat testing was completed on 12/16/2022, her results included: H/H 12/35.8, MCV 92.3, bili count 204,000, magnesium 1.7, potassium 4, creatinine 0.5, calcium 9, ALT 29, AST 20, ferritin 23, folate >20, vitamin B12 621, vitamin D 38, globin A1c 5.4%, TSH 1.67, free T4 0.6, free T3 3.1, uric acid 5, , TPO negative, antithyroglobulin antibody negative, urine protein creatinine ratio 0.1, urine culture mixed, hemoglobin electrophoresis pending, reticulocyte count normal.     -- The patient's vitamin B12 level has improved. She was counseled to continue her supplement.   --Recommend repeat testing in 4 to 6 weeks, on/after 1/13/2023  --Repeat testing ordered  --Long-term follow-up with PCP for monitoring and management     17. Low ferritin -- The patient's ferritin was low at 29 (considered low if <15 in absence of anemia or <40 in setting of anemia)  --Ferrous sulfate 325 mg BID prescribed  --Monitor levels serially  --Monitor nutrition panel q4-6 weeks (CBC, CMP, magnesium, ferritin, folate, vitamin B12, vitamin D25OH), on/after 10/24/2022     --Repeat testing completed 11/9/2022, ferritin 26. Her H/H was stable at 11.3/32.  -- The patient was counseled to continue her oral iron supplement. --Recommend repeat testing in 4 to 6 weeks, on/after 12/7/2022     --Repeat testing completed 12/16/2022. Her ferritin level was stable at 23. --She was counseled to continue her iron supplement. --Recommend repeat testing in 4 to 6 weeks, on/after 1/13/2023  --Repeat testing ordered  --Follow up with PCP for long term monitoring and management     18. Low vitamin D -- The patient's vitamin D was low at 31  --Recommend vitamin D3 2000 IU daily  --Monitor levels serially  --Monitor nutrition panel q4-6 weeks (CBC, CMP, magnesium, ferritin, folate, vitamin B12, vitamin D25OH)     --Repeat testing completed 11/9/2022, vitamin D 30  --The patient was encouraged to take her vitamin D supplement. --Recommend repeat testing in 4 to 6 weeks, on/after 12/7/2022. -- Repeat testing completed 12/16/2022, her vitamin D level was stable at 38. She was counseled to continue her vitamin D supplement. --Recommend repeat testing in 4 to 6 weeks, on/after 1/13/2023  --Repeat testing ordered  --Follow up with PCP for long term monitoring and management     19. Low magnesium -- The patient's magnesium was mildly decreased at 1.5 (1.6-2.6). Her potassium was normal at 3.8. She was prescribed magnesium oxide, 400 mg daily. Recommend repeat testing with next set of labs. --Repeat testing completed 12/16/2022.   Her magnesium level was improved at 1.7. She was counseled to continue her supplement. --Repeat testing ordered     20. Hypothyroxinemia -- The patient's lab results were reviewed. Her free T4 was mildly decreased at 0.89. Her TSH was normal at 0.998 and free T3 normal at 2.8. Screening for thyroid peroxidase antibody and antithyroglobulin antibody was previously negative on 9/26/2022. Counseling was provided to the patient. --Counseling was previously provided to the patient. Counseling was reviewed. She did not have any additional questions or concerns. Per the American thyroid Association, treatment is not recommended for women with isolated hypothyroxinemia. However, thyroid function study should be monitored closely, every 4 weeks throughout the pregnancy. --Recommend repeat thyroid function studies in 4 weeks, on/after 12/7/2022     --Repeat testing completed 12/16/2022, her results included: TSH 1.67, free T4 0.86, free T3 3.1.  --Her free T4 is again mildly decreased. Her TSH and free T3 are normal.  --Recommend repeat testing in 4 to 6 weeks, on/after 1/13/2023. --Repeat testing ordered  --Long-term follow-up with PCP for monitoring management        21. Pressure with voiding/dysuria, improved -- The patient previously had complaints of dysuria and increased pressure with voiding. She denied any associated fevers, chills, nausea, vomiting, and or back pain. The patient had a urine culture on 9/26/2022 that was reported as mixed. Secondary to the patient's symptoms, a prescription for Macrobid was provided. Today, the patient again reports that her symptoms have improved. She again reports intermittent symptoms of pressure but feels it is related to the pregnancy. Infection precautions were reviewed.      Precautions were again reviewed and the patient was counseled to go to the hospital with persistent or worsening symptoms or the development of any associated fevers, chills, nausea, vomiting, and/or back pain.     22.  Upper respiratory infection, improved -- Previously, on 10/13/2022, the patient had complaints of upper respiratory infection symptoms. She reports that her symptoms started on Tuesday, 10/4/2022. She has complaints of congestion and a nonproductive cough. She also reports having a scratchy throat. She denied having any associated fevers, chills, nausea, and or vomiting. She denied having any loss of taste or smell. At 24 weeks gestation, the patient again had complaints of congestion. She reports her symptoms started 2 days ago. She denied having any associated fevers, chills, nausea, vomiting, chest pain, and/or shortness of breath. She denied having any associated loss of taste or smell. Supportive measures were again reviewed including using Tylenol as needed for pain and fever, saline nasal spray for congestion, Robitussin (plain) for cough, a humidifier or vaporizer, and throat lozenges and/or spray. A handout reviewing medication safety in pregnancy was provided. Today, the patient again reports that her symptoms have improved. Precautions were reviewed with the patient and she was counseled that if she develops a fever greater than 100.4 F, chest pain and/or shortness of breath to present immediately for evaluation. The patient expressed verbal understanding of this counseling. 23.  Lump under skin -- The patient previously had concerns regarding a small, pea-sized lump along the right side of her abdominal wall. The patient reports that the lump is at the site of a Lovenox injection. The patient reports that her symptoms are stable. The patient was counseled that sometimes small knots or lumps can develop at the site of Lovenox injections. She was counseled to avoid massaging the area after administering the Lovenox. She was counseled to hold pressure after the injection.      24.  Abdominal wall hernia -- The patient again had concerns regarding a possible hernia at the site of her prior ileostomy. She reports that the area occasionally bulges and is sometimes tender. The patient was counseled that she may have a hernia in that area. With persistent or worsening symptoms, I would recommend referral to general surgery for further evaluation. She was counseled that she can wear gentle compression to help minimize the risk for bowel herniation. Precautions were reviewed and she was counseled to present to the hospital with the development of persistent pain, fever, nausea, and or vomiting. The patient was provided with a referral to general surgery. The patient met with Dr. Chanell Tai on 11/3/2022. Per his consultation note, she has a small incisional hernia at the site of her prior ileostomy. No obstruction was noted. Precautions were reviewed. Dr. 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 Based on her history, she may be at increased risk for having a bowel obstruction. Precautions were reviewed. 25. Occasional headaches, stable -- The patient again reports that she is experiencing occasional headaches. She denies having any associated vision change, chest pain, shortness of breath, nausea, and or vomiting. She denies having the worst headache of her life or any associated neurologic deficits. Today, the patient again reports that her symptoms are stable. The patient was again counseled to stay well-hydrated. She can use Tylenol as needed. She was counseled regarding the use of magnesium oxide 400 mg twice daily and riboflavin 100 mg daily in reducing the frequency and intensity of migraine headaches. Precautions were reviewed, and she was counseled that if she develops the worst headache of her life or a headache with neurological deficits, to present immediately for evaluation. She expressed verbal understanding of this counseling. 26.   MTHFR C677T, heterozygote --  The patient had a thrombophilia panel secondary to a history of a DVT. She was found to be heterozygous for MTHFR C677T. Counseling was previously provided regarding the implications and management of this gene mutation in pregnancy. Counseling was reviewed. She did not have any additional questions or concerns. She was counseled that this gene mutation affects folic acid metabolism. It is fairly common and found in 20-30% of the population. The patient was counseled that this condition is no longer thought to be clinically significant. It is generally only a problem if homocysteine levels are elevated. The patient's homocystine level was normal at 5.3 on 9/26/2022. In the past, this gene mutation was thought to be associated with increased obstetric risks including thrombosis, early recurrent pregnancy loss, placental abruption, hypertensive disorders of pregnancy, poor fetal growth, and fetal loss. More recent studies did not report strong associations with these risks. Because this genetic mutation affects folic acid metabolism, there is an increased risk for folic acid deficiency and other nutritional deficiencies in women with this condition. There is also an increased risk for having a child with an open neural tube defect in women with this mutation, especially if they're homozygous for the C677T mutation. Generally, additional vitamin supplementation is recommended with folic acid or methyl folate, vitamin B6, and vitamin B12. The patient was counseled to take a low-dose aspirin. Fetal growth should be followed serially. She was counseled that there is a 50% chance that she will pass this mutation off to her child(joana). She should relay this information to the pediatrician who cares for her child(joana). She was also encouraged to review this diagnosis with her PCP.          27.  Vaginal discharge, improved -- The patient previously had complaints of increased vaginal discharge during her visit at 22 weeks 3 days.  She denied having any associated itching or irritation.  She felt that she may have a yeast infection.  Thus, a sterile speculum exam was completed on 11/10/2022.  Her cervix appeared closed.  Copious discharge was noted.  A genital culture was collected and noted to be negative.    The patient again had complaints of copious vaginal discharge.  A sterile speculum exam was repeated on 11/17/2022.  Infection screening was completed with GC/chlamydia, Ureaplasma, and a repeat genital culture.    --Screening for GC and chlamydia was negative.  A genital culture was negative.  Screening for Ureaplasma and mycoplasma was pending.     Secondary to the continued vaginal discharge and borderline cervical length, the patient was empirically treated with a course of Flagyl.  A prescription was previously provided.  The patient reported that she tolerated the medication well.     The patient again reports that her symptoms are stable/improving.        28.  Borderline cervical length -- The ultrasound results were reviewed with the patient.  At 22 weeks 3 days, the patient's cervical length was borderline and measured 2.8-3.5 cm without funneling.  No dynamic change was noted.  Secondary to the patient's complaints of increased discharge, A sterile speculum exam was done prior to her transvaginal ultrasound.  The patient's cervix was visually closed.  Only a general culture was collected.     At 23 weeks 3 days, the patient's cervix appeared stable and measured 2.6-2.9 cm without funneling.       A sterile speculum exam was repeated at 23 weeks 3 days.  The patient's cervix was visually closed.  Copious discharge was noted.  Infection screening was completed.  On digital exam, her cervix was closed with approximately 1-2 cm of length palpable.  Her cervix was firm and posterior.      A transvaginal ultrasound was repeated at 24 weeks gestation.  The patient's cervix appeared normal and measured 2.8-3.6 cm  without funneling. A transvaginal ultrasound was repeated at 26 weeks 4 days. The patient's cervix appeared stable and measured 3.2-3.3 cm without funneling. A transvaginal ultrasound was repeated at 28 weeks gestation. The patient's cervix appeared stable and measured 2.7-2.9 cm without funneling. A transvaginal ultrasound was repeated at 30 weeks 2 days. The patient's cervix appeared stable and measured 2.4-2.5 cm without funneling. A transvaginal ultrasound was repeated at 32 weeks 0 days. The patient's cervix appeared stable and measured 2.6 cm without funneling. She again notes good fetal movement and denies any symptoms of discharge, leaking of fluid, vaginal bleeding, and/or contractions. She was counseled that  cervical shortening is associated with a 30% increased risk for delivery prior to 37 weeks' gestation. Interventions and strategies to reduce this risk were reviewed. The patient was counseled that with further cervical shortening, Prometrium would be indicated. The risks and benefits of use were reviewed. She was counseled that vaginal progesterone has been shown to reduce this risk by 30-40%. The risks and benefits of use were reviewed. --The patient requested treatment with vaginal progesterone. A prescription was provided. Instructions for use were reviewed. --She was provided with a prescription for 200 mg to be placed into the vagina at bedtime. She should continue this medication until 36-37 weeks' gestation. --She reports that she is tolerating the vaginal progesterone well. --Continue vaginal progesterone until 36-37 weeks gestation. At this time, close follow-up was recommended. The patient was scheduled to return in 2 weeks for a follow-up cervical length. The patient was counseled to avoid heavy lifting, prolonged standing, and sexual intercourse. The patient was scheduled to return for a follow-up assessment in 2 weeks. Precautions to call and/or return sooner were reviewed. 29.  Abnormal glucose test -- The patient reports that she recently completed a 2-hour oral glucose tolerance test.  Her results included 113/143/141. Her fasting value was elevated. However, the patient reports that she was not truly fasting. --Blood work is ordered for the patient today. A CMP was ordered. The patient was counseled to fast for testing. --If her fasting blood sugar is normal, then it is unlikely that the gestational diabetes. --If there is any question, the patient can repeat the 2-hour oral glucose tolerance test.     --The patient completed a fasting CMP on 2022. I initially reviewed the results and saw a glucose value of 86. This would be normal.  However, after reviewing her results again, the result of 86 was from testing done in November. Her result on 2022 was 96 which would be elevated for a 2 or 3-hour oral glucose tolerance test.     Thus, I recommended a referral for diabetic education and blood sugar monitoring. I contacted the patient regarding this error. She was provided with a referral to diabetic education and a prescription for testing supplies. 27.  Gestational diabetes -- The patient glucose screening results were reviewed. This information is summarized above under issue #29. The patient had her blood sugar log available for review. Dates reviewed included -. Fastin-97 with 3 of 13 values elevated  Post breakfast:  with 4 of 11 values elevated  Post lunch:  with 3 of 11 values elevated  Post dinner:  with 8 of 13 values elevated    The patient met with diabetic education on 2023. Counseling was previously provided. Counseling was reviewed. She did not have any additional questions or concerns.       She was counseled regarding the implications of gestational diabetes in pregnancy including an increased risk of hypertensive disorders of pregnancy, an increased risk for , fetal macrosomia, an increased risk for maternal and/or fetal trauma at time of delivery (in the setting of macrosomia),   delivery, and possibly and increased risk for fetal loss. Additional  risk were discussed including  hypoglycemia, hypocalcemia/hypomagnesemia, jaundice, and respiratory distress. She was counseled that these risks can be reduced with improved glycemic control. Goals for glycemic control were reviewed including fasting of 60-95 mg/dL and a 2 hour postprandial value of <120 mg/dL. At this time, I recommend that the patient continues to follow a gestational diabetic diet. If at any time >50% of her values are above the goals outlined, then medical therapy would be indicated. Bernardo Fairchild Options for medical treatment include insulin or glyburide or metformin. She should continue to check fingersticks four times daily, fasting and 2 hour postprandial.       Secondary to the increased risk for hypertensive disorders of pregnancy, a daily low-dose aspirin was recommended. The risks and benefits of low-dose aspirin use in pregnancy were reviewed. The patient should take 81 mg of aspirin daily for the remainder of pregnancy and 6 to 8 weeks postpartum. The patient was counseled to monitor fetal kick counts daily. Instructions were reviewed. If the patient remains diet controlled, increased surveillance with twice weekly fetal testing is recommended at 34-36 weeks' gestation with delivery at 39-40 weeks' gestation. If she requires medical therapy, then twice weekly testing would be indicated sooner with delivery at 44 week's gestation. Fetal growth should be monitored every 3-4 weeks until delivery. During labor, the patient's blood sugars should be monitored closely and ideally be less than 105 mg/dL during labor in order to minimize the risk of  hypoglycemia.  She should also have a fasting sugar checked postpartum. She was counseled that she has a 50% risk for the development of type 2 diabetes in the next 10 years. This risk can be modified with diet and lifestyle changes. She will need a 2 hour oral glucose tolerance test at 8-12 weeks postpartum. If this is normal, she should have yearly screening for diabetes. If she becomes pregnant in the future, she is at increased risk for recurrent gestational diabetes, 60-70%, and should have early screening for gestational diabetes in the late first or early second trimester. 31.  History of elevated blood pressure -- The patient's blood pressure was initially elevated at 143/84 at 28 weeks gestation. Her blood pressure was repeated normal 113/73. The patient's urine dip was negative for protein. The patient denies having any other prior blood pressure elevations during this pregnancy. She denies having any symptoms of headache, vision change, chest pain, shortness of breath, nausea, vomiting, and or right upper quadrant pain. An exam was done in the office. The patient's heart had a regular rate and rhythm. Her lungs were clear to auscultation bilaterally. Her abdomen was soft, gravid, non tender, and with normal bowel sounds. She had +1 patellar reflexes bilaterally. No clonus was noted bilaterally. She had trace edema in her bilateral lower extremities. The patient's blood pressure was normal today at 126/82. Her urine was negative for protein. The patient was counseled that at this time, continue close monitoring is recommended. Again, the patient has a history of hypertension. Continue increased maternal and fetal surveillance as outlined above. 32.  Poor fetal growth, improved -- The ultrasound findings from 30 weeks 2 days were reviewed with the patient. Overall, the estimated fetal weight is at the 40th percentile, however the abdominal circumference is measuring at the 7th percentile.     Fetal growth was reevaluated at 32 weeks gestation. The overall estimate of fetal weight was 3 pounds 14 ounces, 44 percentile. The [facility] was at the 11th percentile. In the past 2 weeks, the overall estimated fetal weight is increased by 330 g (expected 200 g). The [facility] has increased by 2 cm (expected 2 cm). Fetal testing was reassuring. Counseling was previously provided to patient. Counseling was reviewed. The patient did not have any additional questions or concerns. Again, the overall estimated fetal weight is greater than the 10th percentile, however the abdominal circumference measures less than the 10th percentile at 30 weeks 2 days. Reassuring findings included a normal amniotic fluid index, a biophysical profile score of 8/8, and normal Doppler studies. The patient was counseled that typically fetal growth restriction is formally diagnosed when the overall estimated fetal weight is less than the 10th percentile. However, a decline in the overall estimated fetal weight of greater than 20% and/or an abdominal circumference that measures less then the 10th percentile are findings that are also concerning for and/or consistent with fetal growth restriction. In over 70% of cases, small fetal size is constitutional. In approximately 30% of cases, there is a secondary cause related to placental insufficiency, a fetal issue, and/or an underlying maternal condition. Risk factors were reviewed with the patient including malnutrition, maternal chronic diseases (ie SLE, renal disease, antiphospholipid antibodies, anemia, diabetes), placental disease (chorioangioma, mosaicism), infections, fetal aneuploidy, and teratogen exposure. She was counseled regarding general management plans and that mild IUGR can generally be expectantly managed until 37-39 weeks' gestation.   If there is severe growth restriction, delivery timing is based on the point at which the risk of fetal death exceeds that of  death.  There is an increased risk for fetal loss in the setting of growth restriction, thus, increased surveillance is indicated. The best predictors of outcome are fetal size and gestational age attained. Overall, the long term outcome depends on the etiology of the poor growth. At this time, I recommend increased fetal surveillance. The patient was counseled to monitor fetal kick counts daily. Instructions were reviewed. She was scheduled to return in 2 weeks for follow-up fetal growth assessment and testing. Additional recommendations will be made at that time. Given the concern for poor fetal growth, additional maternal evaluation was recommended. The following labs were ordered: Preeclampsia screening, antiphospholipid antibodies, thyroid function studies/thyroid peroxidase antibodies, a nutrition panel, and infection studies. -- Testing was ordered today     Her prenatal records were reviewed and she had early screening with cell free DNA that was low risk for aneuploidy. Given the lag in fetal growth, a low dose aspirin was recommended. She was counseled to start 81 mg of aspirin daily. The risks and benefits were discussed. The patient was counseled to continue a daily low-dose aspirin as outlined above. 33. Shortness of breath with activities -- The patient had reports of increased shortness of breath primarily with activity. She denied having any associated chest pain, tachycardia, heart palpitations, lightheadedness, dizziness, and/or syncope. The patient was counseled that her symptoms are likely secondary to the pregnancy. With worsening symptoms or the development of any associated cardiac symptoms, then additional maternal evaluation will be recommended.     Precautions were reviewed with the patient and she was counseled to present to the hospital with persistent/worsening symptoms or the development of associated tachycardia, heart palpitations, chest pain, lightheadedness, dizziness, and/or syncope. --I requested the patient return for a follow-up assessment in 1 week unless there is a clinical reason for her to return prior to that time. She is to call if she has any problems or questions prior to her next visit. Further evaluation and management will be dependent on her clinical presentation and the results of her testing. --The patient was advised to call if she has any increased vaginal discharge, vaginal bleeding, contractions, abdominal pain, back pain or any new significant symptomatology prior to her next visit. I advised her that these are signs and symptoms of cervical change and require follow-up assessment when they occur. Preeclampsia precautions were also reviewed with the patient. --The patient was also counseled to call and/or return with any concerns for decreased fetal activity. --The patient is to continue to follow with you in your office for ongoing obstetric care. --The total time spent on today's visit was 30 minutes. This included preparation for the visit (i.e. reviewing prior external notes and test results), performance of a medically appropriate history and examination, counseling, orders for medications, tests or other procedures, and coordination of care. Greater than 50% of the time was spent face-to-face with the patient. This time is exclusive of procedures performed. I answered all of  the patient's questions to her satisfaction. I asked her to call if she had any additional questions prior to her next visit. --At the conclusion of the visit, the patient appeared to have a good understanding of the issues discussed. I answered all of her questions to her satisfaction. I asked her to call if she had any additional questions prior to her next visit. --Thank you for allowing me to participate in the care of this pleasant patient. Please don't hesitate to call me if you have any questions. Sincerely,      Salvatore Patel MD, [address]  [phone]      *All or parts of this note may have been generated using a voice recognition program. There may be typo, grammar, or Word substitution errors that have escaped my review of this note.

## 2023-01-16 NOTE — PROGRESS NOTES
Pt here for BPP/NST  Pt denies any bleeding/cramping  Pt states good fetal movement  Pt going to write down blood sugars on log

## 2023-01-17 LAB — LUPUS ANTICOAG DVVT: NEGATIVE

## 2023-01-18 LAB
CYTOMEGALOVIRUS IGG ANTIBODY: NORMAL
CYTOMEGALOVIRUS IGM ANTIBODY: NORMAL
TOXOPLASMA IGM ANTIBODY: NORMAL
TOXOPLASMOSIS IGG AB: NORMAL
URINE CULTURE, ROUTINE: NORMAL

## 2023-01-19 LAB
ANTICARDIOLIPIN IGA ANTIBODY: <10 APL
ANTICARDIOLIPIN IGG ANTIBODY: <10 GPL
BETA-2 GLYCOPROTEIN 1 IGG ANTIBODY: <10 SGU
BETA-2 GLYCOPROTEIN 1 IGM ANTIBODY: <10 SMU
CARDIOLIPIN AB IGM: <10 MPL
THYROGLOBULIN ANTIBODY: <12 IU/ML (ref 0–40)
THYROID PEROXIDASE (TPO) ABS: <4 IU/ML (ref 0–25)

## 2023-01-20 LAB
PARVOVIRUS B19 IGG ANTIBODY: 0.56 IV
PARVOVIRUS B19 IGM ANTIBODY: 0.19 IV

## 2023-01-23 ENCOUNTER — ANCILLARY PROCEDURE (OUTPATIENT)
Dept: OBGYN CLINIC | Age: 33
End: 2023-01-23
Payer: MEDICAID

## 2023-01-23 ENCOUNTER — HOSPITAL ENCOUNTER (OUTPATIENT)
Age: 33
Discharge: HOME | End: 2023-01-23
Attending: OBSTETRICS & GYNECOLOGY | Admitting: OBSTETRICS & GYNECOLOGY
Payer: MEDICAID

## 2023-01-23 ENCOUNTER — ROUTINE PRENATAL (OUTPATIENT)
Dept: OBGYN CLINIC | Age: 33
End: 2023-01-23
Payer: MEDICAID

## 2023-01-23 VITALS
SYSTOLIC BLOOD PRESSURE: 134 MMHG | BODY MASS INDEX: 24.43 KG/M2 | WEIGHT: 156 LBS | HEART RATE: 100 BPM | DIASTOLIC BLOOD PRESSURE: 83 MMHG

## 2023-01-23 VITALS
HEIGHT: 67 IN | RESPIRATION RATE: 16 BRPM | DIASTOLIC BLOOD PRESSURE: 75 MMHG | TEMPERATURE: 98 F | SYSTOLIC BLOOD PRESSURE: 116 MMHG | WEIGHT: 156 LBS | BODY MASS INDEX: 24.48 KG/M2 | HEART RATE: 93 BPM

## 2023-01-23 DIAGNOSIS — O26.12 LOW WEIGHT GAIN DURING PREGNANCY IN SECOND TRIMESTER: ICD-10-CM

## 2023-01-23 DIAGNOSIS — Z67.91 RH NEGATIVE STATE IN ANTEPARTUM PERIOD: ICD-10-CM

## 2023-01-23 DIAGNOSIS — Z92.89 HISTORY OF BLOOD TRANSFUSION: ICD-10-CM

## 2023-01-23 DIAGNOSIS — R79.89 HYPOTHYROXINEMIA: ICD-10-CM

## 2023-01-23 DIAGNOSIS — O24.419 GESTATIONAL DIABETES MELLITUS (GDM), ANTEPARTUM, GESTATIONAL DIABETES METHOD OF CONTROL UNSPECIFIED: ICD-10-CM

## 2023-01-23 DIAGNOSIS — R63.0 DECREASED APPETITE: ICD-10-CM

## 2023-01-23 DIAGNOSIS — E53.8 LOW VITAMIN B12 LEVEL: ICD-10-CM

## 2023-01-23 DIAGNOSIS — O26.873 SHORT CERVIX DURING PREGNANCY IN THIRD TRIMESTER: ICD-10-CM

## 2023-01-23 DIAGNOSIS — O09.90 SUPERVISION OF HIGH RISK PREGNANCY, ANTEPARTUM: Primary | ICD-10-CM

## 2023-01-23 DIAGNOSIS — O26.899 RH NEGATIVE STATE IN ANTEPARTUM PERIOD: ICD-10-CM

## 2023-01-23 DIAGNOSIS — R79.0 LOW FERRITIN: ICD-10-CM

## 2023-01-23 DIAGNOSIS — Z86.79 HISTORY OF HYPERTENSION: ICD-10-CM

## 2023-01-23 PROBLEM — Z3A.33 33 WEEKS GESTATION OF PREGNANCY: Status: ACTIVE | Noted: 2023-01-23

## 2023-01-23 LAB
AMNISURE, POC: NEGATIVE
BACTERIA: ABNORMAL /HPF
BILIRUBIN URINE: NEGATIVE
BLOOD, URINE: NEGATIVE
CLARITY: CLEAR
COLOR: YELLOW
EPITHELIAL CELLS, UA: ABNORMAL /HPF
GLUCOSE URINE, POC: NEGATIVE
GLUCOSE URINE: NEGATIVE MG/DL
KETONES, URINE: NEGATIVE MG/DL
LEUKOCYTE ESTERASE, URINE: ABNORMAL
Lab: NORMAL
NEGATIVE QC PASS/FAIL: NORMAL
NITRITE, URINE: NEGATIVE
PH UA: 6.5 (ref 5–9)
POSITIVE QC PASS/FAIL: NORMAL
PROTEIN UA: NEGATIVE
PROTEIN UA: NEGATIVE MG/DL
RBC UA: ABNORMAL /HPF (ref 0–2)
SPECIFIC GRAVITY UA: <=1.005 (ref 1–1.03)
UROBILINOGEN, URINE: 0.2 E.U./DL
WBC UA: ABNORMAL /HPF (ref 0–5)

## 2023-01-23 PROCEDURE — G8484 FLU IMMUNIZE NO ADMIN: HCPCS | Performed by: OBSTETRICS & GYNECOLOGY

## 2023-01-23 PROCEDURE — G8427 DOCREV CUR MEDS BY ELIG CLIN: HCPCS | Performed by: OBSTETRICS & GYNECOLOGY

## 2023-01-23 PROCEDURE — 81002 URINALYSIS NONAUTO W/O SCOPE: CPT | Performed by: OBSTETRICS & GYNECOLOGY

## 2023-01-23 PROCEDURE — 99214 OFFICE O/P EST MOD 30 MIN: CPT | Performed by: OBSTETRICS & GYNECOLOGY

## 2023-01-23 PROCEDURE — 99211 OFF/OP EST MAY X REQ PHY/QHP: CPT

## 2023-01-23 PROCEDURE — 99213 OFFICE O/P EST LOW 20 MIN: CPT | Performed by: OBSTETRICS & GYNECOLOGY

## 2023-01-23 PROCEDURE — 1036F TOBACCO NON-USER: CPT | Performed by: OBSTETRICS & GYNECOLOGY

## 2023-01-23 PROCEDURE — 76820 UMBILICAL ARTERY ECHO: CPT | Performed by: OBSTETRICS & GYNECOLOGY

## 2023-01-23 PROCEDURE — 76818 FETAL BIOPHYS PROFILE W/NST: CPT | Performed by: OBSTETRICS & GYNECOLOGY

## 2023-01-23 PROCEDURE — G8420 CALC BMI NORM PARAMETERS: HCPCS | Performed by: OBSTETRICS & GYNECOLOGY

## 2023-01-23 PROCEDURE — 76817 TRANSVAGINAL US OBSTETRIC: CPT | Performed by: OBSTETRICS & GYNECOLOGY

## 2023-01-23 PROCEDURE — 76815 OB US LIMITED FETUS(S): CPT | Performed by: OBSTETRICS & GYNECOLOGY

## 2023-01-23 PROCEDURE — 84112 EVAL AMNIOTIC FLUID PROTEIN: CPT

## 2023-01-23 PROCEDURE — 81001 URINALYSIS AUTO W/SCOPE: CPT

## 2023-01-23 PROCEDURE — 76821 MIDDLE CEREBRAL ARTERY ECHO: CPT | Performed by: OBSTETRICS & GYNECOLOGY
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
## 2023-01-23 NOTE — PROGRESS NOTES
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 pressure -- The patient's blood pressure was initially elevated at 143/84 at 28 weeks gestation. Her blood pressure was repeated normal 113/73. The patient's urine dip was negative for protein. The patient denies having any other prior blood pressure elevations during this pregnancy. She denies having any symptoms of headache, vision change, chest pain, shortness of breath, nausea, vomiting, and or right upper quadrant pain. An exam was done in the office. The patient's heart had a regular rate and rhythm. Her lungs were clear to auscultation bilaterally. Her abdomen was soft, gravid, non tender, and with normal bowel sounds. She had +1 patellar reflexes bilaterally. No clonus was noted bilaterally. She had trace edema in her bilateral lower extremities. The patient's blood pressure was normal today at 134/83. Her urine was negative for protein. The patient was counseled that at this time, continue close monitoring is recommended. Again, the patient has a history of hypertension. Continue increased maternal and fetal surveillance as outlined above. 33.  Poor fetal growth, improved -- The ultrasound findings from 30 weeks 2 days were reviewed with the patient. Overall, the estimated fetal weight is at the 40th percentile, however the abdominal circumference is measuring at the 7th percentile.      Fetal growth was reevaluated at 32 weeks gestation. The overall estimate of fetal weight was 3 pounds 14 ounces, 44 percentile. The [facility], [place], operated by [facility] was at the 11th percentile. In the past 2 weeks, the overall estimated fetal weight is increased by 330 g (expected 200 g). The [facility], [place], operated by [facility] has increased by 2 cm (expected 2 cm). Fetal testing was reassuring. Counseling was previously provided to patient. Counseling was reviewed. The patient did not have any additional questions or concerns. Again, the overall estimated fetal weight is greater than the 10th percentile, however the abdominal circumference measures less than the 10th percentile at 30 weeks 2 days. Reassuring findings included a normal amniotic fluid index, a biophysical profile score of 8/8, and normal Doppler studies. The patient was counseled that typically fetal growth restriction is formally diagnosed when the overall estimated fetal weight is less than the 10th percentile. However, a decline in the overall estimated fetal weight of greater than 20% and/or an abdominal circumference that measures less then the 10th percentile are findings that are also concerning for and/or consistent with fetal growth restriction. In over 70% of cases, small fetal size is constitutional. In approximately 30% of cases, there is a secondary cause related to placental insufficiency, a fetal issue, and/or an underlying maternal condition. Risk factors were reviewed with the patient including malnutrition, maternal chronic diseases (ie SLE, renal disease, antiphospholipid antibodies, anemia, diabetes), placental disease (chorioangioma, mosaicism), infections, fetal aneuploidy, and teratogen exposure. She was counseled regarding general management plans and that mild IUGR can generally be expectantly managed until 37-39 weeks' gestation.   If there is severe growth restriction, delivery timing is based on the point at which the risk of fetal death exceeds that of  death.  There is an increased risk for fetal loss in the setting of growth restriction, thus, increased surveillance is indicated. The best predictors of outcome are fetal size and gestational age attained. Overall, the long term outcome depends on the etiology of the poor growth. At this time, I recommend increased fetal surveillance. The patient was counseled to monitor fetal kick counts daily. Instructions were reviewed. She was scheduled to return in 2 weeks for follow-up fetal growth assessment and testing. Additional recommendations will be made at that time. Given the concern for poor fetal growth, additional maternal evaluation was recommended. The following labs were ordered: Preeclampsia screening, antiphospholipid antibodies, thyroid function studies/thyroid peroxidase antibodies, a nutrition panel, and infection studies. -- Testing was completed on 1/16/2023. Results are summarized above. Her prenatal records were reviewed and she had early screening with cell free DNA that was low risk for aneuploidy. Given the lag in fetal growth, a low dose aspirin was recommended. She was counseled to start 81 mg of aspirin daily. The risks and benefits were discussed. The patient was counseled to continue a daily low-dose aspirin as outlined above. 34. Shortness of breath with activities, stable -- The patient had reports of increased shortness of breath primarily with activity. She denied having any associated chest pain, tachycardia, heart palpitations, lightheadedness, dizziness, and/or syncope. The patient was counseled that her symptoms are likely secondary to the pregnancy. With worsening symptoms or the development of any associated cardiac symptoms, then additional maternal evaluation will be recommended.      Precautions were reviewed with the patient and she was counseled to present to the hospital with persistent/worsening symptoms or the development of associated tachycardia, heart palpitations, chest pain, lightheadedness, dizziness, and/or syncope. 35.  Decreased TED -- The ultrasound images were reviewed with the patient. Initially, on ultrasound, the TED measured anywhere from 7 to 8.5 cm. Imaging was repeated following her nonstress test.  The TDE ranged from 9 to 11 cm with a good 2 x 2 centimeter pocket of fluid seen. Secondary to the patient's report of a gush of fluid on 1/22/2023, the recommendation was made for the patient to go to L&D to have a sterile speculum exam and AmniSure. If testing is negative for rupture, she was scheduled to return on Thursday for a follow-up ultrasound and visit. Precautions to call and/or return sooner were reviewed. Antepartum was contacted regarding the patient and recommendations outlined above. --I requested the patient return for a follow-up assessment in 3 days unless there is a clinical reason for her to return prior to that time. She is to call if she has any problems or questions prior to her next visit. Further evaluation and management will be dependent on her clinical presentation and the results of her testing. --The patient was advised to call if she has any increased vaginal discharge, vaginal bleeding, contractions, abdominal pain, back pain or any new significant symptomatology prior to her next visit. I advised her that these are signs and symptoms of cervical change and require follow-up assessment when they occur. Preeclampsia precautions were also reviewed with the patient. --The patient was also counseled to call and/or return with any concerns for decreased fetal activity. --The patient is to continue to follow with you in your office for ongoing obstetric care. --The total time spent on today's visit was 30 minutes.   This included preparation for the visit (i.e. reviewing prior external notes and test results), performance of a medically appropriate history and examination, counseling, orders for medications, tests or other procedures, and coordination of care. Greater than 50% of the time was spent face-to-face with the patient. This time is exclusive of procedures performed. I answered all of  the patient's questions to her satisfaction. I asked her to call if she had any additional questions prior to her next visit. --At the conclusion of the visit, the patient appeared to have a good understanding of the issues discussed. I answered all of her questions to her satisfaction. I asked her to call if she had any additional questions prior to her next visit. --Thank you for allowing me to participate in the care of this pleasant patient. Please don't hesitate to call me if you have any questions. Sincerely,      Abi Bourgeois MD, [place] [id number]  [phone]      *All or parts of this note may have been generated using a voice recognition program. There may be typo, grammar, or Word substitution errors that have escaped my review of this note.

## 2023-01-23 NOTE — LETTER
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  hypoglycemia. She should also have a fasting sugar checked postpartum. She was counseled that she has a 50% risk for the development of type 2 diabetes in the next 10 years. This risk can be modified with diet and lifestyle changes. She will need a 2 hour oral glucose tolerance test at 8-12 weeks postpartum. If this is normal, she should have yearly screening for diabetes. If she becomes pregnant in the future, she is at increased risk for recurrent gestational diabetes, 60-70%, and should have early screening for gestational diabetes in the late first or early second trimester. 32.  History of elevated blood pressure -- The patient's blood pressure was initially elevated at 143/84 at 28 weeks gestation. Her blood pressure was repeated normal 113/73. The patient's urine dip was negative for protein. The patient denies having any other prior blood pressure elevations during this pregnancy. She denies having any symptoms of headache, vision change, chest pain, shortness of breath, nausea, vomiting, and or right upper quadrant pain. An exam was done in the office. The patient's heart had a regular rate and rhythm. Her lungs were clear to auscultation bilaterally.   Her abdomen was soft, gravid, non tender, and with normal bowel sounds. She had +1 patellar reflexes bilaterally. No clonus was noted bilaterally. She had trace edema in her bilateral lower extremities. The patient's blood pressure was normal today at 134/83. Her urine was negative for protein. The patient was counseled that at this time, continue close monitoring is recommended. Again, the patient has a history of hypertension. Continue increased maternal and fetal surveillance as outlined above. 33.  Poor fetal growth, improved -- The ultrasound findings from 30 weeks 2 days were reviewed with the patient. Overall, the estimated fetal weight is at the 40th percentile, however the abdominal circumference is measuring at the 7th percentile. Fetal growth was reevaluated at 32 weeks gestation. The overall estimate of fetal weight was 3 pounds 14 ounces, 44 percentile. The [facility] was at the 11th percentile. In the past 2 weeks, the overall estimated fetal weight is increased by 330 g (expected 200 g). The [facility] has increased by 2 cm (expected 2 cm). Fetal testing was reassuring. Counseling was previously provided to patient. Counseling was reviewed. The patient did not have any additional questions or concerns. Again, the overall estimated fetal weight is greater than the 10th percentile, however the abdominal circumference measures less than the 10th percentile at 30 weeks 2 days. Reassuring findings included a normal amniotic fluid index, a biophysical profile score of 8/8, and normal Doppler studies. The patient was counseled that typically fetal growth restriction is formally diagnosed when the overall estimated fetal weight is less than the 10th percentile. However, a decline in the overall estimated fetal weight of greater than 20% and/or an abdominal circumference that measures less then the 10th percentile are findings that are also concerning for and/or consistent with fetal growth restriction.  In over 70% of cases, small fetal size is constitutional. In approximately 30% of cases, there is a secondary cause related to placental insufficiency, a fetal issue, and/or an underlying maternal condition. Risk factors were reviewed with the patient including malnutrition, maternal chronic diseases (ie SLE, renal disease, antiphospholipid antibodies, anemia, diabetes), placental disease (chorioangioma, mosaicism), infections, fetal aneuploidy, and teratogen exposure. She was counseled regarding general management plans and that mild IUGR can generally be expectantly managed until 37-39 weeks' gestation. If there is severe growth restriction, delivery timing is based on the point at which the risk of fetal death exceeds that of  death. There is an increased risk for fetal loss in the setting of growth restriction, thus, increased surveillance is indicated. The best predictors of outcome are fetal size and gestational age attained. Overall, the long term outcome depends on the etiology of the poor growth. At this time, I recommend increased fetal surveillance. The patient was counseled to monitor fetal kick counts daily. Instructions were reviewed. She was scheduled to return in 2 weeks for follow-up fetal growth assessment and testing. Additional recommendations will be made at that time. Given the concern for poor fetal growth, additional maternal evaluation was recommended. The following labs were ordered: Preeclampsia screening, antiphospholipid antibodies, thyroid function studies/thyroid peroxidase antibodies, a nutrition panel, and infection studies. -- Testing was completed on 2023. Results are summarized above. Her prenatal records were reviewed and she had early screening with cell free DNA that was low risk for aneuploidy. Given the lag in fetal growth, a low dose aspirin was recommended. She was counseled to start 81 mg of aspirin daily.   The risks and benefits were discussed. The patient was counseled to continue a daily low-dose aspirin as outlined above. 34. Shortness of breath with activities, stable -- The patient had reports of increased shortness of breath primarily with activity. She denied having any associated chest pain, tachycardia, heart palpitations, lightheadedness, dizziness, and/or syncope. The patient was counseled that her symptoms are likely secondary to the pregnancy. With worsening symptoms or the development of any associated cardiac symptoms, then additional maternal evaluation will be recommended. Precautions were reviewed with the patient and she was counseled to present to the hospital with persistent/worsening symptoms or the development of associated tachycardia, heart palpitations, chest pain, lightheadedness, dizziness, and/or syncope. 35.  Decreased TED -- The ultrasound images were reviewed with the patient. Initially, on ultrasound, the TED measured anywhere from 7 to 8.5 cm. Imaging was repeated following her nonstress test.  The TED ranged from 9 to 11 cm with a good 2 x 2 centimeter pocket of fluid seen. Secondary to the patient's report of a gush of fluid on 1/22/2023, the recommendation was made for the patient to go to L&D to have a sterile speculum exam and AmniSure. If testing is negative for rupture, she was scheduled to return on Thursday for a follow-up ultrasound and visit. Precautions to call and/or return sooner were reviewed. Antepartum was contacted regarding the patient and recommendations outlined above. --I requested the patient return for a follow-up assessment in 3 days unless there is a clinical reason for her to return prior to that time. She is to call if she has any problems or questions prior to her next visit. Further evaluation and management will be dependent on her clinical presentation and the results of her testing.      --The patient was advised to call if she has any increased vaginal discharge, vaginal bleeding, contractions, abdominal pain, back pain or any new significant symptomatology prior to her next visit. I advised her that these are signs and symptoms of cervical change and require follow-up assessment when they occur. Preeclampsia precautions were also reviewed with the patient. --The patient was also counseled to call and/or return with any concerns for decreased fetal activity. --The patient is to continue to follow with you in your office for ongoing obstetric care. --The total time spent on today's visit was 30 minutes. This included preparation for the visit (i.e. reviewing prior external notes and test results), performance of a medically appropriate history and examination, counseling, orders for medications, tests or other procedures, and coordination of care. Greater than 50% of the time was spent face-to-face with the patient. This time is exclusive of procedures performed. I answered all of  the patient's questions to her satisfaction. I asked her to call if she had any additional questions prior to her next visit. --At the conclusion of the visit, the patient appeared to have a good understanding of the issues discussed. I answered all of her questions to her satisfaction. I asked her to call if she had any additional questions prior to her next visit. --Thank you for allowing me to participate in the care of this pleasant patient. Please don't hesitate to call me if you have any questions. Sincerely,      Sukhwinder Kline MD, [facility] 263  [phone]      *All or parts of this note may have been generated using a voice recognition program. There may be typo, grammar, or Word substitution errors that have escaped my review of this note.

## 2023-01-23 NOTE — DISCHARGE INSTRUCTIONS
Home Undelivered Discharge Instructions    After Discharge Orders:    Future Appointments   Date Time Provider Casie Lacy   1/24/2023  9:20 AM DO JEAN-PAUL Delaney [facility]   1/26/2023  2:30 PM Ashu Bennett MD [facility]   1/31/2023 10:30 AM MD JEAN-PAUL Metcalf [facility]   2/3/2023  9:00 AM Ashu Bennett MD [facility]   2/7/2023  8:00 AM Ashu Bennett MD [facility]   2/10/2023 10:30 AM MD JEAN-PAUL Metcalf [facility]   2/14/2023  8:00 AM Ashu Bennett MD [facility]   2/17/2023 10:30 AM MD JEAN-PAUL Metcalf [facility]   2/21/2023  8:00 AM MD JEAN-PAUL Metcalf [facility]   [facility]/24[facility]23 10:30 [facility]   2/28/2023  9:00 AM MD JEAN-PAUL Metcalf [facility]   3/3/2023  9:00 AM MD JEAN-PAUL Metcalf [facility]   4/26/2023 10:00 AM Augie Zelaya MD [facility] MED [facility]   4/26/2023 10:00 AM [facility] ONC FAST TRACK 2 [facility] Med Onc [facility]           Other instructions: Do kick counts once a day on your baby. Choose the time of day your baby is most active. Get in a comfortable lying or sitting position and time how long it takes to feel 10 kicks, twists, turns, swishes, or rolls.  Call your physician or midwife if there have not been 10 kicks in 2 hours    Call physician or midwife, return to Labor and Delivery, call 911, or go to the nearest Emergency Room if: increased leakage or fluid, contractions more than  6 per  30 minutes, decreased fetal movement, persistent low back pain or cramping, bleeding from vaginal area, difficulty urinating, pain with urination, difficulty breathing, new calf pain, persistent headache, or vision change

## 2023-01-23 NOTE — PROGRESS NOTES
Pt here for bpp/nst  +fm  Pt denies  vag bleeding or contractions  Pt voiced rachel gaxiola sometimes. Pt stated thinks  had lof last night.    Pt stated brought glucometer feels not getting correct readings

## 2023-01-23 NOTE — PROGRESS NOTES
33.0 OF Dr Angela Mosher presents from dr Powell Krabbe office for r/o ROM,  pt states about [id number] last night felt warm gush.  Has occasional cramping. +fm, denies vb, Had intercourse yesterday am.   HX short cervix, GDM diet controlled, anemia  EFM in place,

## 2023-01-23 NOTE — PROGRESS NOTES
MFM Note    The patient is a 76-year-old  2 para 1-0-0-1 currently 33 weeks gestation (LMP = [address]) who is followed by maternal-fetal medicine secondary to a history of a chronic DVT and is on prophylactic Lovenox. She also is being followed for a borderline cervical length, poor fetal growth and gestational diabetes. The patient presented to the office today for follow-up. She reported having increased mucus-like discharge last week and thinks she may have lost her mucous plug. Additionally, yesterday, she reports having a gush of fluid that she does not believe was urine. Initially, on ultrasound, the TED measured anywhere from 7 to 8.5 cm. Imaging was repeated following her nonstress test.  The TED ranged from 9 to 11 cm with a good 2 x 2 centimeter pocket of fluid seen. Secondary to the patient's report of a gush of fluid on 2023, the recommendation was made for the patient to go to L&D to have a sterile speculum exam and AmniSure. If testing is negative for rupture, she was scheduled to return on Thursday for a follow-up ultrasound and visit. Precautions to call and/or return sooner were reviewed. Antepartum was contacted regarding the patient and recommendations outlined above.

## 2023-01-24 RX ORDER — FLASH GLUCOSE SCANNING READER
1 EACH MISCELLANEOUS 4 TIMES DAILY
Qty: 1 EACH | Refills: 0 | Status: SHIPPED | OUTPATIENT
Start: 2023-01-24

## 2023-01-24 RX ORDER — FLASH GLUCOSE SENSOR
1 KIT MISCELLANEOUS 4 TIMES DAILY
Qty: 5 EACH | Refills: 1 | Status: SHIPPED | OUTPATIENT
Start: 2023-01-24 | End: 2023-01-26 | Stop reason: SDUPTHER

## 2023-01-26 ENCOUNTER — ROUTINE PRENATAL (OUTPATIENT)
Dept: OBGYN CLINIC | Age: 33
End: 2023-01-26
Payer: MEDICAID

## 2023-01-26 ENCOUNTER — ANCILLARY PROCEDURE (OUTPATIENT)
Dept: OBGYN CLINIC | Age: 33
End: 2023-01-26
Payer: MEDICAID

## 2023-01-26 VITALS
BODY MASS INDEX: 24.32 KG/M2 | WEIGHT: 155.25 LBS | HEART RATE: 101 BPM | SYSTOLIC BLOOD PRESSURE: 119 MMHG | DIASTOLIC BLOOD PRESSURE: 83 MMHG

## 2023-01-26 DIAGNOSIS — O26.12 LOW WEIGHT GAIN DURING PREGNANCY IN SECOND TRIMESTER: ICD-10-CM

## 2023-01-26 DIAGNOSIS — Z86.718 HISTORY OF DVT (DEEP VEIN THROMBOSIS): ICD-10-CM

## 2023-01-26 DIAGNOSIS — O24.419 GESTATIONAL DIABETES MELLITUS (GDM), ANTEPARTUM, GESTATIONAL DIABETES METHOD OF CONTROL UNSPECIFIED: Primary | ICD-10-CM

## 2023-01-26 DIAGNOSIS — O26.873 SHORT CERVIX DURING PREGNANCY IN THIRD TRIMESTER: ICD-10-CM

## 2023-01-26 DIAGNOSIS — Z86.79 HISTORY OF HYPERTENSION: ICD-10-CM

## 2023-01-26 DIAGNOSIS — R35.0 URINARY FREQUENCY: ICD-10-CM

## 2023-01-26 DIAGNOSIS — Z3A.33 33 WEEKS GESTATION OF PREGNANCY: ICD-10-CM

## 2023-01-26 DIAGNOSIS — O36.1920 ANTI-M ISOIMMUNIZATION AFFECTING PREGNANCY IN SECOND TRIMESTER, SINGLE OR UNSPECIFIED FETUS: ICD-10-CM

## 2023-01-26 DIAGNOSIS — R79.0 LOW FERRITIN: ICD-10-CM

## 2023-01-26 DIAGNOSIS — O26.892 VAGINAL DISCHARGE DURING PREGNANCY IN SECOND TRIMESTER: ICD-10-CM

## 2023-01-26 DIAGNOSIS — R79.89 HYPOTHYROXINEMIA: ICD-10-CM

## 2023-01-26 DIAGNOSIS — N89.8 VAGINAL DISCHARGE DURING PREGNANCY IN SECOND TRIMESTER: ICD-10-CM

## 2023-01-26 DIAGNOSIS — O26.899 RH NEGATIVE STATE IN ANTEPARTUM PERIOD: ICD-10-CM

## 2023-01-26 DIAGNOSIS — E53.8 LOW VITAMIN B12 LEVEL: ICD-10-CM

## 2023-01-26 DIAGNOSIS — Z67.91 RH NEGATIVE STATE IN ANTEPARTUM PERIOD: ICD-10-CM

## 2023-01-26 DIAGNOSIS — O36.5930 POOR CLINICAL FETAL GROWTH IN THIRD TRIMESTER, SINGLE OR UNSPECIFIED FETUS: ICD-10-CM

## 2023-01-26 LAB
GLUCOSE URINE, POC: NEGATIVE
PROTEIN UA: NEGATIVE

## 2023-01-26 PROCEDURE — 99213 OFFICE O/P EST LOW 20 MIN: CPT | Performed by: OBSTETRICS & GYNECOLOGY

## 2023-01-26 PROCEDURE — 81002 URINALYSIS NONAUTO W/O SCOPE: CPT | Performed by: OBSTETRICS & GYNECOLOGY

## 2023-01-26 PROCEDURE — 76818 FETAL BIOPHYS PROFILE W/NST: CPT | Performed by: OBSTETRICS & GYNECOLOGY

## 2023-01-26 PROCEDURE — 76815 OB US LIMITED FETUS(S): CPT | Performed by: OBSTETRICS & GYNECOLOGY

## 2023-01-26 RX ORDER — FLASH GLUCOSE SENSOR
1 KIT MISCELLANEOUS 4 TIMES DAILY
Qty: 5 EACH | Refills: 1 | Status: SHIPPED | OUTPATIENT
Start: 2023-01-26

## 2023-01-26 NOTE — PROGRESS NOTES
Pt here for NST and fluid check  Pt c/o occasional cramping but no bleeding  Pt states good fetal movement

## 2023-01-26 NOTE — PATIENT INSTRUCTIONS
You might be having an NST at your next appt. Please eat a large snack or breakfast before coming to office. Thank you Please arrive for your scheduled appointment at least 15 minutes early with your actual insurance card+ a photo ID. Also if you need any refills ordered or have questions, it may take up 48 hours to reply. Please allow ample time for your refills. Call me when you use last refill. Thank you for your cooperation. Call your primary obstetrician with bleeding, leaking of fluid, abdominal tenderness, headache, blurry vision, epigastric pain and increased urinary frequency. If you are experiencing an emergency and need immediate help, call 911 or go to go emergency room or labor and delivery. Do kick counts after dinner. Call your primary obstetrician if less than 10 kicks in 2 hours after dinner. Call your primary obstetrician with bleeding, leaking of fluid, abdominal tenderness, headache, blurry vision, epigastric pain and increased urinary frequency. if you are sick, not feeling well or have an infectious process going on please reschedule your appointment by calling [phone]. Also if any family members are not feeling well, please do not bring them to your appointment. We appreciate your cooperation. We are doing this in order to protect our pregnant mothers+ their babies.

## 2023-01-26 NOTE — PROGRESS NOTES
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 patient's blood pressure was initially elevated at 143/84 at 28 weeks gestation. Her blood pressure was repeated normal 113/73. The patient's urine dip was negative for protein. The patient denies having any other prior blood pressure elevations during this pregnancy. She denies having any symptoms of headache, vision change, chest pain, shortness of breath, nausea, vomiting, and or right upper quadrant pain. An exam was done in the office. The patient's heart had a regular rate and rhythm. Her lungs were clear to auscultation bilaterally. Her abdomen was soft, gravid, non tender, and with normal bowel sounds. She had +1 patellar reflexes bilaterally. No clonus was noted bilaterally. She had trace edema in her bilateral lower extremities. The patient's blood pressure was normal today at 119/83. Her urine was negative for protein. The patient was counseled that at this time, continue close monitoring is recommended. Again, the patient has a history of hypertension. Continue increased maternal and fetal surveillance as outlined above. 33.  Poor fetal growth, improved -- The ultrasound findings from 30 weeks 2 days were reviewed with the patient.  Overall, the estimated fetal weight is at the 40th percentile, however the abdominal circumference is measuring at the 7th percentile. Fetal growth was reevaluated at 32 weeks gestation. The overall estimate of fetal weight was 3 pounds 14 ounces, 44 percentile. The [facility] was at the 11th percentile. In the past 2 weeks, the overall estimated fetal weight is increased by 330 g (expected 200 g). The [facility] has increased by 2 cm (expected 2 cm). Fetal testing was reassuring. Counseling was previously provided to patient. Counseling was reviewed. The patient did not have any additional questions or concerns. Again, the overall estimated fetal weight is greater than the 10th percentile, however the abdominal circumference measures less than the 10th percentile at 30 weeks 2 days. Reassuring findings included a normal amniotic fluid index, a biophysical profile score of 8/8, and normal Doppler studies. The patient was counseled that typically fetal growth restriction is formally diagnosed when the overall estimated fetal weight is less than the 10th percentile. However, a decline in the overall estimated fetal weight of greater than 20% and/or an abdominal circumference that measures less then the 10th percentile are findings that are also concerning for and/or consistent with fetal growth restriction. In over 70% of cases, small fetal size is constitutional. In approximately 30% of cases, there is a secondary cause related to placental insufficiency, a fetal issue, and/or an underlying maternal condition. Risk factors were reviewed with the patient including malnutrition, maternal chronic diseases (ie SLE, renal disease, antiphospholipid antibodies, anemia, diabetes), placental disease (chorioangioma, mosaicism), infections, fetal aneuploidy, and teratogen exposure. She was counseled regarding general management plans and that mild IUGR can generally be expectantly managed until 37-39 weeks' gestation.   If there is severe growth restriction, delivery timing is based on the point at which the risk of fetal death exceeds that of  death. There is an increased risk for fetal loss in the setting of growth restriction, thus, increased surveillance is indicated. The best predictors of outcome are fetal size and gestational age attained. Overall, the long term outcome depends on the etiology of the poor growth. At this time, I recommend increased fetal surveillance. The patient was counseled to monitor fetal kick counts daily. Instructions were reviewed. She was scheduled to return in 2 weeks for follow-up fetal growth assessment and testing. Additional recommendations will be made at that time. Given the concern for poor fetal growth, additional maternal evaluation was recommended. The following labs were ordered: Preeclampsia screening, antiphospholipid antibodies, thyroid function studies/thyroid peroxidase antibodies, a nutrition panel, and infection studies. -- Testing was completed on 2023. Results are summarized above. Her prenatal records were reviewed and she had early screening with cell free DNA that was low risk for aneuploidy. Given the lag in fetal growth, a low dose aspirin was recommended. She was counseled to start 81 mg of aspirin daily. The risks and benefits were discussed. The patient was counseled to continue a daily low-dose aspirin as outlined above. 34. Shortness of breath with activities, stable -- The patient previously reported symptoms of increased shortness of breath primarily with activity. She denied having any associated chest pain, tachycardia, heart palpitations, lightheadedness, dizziness, and/or syncope. Today, the patient reports that her symptoms are stable. The patient was again counseled that her symptoms are likely secondary to the pregnancy. With worsening symptoms or the development of any associated cardiac symptoms, then additional maternal evaluation will be recommended.      Precautions were reviewed with the patient and she was counseled to present to the hospital with persistent/worsening symptoms or the development of associated tachycardia, heart palpitations, chest pain, lightheadedness, dizziness, and/or syncope. 35.  Decreased TED, improved -- The ultrasound images from 33 weeks gestation were reviewed with the patient. Initially, on ultrasound, the TED measured anywhere from 7 to 8.5 cm. Imaging was repeated following her nonstress test.  The TED ranged from 9 to 11 cm with a good 2 x 2 centimeter pocket of fluid seen. Secondary to the patient's report of a gush of fluid on 1/22/2023, the recommendation was made for the patient to go to L&D to have a sterile speculum exam and AmniSure. If testing is negative for rupture, she was scheduled to return on Thursday for a follow-up ultrasound and visit. Precautions to call and/or return sooner were reviewed. An initial was negative on 1/23/2023. The patient returns today for follow-up testing. The TED was normal at 10.3 cm. Patient denied having any leaking of fluid or vaginal discharge. --I requested the patient return for a follow-up assessment in 1 week unless there is a clinical reason for her to return prior to that time. She is to call if she has any problems or questions prior to her next visit. Further evaluation and management will be dependent on her clinical presentation and the results of her testing. --The patient was advised to call if she has any increased vaginal discharge, vaginal bleeding, contractions, abdominal pain, back pain or any new significant symptomatology prior to her next visit. I advised her that these are signs and symptoms of cervical change and require follow-up assessment when they occur. Preeclampsia precautions were also reviewed with the patient. --The patient was also counseled to call and/or return with any concerns for decreased fetal activity.     --The patient is to continue to follow with you in your office for ongoing obstetric care. --The total time spent on today's visit was 25 minutes. This included preparation for the visit (i.e. reviewing prior external notes and test results), performance of a medically appropriate history and examination, counseling, orders for medications, tests or other procedures, and coordination of care. Greater than 50% of the time was spent face-to-face with the patient. This time is exclusive of procedures performed. I answered all of  the patient's questions to her satisfaction. I asked her to call if she had any additional questions prior to her next visit. --At the conclusion of the visit, the patient appeared to have a good understanding of the issues discussed. I answered all of her questions to her satisfaction. I asked her to call if she had any additional questions prior to her next visit. --Thank you for allowing me to participate in the care of this pleasant patient. Please don't hesitate to call me if you have any questions. Sincerely,      Juan Romo MD, [id number]  [phone]      *All or parts of this note may have been generated using a voice recognition program. There may be typo, grammar, or Word substitution errors that have escaped my review of this note.

## 2023-01-26 NOTE — LETTER
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 mg/dL during labor in order to minimize the risk of  hypoglycemia. She should also have a fasting sugar checked postpartum. She was counseled that she has a 50% risk for the development of type 2 diabetes in the next 10 years. This risk can be modified with diet and lifestyle changes. She will need a 2 hour oral glucose tolerance test at 8-12 weeks postpartum. If this is normal, she should have yearly screening for diabetes. If she becomes pregnant in the future, she is at increased risk for recurrent gestational diabetes, 60-70%, and should have early screening for gestational diabetes in the late first or early second trimester. 32.  History of elevated blood pressure -- The patient's blood pressure was initially elevated at 143/84 at 28 weeks gestation. Her blood pressure was repeated normal 113/73. The patient's urine dip was negative for protein. The patient denies having any other prior blood pressure elevations during this pregnancy. She denies having any symptoms of headache, vision change, chest pain, shortness of breath, nausea, vomiting, and or right upper quadrant pain. An exam was done in the office. The patient's heart had a regular rate and rhythm. Her lungs were clear to auscultation bilaterally. Her abdomen was soft, gravid, non tender, and with normal bowel sounds. She had +1 patellar reflexes bilaterally. No clonus was noted bilaterally. She had trace edema in her bilateral lower extremities. The patient's blood pressure was normal today at 119/83. Her urine was negative for protein. The patient was counseled that at this time, continue close monitoring is recommended. Again, the patient has a history of hypertension. Continue increased maternal and fetal surveillance as outlined above. 33.  Poor fetal growth, improved -- The ultrasound findings from 30 weeks 2 days were reviewed with the patient. Overall, the estimated fetal weight is at the 40th percentile, however the abdominal circumference is measuring at the 7th percentile. Fetal growth was reevaluated at 32 weeks gestation. The overall estimate of fetal weight was 3 pounds 14 ounces, 44 percentile. The [facility] was at the 11th percentile. In the past 2 weeks, the overall estimated fetal weight is increased by 330 g (expected 200 g). The [facility] has increased by 2 cm (expected 2 cm). Fetal testing was reassuring. Counseling was previously provided to patient. Counseling was reviewed. The patient did not have any additional questions or concerns. Again, the overall estimated fetal weight is greater than the 10th percentile, however the abdominal circumference measures less than the 10th percentile at 30 weeks 2 days. Reassuring findings included a normal amniotic fluid index, a biophysical profile score of 8/8, and normal Doppler studies. The patient was counseled that typically fetal growth restriction is formally diagnosed when the overall estimated fetal weight is less than the 10th percentile. However, a decline in the overall estimated fetal weight of greater than 20% and/or an abdominal circumference that measures less then the 10th percentile are findings that are also concerning for and/or consistent with fetal growth restriction.  In over 70% of cases, small fetal size is constitutional. In approximately 30% of cases, there is a secondary cause related to placental insufficiency, a fetal issue, and/or an underlying maternal condition. Risk factors were reviewed with the patient including malnutrition, maternal chronic diseases (ie SLE, renal disease, antiphospholipid antibodies, anemia, diabetes), placental disease (chorioangioma, mosaicism), infections, fetal aneuploidy, and teratogen exposure. She was counseled regarding general management plans and that mild IUGR can generally be expectantly managed until 37-39 weeks' gestation. If there is severe growth restriction, delivery timing is based on the point at which the risk of fetal death exceeds that of  death. There is an increased risk for fetal loss in the setting of growth restriction, thus, increased surveillance is indicated. The best predictors of outcome are fetal size and gestational age attained. Overall, the long term outcome depends on the etiology of the poor growth. At this time, I recommend increased fetal surveillance. The patient was counseled to monitor fetal kick counts daily. Instructions were reviewed. She was scheduled to return in 2 weeks for follow-up fetal growth assessment and testing. Additional recommendations will be made at that time. Given the concern for poor fetal growth, additional maternal evaluation was recommended. The following labs were ordered: Preeclampsia screening, antiphospholipid antibodies, thyroid function studies/thyroid peroxidase antibodies, a nutrition panel, and infection studies. -- Testing was completed on 2023. Results are summarized above. Her prenatal records were reviewed and she had early screening with cell free DNA that was low risk for aneuploidy. Given the lag in fetal growth, a low dose aspirin was recommended. She was counseled to start 81 mg of aspirin daily. The risks and benefits were discussed.   The patient was counseled to continue a daily low-dose aspirin as outlined above. 34. Shortness of breath with activities, stable -- The patient previously reported symptoms of increased shortness of breath primarily with activity. She denied having any associated chest pain, tachycardia, heart palpitations, lightheadedness, dizziness, and/or syncope. Today, the patient reports that her symptoms are stable. The patient was again counseled that her symptoms are likely secondary to the pregnancy. With worsening symptoms or the development of any associated cardiac symptoms, then additional maternal evaluation will be recommended. Precautions were reviewed with the patient and she was counseled to present to the hospital with persistent/worsening symptoms or the development of associated tachycardia, heart palpitations, chest pain, lightheadedness, dizziness, and/or syncope. 35.  Decreased TED, improved -- The ultrasound images from 33 weeks gestation were reviewed with the patient. Initially, on ultrasound, the TED measured anywhere from 7 to 8.5 cm. Imaging was repeated following her nonstress test.  The TED ranged from 9 to 11 cm with a good 2 x 2 centimeter pocket of fluid seen. Secondary to the patient's report of a gush of fluid on 1/22/2023, the recommendation was made for the patient to go to L&D to have a sterile speculum exam and AmniSure. If testing is negative for rupture, she was scheduled to return on Thursday for a follow-up ultrasound and visit. Precautions to call and/or return sooner were reviewed. An initial was negative on 1/23/2023. The patient returns today for follow-up testing. The TED was normal at 10.3 cm. Patient denied having any leaking of fluid or vaginal discharge. --I requested the patient return for a follow-up assessment in 1 week unless there is a clinical reason for her to return prior to that time.  She is to call if she has any problems or questions prior to her next visit. Further evaluation and management will be dependent on her clinical presentation and the results of her testing. --The patient was advised to call if she has any increased vaginal discharge, vaginal bleeding, contractions, abdominal pain, back pain or any new significant symptomatology prior to her next visit. I advised her that these are signs and symptoms of cervical change and require follow-up assessment when they occur. Preeclampsia precautions were also reviewed with the patient. --The patient was also counseled to call and/or return with any concerns for decreased fetal activity. --The patient is to continue to follow with you in your office for ongoing obstetric care. --The total time spent on today's visit was 25 minutes. This included preparation for the visit (i.e. reviewing prior external notes and test results), performance of a medically appropriate history and examination, counseling, orders for medications, tests or other procedures, and coordination of care. Greater than 50% of the time was spent face-to-face with the patient. This time is exclusive of procedures performed. I answered all of  the patient's questions to her satisfaction. I asked her to call if she had any additional questions prior to her next visit. --At the conclusion of the visit, the patient appeared to have a good understanding of the issues discussed. I answered all of her questions to her satisfaction. I asked her to call if she had any additional questions prior to her next visit. --Thank you for allowing me to participate in the care of this pleasant patient. Please don't hesitate to call me if you have any questions.       Sincerely,      Nisha Lennon MD, [address]  [phone]      *All or parts of this note may have been generated using a voice recognition program. There may be typo, grammar, or Word substitution errors that have escaped my review of this note.

## 2023-01-29 DIAGNOSIS — R79.89 LOW VITAMIN D LEVEL: ICD-10-CM

## 2023-01-29 DIAGNOSIS — R79.0 LOW FERRITIN: ICD-10-CM

## 2023-01-29 DIAGNOSIS — D64.9 ANEMIA, UNSPECIFIED TYPE: ICD-10-CM

## 2023-01-29 DIAGNOSIS — Z3A.16 16 WEEKS GESTATION OF PREGNANCY: ICD-10-CM

## 2023-01-30 PROBLEM — O24.419 GESTATIONAL DIABETES MELLITUS (GDM), ANTEPARTUM: Status: ACTIVE | Noted: 2023-01-30

## 2023-01-30 RX ORDER — ACETAMINOPHEN 160 MG
TABLET,DISINTEGRATING ORAL
Qty: 30 CAPSULE | Refills: 3 | Status: SHIPPED | OUTPATIENT
Start: 2023-01-30

## 2023-01-30 RX ORDER — LANOLIN ALCOHOL/MO/W.PET/CERES
CREAM (GRAM) TOPICAL
Qty: 30 TABLET | Refills: 3 | Status: SHIPPED | OUTPATIENT
Start: 2023-01-30

## 2023-01-31 ENCOUNTER — ROUTINE PRENATAL (OUTPATIENT)
Dept: OBGYN | Age: 33
End: 2023-01-31
Payer: MEDICAID

## 2023-01-31 ENCOUNTER — ANCILLARY PROCEDURE (OUTPATIENT)
Dept: OBGYN CLINIC | Age: 33
End: 2023-01-31
Payer: MEDICAID

## 2023-01-31 ENCOUNTER — ROUTINE PRENATAL (OUTPATIENT)
Dept: OBGYN CLINIC | Age: 33
End: 2023-01-31
Payer: MEDICAID

## 2023-01-31 ENCOUNTER — FOLLOWUP TELEPHONE ENCOUNTER (OUTPATIENT)
Dept: DIABETES SERVICES | Age: 33
End: 2023-01-31

## 2023-01-31 VITALS
HEART RATE: 106 BPM | SYSTOLIC BLOOD PRESSURE: 114 MMHG | BODY MASS INDEX: 24.59 KG/M2 | DIASTOLIC BLOOD PRESSURE: 81 MMHG | WEIGHT: 157 LBS

## 2023-01-31 VITALS
SYSTOLIC BLOOD PRESSURE: 121 MMHG | HEART RATE: 95 BPM | DIASTOLIC BLOOD PRESSURE: 83 MMHG | WEIGHT: 157.6 LBS | BODY MASS INDEX: 24.68 KG/M2

## 2023-01-31 DIAGNOSIS — Z86.718 HISTORY OF DVT (DEEP VEIN THROMBOSIS): ICD-10-CM

## 2023-01-31 DIAGNOSIS — R79.89 HYPOTHYROXINEMIA: ICD-10-CM

## 2023-01-31 DIAGNOSIS — Z67.91 RH NEGATIVE STATE IN ANTEPARTUM PERIOD: ICD-10-CM

## 2023-01-31 DIAGNOSIS — R79.0 LOW FERRITIN: ICD-10-CM

## 2023-01-31 DIAGNOSIS — O26.899 RH NEGATIVE STATE IN ANTEPARTUM PERIOD: ICD-10-CM

## 2023-01-31 DIAGNOSIS — D64.9 ANEMIA, UNSPECIFIED TYPE: ICD-10-CM

## 2023-01-31 DIAGNOSIS — O26.12 LOW WEIGHT GAIN DURING PREGNANCY IN SECOND TRIMESTER: ICD-10-CM

## 2023-01-31 DIAGNOSIS — Z92.89 HISTORY OF BLOOD TRANSFUSION: ICD-10-CM

## 2023-01-31 DIAGNOSIS — Z34.80 SUPERVISION OF OTHER NORMAL PREGNANCY, ANTEPARTUM: Primary | ICD-10-CM

## 2023-01-31 DIAGNOSIS — O26.873 SHORT CERVIX DURING PREGNANCY IN THIRD TRIMESTER: ICD-10-CM

## 2023-01-31 DIAGNOSIS — Z3A.34 34 WEEKS GESTATION OF PREGNANCY: ICD-10-CM

## 2023-01-31 DIAGNOSIS — Z86.79 HISTORY OF HYPERTENSION: ICD-10-CM

## 2023-01-31 DIAGNOSIS — K43.9 ABDOMINAL WALL HERNIA: ICD-10-CM

## 2023-01-31 DIAGNOSIS — O36.5930 POOR CLINICAL FETAL GROWTH IN THIRD TRIMESTER, SINGLE OR UNSPECIFIED FETUS: ICD-10-CM

## 2023-01-31 DIAGNOSIS — O36.1920 ANTI-M ISOIMMUNIZATION AFFECTING PREGNANCY IN SECOND TRIMESTER, SINGLE OR UNSPECIFIED FETUS: ICD-10-CM

## 2023-01-31 DIAGNOSIS — E53.8 LOW VITAMIN B12 LEVEL: ICD-10-CM

## 2023-01-31 DIAGNOSIS — O24.419 GESTATIONAL DIABETES MELLITUS (GDM), ANTEPARTUM, GESTATIONAL DIABETES METHOD OF CONTROL UNSPECIFIED: Primary | ICD-10-CM

## 2023-01-31 LAB
GLUCOSE URINE, POC: NEGATIVE
PROTEIN UA: NEGATIVE

## 2023-01-31 PROCEDURE — G8420 CALC BMI NORM PARAMETERS: HCPCS | Performed by: OBSTETRICS & GYNECOLOGY

## 2023-01-31 PROCEDURE — G8484 FLU IMMUNIZE NO ADMIN: HCPCS | Performed by: OBSTETRICS & GYNECOLOGY

## 2023-01-31 PROCEDURE — G8427 DOCREV CUR MEDS BY ELIG CLIN: HCPCS | Performed by: OBSTETRICS & GYNECOLOGY

## 2023-01-31 PROCEDURE — 81002 URINALYSIS NONAUTO W/O SCOPE: CPT | Performed by: OBSTETRICS & GYNECOLOGY

## 2023-01-31 PROCEDURE — 76820 UMBILICAL ARTERY ECHO: CPT | Performed by: OBSTETRICS & GYNECOLOGY

## 2023-01-31 PROCEDURE — 76818 FETAL BIOPHYS PROFILE W/NST: CPT | Performed by: OBSTETRICS & GYNECOLOGY

## 2023-01-31 PROCEDURE — 1036F TOBACCO NON-USER: CPT | Performed by: OBSTETRICS & GYNECOLOGY

## 2023-01-31 PROCEDURE — 76821 MIDDLE CEREBRAL ARTERY ECHO: CPT | Performed by: OBSTETRICS & GYNECOLOGY

## 2023-01-31 PROCEDURE — 99213 OFFICE O/P EST LOW 20 MIN: CPT | Performed by: OBSTETRICS & GYNECOLOGY

## 2023-01-31 PROCEDURE — 76816 OB US FOLLOW-UP PER FETUS: CPT | Performed by: OBSTETRICS & GYNECOLOGY

## 2023-01-31 PROCEDURE — 99214 OFFICE O/P EST MOD 30 MIN: CPT | Performed by: OBSTETRICS & GYNECOLOGY

## 2023-01-31 NOTE — PROGRESS NOTES
BS range 83-1 02 2-hour post prandial highest 197 the majority in the normal range. Had mild cramping on Sunday position change resolved them. No BLD, DELBERT F.  Good FM. No SS PIH. Meets with [facility] today.

## 2023-01-31 NOTE — PROGRESS NOTES
Diabetes Self-Management Education Record    Participant Name: Larry Armijo  Referring Provider: Mary Hogan MD  Assessment/Evaluation Ratings:  1=Needs Instruction   4=Demonstrates Understanding/Competency  2=Needs Review   NC=Not Covered    3=Comprehends Key Points  N/A=Not Applicable  Topics/Learning Objectives Pre-session Assess Date:  Instructor initials/date  1/9/23 KMS Instr. Date    Instructor initials/date  1/9/23 KMS Follow-up Post- session Eval Comments   Diabetes disease process & Treatment process:   -Define type of diabetes in simple terms.  - Describe the ABCs of  diabetes management  -Identify own type of diabetes  -Identify lifestyle changes/treatment options  -other:  1 [x] All     []  []  []  []  []  3 1/9/23 KMS  GDM, no previous h/o diabetes    Developing strategies for Healthy coping/psychosocial issues:    -Describe feelings about living with diabetes  -Identify coping strategies and sources of stress  -Identify support needed & support network available  -Complete PAID-5 Diabetes questionnaire 1 [x] All     []  []  []    []  4 Date: 1/9/23 KMS  PAID-5 Score: 0  Confidence Score: 5           Prevention, detection & treatment of Chronic complications:    -Identify the prevention, detection and treatment for complications including immunizations, preventive eye, foot, dental and renal exams as indicated per the participant's duration of diabetes and health status.  -Define the natural course of diabetes and the relationship of blood glucose levels to long term complications of diabetes.   1 [] All     []            [x]  4    Prevention, detection & treatment of acute complications:    -State the causes,signs & symptoms of hyper & hypoglycemia, and prevention & treatment strategies.   -Describe sick day guidelines  DKA /indications for ketone testing &  when to call physician  1 [x] All     []      []    4              -Identify severe weather/situation crisis  & diabetes supplies management 1 []      Using medications safely:   -State effects of diabetes medicines on blood glucose levels;  -List diabetes medication taken, action & side effects 1 [] All     []  []   N/A   Insulin/Injectables/glucagon  -Name appropriate injection sites; proper storage; supplies needed;  1   []   N/A    Demonstrate proper technique 1 []   N/A   Monitoring blood glucose, interpreting and using results:   -Identify the purpose of testing   -Identify recommended & personal blood glucose targets & HgbA1C target levels  -State the Importance of logging blood glucose levels for pattern recognition;   -State benefits of reading/using pt generated health data  -Verbalize safe lancet disposal 2 [x] All     []  []    []  []  []  4 1/9/23 KMS  Monitoring 4 times daily, fasting and 2 hours after each meal.  Target glucose ranges reviewed. Patient keeping a log of all results to share with her doctor. Understands to notify doctor of any sustained high/low patterns.    -Demonstrate proper testing technique  []      Incorporating physical activity into lifestyle:   -State effect of exercise on blood glucose levels;   -State benefits of regular exercise;   -Define safety considerations/food choices if needed.  -Describe contraindications/maintenance of activity. 1 [x] All     []  []    []  []  4 1/9/23 KMS  Not physically active--currently has a lifting restriction d/t shortened cervix. Plans to start exercising once this restriction is removed.     Incorporating nutritional management into lifestyle:   -Describe effect of type, amount & timing of food on blood glucose  -Describe methods for preparing and planning   healthy meals  -Correctly read food labels for nutritional values  -Name 3 foods high in Carbohydrate  -Plan a sample 4 carbohydrate-controlled meal using Diabetes Plate Method  -Verbalized ability to measure and count carbohydrate gram servings using food labels and carbohydrate food list.    -Plan a carbohydrate-controlled meal based on individual needs/preferences from a Registered Dietitian.   1 [x] All       []    []    []  []      []        []  3/4   1/9/23 KMS  Patient enjoys pop, juice, and sweet tea. Upon dietary recall, she is also eating carbohydrate food amounts that would exceed her meal/snack allowances. Patient is able to correctly identify carbohydrate foods, measure carbohydrate portions, and read nutrition labels to determine carbohydrate servings. Patient will aim to eat 3 meals daily (spaced out every 4.5 to 5 hours) with snacks 2 hours after each meal according to 2100 calorie meal plan prepared by Gus Rg, MS, RDN, LD. Patient states she may have difficulty sticking to her designated meal time schedule, but will try her best. She plans to switch to naturally flavored venegas or carbonated sparkling water, and limit her sweetened beverages. Developing strategies for problem solving to promote health/change behavior. -Identify 7 self-care behaviors; Personal health risk factors; Benefits, challenges & strategies for behavioral change and set an individualized goal selection. 1   [x]  4 1/9/23 KMS  [x]Nutrition: Follow meal plan. [x]Monitoring: Check glucose levels 4 times daily.    []Exercise  []Medication  []Other     Identified Barriers to learning/adherence to self management plan:    None  []  other    Instruction Method:  Lecture/Discussion and Handouts    Supporting Education Materials/Equipment Provided: Meal Plan and Gestational Pathway Booklet   []Portuguese materials       [] services     []Other:      Encounter Type Date Attended Start Time End Time Comments No Show Dates   Assessment          Session 1         Session 2        1:1 DSMES          In person Follow-up         Gestational Diabetes 1/9/23 KMS 65 1200      DSMES #3        Meter Instrx        Insulin Instrx           Additional Comments:         Date:   Follow-up goal attainment based on patients initial DSMES goal    Dr Notified by [] EMR []Fax        []Post class Hgb A1C  []Medication compliance   []Plate method/meal plan compliance   []Able to state the number of Carbohydrate servings eaten at B,L,D   []Testing blood glucose as prescribed by PCP   []Exercise Routine   []Other:   []Other:     [x]Patient lost to follow-up  Dr Notified by [x]EMR []Fax

## 2023-01-31 NOTE — PROGRESS NOTES
Routine ob  +fm  Pt denies lof vag bleeding or contractions  Pt voiced this past Sunday cramping. Pt just changed positions and eventually stopped  Copied pt sugars david scan to chart.

## 2023-01-31 NOTE — PROGRESS NOTES
Pt here for BPP/NST  Pt denies any bleeding but had some cramping on Sunday  Pt states good fetal movement  Blood sugars scanned into media  Pt did not leave urine in triage

## 2023-01-31 NOTE — PATIENT INSTRUCTIONS

## 2023-01-31 NOTE — PATIENT INSTRUCTIONS
Please arrive for your scheduled appointment at least 15 minutes early with your actual insurance card+ a photo ID. Also if you need any refills ordered or have questions, it may take up 48 hours to reply. Please allow ample time for your refills. Call me when you use last refill. Thank you for your cooperation. Call your primary obstetrician with bleeding, leaking of fluid, abdominal tenderness, headache, blurry vision, epigastric pain and increased urinary frequency. If you are experiencing an emergency and need immediate help, call 911 or go to go emergency room or labor and delivery.  Do kick counts after dinner. Call your primary obstetrician if less than 10 kicks in 2 hours after dinner.     Call your primary obstetrician with bleeding, leaking of fluid, abdominal tenderness, headache, blurry vision, epigastric pain and increased urinary frequency. Do kick counts after dinner. Call your primary obstetrician if less than 10 kicks in 2 hours after dinner.     Call your primary obstetrician with bleeding, leaking of fluid, abdominal tenderness, headache, blurry vision, epigastric pain and increased urinary frequency.

## 2023-01-31 NOTE — LETTER
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 benefits of low-dose aspirin use in pregnancy were reviewed. The patient should take 81 mg of aspirin daily for the remainder of pregnancy and 6 to 8 weeks postpartum. The patient was counseled to monitor fetal kick counts daily. Instructions were reviewed. If the patient remains diet controlled, increased surveillance with twice weekly fetal testing is recommended at 34-36 weeks' gestation with delivery at 39-40 weeks' gestation. If she requires medical therapy, then twice weekly testing would be indicated sooner with delivery at 44 week's gestation. Fetal growth should be monitored every 3-4 weeks until delivery. During labor, the patient's blood sugars should be monitored closely and ideally be less than 105 mg/dL during labor in order to minimize the risk of  hypoglycemia. She should also have a fasting sugar checked postpartum. She was counseled that she has a 50% risk for the development of type 2 diabetes in the next 10 years. This risk can be modified with diet and lifestyle changes. She will need a 2 hour oral glucose tolerance test at 8-12 weeks postpartum. If this is normal, she should have yearly screening for diabetes.   If she becomes pregnant in the future, she is at increased risk for recurrent gestational diabetes, 60-70%, and should have early screening for gestational diabetes in the late first or early second trimester. 32.  History of elevated blood pressure -- The patient's blood pressure was initially elevated at 143/84 at 28 weeks gestation. Her blood pressure was repeated normal 113/73. The patient's urine dip was negative for protein. The patient denies having any other prior blood pressure elevations during this pregnancy. She denies having any symptoms of headache, vision change, chest pain, shortness of breath, nausea, vomiting, and or right upper quadrant pain. An exam was done in the office. The patient's heart had a regular rate and rhythm. Her lungs were clear to auscultation bilaterally. Her abdomen was soft, gravid, non tender, and with normal bowel sounds. She had +1 patellar reflexes bilaterally. No clonus was noted bilaterally. She had trace edema in her bilateral lower extremities. The patient's blood pressure was normal today at 114/81. Her urine was negative for protein. The patient was counseled that at this time, continue close monitoring is recommended. Again, the patient has a history of hypertension. Continue increased maternal and fetal surveillance as outlined above. 33.  Poor fetal growth, improved -- The ultrasound findings from 30 weeks 2 days were reviewed with the patient. Overall, the estimated fetal weight is at the 40th percentile, however the abdominal circumference is measuring at the 7th percentile. Fetal growth was reevaluated at 32 weeks gestation. The overall estimate of fetal weight was 3 pounds 14 ounces, 44 percentile. The [facility] was at the 11th percentile. In the past 2 weeks, the overall estimated fetal weight is increased by 330 g (expected 200 g). The [facility] has increased by 2 cm (expected 2 cm). Fetal testing was reassuring.     Fetal growth was reevaluated today at 34 weeks 1 day.  The overall estimated fetal weight was 4 pounds 10 ounces, 36 percentile. The [facility] was at the 16th percentile. The overall estimate of fetal weight increased by 339 g in 2 weeks 1 day. The AC increased by 2.3 cm. There has been appropriate interval fetal growth. Counseling was previously provided to patient. Counseling was reviewed. The patient did not have any additional questions or concerns. Again, the overall estimated fetal weight is greater than the 10th percentile, however the abdominal circumference measures less than the 10th percentile at 30 weeks 2 days. Reassuring findings included a normal amniotic fluid index, a biophysical profile score of 8/8, and normal Doppler studies. The patient was counseled that typically fetal growth restriction is formally diagnosed when the overall estimated fetal weight is less than the 10th percentile. However, a decline in the overall estimated fetal weight of greater than 20% and/or an abdominal circumference that measures less then the 10th percentile are findings that are also concerning for and/or consistent with fetal growth restriction. In over 70% of cases, small fetal size is constitutional. In approximately 30% of cases, there is a secondary cause related to placental insufficiency, a fetal issue, and/or an underlying maternal condition. Risk factors were reviewed with the patient including malnutrition, maternal chronic diseases (ie SLE, renal disease, antiphospholipid antibodies, anemia, diabetes), placental disease (chorioangioma, mosaicism), infections, fetal aneuploidy, and teratogen exposure. She was counseled regarding general management plans and that mild IUGR can generally be expectantly managed until 37-39 weeks' gestation. If there is severe growth restriction, delivery timing is based on the point at which the risk of fetal death exceeds that of  death.   There is an increased risk for fetal loss in the setting of growth restriction, thus, increased surveillance is indicated. The best predictors of outcome are fetal size and gestational age attained. Overall, the long term outcome depends on the etiology of the poor growth. At this time, I recommend increased fetal surveillance. The patient was counseled to monitor fetal kick counts daily. Instructions were reviewed. She was scheduled to return in 2 weeks for follow-up fetal growth assessment and testing. Additional recommendations will be made at that time. Given the concern for poor fetal growth, additional maternal evaluation was recommended. The following labs were ordered: Preeclampsia screening, antiphospholipid antibodies, thyroid function studies/thyroid peroxidase antibodies, a nutrition panel, and infection studies. -- Testing was completed on 1/16/2023. Results are summarized above. Her prenatal records were reviewed and she had early screening with cell free DNA that was low risk for aneuploidy. Given the lag in fetal growth, a low dose aspirin was recommended. She was counseled to start 81 mg of aspirin daily. The risks and benefits were discussed. The patient was counseled to continue a daily low-dose aspirin as outlined above. 34. Shortness of breath with activities, stable -- The patient previously reported symptoms of increased shortness of breath primarily with activity. She denied having any associated chest pain, tachycardia, heart palpitations, lightheadedness, dizziness, and/or syncope. Today, the patient again reports that her symptoms are stable. The patient was again counseled that her symptoms are likely secondary to the pregnancy. With worsening symptoms or the development of any associated cardiac symptoms, then additional maternal evaluation will be recommended.      Precautions were reviewed with the patient and she was counseled to present to the hospital with persistent/worsening symptoms or the development of associated tachycardia, heart palpitations, chest pain, lightheadedness, dizziness, and/or syncope. 35.  Decreased TED, improved -- The ultrasound images from 33 weeks gestation were reviewed with the patient. Initially, on ultrasound, the TED measured anywhere from 7 to 8.5 cm. Imaging was repeated following her nonstress test.  The TED ranged from 9 to 11 cm with a good 2 x 2 centimeter pocket of fluid seen. Secondary to the patient's report of a gush of fluid on 1/22/2023, the recommendation was made for the patient to go to L&D to have a sterile speculum exam and AmniSure. If testing is negative for rupture, she was scheduled to return on Thursday for a follow-up ultrasound and visit. Precautions to call and/or return sooner were reviewed. An amnisure was negative on 1/23/2023. The patient returned for follow up testing at 33w3d. The TED was normal at 10.3 cm. Patient denied having any leaking of fluid or vaginal discharge. She returns today at 34 weeks 1 day for follow-up testing. The TED was normal at 10.8 cm. --I requested the patient return for a follow-up assessment in 3 days unless there is a clinical reason for her to return prior to that time. She is to call if she has any problems or questions prior to her next visit. Further evaluation and management will be dependent on her clinical presentation and the results of her testing. --The patient was advised to call if she has any increased vaginal discharge, vaginal bleeding, contractions, abdominal pain, back pain or any new significant symptomatology prior to her next visit. I advised her that these are signs and symptoms of cervical change and require follow-up assessment when they occur. Preeclampsia precautions were also reviewed with the patient. --The patient was also counseled to call and/or return with any concerns for decreased fetal activity.     --The patient is to continue to follow with you in your office for ongoing obstetric care. --The total time spent on today's visit was 30 minutes. This included preparation for the visit (i.e. reviewing prior external notes and test results), performance of a medically appropriate history and examination, counseling, orders for medications, tests or other procedures, and coordination of care. Greater than 50% of the time was spent face-to-face with the patient. This time is exclusive of procedures performed. I answered all of  the patient's questions to her satisfaction. I asked her to call if she had any additional questions prior to her next visit. --At the conclusion of the visit, the patient appeared to have a good understanding of the issues discussed. I answered all of her questions to her satisfaction. I asked her to call if she had any additional questions prior to her next visit. --Thank you for allowing me to participate in the care of this pleasant patient. Please don't hesitate to call me if you have any questions. Sincerely,      Karlene Munoz MD, [address] [phone]      *All or parts of this note may have been generated using a voice recognition program. There may be typo, grammar, or Word substitution errors that have escaped my review of this note.

## 2023-01-31 NOTE — LETTER
[facility] Diabetes Education Follow-up letter to provider    Name: Roberto Nicholson  :   1990    Date:   2023                   Dear  Dr. Vivienne Peterson    Thank you for referring Roberto Nicholson to [facility] Diabetes Education Services. We follow-up with our participants after 3 months to monitor their progress on their selected goal.    We were unable to reach Roberto Nicholson to follow-up with their behavioral goal of: following the carbohydrate consistent meal plan and monitoring her blood glucose.          Please contact us if you need any further assistance with this patient at:     [address] [facility] or Oscar Perera Locations: [place] Location: [address] [place] [address]) Diabetes Education Department  [employer] Recognized DSMES Program rev. 10/19/22

## 2023-01-31 NOTE — PROGRESS NOTES
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 week's gestation. Fetal growth should be monitored every 3-4 weeks until delivery. During labor, the patient's blood sugars should be monitored closely and ideally be less than 105 mg/dL during labor in order to minimize the risk of  hypoglycemia. She should also have a fasting sugar checked postpartum. She was counseled that she has a 50% risk for the development of type 2 diabetes in the next 10 years. This risk can be modified with diet and lifestyle changes. She will need a 2 hour oral glucose tolerance test at 8-12 weeks postpartum. If this is normal, she should have yearly screening for diabetes. If she becomes pregnant in the future, she is at increased risk for recurrent gestational diabetes, 60-70%, and should have early screening for gestational diabetes in the late first or early second trimester. 32.  History of elevated blood pressure -- The patient's blood pressure was initially elevated at 143/84 at 28 weeks gestation. Her blood pressure was repeated normal 113/73. The patient's urine dip was negative for protein. The patient denies having any other prior blood pressure elevations during this pregnancy. She denies having any symptoms of headache, vision change, chest pain, shortness of breath, nausea, vomiting, and or right upper quadrant pain. An exam was done in the office.   The patient's heart had a regular rate and rhythm. Her lungs were clear to auscultation bilaterally. Her abdomen was soft, gravid, non tender, and with normal bowel sounds. She had +1 patellar reflexes bilaterally. No clonus was noted bilaterally. She had trace edema in her bilateral lower extremities. The patient's blood pressure was normal today at 114/81. Her urine was negative for protein. The patient was counseled that at this time, continue close monitoring is recommended. Again, the patient has a history of hypertension. Continue increased maternal and fetal surveillance as outlined above. 33.  Poor fetal growth, improved -- The ultrasound findings from 30 weeks 2 days were reviewed with the patient. Overall, the estimated fetal weight is at the 40th percentile, however the abdominal circumference is measuring at the 7th percentile. Fetal growth was reevaluated at 32 weeks gestation. The overall estimate of fetal weight was 3 pounds 14 ounces, 44 percentile. The [facility] was at the 11th percentile. In the past 2 weeks, the overall estimated fetal weight is increased by 330 g (expected 200 g). The [facility] has increased by 2 cm (expected 2 cm). Fetal testing was reassuring. Fetal growth was reevaluated today at 34 weeks 1 day. The overall estimated fetal weight was 4 pounds 10 ounces, 36 percentile. The [facility] was at the 16th percentile. The overall estimate of fetal weight increased by 339 g in 2 weeks 1 day. The AC increased by 2.3 cm. There has been appropriate interval fetal growth. Counseling was previously provided to patient. Counseling was reviewed. The patient did not have any additional questions or concerns. Again, the overall estimated fetal weight is greater than the 10th percentile, however the abdominal circumference measures less than the 10th percentile at 30 weeks 2 days. Reassuring findings included a normal amniotic fluid index, a biophysical profile score of 8/8, and normal Doppler studies. The patient was counseled that typically fetal growth restriction is formally diagnosed when the overall estimated fetal weight is less than the 10th percentile. However, a decline in the overall estimated fetal weight of greater than 20% and/or an abdominal circumference that measures less then the 10th percentile are findings that are also concerning for and/or consistent with fetal growth restriction. In over 70% of cases, small fetal size is constitutional. In approximately 30% of cases, there is a secondary cause related to placental insufficiency, a fetal issue, and/or an underlying maternal condition. Risk factors were reviewed with the patient including malnutrition, maternal chronic diseases (ie SLE, renal disease, antiphospholipid antibodies, anemia, diabetes), placental disease (chorioangioma, mosaicism), infections, fetal aneuploidy, and teratogen exposure. She was counseled regarding general management plans and that mild IUGR can generally be expectantly managed until 37-39 weeks' gestation. If there is severe growth restriction, delivery timing is based on the point at which the risk of fetal death exceeds that of  death. There is an increased risk for fetal loss in the setting of growth restriction, thus, increased surveillance is indicated. The best predictors of outcome are fetal size and gestational age attained. Overall, the long term outcome depends on the etiology of the poor growth. At this time, I recommend increased fetal surveillance. The patient was counseled to monitor fetal kick counts daily. Instructions were reviewed. She was scheduled to return in 2 weeks for follow-up fetal growth assessment and testing. Additional recommendations will be made at that time. Given the concern for poor fetal growth, additional maternal evaluation was recommended.  The following labs were ordered: Preeclampsia screening, antiphospholipid antibodies, thyroid function studies/thyroid peroxidase antibodies, a nutrition panel, and infection studies. -- Testing was completed on 1/16/2023. Results are summarized above. Her prenatal records were reviewed and she had early screening with cell free DNA that was low risk for aneuploidy. Given the lag in fetal growth, a low dose aspirin was recommended. She was counseled to start 81 mg of aspirin daily. The risks and benefits were discussed. The patient was counseled to continue a daily low-dose aspirin as outlined above. 34. Shortness of breath with activities, stable -- The patient previously reported symptoms of increased shortness of breath primarily with activity. She denied having any associated chest pain, tachycardia, heart palpitations, lightheadedness, dizziness, and/or syncope. Today, the patient again reports that her symptoms are stable. The patient was again counseled that her symptoms are likely secondary to the pregnancy. With worsening symptoms or the development of any associated cardiac symptoms, then additional maternal evaluation will be recommended. Precautions were reviewed with the patient and she was counseled to present to the hospital with persistent/worsening symptoms or the development of associated tachycardia, heart palpitations, chest pain, lightheadedness, dizziness, and/or syncope. 35.  Decreased TED, improved -- The ultrasound images from 33 weeks gestation were reviewed with the patient. Initially, on ultrasound, the TED measured anywhere from 7 to 8.5 cm. Imaging was repeated following her nonstress test.  The TED ranged from 9 to 11 cm with a good 2 x 2 centimeter pocket of fluid seen. Secondary to the patient's report of a gush of fluid on 1/22/2023, the recommendation was made for the patient to go to L&D to have a sterile speculum exam and AmniSure.   If testing is negative for rupture, she was scheduled to return on Thursday for a follow-up ultrasound and visit.  Precautions to call and/or return sooner were reviewed. An amnisure was negative on 1/23/2023. The patient returned for follow up testing at 33w3d. The TED was normal at 10.3 cm. Patient denied having any leaking of fluid or vaginal discharge. She returns today at 34 weeks 1 day for follow-up testing. The TED was normal at 10.8 cm. --I requested the patient return for a follow-up assessment in 3 days unless there is a clinical reason for her to return prior to that time. She is to call if she has any problems or questions prior to her next visit. Further evaluation and management will be dependent on her clinical presentation and the results of her testing. --The patient was advised to call if she has any increased vaginal discharge, vaginal bleeding, contractions, abdominal pain, back pain or any new significant symptomatology prior to her next visit. I advised her that these are signs and symptoms of cervical change and require follow-up assessment when they occur. Preeclampsia precautions were also reviewed with the patient. --The patient was also counseled to call and/or return with any concerns for decreased fetal activity. --The patient is to continue to follow with you in your office for ongoing obstetric care. --The total time spent on today's visit was 30 minutes. This included preparation for the visit (i.e. reviewing prior external notes and test results), performance of a medically appropriate history and examination, counseling, orders for medications, tests or other procedures, and coordination of care. Greater than 50% of the time was spent face-to-face with the patient. This time is exclusive of procedures performed. I answered all of  the patient's questions to her satisfaction. I asked her to call if she had any additional questions prior to her next visit.       --At the conclusion of the visit, the patient appeared to have a good understanding of the issues discussed. I answered all of her questions to her satisfaction. I asked her to call if she had any additional questions prior to her next visit. --Thank you for allowing me to participate in the care of this pleasant patient. Please don't hesitate to call me if you have any questions. Sincerely,      Laury Castro MD, [address]  [phone]      *All or parts of this note may have been generated using a voice recognition program. There may be typo, grammar, or Word substitution errors that have escaped my review of this note.

## 2023-02-03 ENCOUNTER — ROUTINE PRENATAL (OUTPATIENT)
Dept: OBGYN CLINIC | Age: 33
End: 2023-02-03
Payer: MEDICAID

## 2023-02-03 VITALS
DIASTOLIC BLOOD PRESSURE: 84 MMHG | BODY MASS INDEX: 24.49 KG/M2 | SYSTOLIC BLOOD PRESSURE: 116 MMHG | HEART RATE: 112 BPM | WEIGHT: 156.38 LBS

## 2023-02-03 DIAGNOSIS — Z3A.34 34 WEEKS GESTATION OF PREGNANCY: Primary | ICD-10-CM

## 2023-02-03 DIAGNOSIS — O24.419 GESTATIONAL DIABETES MELLITUS (GDM), ANTEPARTUM, GESTATIONAL DIABETES METHOD OF CONTROL UNSPECIFIED: ICD-10-CM

## 2023-02-03 LAB
GLUCOSE URINE, POC: NEGATIVE
PROTEIN UA: NEGATIVE

## 2023-02-03 PROCEDURE — 81002 URINALYSIS NONAUTO W/O SCOPE: CPT | Performed by: OBSTETRICS & GYNECOLOGY

## 2023-02-03 PROCEDURE — 59025 FETAL NON-STRESS TEST: CPT | Performed by: OBSTETRICS & GYNECOLOGY

## 2023-02-03 PROCEDURE — 99213 OFFICE O/P EST LOW 20 MIN: CPT | Performed by: OBSTETRICS & GYNECOLOGY

## 2023-02-03 NOTE — PATIENT INSTRUCTIONS
Please arrive for your scheduled appointment at least 15 minutes early with your actual insurance card+ a photo ID. Also if you need any refills ordered or have questions, it may take up 48 hours to reply. Please allow ample time for your refills. Call me when you use last refill. Thank you for your cooperation. You might be having an NST at your next appt. Please eat a large snack or breakfast before coming to office. Thank you Call your primary obstetrician with bleeding, leaking of fluid, abdominal tenderness, headache, blurry vision, epigastric pain and increased urinary frequency. If you are experiencing an emergency and need immediate help, call 911 or go to go emergency room or labor and delivery. Do kick counts after dinner. Call your primary obstetrician if less than 10 kicks in 2 hours after dinner. Call your primary obstetrician with bleeding, leaking of fluid, abdominal tenderness, headache, blurry vision, epigastric pain and increased urinary frequency. if you are sick, not feeling well or have an infectious process going on please reschedule your appointment by calling [phone]. Also if any family members are not feeling well, please do not bring them to your appointment. We appreciate your cooperation. We are doing this in order to protect our pregnant mothers+ their babies. if you are sick, not feeling well or have an infectious process going on please reschedule your appointment by calling [phone]. Also if any family members are not feeling well, please do not bring them to your appointment. We appreciate your cooperation. We are doing this in order to protect our pregnant mothers+ their babies.

## 2023-02-03 NOTE — PROGRESS NOTES
NON STRESS TEST INTERPRETATION    2/3/23    RE:  Tsering Floyd   : 1990   AGE: 29 y.o. GESTATIONAL AGE:  34w4d    DIAGNOSIS:   Advanced maternal age, history of DVT on Lovenox    INDICATION:  Same as above    TIME ON:  937      TIME OFF:  957      RESULT:   REACTIVE      FHR Baseline Rate:   140 bpm    PERIODIC CHANGES:    Accelerations present, variability moderate, no decelerations noted    COMMENTS:      She is to continue having NST's every 3-4 days, and BPP with umbilical artery doppler studies once per week    -- Blood sugars were reviewed. She was counseled to continue a gestational diabetic diet.         Davi Rowell MD, [facility]

## 2023-02-03 NOTE — PROGRESS NOTES
Pt here for NST  Blood sugars scanned into media  Pt states active baby  Pt denies any bleeding/cramping

## 2023-02-07 ENCOUNTER — ROUTINE PRENATAL (OUTPATIENT)
Dept: OBGYN CLINIC | Age: 33
End: 2023-02-07
Payer: MEDICAID

## 2023-02-07 ENCOUNTER — ANCILLARY PROCEDURE (OUTPATIENT)
Dept: OBGYN CLINIC | Age: 33
End: 2023-02-07
Payer: MEDICAID

## 2023-02-07 VITALS
DIASTOLIC BLOOD PRESSURE: 87 MMHG | WEIGHT: 155.5 LBS | HEART RATE: 95 BPM | BODY MASS INDEX: 24.35 KG/M2 | SYSTOLIC BLOOD PRESSURE: 118 MMHG

## 2023-02-07 DIAGNOSIS — E53.8 LOW VITAMIN B12 LEVEL: ICD-10-CM

## 2023-02-07 DIAGNOSIS — O26.899 RH NEGATIVE STATE IN ANTEPARTUM PERIOD: ICD-10-CM

## 2023-02-07 DIAGNOSIS — D64.9 ANEMIA, UNSPECIFIED TYPE: ICD-10-CM

## 2023-02-07 DIAGNOSIS — Z3A.35 35 WEEKS GESTATION OF PREGNANCY: ICD-10-CM

## 2023-02-07 DIAGNOSIS — R79.0 LOW FERRITIN: ICD-10-CM

## 2023-02-07 DIAGNOSIS — Z92.89 HISTORY OF BLOOD TRANSFUSION: ICD-10-CM

## 2023-02-07 DIAGNOSIS — O36.1920 ANTI-M ISOIMMUNIZATION AFFECTING PREGNANCY IN SECOND TRIMESTER, SINGLE OR UNSPECIFIED FETUS: ICD-10-CM

## 2023-02-07 DIAGNOSIS — Z67.91 RH NEGATIVE STATE IN ANTEPARTUM PERIOD: ICD-10-CM

## 2023-02-07 DIAGNOSIS — Z86.79 HISTORY OF HYPERTENSION: ICD-10-CM

## 2023-02-07 DIAGNOSIS — Z86.718 HISTORY OF DVT (DEEP VEIN THROMBOSIS): ICD-10-CM

## 2023-02-07 DIAGNOSIS — O24.419 GESTATIONAL DIABETES MELLITUS (GDM), ANTEPARTUM, GESTATIONAL DIABETES METHOD OF CONTROL UNSPECIFIED: Primary | ICD-10-CM

## 2023-02-07 DIAGNOSIS — R79.89 HYPOTHYROXINEMIA: ICD-10-CM

## 2023-02-07 LAB
GLUCOSE URINE, POC: NEGATIVE
PROTEIN UA: NEGATIVE

## 2023-02-07 PROCEDURE — 81002 URINALYSIS NONAUTO W/O SCOPE: CPT | Performed by: OBSTETRICS & GYNECOLOGY

## 2023-02-07 PROCEDURE — 76820 UMBILICAL ARTERY ECHO: CPT | Performed by: OBSTETRICS & GYNECOLOGY

## 2023-02-07 PROCEDURE — G8484 FLU IMMUNIZE NO ADMIN: HCPCS | Performed by: OBSTETRICS & GYNECOLOGY

## 2023-02-07 PROCEDURE — G8420 CALC BMI NORM PARAMETERS: HCPCS | Performed by: OBSTETRICS & GYNECOLOGY

## 2023-02-07 PROCEDURE — 99213 OFFICE O/P EST LOW 20 MIN: CPT | Performed by: OBSTETRICS & GYNECOLOGY

## 2023-02-07 PROCEDURE — 99214 OFFICE O/P EST MOD 30 MIN: CPT | Performed by: OBSTETRICS & GYNECOLOGY

## 2023-02-07 PROCEDURE — 76818 FETAL BIOPHYS PROFILE W/NST: CPT | Performed by: OBSTETRICS & GYNECOLOGY

## 2023-02-07 PROCEDURE — 1036F TOBACCO NON-USER: CPT | Performed by: OBSTETRICS & GYNECOLOGY

## 2023-02-07 PROCEDURE — 76815 OB US LIMITED FETUS(S): CPT | Performed by: OBSTETRICS & GYNECOLOGY

## 2023-02-07 PROCEDURE — 76821 MIDDLE CEREBRAL ARTERY ECHO: CPT | Performed by: OBSTETRICS & GYNECOLOGY

## 2023-02-07 PROCEDURE — G8427 DOCREV CUR MEDS BY ELIG CLIN: HCPCS | Performed by: OBSTETRICS & GYNECOLOGY

## 2023-02-07 NOTE — LETTER
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 recommended. The risks and benefits of low-dose aspirin use in pregnancy were reviewed. The patient should take 81 mg of aspirin daily for the remainder of pregnancy and 6 to 8 weeks postpartum. The patient was counseled to monitor fetal kick counts daily. Instructions were reviewed. If the patient remains diet controlled, increased surveillance with twice weekly fetal testing is recommended at 34-36 weeks' gestation with delivery at 39-40 weeks' gestation. If she requires medical therapy, then twice weekly testing would be indicated sooner with delivery at 44 week's gestation. Fetal growth should be monitored every 3-4 weeks until delivery. During labor, the patient's blood sugars should be monitored closely and ideally be less than 105 mg/dL during labor in order to minimize the risk of  hypoglycemia. She should also have a fasting sugar checked postpartum. She was counseled that she has a 50% risk for the development of type 2 diabetes in the next 10 years. This risk can be modified with diet and lifestyle changes. She will need a 2 hour oral glucose tolerance test at 8-12 weeks postpartum. If this is normal, she should have yearly screening for diabetes.   If she becomes pregnant in the future, she is at increased risk for recurrent gestational diabetes, 60-70%, and should have early screening for gestational diabetes in the late first or early second trimester. 32.  History of elevated blood pressure -- The patient's blood pressure was initially elevated at 143/84 at 28 weeks gestation. Her blood pressure was repeated normal 113/73. The patient's urine dip was negative for protein. The patient denies having any other prior blood pressure elevations during this pregnancy. She denies having any symptoms of headache, vision change, chest pain, shortness of breath, nausea, vomiting, and or right upper quadrant pain. An exam was done in the office. The patient's heart had a regular rate and rhythm. Her lungs were clear to auscultation bilaterally. Her abdomen was soft, gravid, non tender, and with normal bowel sounds. She had +1 patellar reflexes bilaterally. No clonus was noted bilaterally. She had trace edema in her bilateral lower extremities. The patient's blood pressure was normal today at 118/87. Her urine was negative for protein. The patient was counseled that at this time, continue close monitoring is recommended. Again, the patient has a history of hypertension. Continue increased maternal and fetal surveillance as outlined above. 33.  Poor fetal growth, improved -- The ultrasound findings from 30 weeks 2 days were reviewed with the patient. Overall, the estimated fetal weight is at the 40th percentile, however the abdominal circumference is measuring at the 7th percentile. Fetal growth was reevaluated at 32 weeks gestation. The overall estimate of fetal weight was 3 pounds 14 ounces, 44 percentile. The [facility] was at the 11th percentile. In the past 2 weeks, the overall estimated fetal weight is increased by 330 g (expected 200 g). The [facility] has increased by 2 cm (expected 2 cm). Fetal testing was reassuring.      Fetal growth was reevaluated at 34 weeks 1 day. The overall estimated fetal weight was 4 pounds 10 ounces, 36 percentile. The [facility] was at the 16th percentile. The overall estimate of fetal weight increased by 339 g in 2 weeks 1 day. The AC increased by 2.3 cm. There has been appropriate interval fetal growth. Counseling was previously provided to patient. Counseling was reviewed. The patient did not have any additional questions or concerns. Again, the overall estimated fetal weight is greater than the 10th percentile, however the abdominal circumference measures less than the 10th percentile at 30 weeks 2 days. Reassuring findings included a normal amniotic fluid index, a biophysical profile score of 8/8, and normal Doppler studies. The patient was counseled that typically fetal growth restriction is formally diagnosed when the overall estimated fetal weight is less than the 10th percentile. However, a decline in the overall estimated fetal weight of greater than 20% and/or an abdominal circumference that measures less then the 10th percentile are findings that are also concerning for and/or consistent with fetal growth restriction. In over 70% of cases, small fetal size is constitutional. In approximately 30% of cases, there is a secondary cause related to placental insufficiency, a fetal issue, and/or an underlying maternal condition. Risk factors were reviewed with the patient including malnutrition, maternal chronic diseases (ie SLE, renal disease, antiphospholipid antibodies, anemia, diabetes), placental disease (chorioangioma, mosaicism), infections, fetal aneuploidy, and teratogen exposure. She was counseled regarding general management plans and that mild IUGR can generally be expectantly managed until 37-39 weeks' gestation. If there is severe growth restriction, delivery timing is based on the point at which the risk of fetal death exceeds that of  death.   There is an increased risk for fetal loss in the setting of growth restriction, thus, increased surveillance is indicated. The best predictors of outcome are fetal size and gestational age attained. Overall, the long term outcome depends on the etiology of the poor growth. At this time, I recommend increased fetal surveillance. The patient was counseled to monitor fetal kick counts daily. Instructions were reviewed. She was scheduled to return in 2 weeks for follow-up fetal growth assessment and testing. Additional recommendations will be made at that time. Given the concern for poor fetal growth, additional maternal evaluation was recommended. The following labs were ordered: Preeclampsia screening, antiphospholipid antibodies, thyroid function studies/thyroid peroxidase antibodies, a nutrition panel, and infection studies. -- Testing was completed on 1/16/2023. Results are summarized above. Her prenatal records were reviewed and she had early screening with cell free DNA that was low risk for aneuploidy. Given the lag in fetal growth, a low dose aspirin was recommended. She was counseled to start 81 mg of aspirin daily. The risks and benefits were discussed. The patient was counseled to continue a daily low-dose aspirin as outlined above. 34. Shortness of breath with activities, stable -- The patient previously reported symptoms of increased shortness of breath primarily with activity. She denied having any associated chest pain, tachycardia, heart palpitations, lightheadedness, dizziness, and/or syncope. Today, the patient again reports having symptoms. Her heart rate ranged from 100-107. Her oxygen saturation was 97 to 95% on room air. The patient was again counseled that her symptoms are likely secondary to the pregnancy. With worsening symptoms or the development of any associated cardiac symptoms, then additional maternal evaluation will be recommended.      Precautions were reviewed with the patient and she was counseled to present to the hospital with persistent/worsening symptoms or the development of associated tachycardia, heart palpitations, chest pain, lightheadedness, dizziness, and/or syncope. 35.  Decreased TED, improved -- The ultrasound images from 33 weeks gestation were reviewed with the patient. Initially, on ultrasound, the TED measured anywhere from 7 to 8.5 cm. Imaging was repeated following her nonstress test.  The TED ranged from 9 to 11 cm with a good 2 x 2 centimeter pocket of fluid seen. Secondary to the patient's report of a gush of fluid on 1/22/2023, the recommendation was made for the patient to go to L&D to have a sterile speculum exam and AmniSure. If testing is negative for rupture, she was scheduled to return on Thursday for a follow-up ultrasound and visit. Precautions to call and/or return sooner were reviewed. An amnisure was negative on 1/23/2023. The patient returned for follow up testing at 33w3d. The TED was normal at 10.3 cm. Patient denied having any leaking of fluid or vaginal discharge. She returned at 34 weeks 1 day for follow-up testing. The TED was normal at 10.8 cm. She returned at 35 weeks 1 day. The TED was again normal at 10.2 cm. --I requested the patient return for a follow-up assessment in 3 days unless there is a clinical reason for her to return prior to that time. She is to call if she has any problems or questions prior to her next visit. Further evaluation and management will be dependent on her clinical presentation and the results of her testing. --The patient was advised to call if she has any increased vaginal discharge, vaginal bleeding, contractions, abdominal pain, back pain or any new significant symptomatology prior to her next visit. I advised her that these are signs and symptoms of cervical change and require follow-up assessment when they occur.   Preeclampsia precautions were also reviewed with the patient. --The patient was also counseled to call and/or return with any concerns for decreased fetal activity. --The patient is to continue to follow with you in your office for ongoing obstetric care. --The total time spent on today's visit was 30 minutes. This included preparation for the visit (i.e. reviewing prior external notes and test results), performance of a medically appropriate history and examination, counseling, orders for medications, tests or other procedures, and coordination of care. Greater than 50% of the time was spent face-to-face with the patient. This time is exclusive of procedures performed. I answered all of  the patient's questions to her satisfaction. I asked her to call if she had any additional questions prior to her next visit. --At the conclusion of the visit, the patient appeared to have a good understanding of the issues discussed. I answered all of her questions to her satisfaction. I asked her to call if she had any additional questions prior to her next visit. --Thank you for allowing me to participate in the care of this pleasant patient. Please don't hesitate to call me if you have any questions. Sincerely,      Mick Greene MD, [id number]  [phone]      *All or parts of this note may have been generated using a voice recognition program. There may be typo, grammar, or Word substitution errors that have escaped my review of this note.

## 2023-02-07 NOTE — PROGRESS NOTES
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 weeks until delivery. During labor, the patient's blood sugars should be monitored closely and ideally be less than 105 mg/dL during labor in order to minimize the risk of  hypoglycemia. She should also have a fasting sugar checked postpartum. She was counseled that she has a 50% risk for the development of type 2 diabetes in the next 10 years. This risk can be modified with diet and lifestyle changes. She will need a 2 hour oral glucose tolerance test at 8-12 weeks postpartum. If this is normal, she should have yearly screening for diabetes. If she becomes pregnant in the future, she is at increased risk for recurrent gestational diabetes, 60-70%, and should have early screening for gestational diabetes in the late first or early second trimester. 32.  History of elevated blood pressure -- The patient's blood pressure was initially elevated at 143/84 at 28 weeks gestation. Her blood pressure was repeated normal 113/73. The patient's urine dip was negative for protein. The patient denies having any other prior blood pressure elevations during this pregnancy. She denies having any symptoms of headache, vision change, chest pain, shortness of breath, nausea, vomiting, and or right upper quadrant pain. An exam was done in the office.   The patient's heart had a regular rate and rhythm. Her lungs were clear to auscultation bilaterally. Her abdomen was soft, gravid, non tender, and with normal bowel sounds. She had +1 patellar reflexes bilaterally. No clonus was noted bilaterally. She had trace edema in her bilateral lower extremities. The patient's blood pressure was normal today at 118/87. Her urine was negative for protein. The patient was counseled that at this time, continue close monitoring is recommended. Again, the patient has a history of hypertension. Continue increased maternal and fetal surveillance as outlined above. 33.  Poor fetal growth, improved -- The ultrasound findings from 30 weeks 2 days were reviewed with the patient. Overall, the estimated fetal weight is at the 40th percentile, however the abdominal circumference is measuring at the 7th percentile. Fetal growth was reevaluated at 32 weeks gestation. The overall estimate of fetal weight was 3 pounds 14 ounces, 44 percentile. The [facility] was at the 11th percentile. In the past 2 weeks, the overall estimated fetal weight is increased by 330 g (expected 200 g). The [facility] has increased by 2 cm (expected 2 cm). Fetal testing was reassuring. Fetal growth was reevaluated at 34 weeks 1 day. The overall estimated fetal weight was 4 pounds 10 ounces, 36 percentile. The [facility] was at the 16th percentile. The overall estimate of fetal weight increased by 339 g in 2 weeks 1 day. The AC increased by 2.3 cm. There has been appropriate interval fetal growth. Counseling was previously provided to patient. Counseling was reviewed. The patient did not have any additional questions or concerns. Again, the overall estimated fetal weight is greater than the 10th percentile, however the abdominal circumference measures less than the 10th percentile at 30 weeks 2 days. Reassuring findings included a normal amniotic fluid index, a biophysical profile score of 8/8, and normal Doppler studies. The patient was counseled that typically fetal growth restriction is formally diagnosed when the overall estimated fetal weight is less than the 10th percentile. However, a decline in the overall estimated fetal weight of greater than 20% and/or an abdominal circumference that measures less then the 10th percentile are findings that are also concerning for and/or consistent with fetal growth restriction. In over 70% of cases, small fetal size is constitutional. In approximately 30% of cases, there is a secondary cause related to placental insufficiency, a fetal issue, and/or an underlying maternal condition. Risk factors were reviewed with the patient including malnutrition, maternal chronic diseases (ie SLE, renal disease, antiphospholipid antibodies, anemia, diabetes), placental disease (chorioangioma, mosaicism), infections, fetal aneuploidy, and teratogen exposure. She was counseled regarding general management plans and that mild IUGR can generally be expectantly managed until 37-39 weeks' gestation. If there is severe growth restriction, delivery timing is based on the point at which the risk of fetal death exceeds that of  death. There is an increased risk for fetal loss in the setting of growth restriction, thus, increased surveillance is indicated. The best predictors of outcome are fetal size and gestational age attained. Overall, the long term outcome depends on the etiology of the poor growth. At this time, I recommend increased fetal surveillance. The patient was counseled to monitor fetal kick counts daily. Instructions were reviewed. She was scheduled to return in 2 weeks for follow-up fetal growth assessment and testing. Additional recommendations will be made at that time. Given the concern for poor fetal growth, additional maternal evaluation was recommended.  The following labs were ordered: Preeclampsia screening, antiphospholipid antibodies, thyroid function studies/thyroid peroxidase antibodies, a nutrition panel, and infection studies. -- Testing was completed on 1/16/2023. Results are summarized above. Her prenatal records were reviewed and she had early screening with cell free DNA that was low risk for aneuploidy. Given the lag in fetal growth, a low dose aspirin was recommended. She was counseled to start 81 mg of aspirin daily. The risks and benefits were discussed. The patient was counseled to continue a daily low-dose aspirin as outlined above. 34. Shortness of breath with activities, stable -- The patient previously reported symptoms of increased shortness of breath primarily with activity. She denied having any associated chest pain, tachycardia, heart palpitations, lightheadedness, dizziness, and/or syncope. Today, the patient again reports having symptoms. Her heart rate ranged from 100-107. Her oxygen saturation was 97 to 95% on room air. The patient was again counseled that her symptoms are likely secondary to the pregnancy. With worsening symptoms or the development of any associated cardiac symptoms, then additional maternal evaluation will be recommended. Precautions were reviewed with the patient and she was counseled to present to the hospital with persistent/worsening symptoms or the development of associated tachycardia, heart palpitations, chest pain, lightheadedness, dizziness, and/or syncope. 35.  Decreased TED, improved -- The ultrasound images from 33 weeks gestation were reviewed with the patient. Initially, on ultrasound, the TED measured anywhere from 7 to 8.5 cm. Imaging was repeated following her nonstress test.  The TED ranged from 9 to 11 cm with a good 2 x 2 centimeter pocket of fluid seen. Secondary to the patient's report of a gush of fluid on 1/22/2023, the recommendation was made for the patient to go to L&D to have a sterile speculum exam and AmniSure.   If testing is negative for rupture, she was scheduled to return on Thursday for a follow-up ultrasound and visit. Precautions to call and/or return sooner were reviewed. An amnisure was negative on 1/23/2023. The patient returned for follow up testing at 33w3d. The TED was normal at 10.3 cm. Patient denied having any leaking of fluid or vaginal discharge. She returned at 34 weeks 1 day for follow-up testing. The TED was normal at 10.8 cm. She returned at 35 weeks 1 day. The TED was again normal at 10.2 cm. --I requested the patient return for a follow-up assessment in 3 days unless there is a clinical reason for her to return prior to that time. She is to call if she has any problems or questions prior to her next visit. Further evaluation and management will be dependent on her clinical presentation and the results of her testing. --The patient was advised to call if she has any increased vaginal discharge, vaginal bleeding, contractions, abdominal pain, back pain or any new significant symptomatology prior to her next visit. I advised her that these are signs and symptoms of cervical change and require follow-up assessment when they occur. Preeclampsia precautions were also reviewed with the patient. --The patient was also counseled to call and/or return with any concerns for decreased fetal activity. --The patient is to continue to follow with you in your office for ongoing obstetric care. --The total time spent on today's visit was 30 minutes. This included preparation for the visit (i.e. reviewing prior external notes and test results), performance of a medically appropriate history and examination, counseling, orders for medications, tests or other procedures, and coordination of care. Greater than 50% of the time was spent face-to-face with the patient. This time is exclusive of procedures performed. I answered all of  the patient's questions to her satisfaction.  I asked her to call if she had any additional questions prior to her next visit. --At the conclusion of the visit, the patient appeared to have a good understanding of the issues discussed. I answered all of her questions to her satisfaction. I asked her to call if she had any additional questions prior to her next visit. --Thank you for allowing me to participate in the care of this pleasant patient. Please don't hesitate to call me if you have any questions. Sincerely,      Jefry Shaikh MD, [facility] [place] [address]  [phone]      *All or parts of this note may have been generated using a voice recognition program. There may be typo, grammar, or Word substitution errors that have escaped my review of this note.

## 2023-02-07 NOTE — PROGRESS NOTES
Pt here for BPP/NST  Pt had nosebleed x2 in past week  Pt states good fetal movment  Blood sugars scanned into media

## 2023-02-07 NOTE — PATIENT INSTRUCTIONS

## 2023-02-10 ENCOUNTER — ROUTINE PRENATAL (OUTPATIENT)
Dept: OBGYN CLINIC | Age: 33
End: 2023-02-10
Payer: MEDICAID

## 2023-02-10 VITALS
WEIGHT: 155 LBS | SYSTOLIC BLOOD PRESSURE: 126 MMHG | DIASTOLIC BLOOD PRESSURE: 76 MMHG | HEART RATE: 109 BPM | BODY MASS INDEX: 24.28 KG/M2

## 2023-02-10 DIAGNOSIS — E61.1 LOW IRON: ICD-10-CM

## 2023-02-10 DIAGNOSIS — Z3A.35 35 WEEKS GESTATION OF PREGNANCY: Primary | ICD-10-CM

## 2023-02-10 DIAGNOSIS — O24.419 GESTATIONAL DIABETES MELLITUS (GDM), ANTEPARTUM, GESTATIONAL DIABETES METHOD OF CONTROL UNSPECIFIED: ICD-10-CM

## 2023-02-10 LAB
GLUCOSE URINE, POC: NEGATIVE
PROTEIN UA: POSITIVE

## 2023-02-10 PROCEDURE — 81002 URINALYSIS NONAUTO W/O SCOPE: CPT | Performed by: OBSTETRICS & GYNECOLOGY

## 2023-02-10 PROCEDURE — 59025 FETAL NON-STRESS TEST: CPT | Performed by: OBSTETRICS & GYNECOLOGY

## 2023-02-10 PROCEDURE — 99213 OFFICE O/P EST LOW 20 MIN: CPT | Performed by: OBSTETRICS & GYNECOLOGY

## 2023-02-10 RX ORDER — FERROUS SULFATE 325(65) MG
325 TABLET ORAL 2 TIMES DAILY
Qty: 60 TABLET | Refills: 3 | Status: SHIPPED | OUTPATIENT
Start: 2023-02-10

## 2023-02-10 NOTE — PROGRESS NOTES
NON STRESS TEST INTERPRETATION    2/10/23    RE:  Anisa Double   : 1990   AGE: 29 y.o.     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

## 2023-02-10 NOTE — PROGRESS NOTES
February 10, 2023      Eliz Cabral DO  [address]     RE:  Brittany Olvera  : 1990   AGE: 29 y.o. This report has been created using voice recognition software. It may contain errors which are inherent in voice recognition technology. Dear Dr. Danni Shafer:      I had the pleasure of meeting with Ms. Corley for fetal testing. As you know, Ms. Philip Baker  is a 29 y.o.  at 35w4d (LMP = 5 wk US) who is being followed by our office for multiple medical issues. Today, Ms. Philip Baker reports that she feels well. She notes good fetal movement and denies any symptoms of leaking of fluid, vaginal bleeding, and/or contractions. She presented for fetal testing. A nonstress test was reactive. PERTINENT PHYSICAL EXAMINATION:   /76   Pulse (!) 109   Wt 155 lb (70.3 kg)   LMP 2022 (Exact Date)   BMI 24.28 kg/m²     Urine dipstick:   Negative for Glucose    Trace for Albumin      A fetal ultrasound assessment was performed today. A report is enclosed for your review. Assessment & Plan:  29 y.o.  at 35w4d (LMP = 5 wk US) with:    1. Fetal testing -- The patient presented to the office today for fetal testing secondary to a history of a thrombosis and gestational diabetes. The patient did not have any concerns today. Fetal testing was reassuring and the nonstress test was reactive. She was scheduled to return in 4 days for fetal testing. 2.  Gestational diabetes -- The patient additional available data include -2/10. Fastin-110, 8/10 elevated  Post breakfast: ,  elevated  Post lunch: ,  elevated  Post dinner: 102-143,  elevated    The patient recently transition to a continuous glucose monitor. She was counseled to continue to follow a gestational diabetic diet and to watch her diet. Blood sugars will be reviewed next week.   If her values continue to be elevated, then insulin will be prescribed. --I requested the patient return for a follow-up assessment in 4 days unless there is a clinical reason for her to return prior to that time. She is to call if she has any problems or questions prior to her next visit. Further evaluation and management will be dependent on her clinical presentation and the results of her testing. --The patient was advised to call if she has any increased vaginal discharge, vaginal bleeding, contractions, abdominal pain, back pain or any new significant symptomatology prior to her next visit. I advised her that these are signs and symptoms of cervical change and require follow-up assessment when they occur. Preeclampsia precautions were also reviewed with the patient. --The patient is to continue to follow with you in your office for ongoing obstetric care. --The total time spent on today's visit was 15 minutes. This included preparation for the visit (i.e. reviewing prior external notes and test results), performance of a medically appropriate history and examination, counseling, orders for medications, tests or other procedures, and coordination of care. Greater than 50% of the time was spent face-to-face with the patient. This time is exclusive of procedures performed. I answered all of  the patient's questions to her satisfaction. I asked her to call if she had any additional questions prior to her next visit. --At the conclusion of the visit, the patient appeared to have a good understanding of the issues discussed. I answered all of her questions to her satisfaction. I asked her to call if she had any additional questions prior to her next visit. --Thank you for allowing me to participate in the care of this pleasant patient. Please don't hesitate to call me if you have any questions.       Sincerely,      Sarbjit Heller MD, [facility]  [phone]      *All or parts of this note may have been generated using a voice recognition program. There may be typo, grammar, or Word substitution errors that have escaped my review of this note.

## 2023-02-10 NOTE — LETTER
[facility] Maternal Fetal Medicine  [address] [address]  Phone: [phone]  Fax: [phone]           Soumya Cortez MD      February 10, 2023     Patient: Veronika Brice   MR Number: [id number]   YOB: 1990   Date of Visit: 2/10/2023       Dear Dr. Jillian Barclay: Thank you for referring Natalie Alvarez to me for evaluation/treatment. Below are the relevant portions of my assessment and plan of care. If you have questions, please do not hesitate to call me. I look forward to following Vanessa along with you. Sincerely,        Soumya Cortez MD    CC providers:  Ash Rodríguez DO  [address] [place] [address] Floor  [facility] [place] [address]  [facility] [facility]       February 10, 2023      Ash Rodríguez DO  [address] [place] [address]     RE:  Charla Pham  : 1990   AGE: 29 y.o. This report has been created using voice recognition software. It may contain errors which are inherent in voice recognition technology. Dear Dr. Jillian Barclay:      I had the pleasure of meeting with Ms. Corley for fetal testing. As you know, Ms. Mandie Bateman  is a 29 y.o.  at 35w4d (LMP = 5 wk US) who is being followed by our office for multiple medical issues. Today, Ms. Mandie Bateman reports that she feels well. She notes good fetal movement and denies any symptoms of leaking of fluid, vaginal bleeding, and/or contractions. She presented for fetal testing. A nonstress test was reactive. PERTINENT PHYSICAL EXAMINATION:   /76   Pulse (!) 109   Wt 155 lb (70.3 kg)   LMP 2022 (Exact Date)   BMI 24.28 kg/m²     Urine dipstick:   Negative for Glucose    Trace for Albumin      A fetal ultrasound assessment was performed today. A report is enclosed for your review. Assessment & Plan:  29 y.o.  at 35w4d (LMP = 5 wk US) with:    1.   Fetal testing -- The patient presented to the office today for fetal testing secondary to a history of a thrombosis and gestational diabetes. The patient did not have any concerns today. Fetal testing was reassuring and the nonstress test was reactive. She was scheduled to return in 4 days for fetal testing. 2.  Gestational diabetes -- The patient additional available data include -2/10. Fastin-110, 8/10 elevated  Post breakfast: ,  elevated  Post lunch: ,  elevated  Post dinner: 102-143,  elevated    The patient recently transition to a continuous glucose monitor. She was counseled to continue to follow a gestational diabetic diet and to watch her diet. Blood sugars will be reviewed next week. If her values continue to be elevated, then insulin will be prescribed. --I requested the patient return for a follow-up assessment in 4 days unless there is a clinical reason for her to return prior to that time. She is to call if she has any problems or questions prior to her next visit. Further evaluation and management will be dependent on her clinical presentation and the results of her testing. --The patient was advised to call if she has any increased vaginal discharge, vaginal bleeding, contractions, abdominal pain, back pain or any new significant symptomatology prior to her next visit. I advised her that these are signs and symptoms of cervical change and require follow-up assessment when they occur. Preeclampsia precautions were also reviewed with the patient. --The patient is to continue to follow with you in your office for ongoing obstetric care. --The total time spent on today's visit was 15 minutes. This included preparation for the visit (i.e. reviewing prior external notes and test results), performance of a medically appropriate history and examination, counseling, orders for medications, tests or other procedures, and coordination of care. Greater than 50% of the time was spent face-to-face with the patient.   This time is exclusive of procedures performed. I answered all of  the patient's questions to her satisfaction. I asked her to call if she had any additional questions prior to her next visit. --At the conclusion of the visit, the patient appeared to have a good understanding of the issues discussed. I answered all of her questions to her satisfaction. I asked her to call if she had any additional questions prior to her next visit. --Thank you for allowing me to participate in the care of this pleasant patient. Please don't hesitate to call me if you have any questions. Sincerely,      Lillian Greer MD, [facility] [place] [address]  [phone]      *All or parts of this note may have been generated using a voice recognition program. There may be typo, grammar, or Word substitution errors that have escaped my review of this note.

## 2023-02-14 ENCOUNTER — ROUTINE PRENATAL (OUTPATIENT)
Dept: OBGYN | Age: 33
End: 2023-02-14
Payer: MEDICAID

## 2023-02-14 ENCOUNTER — ANCILLARY PROCEDURE (OUTPATIENT)
Dept: OBGYN CLINIC | Age: 33
End: 2023-02-14
Payer: MEDICAID

## 2023-02-14 ENCOUNTER — ROUTINE PRENATAL (OUTPATIENT)
Dept: OBGYN CLINIC | Age: 33
End: 2023-02-14
Payer: MEDICAID

## 2023-02-14 VITALS
WEIGHT: 156 LBS | SYSTOLIC BLOOD PRESSURE: 120 MMHG | HEART RATE: 98 BPM | BODY MASS INDEX: 24.43 KG/M2 | DIASTOLIC BLOOD PRESSURE: 85 MMHG

## 2023-02-14 VITALS
WEIGHT: 157 LBS | DIASTOLIC BLOOD PRESSURE: 86 MMHG | BODY MASS INDEX: 24.59 KG/M2 | SYSTOLIC BLOOD PRESSURE: 125 MMHG | HEART RATE: 89 BPM

## 2023-02-14 DIAGNOSIS — O26.899 RH NEGATIVE STATE IN ANTEPARTUM PERIOD: ICD-10-CM

## 2023-02-14 DIAGNOSIS — R79.0 LOW FERRITIN: ICD-10-CM

## 2023-02-14 DIAGNOSIS — R79.89 HYPOTHYROXINEMIA: ICD-10-CM

## 2023-02-14 DIAGNOSIS — Z67.91 RH NEGATIVE STATE IN ANTEPARTUM PERIOD: ICD-10-CM

## 2023-02-14 DIAGNOSIS — O09.90 SUPERVISION OF HIGH RISK PREGNANCY, ANTEPARTUM: Primary | ICD-10-CM

## 2023-02-14 DIAGNOSIS — E53.8 LOW VITAMIN B12 LEVEL: ICD-10-CM

## 2023-02-14 DIAGNOSIS — D64.9 ANEMIA, UNSPECIFIED TYPE: ICD-10-CM

## 2023-02-14 DIAGNOSIS — Z86.718 HISTORY OF DVT (DEEP VEIN THROMBOSIS): ICD-10-CM

## 2023-02-14 DIAGNOSIS — O36.1920 ANTI-M ISOIMMUNIZATION AFFECTING PREGNANCY IN SECOND TRIMESTER, SINGLE OR UNSPECIFIED FETUS: ICD-10-CM

## 2023-02-14 DIAGNOSIS — Z86.79 HISTORY OF HYPERTENSION: ICD-10-CM

## 2023-02-14 DIAGNOSIS — Z3A.36 36 WEEKS GESTATION OF PREGNANCY: Primary | ICD-10-CM

## 2023-02-14 DIAGNOSIS — Z92.89 HISTORY OF BLOOD TRANSFUSION: ICD-10-CM

## 2023-02-14 DIAGNOSIS — O24.414 INSULIN CONTROLLED GESTATIONAL DIABETES MELLITUS (GDM) IN THIRD TRIMESTER: ICD-10-CM

## 2023-02-14 DIAGNOSIS — O24.419 GESTATIONAL DIABETES MELLITUS (GDM), ANTEPARTUM, GESTATIONAL DIABETES METHOD OF CONTROL UNSPECIFIED: ICD-10-CM

## 2023-02-14 DIAGNOSIS — O36.1930 MATERNAL ATYPICAL ANTIBODY AFFECTING PREGNANCY IN THIRD TRIMESTER, SINGLE OR UNSPECIFIED FETUS: ICD-10-CM

## 2023-02-14 LAB
GLUCOSE URINE, POC: NEGATIVE
PROTEIN UA: NEGATIVE

## 2023-02-14 PROCEDURE — G8427 DOCREV CUR MEDS BY ELIG CLIN: HCPCS | Performed by: OBSTETRICS & GYNECOLOGY

## 2023-02-14 PROCEDURE — 76821 MIDDLE CEREBRAL ARTERY ECHO: CPT | Performed by: OBSTETRICS & GYNECOLOGY

## 2023-02-14 PROCEDURE — 81002 URINALYSIS NONAUTO W/O SCOPE: CPT | Performed by: OBSTETRICS & GYNECOLOGY

## 2023-02-14 PROCEDURE — 99213 OFFICE O/P EST LOW 20 MIN: CPT | Performed by: OBSTETRICS & GYNECOLOGY

## 2023-02-14 PROCEDURE — G8420 CALC BMI NORM PARAMETERS: HCPCS | Performed by: OBSTETRICS & GYNECOLOGY

## 2023-02-14 PROCEDURE — 76815 OB US LIMITED FETUS(S): CPT | Performed by: OBSTETRICS & GYNECOLOGY

## 2023-02-14 PROCEDURE — 1036F TOBACCO NON-USER: CPT | Performed by: OBSTETRICS & GYNECOLOGY

## 2023-02-14 PROCEDURE — 76818 FETAL BIOPHYS PROFILE W/NST: CPT | Performed by: OBSTETRICS & GYNECOLOGY

## 2023-02-14 PROCEDURE — G8484 FLU IMMUNIZE NO ADMIN: HCPCS | Performed by: OBSTETRICS & GYNECOLOGY

## 2023-02-14 PROCEDURE — 76820 UMBILICAL ARTERY ECHO: CPT | Performed by: OBSTETRICS & GYNECOLOGY

## 2023-02-14 RX ORDER — IBUPROFEN 200 MG
1 TABLET ORAL 2 TIMES DAILY
Qty: 50 EACH | Refills: 3 | Status: ON HOLD | OUTPATIENT
Start: 2023-02-14 | End: 2023-03-11

## 2023-02-14 RX ORDER — INSULIN HUMAN 100 [IU]/ML
4 INJECTION, SUSPENSION SUBCUTANEOUS NIGHTLY
Qty: 1 ADJUSTABLE DOSE PRE-FILLED PEN SYRINGE | Refills: 1 | Status: ON HOLD | OUTPATIENT
Start: 2023-02-14

## 2023-02-14 RX ORDER — HEPARIN SODIUM 10000 [USP'U]/ML
10000 INJECTION, SOLUTION INTRAVENOUS; SUBCUTANEOUS EVERY 12 HOURS
Qty: 50 ML | Refills: 0 | Status: ON HOLD | OUTPATIENT
Start: 2023-02-14 | End: 2023-03-11

## 2023-02-14 RX ORDER — PEN NEEDLE, DIABETIC 30 GX5/16"
1 NEEDLE, DISPOSABLE MISCELLANEOUS 2 TIMES DAILY
Qty: 50 EACH | Refills: 1 | Status: ON HOLD | OUTPATIENT
Start: 2023-02-14

## 2023-02-14 NOTE — PROGRESS NOTES
Just saw MFM labs ordered ultrasound performed. Reports good FM. Did have some pink spotting this morning no SS PIH. GBS performed.   Reviewed L&D plan is IOL at 39 weeks

## 2023-02-14 NOTE — PROGRESS NOTES
Patient here today for US and NST   Patient states she is having a light pink discharge this morning   No other complaints  Feeling baby move  Urine for glucose and protein obtained with negative results.

## 2023-02-14 NOTE — PROGRESS NOTES
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 patient's blood sugars should be monitored closely and ideally be less than 105 mg/dL during labor in order to minimize the risk of  hypoglycemia. She should also have a fasting sugar checked postpartum. She was counseled that she has a 50% risk for the development of type 2 diabetes in the next 10 years. This risk can be modified with diet and lifestyle changes. She will need a 2 hour oral glucose tolerance test at 8-12 weeks postpartum. If this is normal, she should have yearly screening for diabetes. If she becomes pregnant in the future, she is at increased risk for recurrent gestational diabetes, 60-70%, and should have early screening for gestational diabetes in the late first or early second trimester. 32.  History of elevated blood pressure -- The patient's blood pressure was initially elevated at 143/84 at 28 weeks gestation. Her blood pressure was repeated normal 113/73. The patient's urine dip was negative for protein. The patient denies having any other prior blood pressure elevations during this pregnancy. She denies having any symptoms of headache, vision change, chest pain, shortness of breath, nausea, vomiting, and or right upper quadrant pain. An exam was done in the office. The patient's heart had a regular rate and rhythm. Her lungs were clear to auscultation bilaterally.   Her abdomen was soft, gravid, non tender, and with normal bowel sounds. She had +1 patellar reflexes bilaterally. No clonus was noted bilaterally. She had trace edema in her bilateral lower extremities. The patient's blood pressure was normal today at 120/85. Her urine was negative for protein. The patient was counseled that at this time, continue close monitoring is recommended. Again, the patient has a history of hypertension. Continue increased maternal and fetal surveillance as outlined above. 33.  Poor fetal growth, improved -- The ultrasound findings from 30 weeks 2 days were reviewed with the patient. Overall, the estimated fetal weight is at the 40th percentile, however the abdominal circumference is measuring at the 7th percentile. Fetal growth was reevaluated at 32 weeks gestation. The overall estimate of fetal weight was 3 pounds 14 ounces, 44 percentile. [facility] was at the 11th percentile. In the past 2 weeks, the overall estimated fetal weight is increased by 330 g (expected 200 g). The [facility] has increased by 2 cm (expected 2 cm). Fetal testing was reassuring. Fetal growth was reevaluated at 34 weeks 1 day. The overall estimated fetal weight was 4 pounds 10 ounces, 36 percentile. The [facility] was at the 16th percentile. The overall estimate of fetal weight increased by 339 g in 2 weeks 1 day. The AC increased by 2.3 cm. There has been appropriate interval fetal growth. Counseling was previously provided to patient. Counseling was reviewed. The patient did not have any additional questions or concerns. Again, the overall estimated fetal weight is greater than the 10th percentile, however the abdominal circumference measures less than the 10th percentile at 30 weeks 2 days. Reassuring findings included a normal amniotic fluid index, a biophysical profile score of 8/8, and normal Doppler studies.       The patient was counseled that typically fetal growth restriction is formally diagnosed when the overall estimated fetal weight is less than the 10th percentile. However, a decline in the overall estimated fetal weight of greater than 20% and/or an abdominal circumference that measures less then the 10th percentile are findings that are also concerning for and/or consistent with fetal growth restriction. In over 70% of cases, small fetal size is constitutional. In approximately 30% of cases, there is a secondary cause related to placental insufficiency, a fetal issue, and/or an underlying maternal condition. Risk factors were reviewed with the patient including malnutrition, maternal chronic diseases (ie SLE, renal disease, antiphospholipid antibodies, anemia, diabetes), placental disease (chorioangioma, mosaicism), infections, fetal aneuploidy, and teratogen exposure. She was counseled regarding general management plans and that mild IUGR can generally be expectantly managed until 37-39 weeks' gestation. If there is severe growth restriction, delivery timing is based on the point at which the risk of fetal death exceeds that of  death. There is an increased risk for fetal loss in the setting of growth restriction, thus, increased surveillance is indicated. The best predictors of outcome are fetal size and gestational age attained. Overall, the long term outcome depends on the etiology of the poor growth. At this time, I recommend increased fetal surveillance. The patient was counseled to monitor fetal kick counts daily. Instructions were reviewed. She was scheduled to return in 2 weeks for follow-up fetal growth assessment and testing. Additional recommendations will be made at that time. Given the concern for poor fetal growth, additional maternal evaluation was recommended. The following labs were ordered: Preeclampsia screening, antiphospholipid antibodies, thyroid function studies/thyroid peroxidase antibodies, a nutrition panel, and infection studies.    -- Testing was completed on 1/16/2023. Results are summarized above. Her prenatal records were reviewed and she had early screening with cell free DNA that was low risk for aneuploidy. Given the lag in fetal growth, a low dose aspirin was recommended. She was counseled to start 81 mg of aspirin daily. The risks and benefits were discussed. The patient was counseled to continue a daily low-dose aspirin as outlined above. 34. Shortness of breath with activities, stable -- The patient previously reported symptoms of increased shortness of breath primarily with activity. She denied having any associated chest pain, tachycardia, heart palpitations, lightheadedness, dizziness, and/or syncope. Today, the patient reported that her symptoms are stable. The patient was again counseled that her symptoms are likely secondary to the pregnancy. With worsening symptoms or the development of any associated cardiac symptoms, then additional maternal evaluation will be recommended. Precautions were reviewed with the patient and she was counseled to present to the hospital with persistent/worsening symptoms or the development of associated tachycardia, heart palpitations, chest pain, lightheadedness, dizziness, and/or syncope. 35.  Decreased TED, improved -- The ultrasound images from 33 weeks gestation were reviewed with the patient. Initially, on ultrasound, the TED measured anywhere from 7 to 8.5 cm. Imaging was repeated following her nonstress test.  The TED ranged from 9 to 11 cm with a good 2 x 2 centimeter pocket of fluid seen. Secondary to the patient's report of a gush of fluid on 1/22/2023, the recommendation was made for the patient to go to L&D to have a sterile speculum exam and AmniSure. If testing is negative for rupture, she was scheduled to return on Thursday for a follow-up ultrasound and visit. Precautions to call and/or return sooner were reviewed.      An amnisure was negative on 1/23/2023. The patient returned for follow up testing at 33w3d. The TED was normal at 10.3 cm. Patient denied having any leaking of fluid or vaginal discharge. She returned at 34 weeks 1 day for follow-up testing. The TED was normal at 10.8 cm. She returned at 35 weeks 1 day. The TED was again normal at 10.2 cm. She returned at 36 weeks 1 day. The TED was again normal at 12.8 cm. --I requested the patient return for a follow-up assessment in 1 week unless there is a clinical reason for her to return prior to that time. She is to call if she has any problems or questions prior to her next visit. Further evaluation and management will be dependent on her clinical presentation and the results of her testing. --The patient was advised to call if she has any increased vaginal discharge, vaginal bleeding, contractions, abdominal pain, back pain or any new significant symptomatology prior to her next visit. I advised her that these are signs and symptoms of cervical change and require follow-up assessment when they occur. Preeclampsia precautions were also reviewed with the patient. --The patient was also counseled to call and/or return with any concerns for decreased fetal activity. --The patient is to continue to follow with you in your office for ongoing obstetric care. --The total time spent on today's visit was 30 minutes. This included preparation for the visit (i.e. reviewing prior external notes and test results), performance of a medically appropriate history and examination, counseling, orders for medications, tests or other procedures, and coordination of care. Greater than 50% of the time was spent face-to-face with the patient. This time is exclusive of procedures performed. I answered all of  the patient's questions to her satisfaction. I asked her to call if she had any additional questions prior to her next visit.       --At the conclusion of the visit, the patient appeared to have a good understanding of the issues discussed. I answered all of her questions to her satisfaction. I asked her to call if she had any additional questions prior to her next visit. --Thank you for allowing me to participate in the care of this pleasant patient. Please don't hesitate to call me if you have any questions. Sincerely,      Milvia Sandoval MD, [address]  [phone]      *All or parts of this note may have been generated using a voice recognition program. There may be typo, grammar, or Word substitution errors that have escaped my review of this note.

## 2023-02-14 NOTE — PROGRESS NOTES
Pt instructed on insulin pen. She was able to assemble and dose artificial skin without difficulty  She was also instructed on Heparin dosing and she was instructed to bring all of her meds to her next appt to verify dosages and administration.   Pt states that she has a lot of nurses in her family that can help her and that she will bring to her appt

## 2023-02-14 NOTE — PROGRESS NOTES
Routine ob  +fm  Pt denies lof   Pt voiced pink tinge to discharge and rachel gaxiola  Pt urine was check with mfm

## 2023-02-15 PROBLEM — O24.414 INSULIN CONTROLLED GESTATIONAL DIABETES MELLITUS (GDM) IN THIRD TRIMESTER: Status: ACTIVE | Noted: 2023-01-30

## 2023-02-15 PROBLEM — O36.1930 MATERNAL ATYPICAL ANTIBODY COMPLICATING PREGNANCY IN THIRD TRIMESTER: Status: ACTIVE | Noted: 2022-09-25

## 2023-02-17 ENCOUNTER — HOSPITAL ENCOUNTER (OUTPATIENT)
Age: 33
Discharge: HOME | End: 2023-02-17
Payer: MEDICAID

## 2023-02-17 ENCOUNTER — ROUTINE PRENATAL (OUTPATIENT)
Dept: OBGYN CLINIC | Age: 33
End: 2023-02-17
Payer: MEDICAID

## 2023-02-17 VITALS
DIASTOLIC BLOOD PRESSURE: 84 MMHG | SYSTOLIC BLOOD PRESSURE: 115 MMHG | HEART RATE: 65 BPM | WEIGHT: 157.5 LBS | BODY MASS INDEX: 24.67 KG/M2

## 2023-02-17 DIAGNOSIS — O24.414 INSULIN CONTROLLED GESTATIONAL DIABETES MELLITUS (GDM) IN THIRD TRIMESTER: ICD-10-CM

## 2023-02-17 DIAGNOSIS — E53.8 LOW VITAMIN B12 LEVEL: ICD-10-CM

## 2023-02-17 DIAGNOSIS — Z3A.36 36 WEEKS GESTATION OF PREGNANCY: ICD-10-CM

## 2023-02-17 DIAGNOSIS — D64.9 ANEMIA, UNSPECIFIED TYPE: ICD-10-CM

## 2023-02-17 DIAGNOSIS — O36.1920 ANTI-M ISOIMMUNIZATION AFFECTING PREGNANCY IN SECOND TRIMESTER, SINGLE OR UNSPECIFIED FETUS: ICD-10-CM

## 2023-02-17 DIAGNOSIS — Z86.718 HISTORY OF DVT (DEEP VEIN THROMBOSIS): ICD-10-CM

## 2023-02-17 DIAGNOSIS — R79.89 HYPOTHYROXINEMIA: ICD-10-CM

## 2023-02-17 DIAGNOSIS — O24.419 GESTATIONAL DIABETES MELLITUS (GDM), ANTEPARTUM, GESTATIONAL DIABETES METHOD OF CONTROL UNSPECIFIED: ICD-10-CM

## 2023-02-17 DIAGNOSIS — O24.419 GESTATIONAL DIABETES MELLITUS (GDM), ANTEPARTUM, GESTATIONAL DIABETES METHOD OF CONTROL UNSPECIFIED: Primary | ICD-10-CM

## 2023-02-17 DIAGNOSIS — Z86.79 HISTORY OF HYPERTENSION: ICD-10-CM

## 2023-02-17 DIAGNOSIS — R79.0 LOW FERRITIN: ICD-10-CM

## 2023-02-17 DIAGNOSIS — O36.1930 MATERNAL ATYPICAL ANTIBODY AFFECTING PREGNANCY IN THIRD TRIMESTER, SINGLE OR UNSPECIFIED FETUS: ICD-10-CM

## 2023-02-17 LAB
ABO/RH: NORMAL
ALBUMIN SERPL-MCNC: 3.7 G/DL (ref 3.5–5.2)
ALP BLD-CCNC: 192 U/L (ref 35–104)
ALT SERPL-CCNC: 25 U/L (ref 0–32)
ANION GAP SERPL CALCULATED.3IONS-SCNC: 11 MMOL/L (ref 7–16)
ANTIBODY SCREEN: NORMAL
AST SERPL-CCNC: 20 U/L (ref 0–31)
BACTERIA: NORMAL /HPF
BASOPHILS ABSOLUTE: 0.02 E9/L (ref 0–0.2)
BASOPHILS RELATIVE PERCENT: 0.4 % (ref 0–2)
BILIRUB SERPL-MCNC: 0.5 MG/DL (ref 0–1.2)
BILIRUBIN URINE: NEGATIVE
BLOOD, URINE: NEGATIVE
BUN BLDV-MCNC: 6 MG/DL (ref 6–20)
BURR CELLS: ABNORMAL
CALCIUM SERPL-MCNC: 9.4 MG/DL (ref 8.6–10.2)
CHLORIDE BLD-SCNC: 103 MMOL/L (ref 98–107)
CLARITY: CLEAR
CO2: 23 MMOL/L (ref 22–29)
COLOR: YELLOW
CREAT SERPL-MCNC: 0.6 MG/DL (ref 0.5–1)
CREATININE URINE: 40 MG/DL (ref 29–226)
EOSINOPHILS ABSOLUTE: 0.04 E9/L (ref 0.05–0.5)
EOSINOPHILS RELATIVE PERCENT: 0.8 % (ref 0–6)
FERRITIN: 32 NG/ML
FOLATE: >20 NG/ML (ref 4.8–24.2)
GFR SERPL CREATININE-BSD FRML MDRD: >60 ML/MIN/1.73
GLUCOSE BLD-MCNC: 118 MG/DL (ref 74–99)
GLUCOSE URINE, POC: NEGATIVE
GLUCOSE URINE: NEGATIVE MG/DL
HBA1C MFR BLD: 5.7 % (ref 4–5.6)
HCT VFR BLD CALC: 39.6 % (ref 34–48)
HEMOGLOBIN: 13.2 G/DL (ref 11.5–15.5)
IMMATURE GRANULOCYTES #: 0.03 E9/L
IMMATURE GRANULOCYTES %: 0.6 % (ref 0–5)
KETONES, URINE: NEGATIVE MG/DL
LACTATE DEHYDROGENASE: 169 U/L (ref 135–214)
LEUKOCYTE ESTERASE, URINE: NEGATIVE
LYMPHOCYTES ABSOLUTE: 1.3 E9/L (ref 1.5–4)
LYMPHOCYTES RELATIVE PERCENT: 26.8 % (ref 20–42)
MAGNESIUM: 1.8 MG/DL (ref 1.6–2.6)
MCH RBC QN AUTO: 30.8 PG (ref 26–35)
MCHC RBC AUTO-ENTMCNC: 33.3 % (ref 32–34.5)
MCV RBC AUTO: 92.3 FL (ref 80–99.9)
MONOCYTES ABSOLUTE: 0.28 E9/L (ref 0.1–0.95)
MONOCYTES RELATIVE PERCENT: 5.8 % (ref 2–12)
NEUTROPHILS ABSOLUTE: 3.18 E9/L (ref 1.8–7.3)
NEUTROPHILS RELATIVE PERCENT: 65.6 % (ref 43–80)
NITRITE, URINE: NEGATIVE
OVALOCYTES: ABNORMAL
PDW BLD-RTO: 13.1 FL (ref 11.5–15)
PH UA: 6.5 (ref 5–9)
PLATELET # BLD: 183 E9/L (ref 130–450)
PMV BLD AUTO: 12.1 FL (ref 7–12)
POIKILOCYTES: ABNORMAL
POTASSIUM SERPL-SCNC: 3.7 MMOL/L (ref 3.5–5)
PROTEIN PROTEIN: 5 MG/DL (ref 0–12)
PROTEIN UA: NEGATIVE
PROTEIN UA: NEGATIVE MG/DL
PROTEIN/CREAT RATIO: 0.1
PROTEIN/CREAT RATIO: 0.1 (ref 0–0.2)
RBC # BLD: 4.29 E12/L (ref 3.5–5.5)
RBC UA: NORMAL /HPF (ref 0–2)
SODIUM BLD-SCNC: 137 MMOL/L (ref 132–146)
SPECIFIC GRAVITY UA: <=1.005 (ref 1–1.03)
T3 FREE: 2.8 PG/ML (ref 2–4.4)
T4 FREE: 0.94 NG/DL (ref 0.93–1.7)
TOTAL PROTEIN: 6.9 G/DL (ref 6.4–8.3)
TSH SERPL DL<=0.05 MIU/L-ACNC: 1.54 UIU/ML (ref 0.27–4.2)
URIC ACID, SERUM: 5.6 MG/DL (ref 2.4–5.7)
UROBILINOGEN, URINE: 0.2 E.U./DL
VITAMIN B-12: 619 PG/ML (ref 211–946)
VITAMIN D 25-HYDROXY: 45 NG/ML (ref 30–100)
WBC # BLD: 4.9 E9/L (ref 4.5–11.5)
WBC UA: NORMAL /HPF (ref 0–5)

## 2023-02-17 PROCEDURE — 83735 ASSAY OF MAGNESIUM: CPT

## 2023-02-17 PROCEDURE — 84156 ASSAY OF PROTEIN URINE: CPT

## 2023-02-17 PROCEDURE — 86901 BLOOD TYPING SEROLOGIC RH(D): CPT

## 2023-02-17 PROCEDURE — 86850 RBC ANTIBODY SCREEN: CPT

## 2023-02-17 PROCEDURE — 99213 OFFICE O/P EST LOW 20 MIN: CPT | Performed by: OBSTETRICS & GYNECOLOGY

## 2023-02-17 PROCEDURE — 82607 VITAMIN B-12: CPT

## 2023-02-17 PROCEDURE — 84443 ASSAY THYROID STIM HORMONE: CPT

## 2023-02-17 PROCEDURE — 59025 FETAL NON-STRESS TEST: CPT | Performed by: OBSTETRICS & GYNECOLOGY

## 2023-02-17 PROCEDURE — 82570 ASSAY OF URINE CREATININE: CPT

## 2023-02-17 PROCEDURE — 36415 COLL VENOUS BLD VENIPUNCTURE: CPT

## 2023-02-17 PROCEDURE — 82306 VITAMIN D 25 HYDROXY: CPT

## 2023-02-17 PROCEDURE — 85025 COMPLETE CBC W/AUTO DIFF WBC: CPT

## 2023-02-17 PROCEDURE — 80053 COMPREHEN METABOLIC PANEL: CPT

## 2023-02-17 PROCEDURE — 83036 HEMOGLOBIN GLYCOSYLATED A1C: CPT

## 2023-02-17 PROCEDURE — 84481 FREE ASSAY (FT-3): CPT

## 2023-02-17 PROCEDURE — 83615 LACTATE (LD) (LDH) ENZYME: CPT

## 2023-02-17 PROCEDURE — 87088 URINE BACTERIA CULTURE: CPT

## 2023-02-17 PROCEDURE — 81001 URINALYSIS AUTO W/SCOPE: CPT

## 2023-02-17 PROCEDURE — 82728 ASSAY OF FERRITIN: CPT

## 2023-02-17 PROCEDURE — 84550 ASSAY OF BLOOD/URIC ACID: CPT

## 2023-02-17 PROCEDURE — 81002 URINALYSIS NONAUTO W/O SCOPE: CPT | Performed by: OBSTETRICS & GYNECOLOGY

## 2023-02-17 PROCEDURE — 82746 ASSAY OF FOLIC ACID SERUM: CPT

## 2023-02-17 PROCEDURE — 84439 ASSAY OF FREE THYROXINE: CPT

## 2023-02-17 PROCEDURE — 86900 BLOOD TYPING SEROLOGIC ABO: CPT

## 2023-02-17 RX ORDER — INSULIN GLARGINE 100 [IU]/ML
4 INJECTION, SOLUTION SUBCUTANEOUS NIGHTLY
Status: ON HOLD | COMMUNITY

## 2023-02-17 NOTE — PROGRESS NOTES
Pt here for NST  Pt feels a lot of pelvic pressure  Pt states good fetal movement  Pt denies any bleeding  Pt states good fetal movement  Blood sugars scanned into media

## 2023-02-17 NOTE — PATIENT INSTRUCTIONS

## 2023-02-17 NOTE — LETTER
[facility]  [address] [facility] [address]  Phone: [phone]  Fax: [phone]           Nina Rousseau MD      2023     Patient: Johanny Campos   MR Number: [id number]   YOB: 1990   Date of Visit: 2023       Dear Dr. Becky Meier: Thank you for referring Michael Ramirez to me for evaluation/treatment. Below are the relevant portions of my assessment and plan of care. If you have questions, please do not hesitate to call me. I look forward to following Vanessa along with you. Sincerely,        Nina Rousseau MD    CC providers:  Huong Medrano DO  [address] [place] [id number]  [facility]       2023      Huong Medrano DO  [address] [place] [address]     RE:  Miles Seals  : 1990   AGE: 29 y.o. This report has been created using voice recognition software. It may contain errors which are inherent in voice recognition technology. Dear Dr. Becky Meier:      I had the pleasure of meeting with Ms. Corley for fetal testing. As you know, Ms. Lashell Joya  is a 29 y.o.  at 36w4d (LMP = [address]) who is being followed by our office for gestational diabetes on insulin and a history of a DVT. Today, Ms. Lashell Joya reports that she feels well. She notes good fetal movement and denies any symptoms of leaking of fluid, vaginal bleeding, and/or contractions. The patient presented today for fetal testing. Nonstress test was completed and noted to be reactive. PERTINENT PHYSICAL EXAMINATION:   /84   Pulse 65   Wt 157 lb 8 oz (71.4 kg)   LMP 2022 (Exact Date)   BMI 24.67 kg/m²     Urine dipstick:   Negative for Glucose    Negative for Albumin      A fetal ultrasound assessment was performed today. A report is enclosed for your review. Assessment & Plan:  29 y.o.  at 36w4d (LMP = [address]) with:    1.   Fetal testing -- The patient presented to the office today for fetal testing secondary to gestational diabetes on insulin and a history of a DVT on heparin. The patient did not have any concerns today. Fetal testing was reassuring and the nonstress test was reactive. 2.  Gestational diabetes, on insulin -- The patient was recently started on insulin on 2023. She was provided with a prescription for insulin, NPH, 4 units at bedtime. Her insurance only covers Lantus. Thus, the prescription was changed to Lantus, 4 units at bedtime. The patient had a blood sugar log available for review. Dates reviewed included -. Fastin-86, 0 of 4 elevated  Post breakfast: 113-126, 2 of 3 elevated  Post lunch: 94-1 111, 0 of 3 elevated  Post dinner: , 0 of 3 elevated    The patient was counseled to continue her current dose of Lantus. She was counseled to continue to follow a gestational diabetic diet. --I requested the patient return for a follow-up assessment in 4 days unless there is a clinical reason for her to return prior to that time. She is to call if she has any problems or questions prior to her next visit. Further evaluation and management will be dependent on her clinical presentation and the results of her testing. --The patient was advised to call if she has any increased vaginal discharge, vaginal bleeding, contractions, abdominal pain, back pain or any new significant symptomatology prior to her next visit. I advised her that these are signs and symptoms of cervical change and require follow-up assessment when they occur. Preeclampsia precautions were also reviewed with the patient. --The patient is to continue to follow with you in your office for ongoing obstetric care. --The total time spent on today's visit was 10 minutes.   This included preparation for the visit (i.e. reviewing prior external notes and test results), performance of a medically appropriate history and examination, counseling, orders for medications, tests or other procedures, and coordination of care. Greater than 50% of the time was spent face-to-face with the patient. This time is exclusive of procedures performed. I answered all of  the patient's questions to her satisfaction. I asked her to call if she had any additional questions prior to her next visit. --At the conclusion of the visit, the patient appeared to have a good understanding of the issues discussed. I answered all of her questions to her satisfaction. I asked her to call if she had any additional questions prior to her next visit. --Thank you for allowing me to participate in the care of this pleasant patient. Please don't hesitate to call me if you have any questions. Sincerely,      Enrike Olguin MD, [place] [address]  [phone]      *All or parts of this note may have been generated using a voice recognition program. There may be typo, grammar, or Word substitution errors that have escaped my review of this note.

## 2023-02-17 NOTE — PROGRESS NOTES
2023      Raeann Avery DO  [address]     RE:  Dolly Tirado  : 1990   AGE: 29 y.o. This report has been created using voice recognition software. It may contain errors which are inherent in voice recognition technology. Dear Dr. Jeremiah Prasad:      I had the pleasure of meeting with Ms. Corley for fetal testing. As you know, Ms. Naif Soriano  is a 29 y.o.  at 36w4d (LMP = [address]) who is being followed by our office for gestational diabetes on insulin and a history of a DVT. Today, Ms. Naif Soriano reports that she feels well. She notes good fetal movement and denies any symptoms of leaking of fluid, vaginal bleeding, and/or contractions. The patient presented today for fetal testing. Nonstress test was completed and noted to be reactive. PERTINENT PHYSICAL EXAMINATION:   /84   Pulse 65   Wt 157 lb 8 oz (71.4 kg)   LMP 2022 (Exact Date)   BMI 24.67 kg/m²     Urine dipstick:   Negative for Glucose    Negative for Albumin      A fetal ultrasound assessment was performed today. A report is enclosed for your review. Assessment & Plan:  29 y.o.  at 36w4d (LMP = 5 [address]) with:    1. Fetal testing -- The patient presented to the office today for fetal testing secondary to gestational diabetes on insulin and a history of a DVT on heparin. The patient did not have any concerns today. Fetal testing was reassuring and the nonstress test was reactive. 2.  Gestational diabetes, on insulin -- The patient was recently started on insulin on 2023. She was provided with a prescription for insulin, NPH, 4 units at bedtime. Her insurance only covers Lantus. Thus, the prescription was changed to Lantus, 4 units at bedtime. The patient had a blood sugar log available for review. Dates reviewed included -.   Fastin-86, 0 of 4 elevated  Post breakfast: 113-126, 2 of 3 elevated  Post lunch: 94-1 111, 0 of 3 elevated  Post dinner: , 0 of 3 elevated    The patient was counseled to continue her current dose of Lantus. She was counseled to continue to follow a gestational diabetic diet. --I requested the patient return for a follow-up assessment in 4 days unless there is a clinical reason for her to return prior to that time. She is to call if she has any problems or questions prior to her next visit. Further evaluation and management will be dependent on her clinical presentation and the results of her testing. --The patient was advised to call if she has any increased vaginal discharge, vaginal bleeding, contractions, abdominal pain, back pain or any new significant symptomatology prior to her next visit. I advised her that these are signs and symptoms of cervical change and require follow-up assessment when they occur. Preeclampsia precautions were also reviewed with the patient. --The patient is to continue to follow with you in your office for ongoing obstetric care. --The total time spent on today's visit was 10 minutes. This included preparation for the visit (i.e. reviewing prior external notes and test results), performance of a medically appropriate history and examination, counseling, orders for medications, tests or other procedures, and coordination of care. Greater than 50% of the time was spent face-to-face with the patient. This time is exclusive of procedures performed. I answered all of  the patient's questions to her satisfaction. I asked her to call if she had any additional questions prior to her next visit. --At the conclusion of the visit, the patient appeared to have a good understanding of the issues discussed. I answered all of her questions to her satisfaction. I asked her to call if she had any additional questions prior to her next visit. --Thank you for allowing me to participate in the care of this pleasant patient.   Please don't hesitate to call me if you have any questions. Sincerely,      Tu Lin MD, [address]  [phone]      *All or parts of this note may have been generated using a voice recognition program. There may be typo, grammar, or Word substitution errors that have escaped my review of this note.

## 2023-02-17 NOTE — PROGRESS NOTES
NON STRESS TEST INTERPRETATION    23    RE:  Nusrat Rm   : 1990   AGE: 29 y.o.     GESTATIONAL AGE:  36w4d    DIAGNOSIS:   Gestational diabetes, insulin controlled, history of DVT on heparin    INDICATION:  Same as above    TIME ON:  1109      TIME OFF:  1132      RESULT:   REACTIVE      FHR Baseline Rate:   140 bpm    PERIODIC CHANGES:    Accelerations present, variability moderate, no decelerations noted    COMMENTS:      She is to continue having NST's every 3-4 days, and BPP with umbilical artery doppler studies once per week        Glenis Goltz, MD, [facility]

## 2023-02-18 ENCOUNTER — ANESTHESIA EVENT (OUTPATIENT)
Dept: LABOR AND DELIVERY | Age: 33
DRG: 560 | End: 2023-02-18
Payer: MEDICAID

## 2023-02-18 ENCOUNTER — ANESTHESIA (OUTPATIENT)
Dept: LABOR AND DELIVERY | Age: 33
DRG: 560 | End: 2023-02-18
Payer: MEDICAID

## 2023-02-18 ENCOUNTER — HOSPITAL ENCOUNTER (INPATIENT)
Age: 33
LOS: 2 days | Discharge: HOME | DRG: 560 | End: 2023-02-20
Attending: OBSTETRICS & GYNECOLOGY | Admitting: OBSTETRICS & GYNECOLOGY
Payer: MEDICAID

## 2023-02-18 PROBLEM — O42.90 AMNIOTIC FLUID LEAKING: Status: ACTIVE | Noted: 2023-02-18

## 2023-02-18 LAB
ABO/RH: NORMAL
AMNISURE, POC: POSITIVE
AMPHETAMINE SCREEN, URINE: NOT DETECTED
ANTIBODY SCREEN: NORMAL
APTT: 26.3 SEC (ref 24.5–35.1)
BARBITURATE SCREEN URINE: NOT DETECTED
BENZODIAZEPINE SCREEN, URINE: NOT DETECTED
CANNABINOID SCREEN URINE: NOT DETECTED
COCAINE METABOLITE SCREEN URINE: NOT DETECTED
D DIMER: 231 NG/ML DDU
FENTANYL SCREEN, URINE: NOT DETECTED
FIBRINOGEN: 592 MG/DL (ref 200–400)
GROUP B STREP CULTURE: NORMAL
HCT VFR BLD CALC: 35.1 % (ref 34–48)
HEMOGLOBIN: 12 G/DL (ref 11.5–15.5)
INR BLD: 1
Lab: ABNORMAL
Lab: NORMAL
MCH RBC QN AUTO: 30.5 PG (ref 26–35)
MCHC RBC AUTO-ENTMCNC: 34.2 % (ref 32–34.5)
MCV RBC AUTO: 89.1 FL (ref 80–99.9)
METHADONE SCREEN, URINE: NOT DETECTED
NEGATIVE QC PASS/FAIL: ABNORMAL
OPIATE SCREEN URINE: NOT DETECTED
OXYCODONE URINE: NOT DETECTED
PDW BLD-RTO: 12.9 FL (ref 11.5–15)
PHENCYCLIDINE SCREEN URINE: NOT DETECTED
PLATELET # BLD: 177 E9/L (ref 130–450)
PMV BLD AUTO: 12.4 FL (ref 7–12)
POSITIVE QC PASS/FAIL: ABNORMAL
PROTHROMBIN TIME: 10.9 SEC (ref 9.3–12.4)
RBC # BLD: 3.94 E12/L (ref 3.5–5.5)
WBC # BLD: 5.2 E9/L (ref 4.5–11.5)

## 2023-02-18 PROCEDURE — 80307 DRUG TEST PRSMV CHEM ANLYZR: CPT

## 2023-02-18 PROCEDURE — 6360000002 HC RX W HCPCS: Performed by: OBSTETRICS & GYNECOLOGY

## 2023-02-18 PROCEDURE — 85378 FIBRIN DEGRADE SEMIQUANT: CPT

## 2023-02-18 PROCEDURE — 1220000000 HC SEMI PRIVATE OB R&B

## 2023-02-18 PROCEDURE — 6360000002 HC RX W HCPCS

## 2023-02-18 PROCEDURE — 86850 RBC ANTIBODY SCREEN: CPT

## 2023-02-18 PROCEDURE — 85610 PROTHROMBIN TIME: CPT

## 2023-02-18 PROCEDURE — APPNB30 APP NON BILLABLE TIME 0-30 MINS: Performed by: ADVANCED PRACTICE MIDWIFE

## 2023-02-18 PROCEDURE — 2580000003 HC RX 258: Performed by: OBSTETRICS & GYNECOLOGY

## 2023-02-18 PROCEDURE — 85384 FIBRINOGEN ACTIVITY: CPT

## 2023-02-18 PROCEDURE — 85027 COMPLETE CBC AUTOMATED: CPT

## 2023-02-18 PROCEDURE — 59409 OBSTETRICAL CARE: CPT | Performed by: ADVANCED PRACTICE MIDWIFE

## 2023-02-18 PROCEDURE — 85730 THROMBOPLASTIN TIME PARTIAL: CPT

## 2023-02-18 PROCEDURE — 86900 BLOOD TYPING SEROLOGIC ABO: CPT

## 2023-02-18 PROCEDURE — 6370000000 HC RX 637 (ALT 250 FOR IP): Performed by: STUDENT IN AN ORGANIZED HEALTH CARE EDUCATION/TRAINING PROGRAM

## 2023-02-18 PROCEDURE — 36415 COLL VENOUS BLD VENIPUNCTURE: CPT

## 2023-02-18 PROCEDURE — 86901 BLOOD TYPING SEROLOGIC RH(D): CPT

## 2023-02-18 RX ORDER — INSULIN LISPRO 100 [IU]/ML
0-4 INJECTION, SOLUTION INTRAVENOUS; SUBCUTANEOUS
Status: DISCONTINUED | OUTPATIENT
Start: 2023-02-18 | End: 2023-02-18

## 2023-02-18 RX ORDER — NALBUPHINE HCL 10 MG/ML
5 AMPUL (ML) INJECTION
Status: DISCONTINUED | OUTPATIENT
Start: 2023-02-18 | End: 2023-02-18

## 2023-02-18 RX ORDER — DOCUSATE SODIUM 100 MG/1
100 CAPSULE, LIQUID FILLED ORAL 2 TIMES DAILY
Status: DISCONTINUED | OUTPATIENT
Start: 2023-02-18 | End: 2023-02-20 | Stop reason: HOSPADM

## 2023-02-18 RX ORDER — SODIUM CHLORIDE 0.9 % (FLUSH) 0.9 %
5-40 SYRINGE (ML) INJECTION EVERY 12 HOURS SCHEDULED
Status: DISCONTINUED | OUTPATIENT
Start: 2023-02-18 | End: 2023-02-18

## 2023-02-18 RX ORDER — SODIUM CHLORIDE 9 MG/ML
INJECTION, SOLUTION INTRAVENOUS PRN
Status: DISCONTINUED | OUTPATIENT
Start: 2023-02-18 | End: 2023-02-20 | Stop reason: HOSPADM

## 2023-02-18 RX ORDER — CARBOPROST TROMETHAMINE 250 UG/ML
250 INJECTION, SOLUTION INTRAMUSCULAR PRN
Status: DISCONTINUED | OUTPATIENT
Start: 2023-02-18 | End: 2023-02-20 | Stop reason: HOSPADM

## 2023-02-18 RX ORDER — IBUPROFEN 600 MG/1
600 TABLET ORAL EVERY 8 HOURS SCHEDULED
Status: DISCONTINUED | OUTPATIENT
Start: 2023-02-18 | End: 2023-02-20 | Stop reason: HOSPADM

## 2023-02-18 RX ORDER — FERROUS SULFATE 325(65) MG
325 TABLET ORAL 2 TIMES DAILY WITH MEALS
Status: DISCONTINUED | OUTPATIENT
Start: 2023-02-18 | End: 2023-02-20 | Stop reason: HOSPADM

## 2023-02-18 RX ORDER — ENOXAPARIN SODIUM 100 MG/ML
40 INJECTION SUBCUTANEOUS DAILY
Status: DISCONTINUED | OUTPATIENT
Start: 2023-02-19 | End: 2023-02-20 | Stop reason: HOSPADM

## 2023-02-18 RX ORDER — SODIUM CHLORIDE, SODIUM LACTATE, POTASSIUM CHLORIDE, AND CALCIUM CHLORIDE .6; .31; .03; .02 G/100ML; G/100ML; G/100ML; G/100ML
1000 INJECTION, SOLUTION INTRAVENOUS PRN
Status: DISCONTINUED | OUTPATIENT
Start: 2023-02-18 | End: 2023-02-18

## 2023-02-18 RX ORDER — SODIUM CHLORIDE, SODIUM LACTATE, POTASSIUM CHLORIDE, AND CALCIUM CHLORIDE .6; .31; .03; .02 G/100ML; G/100ML; G/100ML; G/100ML
500 INJECTION, SOLUTION INTRAVENOUS PRN
Status: DISCONTINUED | OUTPATIENT
Start: 2023-02-18 | End: 2023-02-18

## 2023-02-18 RX ORDER — NALBUPHINE HCL 10 MG/ML
AMPUL (ML) INJECTION
Status: COMPLETED
Start: 2023-02-18 | End: 2023-02-18

## 2023-02-18 RX ORDER — METHYLERGONOVINE MALEATE 0.2 MG/ML
200 INJECTION INTRAVENOUS PRN
Status: DISCONTINUED | OUTPATIENT
Start: 2023-02-18 | End: 2023-02-20 | Stop reason: HOSPADM

## 2023-02-18 RX ORDER — SODIUM CHLORIDE, SODIUM LACTATE, POTASSIUM CHLORIDE, CALCIUM CHLORIDE 600; 310; 30; 20 MG/100ML; MG/100ML; MG/100ML; MG/100ML
INJECTION, SOLUTION INTRAVENOUS CONTINUOUS
Status: DISCONTINUED | OUTPATIENT
Start: 2023-02-18 | End: 2023-02-18

## 2023-02-18 RX ORDER — SODIUM CHLORIDE 0.9 % (FLUSH) 0.9 %
5-40 SYRINGE (ML) INJECTION EVERY 12 HOURS SCHEDULED
Status: DISCONTINUED | OUTPATIENT
Start: 2023-02-18 | End: 2023-02-20 | Stop reason: HOSPADM

## 2023-02-18 RX ORDER — MISOPROSTOL 200 UG/1
800 TABLET ORAL PRN
Status: DISCONTINUED | OUTPATIENT
Start: 2023-02-18 | End: 2023-02-20 | Stop reason: HOSPADM

## 2023-02-18 RX ORDER — FENTANYL CITRATE 50 UG/ML
INJECTION, SOLUTION INTRAMUSCULAR; INTRAVENOUS
Status: COMPLETED
Start: 2023-02-18 | End: 2023-02-18

## 2023-02-18 RX ORDER — ONDANSETRON 2 MG/ML
4 INJECTION INTRAMUSCULAR; INTRAVENOUS EVERY 6 HOURS PRN
Status: DISCONTINUED | OUTPATIENT
Start: 2023-02-18 | End: 2023-02-18

## 2023-02-18 RX ORDER — MODIFIED LANOLIN
OINTMENT (GRAM) TOPICAL PRN
Status: DISCONTINUED | OUTPATIENT
Start: 2023-02-18 | End: 2023-02-20 | Stop reason: HOSPADM

## 2023-02-18 RX ORDER — SODIUM CHLORIDE 9 MG/ML
25 INJECTION, SOLUTION INTRAVENOUS PRN
Status: DISCONTINUED | OUTPATIENT
Start: 2023-02-18 | End: 2023-02-18

## 2023-02-18 RX ORDER — ACETAMINOPHEN 500 MG
1000 TABLET ORAL EVERY 8 HOURS SCHEDULED
Status: DISCONTINUED | OUTPATIENT
Start: 2023-02-18 | End: 2023-02-20 | Stop reason: HOSPADM

## 2023-02-18 RX ORDER — ONDANSETRON 2 MG/ML
4 INJECTION INTRAMUSCULAR; INTRAVENOUS EVERY 6 HOURS PRN
Status: DISCONTINUED | OUTPATIENT
Start: 2023-02-18 | End: 2023-02-20 | Stop reason: HOSPADM

## 2023-02-18 RX ORDER — FENTANYL CITRATE 50 UG/ML
25 INJECTION, SOLUTION INTRAMUSCULAR; INTRAVENOUS
Status: DISCONTINUED | OUTPATIENT
Start: 2023-02-18 | End: 2023-02-18

## 2023-02-18 RX ORDER — ACETAMINOPHEN 650 MG
TABLET, EXTENDED RELEASE ORAL
Status: DISCONTINUED
Start: 2023-02-18 | End: 2023-02-18

## 2023-02-18 RX ORDER — SODIUM CHLORIDE, SODIUM LACTATE, POTASSIUM CHLORIDE, CALCIUM CHLORIDE 600; 310; 30; 20 MG/100ML; MG/100ML; MG/100ML; MG/100ML
INJECTION, SOLUTION INTRAVENOUS CONTINUOUS
Status: DISCONTINUED | OUTPATIENT
Start: 2023-02-18 | End: 2023-02-20 | Stop reason: HOSPADM

## 2023-02-18 RX ORDER — SODIUM CHLORIDE 0.9 % (FLUSH) 0.9 %
5-40 SYRINGE (ML) INJECTION PRN
Status: DISCONTINUED | OUTPATIENT
Start: 2023-02-18 | End: 2023-02-20 | Stop reason: HOSPADM

## 2023-02-18 RX ORDER — SODIUM CHLORIDE 0.9 % (FLUSH) 0.9 %
5-40 SYRINGE (ML) INJECTION PRN
Status: DISCONTINUED | OUTPATIENT
Start: 2023-02-18 | End: 2023-02-18

## 2023-02-18 RX ADMIN — ACETAMINOPHEN 1000 MG: 500 TABLET ORAL at 22:33

## 2023-02-18 RX ADMIN — Medication 1 MILLI-UNITS/MIN: at 07:02

## 2023-02-18 RX ADMIN — DOCUSATE SODIUM 100 MG: 100 CAPSULE, LIQUID FILLED ORAL at 13:38

## 2023-02-18 RX ADMIN — Medication 166.7 ML: at 12:11

## 2023-02-18 RX ADMIN — Medication: at 11:45

## 2023-02-18 RX ADMIN — SODIUM CHLORIDE, POTASSIUM CHLORIDE, SODIUM LACTATE AND CALCIUM CHLORIDE: 600; 310; 30; 20 INJECTION, SOLUTION INTRAVENOUS at 04:51

## 2023-02-18 RX ADMIN — Medication: at 18:37

## 2023-02-18 RX ADMIN — FENTANYL CITRATE 25 MCG: 50 INJECTION, SOLUTION INTRAMUSCULAR; INTRAVENOUS at 12:20

## 2023-02-18 RX ADMIN — Medication 5 MG: at 09:44

## 2023-02-18 RX ADMIN — NALBUPHINE HYDROCHLORIDE 5 MG: 10 INJECTION, SOLUTION INTRAMUSCULAR; INTRAVENOUS; SUBCUTANEOUS at 09:44

## 2023-02-18 RX ADMIN — ACETAMINOPHEN 1000 MG: 500 TABLET ORAL at 13:38

## 2023-02-18 ASSESSMENT — PAIN DESCRIPTION - DESCRIPTORS
DESCRIPTORS: CRAMPING
DESCRIPTORS: CRAMPING;DISCOMFORT

## 2023-02-18 ASSESSMENT — PAIN DESCRIPTION - LOCATION
LOCATION: ABDOMEN
LOCATION: ABDOMEN;BACK

## 2023-02-18 ASSESSMENT — PAIN SCALES - GENERAL
PAINLEVEL_OUTOF10: 5
PAINLEVEL_OUTOF10: 0
PAINLEVEL_OUTOF10: 8

## 2023-02-18 ASSESSMENT — PAIN DESCRIPTION - ORIENTATION
ORIENTATION: LOWER
ORIENTATION: LOWER

## 2023-02-18 ASSESSMENT — PAIN - FUNCTIONAL ASSESSMENT: PAIN_FUNCTIONAL_ASSESSMENT: ACTIVITIES ARE NOT PREVENTED

## 2023-02-18 NOTE — DISCHARGE INSTRUCTIONS
How to Breastfeed: Step by Step  Overview  Breastfeeding is a skill that gets better with practice. Breastfeed your baby whenever your baby is hungry. In the first 2 weeks, your baby will feed at least 8 times in a 24-hour period. Here is a step-by-step guide on how to breastfeed. It shows just one position that you can use for breastfeeding. Talk to your doctor, midwife, or lactation consultant if you are having trouble getting your baby to latch on. How to Breastfeed  Get ready to breastfeed    Sit in a comfortable chair. Support your baby on a pillow on your lap. Support your breast    Support and narrow your breast with one hand using a \"U hold. \" Your thumb will be on the outer side of your breast. Your fingers will be on the inner side. You can also use a \"C hold,\" with all your fingers below the nipple and your thumb above it. Position your baby    Your other arm is behind your baby's back, with your hand supporting the base of the baby's head. Point your fingers and thumb toward your baby's ears. Get baby to open mouth    Touch your baby's Upper lip with your nipple to get your baby's mouth to open. Wait until your baby opens up really wide, like a big yawn. Bring the baby quickly to your breast--not your breast to the baby. Guide your breast into your baby's mouth. Listen for sucking sounds    The nipple and a large part of the darker area around the nipple (areola) should be in the baby's mouth. The baby's lips should be flared out, not folded in. Listen for regular sucking and swallowing sounds while the baby is feeding. If you cannot see or hear swallowing, watch your baby's ears. They will wiggle slightly when the baby swallows. How to break the latch    If you need to take your baby off your breast (for example, to reposition), you will need to break the baby's latch on your nipple. To break your baby's latch, put one finger in the corner of your baby's mouth.   Push your finger between your baby's gums to gently break the latch. If you don't break the latch before you remove your baby, your nipples can become sore, cracked, or bruised. Where can you learn more? Go to https://harriet.healthImaging3. org and sign in to your [employer] account. Enter R271 in the [facility] box to learn more about \"How to Breastfeed: Step by Step. \"     If you do not have an account, please click on the \"Sign Up Now\" link. Current as of: 2021               Content Version: 13.0  © 1286-7717 [employer]. Care instructions adapted under license by [facility] [place] ([facility]). If you have questions about a medical condition or this instruction, always ask your healthcare professional. [address] any warranty or liability for your use of this information. Follow-up with your OB doctor in 1 week if  delivery or in  6 weeks for vaginal delivery unless otherwise instructed. Call office for an appointment. For breastfeeding support, you can contact our lactation specialists at [phone] or [phone]    DIET  Eat a well balanced diet focusing on foods high in fiber and protein  Drink plenty of fluids especially water. To avoid constipation you may take a mild stool softener as recommended by your doctor or midwife. ACTIVITY  Gradually increase your activity. Resume exercise regimen only after advised by your doctor or midwife. Avoid lifting anything heavier than your baby or a gallon of milk for SIX weeks. Avoid driving until your doctor or midwife has given their approval.  Sandi Rom slowly from a lying to sitting and then a standing position. Climb stairs one at a time. Use caution when carrying your baby up and down the stairs. No sexual activity for 6 weeks or until advised by your doctor - Nothing in vagina: intercourse, tampons, or douching. Be prepared to discuss family planning at your follow-up OB visit.    You may feel tired or have a lack of energy. You may continue your prenatal vitamin to replenish nutrients post delivery. Nap when baby naps to catch up on sleep. May return to work or school in 6 weeks or as directed by OB. EMOTIONS  You may feed fried, sad, teary, & overwhelmed. Contact your OB provider if you feel you may be showing signs of postpartum depression, or have thoughts of harming yourself or your infant. If infant will not stop crying, contact another adult for help or place infant in their crib on their back and take a break. NEVER shake your infant. BLEEDING  Vaginal bleeding will decrease in amount over the next few weeks. You will notice that as your activity increases, your flow may increase. This is your body's way of telling you, you need to take things easier and rest more often. Call your OB/ER if you are saturating more than one maxi pad in an hour. BREAST CARE  Take medications as recommended by your doctor or midwife for pain  If you develop a warm, red, tender area on your breast or develop a fever contact your OB provider. For breastfeeding moms:  If you become engorged, feeding may be more difficult or painful for 1-2 days. You may find it helpful to hand express some milk so that the infant can latch on more easily. While breastfeeding, continue to take your prenatal vitamins as directed by your doctor or midwife. Only take medications verified as safe for breastfeeding. For non-breastfeeding moms:  You may apply ice packs to your breasts over your bra for twenty minutes at a time for comfort. Avoid stimulation to your breasts, when showering allow the water to strike your back not your breasts. Wear a good fitting bra until your milk dries, such as a sports bra. INCISIONAL CARE / TONY CARE  Clean your incision in the shower with mild soap. After shower pat the incision area dry and leave open to air. If used, Steri-stipes should be removed by 2 weeks.   If used, Staples should be removed by the OB in office by 1 week. If used/ordered, an abdominal binder may provide support for your incision. Use the ryan-bottle after toileting until bleeding stops. Cleanse your perineum from front to back  If used, stitches or internal clips will dissolve in 4-6 weeks. You may use a sitz bath or soak in a clean tub as needed for comfort. Kegel exercises will help restore bladder control. SWELLING  Keep your legs elevated when sitting or lying. When wearing stocking or socks, make sure they are not too tight. WHEN TO CALL THE DOCTOR  If you have a temp of 100.4 or more. If your bleeding has increased and you are saturating a pad in an hour. Your abdomen is tender to touch. You are passing blood clots bigger than the size of a lemon. If you are experiencing extreme weakness or dizziness. If you are having flu-like symptoms such as achy muscles or joints. There is a foul smell or a green color to your vaginal bleeding. If you have pain that cannot be relieved. You have persistent burning with urination or frequency. Call if you have concerns about your well-being. You are unable to sleep, eat, or are having thoughts of harming yourself or your baby. You have swelling, bleeding, drainage, foul odor, redness, or warmth in/around your incision or stitches. You have a red, warm, tender area in you calf.

## 2023-02-18 NOTE — PROGRESS NOTES
Received report on Patient by Néstor Ford RN. Patient is eating will assess her after she is finished eating.

## 2023-02-18 NOTE — PROGRESS NOTES
RN called Dr. Sendy Reynaag because pt stated that she discussed with MFM that she would receive the reversal agent for heparin in order for her to receive an epidural during her labor. RN discussed that aPTT is 26.3 and that the pt's heparin dose is 35536 units bid. Dr. Sendy Reynaga stated that she does not need the reversal agent and to wait 12 hours from her last dose which was given at 2230 (2/17).  Will discuss plan with anesthesia team.
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
## 2023-02-18 NOTE — PROGRESS NOTES
RN called anesthesia to discuss pt's plan of epidural. CRNA stated that pt will have to wait 24 hours after her last dose in order to receive the epidural.
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
## 2023-02-18 NOTE — PROGRESS NOTES
Mason TORRES on unit. Updated that patient is now 5-6/90/0. Natasha q2-3min. Patient on 2 deneen-units of pitocin. Notified patient requesting pain medicine, however hospital/unit is out of stadol, pharmacy verified.  New orders received for 5mg nubain, q3h.

## 2023-02-18 NOTE — PROGRESS NOTES
of viable baby girl at 80. Delayed cord clamping completed. Skin to skin initiated immediately after delivery. APGARS 9/9.

## 2023-02-18 NOTE — PROCEDURES
Department of Obstetrics and Gynecology  Spontaneous Vaginal Delivery Note    Patient:  Rickey Albert     Admit Date:  2023  3:20 AM  Medical Record Number:  [id number]   Procedure Date: 2023 12:41 PM     Pre-delivery Diagnosis:   pregnancy <37 weeks, Induced labor, and SROM    Post-delivery Diagnosis:  Living  infant(s) and Female    Information for the patient's :  Marcos Perez [address] [[id number]]                  Infant Wt:   Information for the patient's :  Marcos Perez [address] [[id number]]         APGARS:     Information for the patient's :  Kellee Delgado [[id number]]        Anesthesia:  local    Application and Delivery:  29 y.o.  female  at 44w9d admitted for  ROM and contractions who progressed to complete and delivered Cephalic, occiput anterior presentation via  @ [phone], under none anesthesia,  over an intact perineum no episiotomy with a resulting right periurethral laceration, without dystocia or complication, a Live Born Female infant, weighing 5# 12oz, 2600 grams, Clear fluid at delivery, bulb suctioned on perineum. A nuchal cord was not present. A delayed cord clamping was performed . Spontaneous cry,  Apgar's 1 minute:  9; 5 minute:  9. The placenta was delivered with gentle traction and was noted to be intact, whole, and that the umbilical cord had three vessels noted. Episiotomy was not needed due to    spontaneous right periurethral           Repair was  necessary. 3 interrupted sutures with  Vicryl 3.0 suture. Cervix, rectum, sphincter intact. Sponge, needle, and instrument counts correct x 2.     Delivery Summary:     Mother's Information      Labor Events     Labor?: No  Cervical Ripening:   Now               Marcos Mendez [[id number]]      Labor Events     Labor?: No   Steroids?: None  Cervical Ripening Date/Time:     Antibiotics Received during Labor?: No  Rupture Date/Time: 23 02:00:00   Rupture Type: SROM  Fluid Color: Clear  Fluid Odor: None  Fluid Volume: Moderate  Induction: None       Anesthesia           Start Pushing      Labor onset date/time:   Now     Dilation complete date/time:   Now     Start pushing date/time:    Decision date/time (emergent ):           Delivery ()      Delivery Date/Time:  23 12:06:00       Details:              Assisted Delivery Details             Cord           Placenta           Lacerations           Vaginal Counts        Sponges Needles Instruments   Initial Counts      Final Counts      If the count is incorrect due to Intentionally Retained Foreign Object (IRFO) add the IRFO LDA in Lines/Drains. Add LDA: Link to LDA       Blood Loss 150 Mother: Leroy Mccabe #[id number]     Start of Mother's Information      Delivery Blood Loss 150 23 0006 - 23 1250      None                 End of Mother's Information  Mother: Leroy Mccabe #[id number]                Delivery Providers    Delivering clinician:               Assessment       Apgar Scoring Key:    0 1 2    Skin Color: Blue or pale Acrocyanotic Completely pink    Heart Rate: Absent <100 bpm >100 bpm    Reflex Irritability: No response Grimace Cry or active withdrawal    Muscle Tone: Limp Some flexion Active motion    Respiratory Effort: Absent Weak cry; hypoventilation Good, crying                      Skin Color:   Heart Rate:   Reflex Irritability:   Muscle Tone:   Respiratory Effort:    Total:            1 Minute:        Apgar 1 total from OB History    5 Minute:        Apgar 5 total from OB History    10 Minute:              15 Minute:              20 Minute:                                 Resuscitation                Measurements               Title      Skin to Skin Initiation Date/Time:       Skin to Skin End Date/Time:                      Specimen:  Placenta sent to pathology no     Estimated blood loss:         Condition: infant stable to general nursery and mother stable    Blood Type and Rh: B NEG        Rubella Immunity Status:   Immune           Infant Feeding:    breast    Attending Attestation: I was present and scrubbed for the entire procedure.     AMERICO Rg CNM   2/18/2023 12:41 PM
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
## 2023-02-18 NOTE — PROGRESS NOTES
Blood sugar read by patient's continuous glucose monitor. Blood sugar at this time, 91. Verbal orders by Israel Krishnamurthy CNM okay to use patient's continuous glucose monitor.

## 2023-02-18 NOTE — PROGRESS NOTES
Brian called and said [facility] nurse Midwife said patient does not need a surgical consult after delivery.   Shane hernandez discontinue the order per Brian RN
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
## 2023-02-19 LAB
FETAL SCREEN: NORMAL
HCT VFR BLD CALC: 35.1 % (ref 34–48)
HEMOGLOBIN: 11.6 G/DL (ref 11.5–15.5)
MCH RBC QN AUTO: 31 PG (ref 26–35)
MCHC RBC AUTO-ENTMCNC: 33 % (ref 32–34.5)
MCV RBC AUTO: 93.9 FL (ref 80–99.9)
PDW BLD-RTO: 13 FL (ref 11.5–15)
PLATELET # BLD: 169 E9/L (ref 130–450)
PMV BLD AUTO: 12.6 FL (ref 7–12)
RBC # BLD: 3.74 E12/L (ref 3.5–5.5)
RHIG LOT NUMBER: NORMAL
URINE CULTURE, ROUTINE: NORMAL
WBC # BLD: 7.7 E9/L (ref 4.5–11.5)

## 2023-02-19 PROCEDURE — 85461 HEMOGLOBIN FETAL: CPT

## 2023-02-19 PROCEDURE — 36415 COLL VENOUS BLD VENIPUNCTURE: CPT

## 2023-02-19 PROCEDURE — 1220000000 HC SEMI PRIVATE OB R&B

## 2023-02-19 PROCEDURE — 6360000002 HC RX W HCPCS: Performed by: STUDENT IN AN ORGANIZED HEALTH CARE EDUCATION/TRAINING PROGRAM

## 2023-02-19 PROCEDURE — 6370000000 HC RX 637 (ALT 250 FOR IP): Performed by: STUDENT IN AN ORGANIZED HEALTH CARE EDUCATION/TRAINING PROGRAM

## 2023-02-19 PROCEDURE — 85027 COMPLETE CBC AUTOMATED: CPT

## 2023-02-19 PROCEDURE — 96372 THER/PROPH/DIAG INJ SC/IM: CPT

## 2023-02-19 RX ADMIN — ACETAMINOPHEN 1000 MG: 500 TABLET ORAL at 08:40

## 2023-02-19 RX ADMIN — DOCUSATE SODIUM 100 MG: 100 CAPSULE, LIQUID FILLED ORAL at 08:41

## 2023-02-19 RX ADMIN — ENOXAPARIN SODIUM 40 MG: 100 INJECTION SUBCUTANEOUS at 03:05

## 2023-02-19 RX ADMIN — DOCUSATE SODIUM 100 MG: 100 CAPSULE, LIQUID FILLED ORAL at 21:27

## 2023-02-19 RX ADMIN — ACETAMINOPHEN 1000 MG: 500 TABLET ORAL at 17:23

## 2023-02-19 RX ADMIN — HUMAN RHO(D) IMMUNE GLOBULIN 300 MCG: 300 INJECTION, SOLUTION INTRAMUSCULAR at 12:26

## 2023-02-19 ASSESSMENT — PAIN DESCRIPTION - DESCRIPTORS
DESCRIPTORS: CRAMPING;DISCOMFORT
DESCRIPTORS: CRAMPING;DISCOMFORT

## 2023-02-19 ASSESSMENT — PAIN SCALES - GENERAL
PAINLEVEL_OUTOF10: 3
PAINLEVEL_OUTOF10: 3

## 2023-02-19 ASSESSMENT — PAIN DESCRIPTION - ORIENTATION
ORIENTATION: LOWER
ORIENTATION: LOWER

## 2023-02-19 ASSESSMENT — PAIN DESCRIPTION - LOCATION
LOCATION: ABDOMEN
LOCATION: ABDOMEN

## 2023-02-19 NOTE — PROGRESS NOTES
Doing well, no current complaints. No c/o shortness of breath, leg pain or chest pain suggestive of DVT or pulmonary embolism. No UT or GI complaints. No breast complaints. Lochia within normal limits. No symptoms of depression. Normotensive, afebrile. H&H:   11.6 / 35.1%    A:  S/p vaginal delivery post-partum day 1      No complications    P:  Routine post-partum care.

## 2023-02-19 NOTE — PROGRESS NOTES
[employer] [place] [facility] screening results were discussed with parent. Questions answered. Brochure given to parent. Advised to monitor developmental milestones and contact physician for any concerns.    Eloise Franklin

## 2023-02-20 VITALS
TEMPERATURE: 98.1 F | RESPIRATION RATE: 18 BRPM | HEART RATE: 83 BPM | OXYGEN SATURATION: 98 % | SYSTOLIC BLOOD PRESSURE: 118 MMHG | DIASTOLIC BLOOD PRESSURE: 65 MMHG

## 2023-02-20 PROCEDURE — 0UQMXZZ REPAIR VULVA, EXTERNAL APPROACH: ICD-10-PCS | Performed by: STUDENT IN AN ORGANIZED HEALTH CARE EDUCATION/TRAINING PROGRAM

## 2023-02-20 PROCEDURE — 99024 POSTOP FOLLOW-UP VISIT: CPT | Performed by: STUDENT IN AN ORGANIZED HEALTH CARE EDUCATION/TRAINING PROGRAM

## 2023-02-20 PROCEDURE — 6360000002 HC RX W HCPCS: Performed by: STUDENT IN AN ORGANIZED HEALTH CARE EDUCATION/TRAINING PROGRAM

## 2023-02-20 PROCEDURE — 3E033VJ INTRODUCTION OF OTHER HORMONE INTO PERIPHERAL VEIN, PERCUTANEOUS APPROACH: ICD-10-PCS | Performed by: STUDENT IN AN ORGANIZED HEALTH CARE EDUCATION/TRAINING PROGRAM

## 2023-02-20 PROCEDURE — 7200000001 HC VAGINAL DELIVERY

## 2023-02-20 PROCEDURE — 6370000000 HC RX 637 (ALT 250 FOR IP): Performed by: STUDENT IN AN ORGANIZED HEALTH CARE EDUCATION/TRAINING PROGRAM

## 2023-02-20 RX ORDER — ENOXAPARIN SODIUM 100 MG/ML
40 INJECTION SUBCUTANEOUS DAILY
Qty: 45 EACH | Refills: 0 | Status: SHIPPED | OUTPATIENT
Start: 2023-02-20

## 2023-02-20 RX ADMIN — ACETAMINOPHEN 1000 MG: 500 TABLET ORAL at 03:01

## 2023-02-20 RX ADMIN — ENOXAPARIN SODIUM 40 MG: 100 INJECTION SUBCUTANEOUS at 08:55

## 2023-02-20 RX ADMIN — ACETAMINOPHEN 1000 MG: 500 TABLET ORAL at 10:35

## 2023-02-20 RX ADMIN — DOCUSATE SODIUM 100 MG: 100 CAPSULE, LIQUID FILLED ORAL at 08:55

## 2023-02-20 ASSESSMENT — PAIN DESCRIPTION - FREQUENCY: FREQUENCY: INTERMITTENT

## 2023-02-20 ASSESSMENT — PAIN DESCRIPTION - ONSET: ONSET: ON-GOING

## 2023-02-20 ASSESSMENT — PAIN SCALES - GENERAL
PAINLEVEL_OUTOF10: 0
PAINLEVEL_OUTOF10: 3
PAINLEVEL_OUTOF10: 4

## 2023-02-20 ASSESSMENT — PAIN DESCRIPTION - PAIN TYPE: TYPE: ACUTE PAIN

## 2023-02-20 ASSESSMENT — PAIN DESCRIPTION - DESCRIPTORS
DESCRIPTORS: ACHING;CRAMPING;DISCOMFORT
DESCRIPTORS: CRAMPING

## 2023-02-20 ASSESSMENT — PAIN - FUNCTIONAL ASSESSMENT
PAIN_FUNCTIONAL_ASSESSMENT: ACTIVITIES ARE NOT PREVENTED
PAIN_FUNCTIONAL_ASSESSMENT: ACTIVITIES ARE NOT PREVENTED

## 2023-02-20 ASSESSMENT — PAIN DESCRIPTION - LOCATION
LOCATION: ABDOMEN
LOCATION: ABDOMEN

## 2023-02-20 ASSESSMENT — PAIN DESCRIPTION - ORIENTATION
ORIENTATION: LOWER
ORIENTATION: LOWER
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
## 2023-02-20 NOTE — PROGRESS NOTES
Mother signed infant's discharge instructions and was given a copy for her records. Final ID band check completed with mother and infant. Correct ID confirmed. Hugs tag disabled and removed. Patient instructed on discharge instructions by night shift RN with verbalized understanding. Questions answered prn.      Pt would like to eat lunch and then will be discharged to home

## 2023-02-20 NOTE — PROGRESS NOTES
Infant discharged to home in stable condition via car seat carried by mother on lap in wheelchair. Infant and mother accompanied by PCA.

## 2023-02-20 NOTE — LACTATION NOTE
Mom continues to exclusively breastfeed her baby with the complaint of left nipple pain and baby on and off the latch.  Mom's colostrum is starting to transition to mature milk and the breast is too firm making latch uncomfortable for baby.  Gave mom a hand pump and she pumped until the breast was softened.  Baby did better but was still coming off latch.  Offered a nipple shield to help sustain suck.  Baby was able to breastfeed with no pop offs for 20 minutes on the right breast. Left breast was offered and baby latched with ease.  Encouraged mom to call me when baby is ready to be fed the expressed colostrum.

## 2023-02-21 RX ORDER — ACETAMINOPHEN 500 MG
500 TABLET ORAL EVERY 6 HOURS PRN
Qty: 60 TABLET | Refills: 0 | Status: SHIPPED | OUTPATIENT
Start: 2023-02-21

## 2023-03-13 ENCOUNTER — TELEPHONE (OUTPATIENT)
Dept: OBGYN | Age: 33
End: 2023-03-13

## 2023-03-13 NOTE — TELEPHONE ENCOUNTER
Called to report blood pressures  125/90  152/104  132/97  All of them were taken at [facility] since she has no cuff

## 2023-03-14 ENCOUNTER — TELEPHONE (OUTPATIENT)
Dept: OBGYN | Age: 33
End: 2023-03-14

## 2023-03-15 ENCOUNTER — HOSPITAL ENCOUNTER (OUTPATIENT)
Age: 33
Discharge: HOME | End: 2023-03-15
Payer: MEDICAID

## 2023-03-15 ENCOUNTER — POSTPARTUM VISIT (OUTPATIENT)
Dept: OBGYN | Age: 33
End: 2023-03-15
Payer: MEDICAID

## 2023-03-15 VITALS — DIASTOLIC BLOOD PRESSURE: 95 MMHG | HEART RATE: 100 BPM | SYSTOLIC BLOOD PRESSURE: 131 MMHG

## 2023-03-15 DIAGNOSIS — R51.9 HEADACHE AROUND THE EYES: ICD-10-CM

## 2023-03-15 DIAGNOSIS — I10 HYPERTENSION, UNSPECIFIED TYPE: ICD-10-CM

## 2023-03-15 DIAGNOSIS — Z09 FOLLOW-UP EXAM: Primary | ICD-10-CM

## 2023-03-15 LAB
ERYTHROCYTE [DISTWIDTH] IN BLOOD BY AUTOMATED COUNT: 12 FL (ref 11.5–15)
HCT VFR BLD AUTO: 43 % (ref 34–48)
HGB BLD-MCNC: 14.1 G/DL (ref 11.5–15.5)
MCH RBC QN AUTO: 30.2 PG (ref 26–35)
MCHC RBC AUTO-ENTMCNC: 32.8 % (ref 32–34.5)
MCV RBC AUTO: 92.1 FL (ref 80–99.9)
PLATELET # BLD AUTO: 214 E9/L (ref 130–450)
PMV BLD AUTO: 11.3 FL (ref 7–12)
RBC # BLD AUTO: 4.67 E12/L (ref 3.5–5.5)
WBC # BLD: 4 E9/L (ref 4.5–11.5)

## 2023-03-15 PROCEDURE — 36415 COLL VENOUS BLD VENIPUNCTURE: CPT

## 2023-03-15 PROCEDURE — 1111F DSCHRG MED/CURRENT MED MERGE: CPT | Performed by: OBSTETRICS & GYNECOLOGY

## 2023-03-15 PROCEDURE — 99213 OFFICE O/P EST LOW 20 MIN: CPT | Performed by: OBSTETRICS & GYNECOLOGY

## 2023-03-15 PROCEDURE — 1036F TOBACCO NON-USER: CPT | Performed by: OBSTETRICS & GYNECOLOGY

## 2023-03-15 PROCEDURE — 3080F DIAST BP >= 90 MM HG: CPT | Performed by: OBSTETRICS & GYNECOLOGY

## 2023-03-15 PROCEDURE — G8427 DOCREV CUR MEDS BY ELIG CLIN: HCPCS | Performed by: OBSTETRICS & GYNECOLOGY

## 2023-03-15 PROCEDURE — 3075F SYST BP GE 130 - 139MM HG: CPT | Performed by: OBSTETRICS & GYNECOLOGY

## 2023-03-15 PROCEDURE — G8420 CALC BMI NORM PARAMETERS: HCPCS | Performed by: OBSTETRICS & GYNECOLOGY

## 2023-03-15 PROCEDURE — 99212 OFFICE O/P EST SF 10 MIN: CPT | Performed by: OBSTETRICS & GYNECOLOGY

## 2023-03-15 PROCEDURE — G8484 FLU IMMUNIZE NO ADMIN: HCPCS | Performed by: OBSTETRICS & GYNECOLOGY

## 2023-03-15 PROCEDURE — 85027 COMPLETE CBC AUTOMATED: CPT

## 2023-03-15 RX ORDER — NIFEDIPINE 30 MG/1
30 TABLET, EXTENDED RELEASE ORAL DAILY
Qty: 30 TABLET | Refills: 1 | Status: SHIPPED | OUTPATIENT
Start: 2023-03-15
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
## 2023-03-15 NOTE — PROGRESS NOTES
Pt delivered 2/18 and has been having ha pretty steady since delivery. Pt voiced did not get epideral. Only med she had was stadol. Pt voiced sees spots on sides of vision.

## 2023-03-21 ENCOUNTER — POSTPARTUM VISIT (OUTPATIENT)
Dept: OBGYN | Age: 33
End: 2023-03-21
Payer: MEDICAID

## 2023-03-21 VITALS — SYSTOLIC BLOOD PRESSURE: 123 MMHG | DIASTOLIC BLOOD PRESSURE: 86 MMHG

## 2023-03-21 DIAGNOSIS — I10 HYPERTENSION, UNSPECIFIED TYPE: Primary | ICD-10-CM

## 2023-03-21 PROBLEM — O36.1930 MATERNAL ATYPICAL ANTIBODY COMPLICATING PREGNANCY IN THIRD TRIMESTER: Status: RESOLVED | Noted: 2022-09-25 | Resolved: 2023-03-21

## 2023-03-21 PROBLEM — O26.12 LOW WEIGHT GAIN DURING PREGNANCY IN SECOND TRIMESTER: Status: RESOLVED | Noted: 2022-09-25 | Resolved: 2023-03-21

## 2023-03-21 PROBLEM — Z3A.33 33 WEEKS GESTATION OF PREGNANCY: Status: RESOLVED | Noted: 2023-01-23 | Resolved: 2023-03-21

## 2023-03-21 PROBLEM — O26.873 SHORT CERVIX DURING PREGNANCY IN THIRD TRIMESTER: Status: RESOLVED | Noted: 2022-11-21 | Resolved: 2023-03-21

## 2023-03-21 PROBLEM — Z67.91 RH NEGATIVE STATE IN ANTEPARTUM PERIOD: Status: RESOLVED | Noted: 2022-09-25 | Resolved: 2023-03-21

## 2023-03-21 PROBLEM — N89.8 VAGINAL DISCHARGE DURING PREGNANCY IN SECOND TRIMESTER: Status: RESOLVED | Noted: 2022-11-17 | Resolved: 2023-03-21

## 2023-03-21 PROBLEM — O26.892 VAGINAL DISCHARGE DURING PREGNANCY IN SECOND TRIMESTER: Status: RESOLVED | Noted: 2022-11-17 | Resolved: 2023-03-21

## 2023-03-21 PROBLEM — O26.899 RH NEGATIVE STATE IN ANTEPARTUM PERIOD: Status: RESOLVED | Noted: 2022-09-25 | Resolved: 2023-03-21

## 2023-03-21 PROCEDURE — 99212 OFFICE O/P EST SF 10 MIN: CPT | Performed by: OBSTETRICS & GYNECOLOGY

## 2023-03-21 PROCEDURE — G8484 FLU IMMUNIZE NO ADMIN: HCPCS | Performed by: OBSTETRICS & GYNECOLOGY

## 2023-03-21 PROCEDURE — 1111F DSCHRG MED/CURRENT MED MERGE: CPT | Performed by: OBSTETRICS & GYNECOLOGY

## 2023-03-21 PROCEDURE — 99213 OFFICE O/P EST LOW 20 MIN: CPT | Performed by: OBSTETRICS & GYNECOLOGY

## 2023-03-21 PROCEDURE — 3074F SYST BP LT 130 MM HG: CPT | Performed by: OBSTETRICS & GYNECOLOGY

## 2023-03-21 PROCEDURE — G8427 DOCREV CUR MEDS BY ELIG CLIN: HCPCS | Performed by: OBSTETRICS & GYNECOLOGY

## 2023-03-21 PROCEDURE — G8420 CALC BMI NORM PARAMETERS: HCPCS | Performed by: OBSTETRICS & GYNECOLOGY

## 2023-03-21 PROCEDURE — 3079F DIAST BP 80-89 MM HG: CPT | Performed by: OBSTETRICS & GYNECOLOGY

## 2023-03-21 PROCEDURE — 1036F TOBACCO NON-USER: CPT | Performed by: OBSTETRICS & GYNECOLOGY

## 2023-03-21 NOTE — PROGRESS NOTES
Pt voiced ha have decreased and feels better now has a tooth ache. Had a problem with her tooth when pregnant si it could not be fixed. Goes to dentist this Thursday. Will family doc on 3/27.
Grandfather     Cancer Maternal Cousin         Social History       Tobacco History       Smoking Status  Former Smoking Tobacco Type  Cigarettes      Smokeless Tobacco Use  Never              Alcohol History       Alcohol Use Status  Not Currently Comment  RARELY               Drug Use       Drug Use Status  Not Currently              Sexual Activity       Sexually Active  Yes Partners  Male                      Current Outpatient Medications:     NIFEdipine (PROCARDIA XL) 30 MG extended release tablet, Take 1 tablet by mouth daily, Disp: 30 tablet, Rfl: 1    acetaminophen (TYLENOL) 500 MG tablet, Take 1 tablet by mouth every 6 hours as needed for Pain, Disp: 60 tablet, Rfl: 0    Prenatal Vit-Fe Fumarate-FA (PRENATAL VITAMIN PO), Take 1 each by mouth daily, Disp: , Rfl:     enoxaparin (LOVENOX) 40 MG/0.4ML, Inject 0.4 mLs into the skin daily, Disp: 12 mL, Rfl: 8    INSULIN SYRINGE 1CC/29G (AIMSCO INSULIN SYR ULTRA THIN) 29G X 1/2\" 1 ML MISC, 1 each by Does not apply route 2 times daily for 25 days, Disp: 50 each, Rfl: 3     Allergies   Allergen Reactions    Shrimp (Diagnostic) Swelling     Tongue swelling and throat irritation        Vitals:    03/21/23 1506   BP: 123/86        No exam indicated today. Vanessa was seen today for follow-up. Diagnoses and all orders for this visit:    Hypertension, unspecified type    We have asked her to keep her next postpartum appointment and her appointment with her PCP for further management of her blood pressure. Return to office sooner if she has any questions or concerns. Return for Keep her next postpartum appointment.      Marisela Michael DO

## 2023-03-24 ENCOUNTER — TELEPHONE (OUTPATIENT)
Dept: OBGYN | Age: 33
End: 2023-03-24

## 2023-03-24 NOTE — TELEPHONE ENCOUNTER
Patient states her baby has thrush and she needs to be treated. Has a rash on her breast that is sore.     Rx sent for miconazole bid

## 2023-03-27 ENCOUNTER — OFFICE VISIT (OUTPATIENT)
Dept: PRIMARY CARE CLINIC | Age: 33
End: 2023-03-27
Payer: MEDICAID

## 2023-03-27 VITALS
RESPIRATION RATE: 17 BRPM | SYSTOLIC BLOOD PRESSURE: 118 MMHG | HEIGHT: 67 IN | OXYGEN SATURATION: 98 % | BODY MASS INDEX: 23.23 KG/M2 | HEART RATE: 76 BPM | DIASTOLIC BLOOD PRESSURE: 68 MMHG | WEIGHT: 148 LBS | TEMPERATURE: 98 F

## 2023-03-27 DIAGNOSIS — J34.89 NASAL DRYNESS: ICD-10-CM

## 2023-03-27 DIAGNOSIS — Z76.89 ENCOUNTER TO ESTABLISH CARE: ICD-10-CM

## 2023-03-27 DIAGNOSIS — I10 PRIMARY HYPERTENSION: ICD-10-CM

## 2023-03-27 DIAGNOSIS — N64.4 NIPPLE PAIN: ICD-10-CM

## 2023-03-27 DIAGNOSIS — N64.4 BREAST TENDERNESS: Primary | ICD-10-CM

## 2023-03-27 PROBLEM — R35.0 URINARY FREQUENCY: Status: RESOLVED | Noted: 2022-10-13 | Resolved: 2023-03-27

## 2023-03-27 PROBLEM — O44.40 LOW-LYING PLACENTA: Status: RESOLVED | Noted: 2022-09-25 | Resolved: 2023-03-27

## 2023-03-27 PROBLEM — R39.89 SENSATION OF PRESSURE IN BLADDER AREA: Status: RESOLVED | Noted: 2022-10-13 | Resolved: 2023-03-27

## 2023-03-27 PROBLEM — O36.5990 POOR CLINICAL FETAL GROWTH: Status: RESOLVED | Noted: 2023-01-16 | Resolved: 2023-03-27

## 2023-03-27 PROBLEM — O42.90 AMNIOTIC FLUID LEAKING: Status: RESOLVED | Noted: 2023-02-18 | Resolved: 2023-03-27

## 2023-03-27 PROCEDURE — 3074F SYST BP LT 130 MM HG: CPT | Performed by: PHYSICIAN ASSISTANT

## 2023-03-27 PROCEDURE — 3078F DIAST BP <80 MM HG: CPT | Performed by: PHYSICIAN ASSISTANT

## 2023-03-27 PROCEDURE — 1036F TOBACCO NON-USER: CPT | Performed by: PHYSICIAN ASSISTANT

## 2023-03-27 PROCEDURE — G8420 CALC BMI NORM PARAMETERS: HCPCS | Performed by: PHYSICIAN ASSISTANT

## 2023-03-27 PROCEDURE — 99204 OFFICE O/P NEW MOD 45 MIN: CPT | Performed by: PHYSICIAN ASSISTANT

## 2023-03-27 PROCEDURE — G8427 DOCREV CUR MEDS BY ELIG CLIN: HCPCS | Performed by: PHYSICIAN ASSISTANT

## 2023-03-27 PROCEDURE — G8484 FLU IMMUNIZE NO ADMIN: HCPCS | Performed by: PHYSICIAN ASSISTANT

## 2023-03-27 RX ORDER — ECHINACEA PURPUREA EXTRACT 125 MG
1 TABLET ORAL PRN
Qty: 1 EACH | Refills: 3 | Status: SHIPPED | OUTPATIENT
Start: 2023-03-27

## 2023-03-27 RX ORDER — FLUCONAZOLE 150 MG/1
150 TABLET ORAL ONCE
Qty: 1 TABLET | Refills: 0 | Status: SHIPPED | OUTPATIENT
Start: 2023-03-27 | End: 2023-03-27

## 2023-03-27 SDOH — ECONOMIC STABILITY: HOUSING INSECURITY
IN THE LAST 12 MONTHS, WAS THERE A TIME WHEN YOU DID NOT HAVE A STEADY PLACE TO SLEEP OR SLEPT IN A SHELTER (INCLUDING NOW)?: NO

## 2023-03-27 SDOH — ECONOMIC STABILITY: INCOME INSECURITY: HOW HARD IS IT FOR YOU TO PAY FOR THE VERY BASICS LIKE FOOD, HOUSING, MEDICAL CARE, AND HEATING?: NOT HARD AT ALL

## 2023-03-27 SDOH — ECONOMIC STABILITY: FOOD INSECURITY: WITHIN THE PAST 12 MONTHS, THE FOOD YOU BOUGHT JUST DIDN'T LAST AND YOU DIDN'T HAVE MONEY TO GET MORE.: NEVER TRUE

## 2023-03-27 SDOH — ECONOMIC STABILITY: FOOD INSECURITY: WITHIN THE PAST 12 MONTHS, YOU WORRIED THAT YOUR FOOD WOULD RUN OUT BEFORE YOU GOT MONEY TO BUY MORE.: NEVER TRUE

## 2023-03-27 ASSESSMENT — PATIENT HEALTH QUESTIONNAIRE - PHQ9
SUM OF ALL RESPONSES TO PHQ QUESTIONS 1-9: 0
2. FEELING DOWN, DEPRESSED OR HOPELESS: 0
1. LITTLE INTEREST OR PLEASURE IN DOING THINGS: 0
SUM OF ALL RESPONSES TO PHQ9 QUESTIONS 1 & 2: 0
SUM OF ALL RESPONSES TO PHQ QUESTIONS 1-9: 0

## 2023-03-27 NOTE — PROGRESS NOTES
vaccinations. Advised patient regarding importance of keeping up with recommended health maintenance and to schedule as soon as possible if overdue, as this is important in assessing for undiagnosed pathology, especially cancer, as well as protecting against potentially harmful/life threatening disease. Patient and/or guardian verbalizes understanding and agrees with above counseling, assessment and plan. All questions answered. Jim Celestin PA-C  3/27/2023    I have personally reviewed and updated the chief complaint, HPI, Past Medical, Family and Social History, as well as the above Review of Systems.

## 2023-04-04 ENCOUNTER — POSTPARTUM VISIT (OUTPATIENT)
Dept: OBGYN | Age: 33
End: 2023-04-04
Payer: MEDICAID

## 2023-04-04 VITALS
BODY MASS INDEX: 23.18 KG/M2 | HEART RATE: 90 BPM | WEIGHT: 148 LBS | DIASTOLIC BLOOD PRESSURE: 70 MMHG | SYSTOLIC BLOOD PRESSURE: 109 MMHG

## 2023-04-04 DIAGNOSIS — B35.6 TINEA CRURIS: Primary | ICD-10-CM

## 2023-04-04 PROCEDURE — G8420 CALC BMI NORM PARAMETERS: HCPCS | Performed by: OBSTETRICS & GYNECOLOGY

## 2023-04-04 PROCEDURE — 99214 OFFICE O/P EST MOD 30 MIN: CPT | Performed by: OBSTETRICS & GYNECOLOGY

## 2023-04-04 PROCEDURE — G8427 DOCREV CUR MEDS BY ELIG CLIN: HCPCS | Performed by: OBSTETRICS & GYNECOLOGY

## 2023-04-04 PROCEDURE — 3078F DIAST BP <80 MM HG: CPT | Performed by: OBSTETRICS & GYNECOLOGY

## 2023-04-04 PROCEDURE — 1036F TOBACCO NON-USER: CPT | Performed by: OBSTETRICS & GYNECOLOGY

## 2023-04-04 PROCEDURE — 99213 OFFICE O/P EST LOW 20 MIN: CPT | Performed by: OBSTETRICS & GYNECOLOGY

## 2023-04-04 PROCEDURE — 3074F SYST BP LT 130 MM HG: CPT | Performed by: OBSTETRICS & GYNECOLOGY

## 2023-04-04 ASSESSMENT — ENCOUNTER SYMPTOMS
CONSTIPATION: 0
DIARRHEA: 0
VOMITING: 0
ABDOMINAL PAIN: 0
SHORTNESS OF BREATH: 0
NAUSEA: 0
COUGH: 0
WHEEZING: 0
BLOOD IN STOOL: 0

## 2023-04-04 NOTE — PROGRESS NOTES
Pt here for pp visit breast feeding and pumping  delivered vaginally 2/18. Pt not interested in family planning at this time. Pt never did receive her micomazole cream from the pharmacy. Does pt need to cont lovenox. Pt voiced nipple sensitivity. Pt voiced no problem with ha now.   Could not find pap in her hx pt thinks due for a pap
High Blood Pressure Mother     High Blood Pressure Maternal Uncle     Cancer Maternal Grandmother     High Blood Pressure Maternal Grandfather     Heart Attack Maternal Grandfather     Cancer Maternal Cousin           Current Outpatient Medications:     Enoxaparin Sodium (LOVENOX IJ), Inject as directed, Disp: , Rfl:     miconazole (MICONAZOLE ANTIFUNGAL) 2 % cream, Apply topically 2 times daily. , Disp: 15 g, Rfl: 1    sodium chloride (ALTAMIST SPRAY) 0.65 % nasal spray, 1 spray by Nasal route as needed for Congestion, Disp: 1 each, Rfl: 3    NIFEdipine (PROCARDIA XL) 30 MG extended release tablet, Take 1 tablet by mouth daily, Disp: 30 tablet, Rfl: 1    acetaminophen (TYLENOL) 500 MG tablet, Take 1 tablet by mouth every 6 hours as needed for Pain, Disp: 60 tablet, Rfl: 0    Prenatal Vit-Fe Fumarate-FA (PRENATAL VITAMIN PO), Take 1 each by mouth daily, Disp: , Rfl:      Allergies   Allergen Reactions    Shrimp (Diagnostic) Swelling     Tongue swelling and throat irritation        Social History       Tobacco History       Smoking Status  Former Smoking Tobacco Type  Cigarettes      Smokeless Tobacco Use  Never              Alcohol History       Alcohol Use Status  Not Currently Comment  RARELY               Drug Use       Drug Use Status  Not Currently              Sexual Activity       Sexually Active  Yes Partners  Male                     Review of Systems   Constitutional:  Negative for fever. HENT:  Negative for hearing loss. Eyes:  Negative for visual disturbance. Respiratory:  Negative for cough, shortness of breath and wheezing. Cardiovascular:  Negative for chest pain and palpitations. Gastrointestinal:  Negative for abdominal pain, blood in stool, constipation, diarrhea, nausea and vomiting. Genitourinary:  Negative for dysuria, enuresis, frequency, hematuria and urgency. Musculoskeletal:  Negative for arthralgias and myalgias. Skin:  Negative for rash.    Neurological:  Negative for

## 2023-04-04 NOTE — PATIENT INSTRUCTIONS
Postpartum depression. Fungal infection. We will call with Pap results. Encourage calcium vitamin D and weightbearing exercise. She did not need a return to work note. We did review that she can conceive before her first normal menses.   Her fungal infection

## 2023-04-06 LAB
HPV HR 12 DNA SPEC QL NAA+PROBE: NOT DETECTED
HPV SAMPLE: NORMAL
HPV16 DNA SPEC QL NAA+PROBE: NOT DETECTED
HPV16+18+H RISK 12 DNA CVX-IMP: NORMAL
HPV18 DNA SPEC QL NAA+PROBE: NOT DETECTED
SPECIMEN SOURCE: NORMAL

## 2023-04-21 ENCOUNTER — TELEPHONE (OUTPATIENT)
Dept: OBGYN | Age: 33
End: 2023-04-21

## 2023-04-22 DIAGNOSIS — B35.6 TINEA CRURIS: ICD-10-CM

## 2023-04-24 ENCOUNTER — TELEPHONE (OUTPATIENT)
Dept: OBGYN | Age: 33
End: 2023-04-24

## 2023-04-24 NOTE — TELEPHONE ENCOUNTER
Called pt to let her know her tx for thrush was sent to pharmacy on 4/22.  Advised pt if any prioblems let us know

## 2023-06-07 ENCOUNTER — HOSPITAL ENCOUNTER (OUTPATIENT)
Dept: INFUSION THERAPY | Age: 33
Discharge: HOME | End: 2023-06-07

## 2023-06-07 ENCOUNTER — OFFICE VISIT (OUTPATIENT)
Dept: ONCOLOGY | Age: 33
End: 2023-06-07
Payer: MEDICAID

## 2023-06-07 VITALS
DIASTOLIC BLOOD PRESSURE: 97 MMHG | BODY MASS INDEX: 24.39 KG/M2 | TEMPERATURE: 98.2 F | HEIGHT: 67 IN | HEART RATE: 82 BPM | OXYGEN SATURATION: 100 % | WEIGHT: 155.4 LBS | SYSTOLIC BLOOD PRESSURE: 127 MMHG

## 2023-06-07 DIAGNOSIS — Z86.711 HISTORY OF PULMONARY EMBOLISM: Primary | ICD-10-CM

## 2023-06-07 DIAGNOSIS — Z86.718 HISTORY OF DVT (DEEP VEIN THROMBOSIS): ICD-10-CM

## 2023-06-07 PROCEDURE — 3080F DIAST BP >= 90 MM HG: CPT | Performed by: INTERNAL MEDICINE

## 2023-06-07 PROCEDURE — 99214 OFFICE O/P EST MOD 30 MIN: CPT | Performed by: INTERNAL MEDICINE

## 2023-06-07 PROCEDURE — 3074F SYST BP LT 130 MM HG: CPT | Performed by: INTERNAL MEDICINE

## 2023-06-07 PROCEDURE — G8427 DOCREV CUR MEDS BY ELIG CLIN: HCPCS | Performed by: INTERNAL MEDICINE

## 2023-06-07 PROCEDURE — G8420 CALC BMI NORM PARAMETERS: HCPCS | Performed by: INTERNAL MEDICINE

## 2023-06-07 PROCEDURE — 99212 OFFICE O/P EST SF 10 MIN: CPT

## 2023-06-07 PROCEDURE — 1036F TOBACCO NON-USER: CPT | Performed by: INTERNAL MEDICINE
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
## 2023-06-07 NOTE — PROGRESS NOTES
Patient refused printed AVS.   Pt verbalized understanding of follow up plan of care. All questions answered.

## 2023-06-22 DIAGNOSIS — I10 HYPERTENSION, UNSPECIFIED TYPE: ICD-10-CM

## 2023-06-22 RX ORDER — NIFEDIPINE 30 MG/1
30 TABLET, EXTENDED RELEASE ORAL DAILY
Qty: 30 TABLET | Refills: 1 | Status: SHIPPED | OUTPATIENT
Start: 2023-06-22

## 2023-06-22 NOTE — TELEPHONE ENCOUNTER
----- Message from Anabel Rajesh sent at 6/22/2023 12:58 PM EDT -----  Subject: Refill Request    QUESTIONS  Name of Medication? NIFEdipine (PROCARDIA XL) 30 MG extended release   tablet  Patient-reported dosage and instructions? once daily  How many days do you have left? 2  Preferred Pharmacy? [facility] #[id number]  Pharmacy phone number (if available)? [phone]  ---------------------------------------------------------------------------  --------------  CALL BACK INFO  What is the best way for the office to contact you? OK to leave message on   voicemail  Preferred Call Back Phone Number? [phone]  ---------------------------------------------------------------------------  --------------  SCRIPT ANSWERS  Relationship to Patient?  Self

## 2023-06-28 ENCOUNTER — OFFICE VISIT (OUTPATIENT)
Dept: PRIMARY CARE CLINIC | Age: 33
End: 2023-06-28
Payer: MEDICAID

## 2023-06-28 VITALS
WEIGHT: 151 LBS | TEMPERATURE: 97 F | RESPIRATION RATE: 17 BRPM | OXYGEN SATURATION: 98 % | SYSTOLIC BLOOD PRESSURE: 138 MMHG | HEART RATE: 85 BPM | BODY MASS INDEX: 23.7 KG/M2 | DIASTOLIC BLOOD PRESSURE: 80 MMHG | HEIGHT: 67 IN

## 2023-06-28 DIAGNOSIS — M94.0 COSTOCHONDRITIS, ACUTE: Primary | ICD-10-CM

## 2023-06-28 DIAGNOSIS — R10.9 SIDE PAIN: ICD-10-CM

## 2023-06-28 PROCEDURE — 3078F DIAST BP <80 MM HG: CPT | Performed by: PHYSICIAN ASSISTANT

## 2023-06-28 PROCEDURE — 99213 OFFICE O/P EST LOW 20 MIN: CPT | Performed by: PHYSICIAN ASSISTANT

## 2023-06-28 PROCEDURE — 1036F TOBACCO NON-USER: CPT | Performed by: PHYSICIAN ASSISTANT

## 2023-06-28 PROCEDURE — G8427 DOCREV CUR MEDS BY ELIG CLIN: HCPCS | Performed by: PHYSICIAN ASSISTANT

## 2023-06-28 PROCEDURE — 81002 URINALYSIS NONAUTO W/O SCOPE: CPT | Performed by: PHYSICIAN ASSISTANT

## 2023-06-28 PROCEDURE — G8420 CALC BMI NORM PARAMETERS: HCPCS | Performed by: PHYSICIAN ASSISTANT

## 2023-06-28 PROCEDURE — 3074F SYST BP LT 130 MM HG: CPT | Performed by: PHYSICIAN ASSISTANT

## 2023-07-14 ENCOUNTER — OFFICE VISIT (OUTPATIENT)
Dept: PRIMARY CARE CLINIC | Age: 33
End: 2023-07-14
Payer: MEDICAID

## 2023-07-14 VITALS
OXYGEN SATURATION: 99 % | HEART RATE: 114 BPM | SYSTOLIC BLOOD PRESSURE: 120 MMHG | BODY MASS INDEX: 24.8 KG/M2 | RESPIRATION RATE: 18 BRPM | HEIGHT: 67 IN | DIASTOLIC BLOOD PRESSURE: 80 MMHG | WEIGHT: 158 LBS | TEMPERATURE: 97.8 F

## 2023-07-14 DIAGNOSIS — I10 PRIMARY HYPERTENSION: ICD-10-CM

## 2023-07-14 DIAGNOSIS — R53.83 OTHER FATIGUE: ICD-10-CM

## 2023-07-14 DIAGNOSIS — Z86.39 HX OF IRON DEFICIENCY: ICD-10-CM

## 2023-07-14 DIAGNOSIS — R73.03 BORDERLINE DIABETES: ICD-10-CM

## 2023-07-14 DIAGNOSIS — N76.0 ACUTE VAGINITIS: Primary | ICD-10-CM

## 2023-07-14 LAB
ANION GAP SERPL CALCULATED.3IONS-SCNC: 12 MMOL/L (ref 7–16)
BASOPHILS # BLD: 0.03 E9/L (ref 0–0.2)
BASOPHILS NFR BLD: 0.6 % (ref 0–2)
BUN SERPL-MCNC: 10 MG/DL (ref 6–20)
CALCIUM SERPL-MCNC: 9.5 MG/DL (ref 8.6–10.2)
CHLORIDE SERPL-SCNC: 104 MMOL/L (ref 98–107)
CO2 SERPL-SCNC: 24 MMOL/L (ref 22–29)
CREAT SERPL-MCNC: 0.6 MG/DL (ref 0.5–1)
EOSINOPHIL # BLD: 0.16 E9/L (ref 0.05–0.5)
EOSINOPHIL NFR BLD: 3.4 % (ref 0–6)
ERYTHROCYTE [DISTWIDTH] IN BLOOD BY AUTOMATED COUNT: 13.4 FL (ref 11.5–15)
GLUCOSE SERPL-MCNC: 115 MG/DL (ref 74–99)
HBA1C MFR BLD: 5.8 % (ref 4–5.6)
HCT VFR BLD AUTO: 41.6 % (ref 34–48)
HGB BLD-MCNC: 13.4 G/DL (ref 11.5–15.5)
IMM GRANULOCYTES # BLD: 0.01 E9/L
IMM GRANULOCYTES NFR BLD: 0.2 % (ref 0–5)
LYMPHOCYTES # BLD: 1.92 E9/L (ref 1.5–4)
LYMPHOCYTES NFR BLD: 41 % (ref 20–42)
MCH RBC QN AUTO: 29.5 PG (ref 26–35)
MCHC RBC AUTO-ENTMCNC: 32.2 % (ref 32–34.5)
MCV RBC AUTO: 91.6 FL (ref 80–99.9)
MONOCYTES # BLD: 0.34 E9/L (ref 0.1–0.95)
MONOCYTES NFR BLD: 7.3 % (ref 2–12)
NEUTROPHILS # BLD: 2.22 E9/L (ref 1.8–7.3)
NEUTS SEG NFR BLD: 47.5 % (ref 43–80)
PLATELET # BLD AUTO: 208 E9/L (ref 130–450)
PMV BLD AUTO: 12.6 FL (ref 7–12)
POTASSIUM SERPL-SCNC: 4 MMOL/L (ref 3.5–5)
RBC # BLD AUTO: 4.54 E12/L (ref 3.5–5.5)
SODIUM SERPL-SCNC: 140 MMOL/L (ref 132–146)
TSH SERPL-MCNC: 1.06 UIU/ML (ref 0.27–4.2)
WBC # BLD: 4.7 E9/L (ref 4.5–11.5)

## 2023-07-14 PROCEDURE — 3074F SYST BP LT 130 MM HG: CPT | Performed by: PHYSICIAN ASSISTANT

## 2023-07-14 PROCEDURE — G8420 CALC BMI NORM PARAMETERS: HCPCS | Performed by: PHYSICIAN ASSISTANT

## 2023-07-14 PROCEDURE — 3079F DIAST BP 80-89 MM HG: CPT | Performed by: PHYSICIAN ASSISTANT

## 2023-07-14 PROCEDURE — G8427 DOCREV CUR MEDS BY ELIG CLIN: HCPCS | Performed by: PHYSICIAN ASSISTANT

## 2023-07-14 PROCEDURE — 36415 COLL VENOUS BLD VENIPUNCTURE: CPT | Performed by: PHYSICIAN ASSISTANT

## 2023-07-14 PROCEDURE — 1036F TOBACCO NON-USER: CPT | Performed by: PHYSICIAN ASSISTANT

## 2023-07-14 PROCEDURE — 99214 OFFICE O/P EST MOD 30 MIN: CPT | Performed by: PHYSICIAN ASSISTANT

## 2023-07-14 RX ORDER — FLUCONAZOLE 150 MG/1
150 TABLET ORAL ONCE
Qty: 2 TABLET | Refills: 0 | Status: SHIPPED | OUTPATIENT
Start: 2023-07-14 | End: 2023-07-14

## 2023-07-26 ENCOUNTER — OFFICE VISIT (OUTPATIENT)
Dept: PRIMARY CARE CLINIC | Age: 33
End: 2023-07-26
Payer: MEDICAID

## 2023-07-26 VITALS
OXYGEN SATURATION: 98 % | HEART RATE: 72 BPM | WEIGHT: 158 LBS | RESPIRATION RATE: 17 BRPM | BODY MASS INDEX: 24.8 KG/M2 | SYSTOLIC BLOOD PRESSURE: 118 MMHG | HEIGHT: 67 IN | DIASTOLIC BLOOD PRESSURE: 80 MMHG | TEMPERATURE: 98 F

## 2023-07-26 DIAGNOSIS — N76.0 ACUTE VAGINITIS: ICD-10-CM

## 2023-07-26 DIAGNOSIS — N76.0 ACUTE VAGINITIS: Primary | ICD-10-CM

## 2023-07-26 PROCEDURE — G8420 CALC BMI NORM PARAMETERS: HCPCS | Performed by: PHYSICIAN ASSISTANT

## 2023-07-26 PROCEDURE — G8427 DOCREV CUR MEDS BY ELIG CLIN: HCPCS | Performed by: PHYSICIAN ASSISTANT

## 2023-07-26 PROCEDURE — 99213 OFFICE O/P EST LOW 20 MIN: CPT | Performed by: PHYSICIAN ASSISTANT

## 2023-07-26 PROCEDURE — 3074F SYST BP LT 130 MM HG: CPT | Performed by: PHYSICIAN ASSISTANT

## 2023-07-26 PROCEDURE — 3078F DIAST BP <80 MM HG: CPT | Performed by: PHYSICIAN ASSISTANT

## 2023-07-26 PROCEDURE — 1036F TOBACCO NON-USER: CPT | Performed by: PHYSICIAN ASSISTANT

## 2023-07-26 NOTE — PROGRESS NOTES
and gender appropriate health screening exams and vaccinations. Advised patient regarding importance of keeping up with recommended health maintenance and to schedule as soon as possible if overdue, as this is important in assessing for undiagnosed pathology, especially cancer, as well as protecting against potentially harmful/life threatening disease. Patient and/or guardian verbalizes understanding and agrees with above counseling, assessment and plan. All questions answered. Kareem Real PA-C  8/1/2023    I have personally reviewed and updated the chief complaint, HPI, Past Medical, Family and Social History, as well as the above Review of Systems.

## 2023-07-28 LAB
C TRACH DNA GENITAL QL NAA+PROBE: NEGATIVE
N. GONORRHOEAE DNA: NEGATIVE

## 2023-07-29 LAB
CULTURE: NORMAL
SPECIMEN DESCRIPTION: NORMAL

## 2023-09-28 ENCOUNTER — TELEPHONE (OUTPATIENT)
Dept: NON INVASIVE DIAGNOSTICS | Age: 33
End: 2023-09-28

## 2023-09-28 NOTE — TELEPHONE ENCOUNTER
Routed soon to  echocardiogram from  to Dr. Philip Montiel to check on renewal of order or cancellation of test.  Awaiting on response.     Miriam Mireles RN, BSN

## 2023-10-12 DIAGNOSIS — I10 HYPERTENSION, UNSPECIFIED TYPE: ICD-10-CM

## 2023-10-13 RX ORDER — NIFEDIPINE 30 MG/1
30 TABLET, EXTENDED RELEASE ORAL DAILY
Qty: 30 TABLET | Refills: 2 | Status: SHIPPED | OUTPATIENT
Start: 2023-10-13

## 2024-01-16 ENCOUNTER — OFFICE VISIT (OUTPATIENT)
Dept: PRIMARY CARE CLINIC | Age: 34
End: 2024-01-16
Payer: MEDICAID

## 2024-01-16 VITALS
WEIGHT: 171 LBS | OXYGEN SATURATION: 98 % | HEIGHT: 67 IN | TEMPERATURE: 97.3 F | DIASTOLIC BLOOD PRESSURE: 80 MMHG | SYSTOLIC BLOOD PRESSURE: 120 MMHG | RESPIRATION RATE: 16 BRPM | BODY MASS INDEX: 26.84 KG/M2 | HEART RATE: 94 BPM

## 2024-01-16 DIAGNOSIS — R10.9 FLANK PAIN: ICD-10-CM

## 2024-01-16 DIAGNOSIS — N89.8 VAGINAL DISCHARGE: Primary | ICD-10-CM

## 2024-01-16 DIAGNOSIS — Z90.49 S/P SMALL BOWEL RESECTION: ICD-10-CM

## 2024-01-16 DIAGNOSIS — Z72.51 UNPROTECTED SEXUAL INTERCOURSE: ICD-10-CM

## 2024-01-16 DIAGNOSIS — R10.12 LEFT UPPER QUADRANT ABDOMINAL PAIN: ICD-10-CM

## 2024-01-16 DIAGNOSIS — K43.9 ABDOMINAL WALL HERNIA: ICD-10-CM

## 2024-01-16 DIAGNOSIS — N89.8 VAGINAL DISCHARGE: ICD-10-CM

## 2024-01-16 PROBLEM — D62 ACUTE BLOOD LOSS ANEMIA: Status: RESOLVED | Noted: 2017-04-14 | Resolved: 2024-01-16

## 2024-01-16 LAB
BILIRUBIN, POC: NORMAL
BLOOD URINE, POC: NORMAL
CLARITY, POC: CLEAR
COLOR, POC: YELLOW
GLUCOSE URINE, POC: NORMAL
KETONES, POC: NORMAL
LEUKOCYTE EST, POC: NORMAL
NITRITE, POC: NORMAL
PH, POC: 7
PROTEIN, POC: NORMAL
SPECIFIC GRAVITY, POC: 1.02
UROBILINOGEN, POC: 0.2

## 2024-01-16 PROCEDURE — G8419 CALC BMI OUT NRM PARAM NOF/U: HCPCS | Performed by: PHYSICIAN ASSISTANT

## 2024-01-16 PROCEDURE — G8484 FLU IMMUNIZE NO ADMIN: HCPCS | Performed by: PHYSICIAN ASSISTANT

## 2024-01-16 PROCEDURE — 99214 OFFICE O/P EST MOD 30 MIN: CPT | Performed by: PHYSICIAN ASSISTANT

## 2024-01-16 PROCEDURE — 3079F DIAST BP 80-89 MM HG: CPT | Performed by: PHYSICIAN ASSISTANT

## 2024-01-16 PROCEDURE — 36415 COLL VENOUS BLD VENIPUNCTURE: CPT | Performed by: PHYSICIAN ASSISTANT

## 2024-01-16 PROCEDURE — 1036F TOBACCO NON-USER: CPT | Performed by: PHYSICIAN ASSISTANT

## 2024-01-16 PROCEDURE — 3074F SYST BP LT 130 MM HG: CPT | Performed by: PHYSICIAN ASSISTANT

## 2024-01-16 PROCEDURE — 81002 URINALYSIS NONAUTO W/O SCOPE: CPT | Performed by: PHYSICIAN ASSISTANT

## 2024-01-16 PROCEDURE — G8427 DOCREV CUR MEDS BY ELIG CLIN: HCPCS | Performed by: PHYSICIAN ASSISTANT

## 2024-01-16 ASSESSMENT — PATIENT HEALTH QUESTIONNAIRE - PHQ9
2. FEELING DOWN, DEPRESSED OR HOPELESS: 0
SUM OF ALL RESPONSES TO PHQ QUESTIONS 1-9: 0
SUM OF ALL RESPONSES TO PHQ QUESTIONS 1-9: 0
SUM OF ALL RESPONSES TO PHQ9 QUESTIONS 1 & 2: 0
1. LITTLE INTEREST OR PLEASURE IN DOING THINGS: 0
SUM OF ALL RESPONSES TO PHQ QUESTIONS 1-9: 0
SUM OF ALL RESPONSES TO PHQ QUESTIONS 1-9: 0
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
## 2024-01-17 LAB
HEPATITIS C ANTIBODY: NONREACTIVE
HIV AG/AB: NONREACTIVE

## 2024-01-18 LAB
C TRACH DNA GENITAL QL NAA+PROBE: NEGATIVE
N. GONORRHOEAE DNA: NEGATIVE

## 2024-01-19 LAB
CULTURE: NORMAL
SPECIMEN DESCRIPTION: NORMAL

## 2024-01-24 DIAGNOSIS — R10.9 FLANK PAIN: Primary | ICD-10-CM

## 2024-01-26 ENCOUNTER — HOSPITAL ENCOUNTER (OUTPATIENT)
Age: 34
Discharge: HOME | End: 2024-01-26
Payer: MEDICAID

## 2024-01-26 DIAGNOSIS — R10.9 FLANK PAIN: ICD-10-CM

## 2024-01-26 LAB
ANION GAP SERPL CALCULATED.3IONS-SCNC: 10 MMOL/L (ref 7–16)
BUN SERPL-MCNC: 10 MG/DL (ref 6–20)
CALCIUM SERPL-MCNC: 9.5 MG/DL (ref 8.6–10.2)
CHLORIDE SERPL-SCNC: 103 MMOL/L (ref 98–107)
CO2 SERPL-SCNC: 27 MMOL/L (ref 22–29)
CREAT SERPL-MCNC: 0.7 MG/DL (ref 0.5–1)
GFR SERPL CREATININE-BSD FRML MDRD: >60 ML/MIN/1.73M2
GLUCOSE SERPL-MCNC: 105 MG/DL (ref 74–99)
POTASSIUM SERPL-SCNC: 3.9 MMOL/L (ref 3.5–5)
SODIUM SERPL-SCNC: 140 MMOL/L (ref 132–146)

## 2024-01-26 PROCEDURE — 80048 BASIC METABOLIC PNL TOTAL CA: CPT

## 2024-01-29 ENCOUNTER — HOSPITAL ENCOUNTER (OUTPATIENT)
Dept: CT IMAGING | Age: 34
Discharge: HOME | End: 2024-01-31
Payer: MEDICAID

## 2024-01-29 DIAGNOSIS — K43.9 ABDOMINAL WALL HERNIA: ICD-10-CM

## 2024-01-29 DIAGNOSIS — R10.9 FLANK PAIN: ICD-10-CM

## 2024-01-29 DIAGNOSIS — Z90.49 S/P SMALL BOWEL RESECTION: ICD-10-CM

## 2024-01-29 DIAGNOSIS — R10.12 LEFT UPPER QUADRANT ABDOMINAL PAIN: ICD-10-CM

## 2024-01-29 PROCEDURE — 6360000004 HC RX CONTRAST MEDICATION: Performed by: RADIOLOGY

## 2024-01-29 PROCEDURE — 74178 CT ABD&PLV WO CNTR FLWD CNTR: CPT

## 2024-01-29 RX ADMIN — IOPAMIDOL 75 ML: 755 INJECTION, SOLUTION INTRAVENOUS at 14:26

## 2024-02-12 ENCOUNTER — OFFICE VISIT (OUTPATIENT)
Dept: PRIMARY CARE CLINIC | Age: 34
End: 2024-02-12
Payer: MEDICAID

## 2024-02-12 VITALS
BODY MASS INDEX: 25.74 KG/M2 | RESPIRATION RATE: 16 BRPM | DIASTOLIC BLOOD PRESSURE: 88 MMHG | WEIGHT: 164 LBS | SYSTOLIC BLOOD PRESSURE: 118 MMHG | HEART RATE: 111 BPM | OXYGEN SATURATION: 96 % | HEIGHT: 67 IN | TEMPERATURE: 97.2 F

## 2024-02-12 DIAGNOSIS — F10.10 ALCOHOL ABUSE, DAILY USE: ICD-10-CM

## 2024-02-12 DIAGNOSIS — R16.0 HEPATOMEGALY: Primary | ICD-10-CM

## 2024-02-12 DIAGNOSIS — Z90.49 S/P SMALL BOWEL RESECTION: ICD-10-CM

## 2024-02-12 DIAGNOSIS — R10.12 LEFT UPPER QUADRANT ABDOMINAL PAIN: ICD-10-CM

## 2024-02-12 DIAGNOSIS — K43.9 ABDOMINAL WALL HERNIA: ICD-10-CM

## 2024-02-12 LAB
ALBUMIN SERPL-MCNC: 4.6 G/DL (ref 3.5–5.2)
ALP BLD-CCNC: 73 U/L (ref 35–104)
ALT SERPL-CCNC: 39 U/L (ref 0–32)
ANION GAP SERPL CALCULATED.3IONS-SCNC: 11 MMOL/L (ref 7–16)
AST SERPL-CCNC: 28 U/L (ref 0–31)
BILIRUB SERPL-MCNC: 1.1 MG/DL (ref 0–1.2)
BUN BLDV-MCNC: 13 MG/DL (ref 6–20)
CALCIUM SERPL-MCNC: 9.5 MG/DL (ref 8.6–10.2)
CHLORIDE BLD-SCNC: 97 MMOL/L (ref 98–107)
CO2: 26 MMOL/L (ref 22–29)
CREAT SERPL-MCNC: 0.9 MG/DL (ref 0.5–1)
GFR SERPL CREATININE-BSD FRML MDRD: >60 ML/MIN/1.73M2
GLUCOSE BLD-MCNC: 131 MG/DL (ref 74–99)
POTASSIUM SERPL-SCNC: 3.8 MMOL/L (ref 3.5–5)
SODIUM BLD-SCNC: 134 MMOL/L (ref 132–146)
TOTAL PROTEIN: 7.8 G/DL (ref 6.4–8.3)

## 2024-02-12 PROCEDURE — 99214 OFFICE O/P EST MOD 30 MIN: CPT | Performed by: PHYSICIAN ASSISTANT

## 2024-02-12 PROCEDURE — G8419 CALC BMI OUT NRM PARAM NOF/U: HCPCS | Performed by: PHYSICIAN ASSISTANT

## 2024-02-12 PROCEDURE — 3079F DIAST BP 80-89 MM HG: CPT | Performed by: PHYSICIAN ASSISTANT

## 2024-02-12 PROCEDURE — 36415 COLL VENOUS BLD VENIPUNCTURE: CPT | Performed by: PHYSICIAN ASSISTANT

## 2024-02-12 PROCEDURE — 1036F TOBACCO NON-USER: CPT | Performed by: PHYSICIAN ASSISTANT

## 2024-02-12 PROCEDURE — 3074F SYST BP LT 130 MM HG: CPT | Performed by: PHYSICIAN ASSISTANT

## 2024-02-12 PROCEDURE — G8484 FLU IMMUNIZE NO ADMIN: HCPCS | Performed by: PHYSICIAN ASSISTANT

## 2024-02-12 PROCEDURE — G8427 DOCREV CUR MEDS BY ELIG CLIN: HCPCS | Performed by: PHYSICIAN ASSISTANT

## 2024-02-12 NOTE — PROGRESS NOTES
Chief Complaint   Patient presents with    Results     Cat scan abd  and pelvis       HPI:  Patient is here for follow-up of ct results.  Dr Tay evaluated hernia. No longer causing pain or protruding. Surgery is on hold for now.     EXAMINATION:  CT OF THE ABDOMEN AND PELVIS WITH AND WITHOUT CONTRAST 1/29/2024 2:22 pm  COMPARISON:  None.  FINDINGS:  Lower Chest: No significant parenchymal abnormalities are noted at the lung bases.  There are no signs of pleural pericardial effusion the heart is not appear enlarged.   Organs: The liver revealed a homogeneous density.  There are no signs of a  discrete mass or duct dilation.  The right lobe of the liver measures 20 cm compatible with hepatomegaly.     The gallbladder, pancreas and spleen revealed no acute abnormalities.  The adrenal glands appear within normal range.  The kidneys demonstrate symmetrical density without signs of stones or hydronephrosis.     GI/Bowel: No acute abnormalities of the stomach were observed.  There are postoperative changes of the distal stomach.  Small bowel pattern is  nonobstructive.  The region of the terminal ileum and cecum appears within the normal range.  The colonic gas pattern appears unremarkable otherwise.     Pelvis: No acute abnormalities of the uterus or adnexa are visualized.  There  is a moderate volume of ascites within the pelvis.  No lymphadenopathy was observed.     Peritoneum/Retroperitoneum: There are no signs of retroperitoneal  lymphadenopathy aneurysm or signs of ascites     Bones/Soft Tissues: No acute osseous abnormalities of the spine or pelvis were observed     IMPRESSION:  1. Hepatomegaly  2. There are stable postoperative changes of the stomach.  3. Moderate volume of ascites within the pelvis    Patient's past medical, surgical, social and/or family history reviewed, updated in chart, and are non-contributory (unless otherwise stated).  Medications and allergies also reviewed and updated in

## 2024-02-13 ENCOUNTER — TELEPHONE (OUTPATIENT)
Dept: PRIMARY CARE CLINIC | Age: 34
End: 2024-02-13

## 2024-02-13 NOTE — TELEPHONE ENCOUNTER
Pt informed of labs also pt would like to bring up that her ALT is higher than it has been in the past, is this something to be concerned about ?

## 2024-02-21 ENCOUNTER — OFFICE VISIT (OUTPATIENT)
Dept: OBGYN | Age: 34
End: 2024-02-21
Payer: MEDICAID

## 2024-02-21 VITALS
DIASTOLIC BLOOD PRESSURE: 99 MMHG | SYSTOLIC BLOOD PRESSURE: 137 MMHG | BODY MASS INDEX: 26.23 KG/M2 | HEART RATE: 98 BPM | WEIGHT: 167.5 LBS

## 2024-02-21 DIAGNOSIS — R18.8 FREE FLUID IN PELVIS: Primary | ICD-10-CM

## 2024-02-21 PROCEDURE — 3075F SYST BP GE 130 - 139MM HG: CPT | Performed by: OBSTETRICS & GYNECOLOGY

## 2024-02-21 PROCEDURE — 99213 OFFICE O/P EST LOW 20 MIN: CPT | Performed by: OBSTETRICS & GYNECOLOGY

## 2024-02-21 PROCEDURE — 3080F DIAST BP >= 90 MM HG: CPT | Performed by: OBSTETRICS & GYNECOLOGY

## 2024-02-21 PROCEDURE — 1036F TOBACCO NON-USER: CPT | Performed by: OBSTETRICS & GYNECOLOGY

## 2024-02-21 PROCEDURE — G8419 CALC BMI OUT NRM PARAM NOF/U: HCPCS | Performed by: OBSTETRICS & GYNECOLOGY

## 2024-02-21 PROCEDURE — G8427 DOCREV CUR MEDS BY ELIG CLIN: HCPCS | Performed by: OBSTETRICS & GYNECOLOGY

## 2024-02-21 PROCEDURE — G8484 FLU IMMUNIZE NO ADMIN: HCPCS | Performed by: OBSTETRICS & GYNECOLOGY

## 2024-02-21 NOTE — PROGRESS NOTES
Patient here for office visit. Needs u/s d/t cyst per Dr. Ritu Loza.  Having pain right sided and was noted fluid that side in cat scan.
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
## 2024-03-06 ENCOUNTER — HOSPITAL ENCOUNTER (OUTPATIENT)
Dept: ULTRASOUND IMAGING | Age: 34
Discharge: HOME | End: 2024-03-08
Attending: OBSTETRICS & GYNECOLOGY
Payer: MEDICAID

## 2024-03-06 DIAGNOSIS — R18.8 FREE FLUID IN PELVIS: ICD-10-CM

## 2024-03-06 PROCEDURE — 76830 TRANSVAGINAL US NON-OB: CPT

## 2024-03-07 ENCOUNTER — TELEPHONE (OUTPATIENT)
Dept: OBGYN | Age: 34
End: 2024-03-07

## 2024-03-07 NOTE — TELEPHONE ENCOUNTER
----- Message from Sonya Shea DO sent at 3/7/2024 12:20 PM EST -----  Please let her know that her pelvic ultrasound shows a normal uterus normal thickness of the lining of the uterus and both ovaries are normal they do's report free fluid but everyone has fluid in the pelvis with all the other normal findings no further evaluation would be recommended.

## 2024-03-14 ENCOUNTER — TELEPHONE (OUTPATIENT)
Dept: PRIMARY CARE CLINIC | Age: 34
End: 2024-03-14

## 2024-03-14 DIAGNOSIS — Z90.49 S/P SMALL BOWEL RESECTION: ICD-10-CM

## 2024-03-14 DIAGNOSIS — K43.9 ABDOMINAL WALL HERNIA: Primary | ICD-10-CM

## 2024-03-14 DIAGNOSIS — R16.0 HEPATOMEGALY: ICD-10-CM

## 2024-03-14 NOTE — TELEPHONE ENCOUNTER
Patient phoned she saw her GYN for LUQ pain and she said she told her everything on her US was normal.  The patient said you told her see GYN first then you were gonna send her to someone for a \"scope\".  She wants to know if she needs to come back in or if your just sending referral.    Please advise.

## 2024-03-18 ENCOUNTER — INITIAL CONSULT (OUTPATIENT)
Age: 34
End: 2024-03-18
Payer: MEDICAID

## 2024-03-18 VITALS
DIASTOLIC BLOOD PRESSURE: 88 MMHG | TEMPERATURE: 97.1 F | OXYGEN SATURATION: 98 % | WEIGHT: 163.7 LBS | BODY MASS INDEX: 25.69 KG/M2 | HEIGHT: 67 IN | HEART RATE: 95 BPM | SYSTOLIC BLOOD PRESSURE: 132 MMHG | RESPIRATION RATE: 16 BRPM

## 2024-03-18 DIAGNOSIS — R10.9 LEFT SIDED ABDOMINAL PAIN: ICD-10-CM

## 2024-03-18 DIAGNOSIS — R16.0 HEPATOMEGALY: Primary | ICD-10-CM

## 2024-03-18 PROCEDURE — 3075F SYST BP GE 130 - 139MM HG: CPT | Performed by: NURSE PRACTITIONER

## 2024-03-18 PROCEDURE — 3079F DIAST BP 80-89 MM HG: CPT | Performed by: NURSE PRACTITIONER

## 2024-03-18 PROCEDURE — G8484 FLU IMMUNIZE NO ADMIN: HCPCS | Performed by: NURSE PRACTITIONER

## 2024-03-18 PROCEDURE — 99202 OFFICE O/P NEW SF 15 MIN: CPT | Performed by: NURSE PRACTITIONER

## 2024-03-18 PROCEDURE — 1036F TOBACCO NON-USER: CPT | Performed by: NURSE PRACTITIONER

## 2024-03-18 PROCEDURE — G8419 CALC BMI OUT NRM PARAM NOF/U: HCPCS | Performed by: NURSE PRACTITIONER

## 2024-03-18 PROCEDURE — G8427 DOCREV CUR MEDS BY ELIG CLIN: HCPCS | Performed by: NURSE PRACTITIONER

## 2024-03-18 RX ORDER — OMEPRAZOLE 40 MG/1
40 CAPSULE, DELAYED RELEASE ORAL
Qty: 30 CAPSULE | Refills: 5 | Status: SHIPPED | OUTPATIENT
Start: 2024-03-18
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
## 2024-03-29 ENCOUNTER — HOSPITAL ENCOUNTER (OUTPATIENT)
Age: 34
Discharge: HOME | End: 2024-03-29
Payer: MEDICAID

## 2024-03-29 DIAGNOSIS — R16.0 HEPATOMEGALY: ICD-10-CM

## 2024-03-29 LAB
ALBUMIN SERPL-MCNC: 5.2 G/DL (ref 3.5–5.2)
ALP SERPL-CCNC: 87 U/L (ref 35–104)
ALT SERPL-CCNC: 40 U/L (ref 0–32)
AST SERPL-CCNC: 71 U/L (ref 0–31)
BASOPHILS # BLD: 0.02 K/UL (ref 0–0.2)
BASOPHILS NFR BLD: 0 % (ref 0–2)
BILIRUB DIRECT SERPL-MCNC: 0.4 MG/DL (ref 0–0.3)
BILIRUB INDIRECT SERPL-MCNC: 1.5 MG/DL (ref 0–1)
BILIRUB SERPL-MCNC: 1.9 MG/DL (ref 0–1.2)
EOSINOPHIL # BLD: 0.02 K/UL (ref 0.05–0.5)
EOSINOPHILS RELATIVE PERCENT: 0 % (ref 0–6)
ERYTHROCYTE [DISTWIDTH] IN BLOOD BY AUTOMATED COUNT: 14.8 % (ref 11.5–15)
GGT SERPL-CCNC: 281 U/L (ref 6–42)
HCT VFR BLD AUTO: 44.3 % (ref 34–48)
HGB BLD-MCNC: 15.7 G/DL (ref 11.5–15.5)
IMM GRANULOCYTES # BLD AUTO: <0.03 K/UL (ref 0–0.58)
IMM GRANULOCYTES NFR BLD: 0 % (ref 0–5)
INR PPP: 1
IRON SATN MFR SERPL: 71 % (ref 15–50)
IRON SERPL-MCNC: 247 UG/DL (ref 37–145)
LYMPHOCYTES NFR BLD: 1.16 K/UL (ref 1.5–4)
LYMPHOCYTES RELATIVE PERCENT: 25 % (ref 20–42)
MCH RBC QN AUTO: 31 PG (ref 26–35)
MCHC RBC AUTO-ENTMCNC: 35.4 G/DL (ref 32–34.5)
MCV RBC AUTO: 87.5 FL (ref 80–99.9)
MONOCYTES NFR BLD: 0.42 K/UL (ref 0.1–0.95)
MONOCYTES NFR BLD: 9 % (ref 2–12)
NEUTROPHILS NFR BLD: 65 % (ref 43–80)
NEUTS SEG NFR BLD: 2.95 K/UL (ref 1.8–7.3)
PLATELET # BLD AUTO: 201 K/UL (ref 130–450)
PMV BLD AUTO: 11.4 FL (ref 7–12)
PROT SERPL-MCNC: 9 G/DL (ref 6.4–8.3)
PROTHROMBIN TIME: 11.1 SEC (ref 9.3–12.4)
RBC # BLD AUTO: 5.06 M/UL (ref 3.5–5.5)
TIBC SERPL-MCNC: 350 UG/DL (ref 250–450)
WBC OTHER # BLD: 4.6 K/UL (ref 4.5–11.5)

## 2024-03-29 PROCEDURE — 80076 HEPATIC FUNCTION PANEL: CPT

## 2024-03-29 PROCEDURE — 85025 COMPLETE CBC W/AUTO DIFF WBC: CPT

## 2024-03-29 PROCEDURE — 87340 HEPATITIS B SURFACE AG IA: CPT

## 2024-03-29 PROCEDURE — 83550 IRON BINDING TEST: CPT

## 2024-03-29 PROCEDURE — 86704 HEP B CORE ANTIBODY TOTAL: CPT

## 2024-03-29 PROCEDURE — 36415 COLL VENOUS BLD VENIPUNCTURE: CPT

## 2024-03-29 PROCEDURE — 86255 FLUORESCENT ANTIBODY SCREEN: CPT

## 2024-03-29 PROCEDURE — 86317 IMMUNOASSAY INFECTIOUS AGENT: CPT

## 2024-03-29 PROCEDURE — 82977 ASSAY OF GGT: CPT

## 2024-03-29 PROCEDURE — 82390 ASSAY OF CERULOPLASMIN: CPT

## 2024-03-29 PROCEDURE — 83540 ASSAY OF IRON: CPT

## 2024-03-29 PROCEDURE — 86803 HEPATITIS C AB TEST: CPT

## 2024-03-29 PROCEDURE — 86708 HEPATITIS A ANTIBODY: CPT

## 2024-03-29 PROCEDURE — 85610 PROTHROMBIN TIME: CPT

## 2024-03-30 LAB
CERULOPLASMIN SERPL-MCNC: 30 MG/DL (ref 16–45)
HAV AB SERPL IA-ACNC: NONREACTIVE
HBV CORE AB SER QL: NONREACTIVE

## 2024-04-01 ENCOUNTER — HOSPITAL ENCOUNTER (OUTPATIENT)
Dept: ULTRASOUND IMAGING | Age: 34
Discharge: HOME | End: 2024-04-03
Payer: MEDICAID

## 2024-04-01 DIAGNOSIS — R16.0 HEPATOMEGALY: ICD-10-CM

## 2024-04-01 LAB
HBV SURFACE AB SERPL IA-ACNC: >1000 MIU/ML (ref 0–9.99)
HBV SURFACE AG SERPL QL IA: NONREACTIVE
HCV AB SERPL QL IA: NONREACTIVE
MITOCHONDRIA AB SER QL: NEGATIVE

## 2024-04-01 PROCEDURE — 76981 USE PARENCHYMA: CPT

## 2024-04-04 ENCOUNTER — OFFICE VISIT (OUTPATIENT)
Dept: SURGERY | Age: 34
End: 2024-04-04
Payer: MEDICAID

## 2024-04-04 ENCOUNTER — TELEPHONE (OUTPATIENT)
Dept: SURGERY | Age: 34
End: 2024-04-04

## 2024-04-04 ENCOUNTER — PREP FOR PROCEDURE (OUTPATIENT)
Dept: SURGERY | Age: 34
End: 2024-04-04

## 2024-04-04 VITALS
HEIGHT: 67 IN | DIASTOLIC BLOOD PRESSURE: 85 MMHG | TEMPERATURE: 97.7 F | OXYGEN SATURATION: 98 % | BODY MASS INDEX: 25.27 KG/M2 | RESPIRATION RATE: 18 BRPM | SYSTOLIC BLOOD PRESSURE: 125 MMHG | WEIGHT: 161 LBS | HEART RATE: 89 BPM

## 2024-04-04 DIAGNOSIS — K43.2 INCISIONAL HERNIA, WITHOUT OBSTRUCTION OR GANGRENE: ICD-10-CM

## 2024-04-04 DIAGNOSIS — R10.12 LEFT UPPER QUADRANT PAIN: ICD-10-CM

## 2024-04-04 DIAGNOSIS — R10.12 LEFT UPPER QUADRANT PAIN: Primary | ICD-10-CM

## 2024-04-04 PROCEDURE — G8427 DOCREV CUR MEDS BY ELIG CLIN: HCPCS | Performed by: SURGERY

## 2024-04-04 PROCEDURE — 3079F DIAST BP 80-89 MM HG: CPT | Performed by: SURGERY

## 2024-04-04 PROCEDURE — 99212 OFFICE O/P EST SF 10 MIN: CPT | Performed by: SURGERY

## 2024-04-04 PROCEDURE — 1036F TOBACCO NON-USER: CPT | Performed by: SURGERY

## 2024-04-04 PROCEDURE — G8419 CALC BMI OUT NRM PARAM NOF/U: HCPCS | Performed by: SURGERY

## 2024-04-04 PROCEDURE — 3074F SYST BP LT 130 MM HG: CPT | Performed by: SURGERY

## 2024-04-04 PROCEDURE — 99214 OFFICE O/P EST MOD 30 MIN: CPT | Performed by: SURGERY

## 2024-04-04 NOTE — PROGRESS NOTES
[facility]  General Surgery Attending Office Progress Note    Chief Complaint: left upper quadrant pain       Subjective:   Vanessa Corley complains of LUQ abdominal pain x 1 year. She was started on omeprazole 40 mg daily and the pain has improved. She was started on  3/18  She gave birth on Feb 18, 2023   Pain is much less intense  Bowqel movements are normal  Her abdominal hernia is not causing problems.  She is not symptomatic    Tolerating diet  No fevers. No chills. No nausea/ vomiting    --I have reviewed and confirmed the past medical history, surgical history, social history, allergies in the chart.  --Medications: I have reviewed the medication list in the chart.    --Review of systems-pertinent positives noted in HPI, otherwise negative      Objective:     Vitals:    04/04/24 1253   BP: 125/85   Pulse: 89   Resp: 18   Temp: 97.7 °F (36.5 °C)   SpO2: 98%     Physical Exam  Awake alert x3, GCS 15  No apparent distress  Lungs clear bilaterally, no use of accessory muscles  Heart S1S2, RRR  Abdomen soft nontender nondistended   Small incisional hernia reducible at prior ileostomy site    Assessment:      Diagnosis Orders   1. Left upper quadrant pain            Plan:   --LUQ pain--likely secondary to gastritis vs Peptic ulcer diseas  Sx improved with omepraozle 40 mg daily  Plan on EGD  I have discussed the risks, benefits, and alternatives to esophagogastroduodenoscopy with possible biopsy with deep sedation with the patient. I have detailed the risks of deep sedation (hypotension, hypoxia) as well as complications of bleeding and perforation.  The patient understands the above and agrees to proceed.    - Small incisional hernia prior ostomy site-easily qjaezjlgb-agefbpmllftb-qdqcwrsy monitor    --Prescription management--continue omeprazole 40 mg daily            Dony Tinsley MD, FACS  4/4/2024  1:37 PM      NOTE: This report was transcribed using voice recognition software. Every effort

## 2024-04-04 NOTE — PATIENT INSTRUCTIONS
Patient Information and Instructions for  Upper GI Endoscopy or Esophagogastroduodenoscopy [EGD])         Definition Upper GI Endoscopy or Esophagogastroduodenoscopy [EGD])  This is a test that uses a fiberoptic scope to examine the esophagus (throat), stomach, and upper part of the small intestines.     Upper GI endoscopy may be recommended if you have:   Abdominal pain   Severe heartburn   Persistent nausea and vomiting   Difficulty swallowing   Blood in stool or vomit   Abnormal x-ray or other examinations of the gastrointestinal tract     Conditions that can be diagnosed with upper GI endoscopy include:   Ulcers   Tumors   Polyps   Abnormal narrowing   Inflammation     Possible Complications   Complications are rare, but no procedure is completely free of risk. If you are planning to have upper GI endoscopy, your doctor will review a list of possible complications, which may include:   Bleeding   Damage to the esophagus, stomach, or intestine   Infection   Respiratory depression (reduced breathing rate and/or depth)   Reaction to sedatives or anesthesia causing your blood pressure to drop    Some factors that may increase the risk of complications include:   Age: 60 or older   Pregnancy   Obesity   Smoking , alcoholism , or drug use   Malnutrition   Recent illness   Diabetes   Heart or lung problems   Bleeding disorders   Use of certain medicines     Be sure to discuss these risks with your doctor before the test.     What to Expect   Prior to test   Leading up to the test:   Arrange for a ride home after the test. Also, arrange for help at home.   The night before, eat a light meal.  Do not eat or drink anything after midnight the night before the test.   Talk to your doctor about your medicines. You may be asked to stop taking some medicines up to one week before the procedure, like:   Anti-inflammatory drugs (e.g., aspirin )   Blood thinners, like clopidogrel (Plavix) or warfarin (Coumadin)     Description of

## 2024-04-04 NOTE — TELEPHONE ENCOUNTER
Prior Authorization Form:      DEMOGRAPHICS:                     Patient Name:  Carlene Han  Patient :  1990            Insurance:  Payor: [employer] / Plan: [employer] [place]AN [place] / Product Type: *No Product type* /   Insurance ID Number:    Payer/Plan Subscr  Sex Relation Sub. Ins. ID Effective Group Num   1. [facility]* CARLENE HAN 1990 Female Self [id number]                                   [address]         DIAGNOSIS & PROCEDURE:                       Procedure/Operation: EGD           CPT Code: 89772    Diagnosis:  LUQ Abdominal Pain    ICD10 Code: R10.12    Location:  [facility]    Surgeon:  Dony Tinsley MD      SCHEDULING INFORMATION:                          Date: 2024    Time: 1315              Anesthesia:  MAC/TIVA                                                       Status:  Outpatient          Electronically signed by Yumiko Fletcher MA on 2024 at 2:58 PM

## 2024-04-09 ENCOUNTER — TELEPHONE (OUTPATIENT)
Age: 34
End: 2024-04-09

## 2024-04-09 NOTE — TELEPHONE ENCOUNTER
Called to let you know her ESOPHAGOGASTRODUODENOSCOPY, POSSIBLE BIOPSY is scheduled for 5/6 with Dr. Tinsley and made follow up on 5/15

## 2024-04-30 NOTE — PROGRESS NOTES
fani      [facility] PRE-ADMISSION TESTING   ENDOSCOPY INSTRUCTIONS  PAT- Phone Number: [phone]    ENDOSCOPY INSTRUCTIONS:     [x] Antibacterial Soap Shower Night before or morning of procedure.  [x] Do not wear makeup, lotions, powders, deodorant.   [x] Nothing to eat or drink after midnight. This includes no gum, candy, mints or water.  [x] You may brush your teeth, gargle, but do NOT swallow water.   [x] No tobacco products, illegal drugs, or alcohol within 24 hours of your surgery.  [x] Jewelry or valuables should not be brought to the hospital. All body and/or tongue piercing's must be removed prior to arriving to hospital. No contact lens or hair pins.   [x] Urine Pregnancy test will be preformed the day of surgery. A specimen sample may be brought from home.  [x] Arrange transportation with a responsible adult  to and from the hospital. If you do not have a responsible adult  to transport you, you will need to make arrangements with a medical transportation company. Arrange for someone to be with you for the remainder of the day and for 24 hours after your procedure due to having had anesthesia.    -Who will be your  for transportation? fiance  -Who will be staying with you for 24 hrs after your procedure? fiance  [x] Bring insurance card and photo ID.  [] Bring copy of living will or healthcare power of  papers to be placed in your electronic record.    PARKING INSTRUCTIONS:     [x] ARRIVAL DATE & TIME: 5/6 @1200  [x] Times are subject to change. We will contact you the business day before surgery if that were to occur.  [x] Enter into the [facility] Entrance. Two people may accompany you. Masks are not required.  [x] Parking Lot \"I\" is where you will park. It is located on the corner of [address] and [address]. The entrance is on [address].   Only one vehicle - per patient, is permitted in parking lot.   Upon entering the parking

## 2024-05-03 DIAGNOSIS — I10 HYPERTENSION, UNSPECIFIED TYPE: ICD-10-CM

## 2024-05-03 RX ORDER — NIFEDIPINE 30 MG/1
30 TABLET, EXTENDED RELEASE ORAL DAILY
Qty: 30 TABLET | Refills: 2 | Status: SHIPPED | OUTPATIENT
Start: 2024-05-03

## 2024-05-06 ENCOUNTER — ANESTHESIA (OUTPATIENT)
Dept: ENDOSCOPY | Age: 34
End: 2024-05-06
Payer: MEDICAID

## 2024-05-06 ENCOUNTER — HOSPITAL ENCOUNTER (OUTPATIENT)
Age: 34
Setting detail: OUTPATIENT SURGERY
Discharge: HOME | End: 2024-05-06
Attending: SURGERY | Admitting: SURGERY
Payer: MEDICAID

## 2024-05-06 ENCOUNTER — ANESTHESIA EVENT (OUTPATIENT)
Dept: ENDOSCOPY | Age: 34
End: 2024-05-06
Payer: MEDICAID

## 2024-05-06 VITALS
SYSTOLIC BLOOD PRESSURE: 149 MMHG | TEMPERATURE: 97 F | OXYGEN SATURATION: 100 % | HEART RATE: 81 BPM | RESPIRATION RATE: 18 BRPM | HEIGHT: 67 IN | DIASTOLIC BLOOD PRESSURE: 116 MMHG | BODY MASS INDEX: 25.43 KG/M2 | WEIGHT: 162 LBS

## 2024-05-06 DIAGNOSIS — R10.12 LEFT UPPER QUADRANT PAIN: ICD-10-CM

## 2024-05-06 DIAGNOSIS — Z01.812 PRE-OPERATIVE LABORATORY EXAMINATION: Primary | ICD-10-CM

## 2024-05-06 PROBLEM — K21.00 GASTROESOPHAGEAL REFLUX DISEASE WITH ESOPHAGITIS WITHOUT HEMORRHAGE: Status: ACTIVE | Noted: 2024-05-06

## 2024-05-06 LAB
HCG, URINE, POC: NEGATIVE
Lab: NORMAL
NEGATIVE QC PASS/FAIL: NORMAL
POSITIVE QC PASS/FAIL: NORMAL

## 2024-05-06 PROCEDURE — 3700000001 HC ADD 15 MINUTES (ANESTHESIA): Performed by: SURGERY

## 2024-05-06 PROCEDURE — 3609012400 HC EGD TRANSORAL BIOPSY SINGLE/MULTIPLE: Performed by: SURGERY

## 2024-05-06 PROCEDURE — 88305 TISSUE EXAM BY PATHOLOGIST: CPT

## 2024-05-06 PROCEDURE — 6360000002 HC RX W HCPCS: Performed by: NURSE ANESTHETIST, CERTIFIED REGISTERED

## 2024-05-06 PROCEDURE — 7100000011 HC PHASE II RECOVERY - ADDTL 15 MIN: Performed by: SURGERY

## 2024-05-06 PROCEDURE — 2709999900 HC NON-CHARGEABLE SUPPLY: Performed by: SURGERY

## 2024-05-06 PROCEDURE — 3700000000 HC ANESTHESIA ATTENDED CARE: Performed by: SURGERY

## 2024-05-06 PROCEDURE — 7100000010 HC PHASE II RECOVERY - FIRST 15 MIN: Performed by: SURGERY

## 2024-05-06 PROCEDURE — 43239 EGD BIOPSY SINGLE/MULTIPLE: CPT | Performed by: SURGERY

## 2024-05-06 PROCEDURE — 2580000003 HC RX 258: Performed by: SURGERY

## 2024-05-06 RX ORDER — SODIUM CHLORIDE 0.9 % (FLUSH) 0.9 %
5-40 SYRINGE (ML) INJECTION PRN
Status: DISCONTINUED | OUTPATIENT
Start: 2024-05-06 | End: 2024-05-06 | Stop reason: HOSPADM

## 2024-05-06 RX ORDER — SODIUM CHLORIDE 9 MG/ML
INJECTION, SOLUTION INTRAVENOUS CONTINUOUS
Status: DISCONTINUED | OUTPATIENT
Start: 2024-05-06 | End: 2024-05-06 | Stop reason: HOSPADM

## 2024-05-06 RX ORDER — SODIUM CHLORIDE 0.9 % (FLUSH) 0.9 %
5-40 SYRINGE (ML) INJECTION EVERY 12 HOURS SCHEDULED
Status: DISCONTINUED | OUTPATIENT
Start: 2024-05-06 | End: 2024-05-06 | Stop reason: HOSPADM

## 2024-05-06 RX ORDER — PROPOFOL 10 MG/ML
INJECTION, EMULSION INTRAVENOUS PRN
Status: DISCONTINUED | OUTPATIENT
Start: 2024-05-06 | End: 2024-05-06 | Stop reason: SDUPTHER

## 2024-05-06 RX ORDER — SODIUM CHLORIDE 9 MG/ML
INJECTION, SOLUTION INTRAVENOUS PRN
Status: DISCONTINUED | OUTPATIENT
Start: 2024-05-06 | End: 2024-05-06 | Stop reason: HOSPADM

## 2024-05-06 RX ADMIN — PROPOFOL 250 MG: 10 INJECTION, EMULSION INTRAVENOUS at 13:31

## 2024-05-06 RX ADMIN — SODIUM CHLORIDE: 9 INJECTION, SOLUTION INTRAVENOUS at 13:04

## 2024-05-06 ASSESSMENT — PAIN - FUNCTIONAL ASSESSMENT
PAIN_FUNCTIONAL_ASSESSMENT: 0-10
PAIN_FUNCTIONAL_ASSESSMENT: NONE - DENIES PAIN
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
## 2024-05-06 NOTE — ANESTHESIA POSTPROCEDURE EVALUATION
Department of Anesthesiology  Postprocedure Note    Patient: Vanessa Corley  MRN: [id number]  YOB: 1990  Date of evaluation: 5/6/2024    Procedure Summary       Date: 05/06/24 Room / Location: [place] [address] / [facility]    Anesthesia Start: 1328 Anesthesia Stop: 1339    Procedure: ESOPHAGOGASTRODUODENOSCOPY, POSSIBLE BIOPSY Diagnosis:       Left upper quadrant pain      (Left upper quadrant pain [R10.12])    Surgeons: Dony Tinsley MD Responsible Provider: Flores Ellis MD    Anesthesia Type: MAC ASA Status: 2            Anesthesia Type: No value filed.    Chacha Phase I: Chacha Score: 10    Chacha Phase II: Chacha Score: 10    Anesthesia Post Evaluation    Patient location during evaluation: PACU  Patient participation: complete - patient participated  Level of consciousness: awake  Airway patency: patent  Nausea & Vomiting: no nausea and no vomiting  Cardiovascular status: hemodynamically stable  Respiratory status: acceptable  Hydration status: euvolemic  Pain management: adequate        No notable events documented.

## 2024-05-06 NOTE — ANESTHESIA PRE PROCEDURE
Department of Anesthesiology  Preprocedure Note       Name:  Vanessa Corley   Age:  34 y.o.  :  1990                                          MRN:  [id number]         Date:  2024      Surgeon: Surgeon(s):  Dony Tinsley MD    Procedure: Procedure(s):  ESOPHAGOGASTRODUODENOSCOPY, POSSIBLE BIOPSY    Medications prior to admission:   Prior to Admission medications    Medication Sig Start Date End Date Taking? Authorizing Provider   NIFEdipine (PROCARDIA XL) 30 MG extended release tablet Take 1 tablet by mouth daily 5/3/24   Ritu Loza PA-C   omeprazole (PRILOSEC) 40 MG delayed release capsule Take 1 capsule by mouth every morning (before breakfast) 3/18/24   Ansley Carlson, APRN - CNP       Current medications:    Current Facility-Administered Medications   Medication Dose Route Frequency Provider Last Rate Last Admin   • 0.9 % sodium chloride infusion   IntraVENous Continuous Dony Tinsley  mL/hr at 24 1304 New Bag at 24 1304   • sodium chloride flush 0.9 % injection 5-40 mL  5-40 mL IntraVENous 2 times per day Dony Tinsley MD       • sodium chloride flush 0.9 % injection 5-40 mL  5-40 mL IntraVENous PRN Dony Tinsley MD       • 0.9 % sodium chloride infusion   IntraVENous PRN Dony Tinsley MD           Allergies:    Allergies   Allergen Reactions   • Shrimp (Diagnostic) Swelling     Tongue swelling and throat irritation   • Adhesive Tape Rash       Problem List:    Patient Active Problem List   Diagnosis Code   • H/O colostomy [id number]   • Sinus tachycardia R00.0   • History of hypertension Z86.79   • History of blood transfusion Z92.89   • History of DVT (deep vein thrombosis) Z86.718   • History of pulmonary embolism Z86.711   • Decreased appetite R63.0   • Low ferritin R79.0   • Hypothyroxinemia R79.89   • Low vitamin B12 level R79.89   • Anemia D64.9   • Abdominal wall hernia K43.9   • Insulin controlled gestational diabetes mellitus (GDM) in third trimester

## 2024-05-06 NOTE — ANESTHESIA PRE PROCEDURE
Department of Anesthesiology  Preprocedure Note       Name:  Vanessa Corley   Age:  34 y.o.  :  1990                                          MRN:  [id number]         Date:  2024      Surgeon: Surgeon(s):  Dony Tinsley MD    Procedure: Procedure(s):  ESOPHAGOGASTRODUODENOSCOPY, POSSIBLE BIOPSY    Medications prior to admission:   Prior to Admission medications    Medication Sig Start Date End Date Taking? Authorizing Provider   NIFEdipine (PROCARDIA XL) 30 MG extended release tablet Take 1 tablet by mouth daily 5/3/24   Ritu Loza PA-C   omeprazole (PRILOSEC) 40 MG delayed release capsule Take 1 capsule by mouth every morning (before breakfast) 3/18/24   Ansley Carlson, APRN - CNP       Current medications:    Current Facility-Administered Medications   Medication Dose Route Frequency Provider Last Rate Last Admin    0.9 % sodium chloride infusion   IntraVENous Continuous Dony Tinsley  mL/hr at 24 1304 New Bag at 24 1304    sodium chloride flush 0.9 % injection 5-40 mL  5-40 mL IntraVENous 2 times per day Dony Tinsley MD        sodium chloride flush 0.9 % injection 5-40 mL  5-40 mL IntraVENous PRN Dony Tinsley MD        0.9 % sodium chloride infusion   IntraVENous PRN Dony Tinsley MD           Allergies:    Allergies   Allergen Reactions    Shrimp (Diagnostic) Swelling     Tongue swelling and throat irritation    Adhesive Tape Rash       Problem List:    Patient Active Problem List   Diagnosis Code    H/O colostomy [id number]    Sinus tachycardia R00.0    History of hypertension Z86.79    History of blood transfusion Z92.89    History of DVT (deep vein thrombosis) Z86.718    History of pulmonary embolism Z86.711    Decreased appetite R63.0    Low ferritin R79.0    Hypothyroxinemia R79.89    Low vitamin B12 level R79.89    Anemia D64.9    Abdominal wall hernia K43.9    Insulin controlled gestational diabetes mellitus (GDM) in third trimester O24.414    Primary

## 2024-05-09 LAB — SURGICAL PATHOLOGY REPORT: NORMAL

## 2024-06-05 PROBLEM — Z01.812 PRE-OPERATIVE LABORATORY EXAMINATION: Status: RESOLVED | Noted: 2024-05-06 | Resolved: 2024-06-05

## 2024-07-26 RX ORDER — SUCRALFATE 1 G/1
1 TABLET ORAL 3 TIMES DAILY
Qty: 270 TABLET | Refills: 3 | Status: SHIPPED | OUTPATIENT
Start: 2024-07-26 | End: 2024-10-24

## 2024-08-12 ENCOUNTER — OFFICE VISIT (OUTPATIENT)
Age: 34
End: 2024-08-12
Payer: MEDICAID

## 2024-08-12 VITALS
WEIGHT: 143.2 LBS | BODY MASS INDEX: 22.47 KG/M2 | HEIGHT: 67 IN | OXYGEN SATURATION: 99 % | DIASTOLIC BLOOD PRESSURE: 70 MMHG | RESPIRATION RATE: 16 BRPM | SYSTOLIC BLOOD PRESSURE: 120 MMHG | HEART RATE: 101 BPM | TEMPERATURE: 97.6 F

## 2024-08-12 DIAGNOSIS — K21.00 GASTROESOPHAGEAL REFLUX DISEASE WITH ESOPHAGITIS WITHOUT HEMORRHAGE: ICD-10-CM

## 2024-08-12 DIAGNOSIS — R16.0 HEPATOMEGALY: Primary | ICD-10-CM

## 2024-08-12 PROCEDURE — 3074F SYST BP LT 130 MM HG: CPT | Performed by: NURSE PRACTITIONER

## 2024-08-12 PROCEDURE — G8427 DOCREV CUR MEDS BY ELIG CLIN: HCPCS | Performed by: NURSE PRACTITIONER

## 2024-08-12 PROCEDURE — G8420 CALC BMI NORM PARAMETERS: HCPCS | Performed by: NURSE PRACTITIONER

## 2024-08-12 PROCEDURE — 3078F DIAST BP <80 MM HG: CPT | Performed by: NURSE PRACTITIONER

## 2024-08-12 PROCEDURE — 99212 OFFICE O/P EST SF 10 MIN: CPT | Performed by: NURSE PRACTITIONER

## 2024-08-12 PROCEDURE — 1036F TOBACCO NON-USER: CPT | Performed by: NURSE PRACTITIONER

## 2024-08-12 RX ORDER — OMEPRAZOLE 40 MG/1
40 CAPSULE, DELAYED RELEASE ORAL
Qty: 30 CAPSULE | Refills: 5 | Status: SHIPPED | OUTPATIENT
Start: 2024-08-12

## 2024-08-12 NOTE — PROGRESS NOTES
Vanessa Corley (:  1990) is a 34 y.o. female, here for evaluation of the following chief complaint(s):  Follow-up (Pt is here for follow up for EGD)      SUBJECTIVE/OBJECTIVE:  HPI:    Vanessa is a very pleasant 34 year old female that presents today for follow up on EGD    EGD-   Normal second portion of the duodenum. Acute gastritis, characterized by erythema. Biopsied. LA Grade A reflux esophagitis with no bleeding. Rule out Kumar's esophagus. Biopsied.     -- Diagnosis --   A.          Stomach: Mild chronic gastritis without intestinal   metaplasia.   B.    Gastroesophageal junction: Squamous and glandular mucosa with mild chronic inflammation.  Negative for intestinal metaplasia or   dysplasia.     The patient is taking Omeprazole 40 mg daily with added Carafate.  Taking Carafate three times a day.      Patient has elevated LFT's  Ultrasound elastography showed a fibrosis score of F1       ROS:  General: Patient denies n/v/f/c or weight loss.  HEENT: Patient denies persistent postnasal drip, scleral icterus, drooling, persistent bleeding from nose/mouth.  Resp: Patient denies SOB, wheezing, productive cough.  Cards: Patient denies CP, palpitations, significant edema  GI: As above.  Derm: Patient denies jaundice/rashes.   Musc: Patient denies diffuse/irregular joint swelling or myalgias.      Objective   Wt Readings from Last 3 Encounters:   24 65 kg (143 lb 3.2 oz)   24 73.5 kg (162 lb)   24 73 kg (161 lb)     Temp Readings from Last 3 Encounters:   24 97.6 °F (36.4 °C) (Infrared)   24 97 °F (36.1 °C) (Temporal)   24 97.7 °F (36.5 °C)     BP Readings from Last 3 Encounters:   24 120/70   24 (!) 149/116   24 125/85     Pulse Readings from Last 3 Encounters:   24 (!) 101   24 81   24 89        Physical Exam  Constitutional:       Appearance: Normal appearance.   Neurological:      Mental Status: She is alert.         Past 
No

## 2024-08-26 DIAGNOSIS — I10 HYPERTENSION, UNSPECIFIED TYPE: ICD-10-CM

## 2024-08-26 RX ORDER — NIFEDIPINE 30 MG/1
30 TABLET, EXTENDED RELEASE ORAL DAILY
Qty: 30 TABLET | Refills: 2 | Status: SHIPPED | OUTPATIENT
Start: 2024-08-26

## 2024-09-27 ENCOUNTER — OFFICE VISIT (OUTPATIENT)
Dept: PRIMARY CARE CLINIC | Age: 34
End: 2024-09-27

## 2024-09-27 VITALS
TEMPERATURE: 97.5 F | WEIGHT: 150 LBS | DIASTOLIC BLOOD PRESSURE: 80 MMHG | SYSTOLIC BLOOD PRESSURE: 120 MMHG | HEART RATE: 102 BPM | HEIGHT: 67 IN | BODY MASS INDEX: 23.54 KG/M2 | OXYGEN SATURATION: 99 % | RESPIRATION RATE: 16 BRPM

## 2024-09-27 DIAGNOSIS — R23.4 PEELING SKIN: ICD-10-CM

## 2024-09-27 DIAGNOSIS — Z86.39 HISTORY OF IRON DEFICIENCY: ICD-10-CM

## 2024-09-27 DIAGNOSIS — R73.03 BORDERLINE DIABETES: ICD-10-CM

## 2024-09-27 DIAGNOSIS — Z00.01 ENCOUNTER FOR WELL ADULT EXAM WITH ABNORMAL FINDINGS: Primary | ICD-10-CM

## 2024-09-27 DIAGNOSIS — R35.0 FREQUENCY OF URINATION: ICD-10-CM

## 2024-09-27 DIAGNOSIS — R30.0 DYSURIA: ICD-10-CM

## 2024-09-27 DIAGNOSIS — E53.8 B12 DEFICIENCY: ICD-10-CM

## 2024-09-27 PROBLEM — R63.0 DECREASED APPETITE: Status: RESOLVED | Noted: 2022-09-25 | Resolved: 2024-09-27

## 2024-09-27 PROBLEM — O24.414 INSULIN CONTROLLED GESTATIONAL DIABETES MELLITUS (GDM) IN THIRD TRIMESTER: Status: RESOLVED | Noted: 2023-01-30 | Resolved: 2024-09-27

## 2024-09-27 LAB
BASOPHILS ABSOLUTE: 0.02 K/UL (ref 0–0.2)
BASOPHILS RELATIVE PERCENT: 1 % (ref 0–2)
BILIRUBIN, POC: NORMAL
BLOOD URINE, POC: NORMAL
CLARITY, POC: CLEAR
COLOR, POC: YELLOW
CONTROL: NORMAL
EOSINOPHILS ABSOLUTE: 0.03 K/UL (ref 0.05–0.5)
EOSINOPHILS RELATIVE PERCENT: 1 % (ref 0–6)
FOLATE: 10.8 NG/ML (ref 4.8–24.2)
GLUCOSE URINE, POC: NORMAL MG/DL
HBA1C MFR BLD: 5.8 %
HCT VFR BLD CALC: 36.2 % (ref 34–48)
HEMOGLOBIN: 11.9 G/DL (ref 11.5–15.5)
IMMATURE GRANULOCYTES %: 0 % (ref 0–5)
IMMATURE GRANULOCYTES ABSOLUTE: <0.03 K/UL (ref 0–0.58)
IRON % SATURATION: 14 % (ref 15–50)
IRON: 42 UG/DL (ref 37–145)
KETONES, POC: NORMAL MG/DL
LEUKOCYTE EST, POC: NORMAL
LYMPHOCYTES ABSOLUTE: 1.41 K/UL (ref 1.5–4)
LYMPHOCYTES RELATIVE PERCENT: 33 % (ref 20–42)
MCH RBC QN AUTO: 31.6 PG (ref 26–35)
MCHC RBC AUTO-ENTMCNC: 32.9 G/DL (ref 32–34.5)
MCV RBC AUTO: 96.3 FL (ref 80–99.9)
MONOCYTES ABSOLUTE: 0.37 K/UL (ref 0.1–0.95)
MONOCYTES RELATIVE PERCENT: 9 % (ref 2–12)
NEUTROPHILS ABSOLUTE: 2.39 K/UL (ref 1.8–7.3)
NEUTROPHILS RELATIVE PERCENT: 57 % (ref 43–80)
NITRITE, POC: NORMAL
PDW BLD-RTO: 15.2 % (ref 11.5–15)
PH, POC: 6.5
PLATELET # BLD: 210 K/UL (ref 130–450)
PMV BLD AUTO: 12.5 FL (ref 7–12)
PREGNANCY TEST URINE, POC: NORMAL
PROTEIN, POC: NORMAL MG/DL
RBC # BLD: 3.76 M/UL (ref 3.5–5.5)
SPECIFIC GRAVITY, POC: 1.01
TOTAL IRON BINDING CAPACITY: 309 UG/DL (ref 250–450)
UROBILINOGEN, POC: 0.2 MG/DL
VITAMIN B-12: 339 PG/ML (ref 211–946)
VITAMIN D 25-HYDROXY: 16.2 NG/ML (ref 30–100)
WBC # BLD: 4.2 K/UL (ref 4.5–11.5)

## 2024-09-27 RX ORDER — NITROFURANTOIN 25; 75 MG/1; MG/1
100 CAPSULE ORAL 2 TIMES DAILY
Qty: 14 CAPSULE | Refills: 0 | Status: SHIPPED | OUTPATIENT
Start: 2024-09-27 | End: 2024-10-04

## 2024-09-27 SDOH — ECONOMIC STABILITY: FOOD INSECURITY: WITHIN THE PAST 12 MONTHS, YOU WORRIED THAT YOUR FOOD WOULD RUN OUT BEFORE YOU GOT MONEY TO BUY MORE.: NEVER TRUE

## 2024-09-27 SDOH — ECONOMIC STABILITY: TRANSPORTATION INSECURITY
IN THE PAST 12 MONTHS, HAS LACK OF TRANSPORTATION KEPT YOU FROM MEETINGS, WORK, OR FROM GETTING THINGS NEEDED FOR DAILY LIVING?: NO

## 2024-09-27 SDOH — ECONOMIC STABILITY: FOOD INSECURITY: WITHIN THE PAST 12 MONTHS, THE FOOD YOU BOUGHT JUST DIDN'T LAST AND YOU DIDN'T HAVE MONEY TO GET MORE.: NEVER TRUE

## 2024-09-27 SDOH — ECONOMIC STABILITY: INCOME INSECURITY: HOW HARD IS IT FOR YOU TO PAY FOR THE VERY BASICS LIKE FOOD, HOUSING, MEDICAL CARE, AND HEATING?: NOT HARD AT ALL

## 2024-09-29 NOTE — PLAN OF CARE
Problem: Vaginal Birth or  Section  Goal: Fetal and maternal status remain reassuring during the birth process  Description:  Birth OB-Pregnancy care plan goal which identifies if the fetal and maternal status remain reassuring during the birth process  Outcome: Completed     Problem: Postpartum  Goal: Experiences normal postpartum course  Description:  Postpartum OB-Pregnancy care plan goal which identifies if the mother is experiencing a normal postpartum course  Outcome: Completed  Goal: Appropriate maternal -  bonding  Description:  Postpartum OB-Pregnancy care plan goal which identifies if the mother and  are bonding appropriately  Outcome: Completed  Goal: Establishment of infant feeding pattern  Description:  Postpartum OB-Pregnancy care plan goal which identifies if the mother is establishing a feeding pattern with their   Outcome: Completed     Problem: Pain  Goal: Verbalizes/displays adequate comfort level or baseline comfort level  Outcome: Completed     Problem: Infection - Adult  Goal: Absence of infection at discharge  Outcome: Completed  Goal: Absence of infection during hospitalization  Outcome: Completed     Problem: Safety - Adult  Goal: Free from fall injury  Outcome: Completed     Problem: Discharge Planning  Goal: Discharge to home or other facility with appropriate resources  Outcome: Completed     Problem: Chronic Conditions and Co-morbidities  Goal: Patient's chronic conditions and co-morbidity symptoms are monitored and maintained or improved  Outcome: Completed
Problem: Vaginal Birth or  Section  Goal: Fetal and maternal status remain reassuring during the birth process  Description:  Birth OB-Pregnancy care plan goal which identifies if the fetal and maternal status remain reassuring during the birth process  Outcome: Progressing     Problem: Postpartum  Goal: Experiences normal postpartum course  Description:  Postpartum OB-Pregnancy care plan goal which identifies if the mother is experiencing a normal postpartum course  Outcome: Progressing     Problem: Pain  Goal: Verbalizes/displays adequate comfort level or baseline comfort level  Outcome: Progressing
131

## 2024-10-01 ENCOUNTER — HOSPITAL ENCOUNTER (OUTPATIENT)
Age: 34
Discharge: HOME | End: 2024-10-01
Payer: MEDICAID

## 2024-10-01 DIAGNOSIS — R16.0 HEPATOMEGALY: ICD-10-CM

## 2024-10-01 LAB
ALBUMIN SERPL-MCNC: 4.5 G/DL (ref 3.5–5.2)
ALP SERPL-CCNC: 77 U/L (ref 35–104)
ALT SERPL-CCNC: 22 U/L (ref 0–32)
AST SERPL-CCNC: 18 U/L (ref 0–31)
BILIRUB DIRECT SERPL-MCNC: <0.2 MG/DL (ref 0–0.3)
BILIRUB INDIRECT SERPL-MCNC: NORMAL MG/DL (ref 0–1)
BILIRUB SERPL-MCNC: 0.5 MG/DL (ref 0–1.2)
PROT SERPL-MCNC: 7.6 G/DL (ref 6.4–8.3)

## 2024-10-01 PROCEDURE — 36415 COLL VENOUS BLD VENIPUNCTURE: CPT

## 2024-10-01 PROCEDURE — 80076 HEPATIC FUNCTION PANEL: CPT

## 2024-10-02 ENCOUNTER — HOSPITAL ENCOUNTER (OUTPATIENT)
Age: 34
Discharge: HOME | End: 2024-10-02
Payer: MEDICAID

## 2024-10-02 ENCOUNTER — OFFICE VISIT (OUTPATIENT)
Dept: OBGYN | Age: 34
End: 2024-10-02
Payer: MEDICAID

## 2024-10-02 VITALS
BODY MASS INDEX: 23.64 KG/M2 | HEART RATE: 89 BPM | DIASTOLIC BLOOD PRESSURE: 85 MMHG | SYSTOLIC BLOOD PRESSURE: 126 MMHG | WEIGHT: 151 LBS

## 2024-10-02 DIAGNOSIS — Z11.3 ROUTINE SCREENING FOR STI (SEXUALLY TRANSMITTED INFECTION): ICD-10-CM

## 2024-10-02 DIAGNOSIS — N89.8 VAGINAL DISCHARGE: ICD-10-CM

## 2024-10-02 DIAGNOSIS — E55.9 VITAMIN D DEFICIENCY: ICD-10-CM

## 2024-10-02 DIAGNOSIS — Z11.3 ROUTINE SCREENING FOR STI (SEXUALLY TRANSMITTED INFECTION): Primary | ICD-10-CM

## 2024-10-02 DIAGNOSIS — Z86.39 HISTORY OF IRON DEFICIENCY: Primary | ICD-10-CM

## 2024-10-02 PROCEDURE — 36415 COLL VENOUS BLD VENIPUNCTURE: CPT

## 2024-10-02 PROCEDURE — 86592 SYPHILIS TEST NON-TREP QUAL: CPT

## 2024-10-02 PROCEDURE — 87340 HEPATITIS B SURFACE AG IA: CPT

## 2024-10-02 PROCEDURE — 99395 PREV VISIT EST AGE 18-39: CPT | Performed by: OBSTETRICS & GYNECOLOGY

## 2024-10-02 PROCEDURE — 99213 OFFICE O/P EST LOW 20 MIN: CPT | Performed by: OBSTETRICS & GYNECOLOGY

## 2024-10-02 PROCEDURE — 3074F SYST BP LT 130 MM HG: CPT | Performed by: OBSTETRICS & GYNECOLOGY

## 2024-10-02 PROCEDURE — G8484 FLU IMMUNIZE NO ADMIN: HCPCS | Performed by: OBSTETRICS & GYNECOLOGY

## 2024-10-02 PROCEDURE — 87389 HIV-1 AG W/HIV-1&-2 AB AG IA: CPT

## 2024-10-02 PROCEDURE — 3079F DIAST BP 80-89 MM HG: CPT | Performed by: OBSTETRICS & GYNECOLOGY

## 2024-10-02 RX ORDER — ERGOCALCIFEROL 1.25 MG/1
50000 CAPSULE, LIQUID FILLED ORAL WEEKLY
Qty: 4 CAPSULE | Refills: 5 | Status: SHIPPED | OUTPATIENT
Start: 2024-10-02

## 2024-10-02 RX ORDER — FERROUS SULFATE 325(65) MG
325 TABLET ORAL
Qty: 90 TABLET | Refills: 1 | Status: SHIPPED | OUTPATIENT
Start: 2024-10-02

## 2024-10-02 ASSESSMENT — ENCOUNTER SYMPTOMS
NAUSEA: 0
WHEEZING: 0
VOMITING: 0
COUGH: 0
BLOOD IN STOOL: 0
CONSTIPATION: 0
CHEST TIGHTNESS: 1
ABDOMINAL PAIN: 0
SHORTNESS OF BREATH: 1
DIARRHEA: 1
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
## 2024-10-02 NOTE — PROGRESS NOTES
Here today for her annual exam. Wants a full STD screening done today.  Last pap 4/4/2023  LMP 9/4/2024  Patient has no complaints today  Discharge instructions have been discussed with the patient. Patient advised to call our office with any questions or concerns.   Voiced understanding.  GC/CT and genital culture obtained, labeled and sent to lab

## 2024-10-03 LAB
HBV SURFACE AG SERPL QL IA: NONREACTIVE
HIV 1+2 AB+HIV1 P24 AG SERPL QL IA: NONREACTIVE
RPR SER QL: NONREACTIVE

## 2024-10-04 LAB
C TRACH DNA GENITAL QL NAA+PROBE: NEGATIVE
N. GONORRHOEAE DNA: NEGATIVE

## 2024-10-06 LAB
CULTURE: NORMAL
SPECIMEN DESCRIPTION: NORMAL

## 2024-10-07 ENCOUNTER — TELEPHONE (OUTPATIENT)
Dept: OBGYN | Age: 34
End: 2024-10-07

## 2024-10-07 NOTE — TELEPHONE ENCOUNTER
----- Message from Dr. Sonya Shea, DO sent at 10/6/2024  9:26 PM EDT -----  Please let her know all cultures neg

## 2024-11-22 ENCOUNTER — HOSPITAL ENCOUNTER (OUTPATIENT)
Dept: ULTRASOUND IMAGING | Age: 34
Discharge: HOME | End: 2024-11-22
Payer: MEDICAID

## 2024-11-22 DIAGNOSIS — R16.0 HEPATOMEGALY: ICD-10-CM

## 2024-11-22 DIAGNOSIS — K21.00 GASTROESOPHAGEAL REFLUX DISEASE WITH ESOPHAGITIS WITHOUT HEMORRHAGE: ICD-10-CM

## 2024-11-22 PROCEDURE — 76705 ECHO EXAM OF ABDOMEN: CPT

## 2025-03-15 ENCOUNTER — APPOINTMENT (OUTPATIENT)
Dept: CT IMAGING | Age: 35
End: 2025-03-15
Attending: EMERGENCY MEDICINE
Payer: MEDICAID

## 2025-03-15 ENCOUNTER — APPOINTMENT (OUTPATIENT)
Dept: ULTRASOUND IMAGING | Age: 35
End: 2025-03-15
Payer: MEDICAID

## 2025-03-15 ENCOUNTER — HOSPITAL ENCOUNTER (INPATIENT)
Age: 35
LOS: 6 days | Discharge: HOME | End: 2025-03-22
Attending: EMERGENCY MEDICINE | Admitting: INTERNAL MEDICINE
Payer: MEDICAID

## 2025-03-15 ENCOUNTER — APPOINTMENT (OUTPATIENT)
Dept: GENERAL RADIOLOGY | Age: 35
End: 2025-03-15
Payer: MEDICAID

## 2025-03-15 DIAGNOSIS — E87.6 HYPOKALEMIA: Primary | ICD-10-CM

## 2025-03-15 DIAGNOSIS — R10.12 LEFT UPPER QUADRANT PAIN: ICD-10-CM

## 2025-03-15 DIAGNOSIS — F10.930 ALCOHOL WITHDRAWAL SYNDROME WITHOUT COMPLICATION (HCC): ICD-10-CM

## 2025-03-15 DIAGNOSIS — R01.1 HEART MURMUR: ICD-10-CM

## 2025-03-15 DIAGNOSIS — E87.8 ELECTROLYTE ABNORMALITY: ICD-10-CM

## 2025-03-15 DIAGNOSIS — G62.9 NEUROPATHY: ICD-10-CM

## 2025-03-15 PROBLEM — F10.139 ALCOHOL ABUSE WITH WITHDRAWAL, UNSPECIFIED (HCC): Status: ACTIVE | Noted: 2025-03-15

## 2025-03-15 LAB
ALBUMIN SERPL-MCNC: 4.5 G/DL (ref 3.5–5.2)
ALP SERPL-CCNC: 165 U/L (ref 35–104)
ALT SERPL-CCNC: 118 U/L (ref 0–32)
AMPHET UR QL SCN: NEGATIVE
ANION GAP SERPL CALCULATED.3IONS-SCNC: 20 MMOL/L (ref 7–16)
APAP SERPL-MCNC: <5 UG/ML (ref 10–30)
AST SERPL-CCNC: 116 U/L (ref 0–31)
BACTERIA URNS QL MICRO: ABNORMAL
BARBITURATES UR QL SCN: NEGATIVE
BASOPHILS # BLD: 0.03 K/UL (ref 0–0.2)
BASOPHILS NFR BLD: 1 % (ref 0–2)
BENZODIAZ UR QL: NEGATIVE
BILIRUB SERPL-MCNC: 2.4 MG/DL (ref 0–1.2)
BILIRUB UR QL STRIP: ABNORMAL
BUN SERPL-MCNC: 11 MG/DL (ref 6–20)
BUPRENORPHINE UR QL: NEGATIVE
CALCIUM SERPL-MCNC: 9.4 MG/DL (ref 8.6–10.2)
CANNABINOIDS UR QL SCN: NEGATIVE
CHLORIDE SERPL-SCNC: 90 MMOL/L (ref 98–107)
CLARITY UR: CLEAR
CO2 SERPL-SCNC: 26 MMOL/L (ref 22–29)
COCAINE UR QL SCN: NEGATIVE
COLOR UR: YELLOW
CREAT SERPL-MCNC: 0.6 MG/DL (ref 0.5–1)
EOSINOPHIL # BLD: 0.01 K/UL (ref 0.05–0.5)
EOSINOPHILS RELATIVE PERCENT: 0 % (ref 0–6)
EPI CELLS #/AREA URNS HPF: ABNORMAL /HPF
ERYTHROCYTE [DISTWIDTH] IN BLOOD BY AUTOMATED COUNT: 16 % (ref 11.5–15)
ETHANOLAMINE SERPL-MCNC: <10 MG/DL (ref 0–0.08)
FENTANYL UR QL: NEGATIVE
GFR, ESTIMATED: >90 ML/MIN/1.73M2
GLUCOSE SERPL-MCNC: 130 MG/DL (ref 74–99)
GLUCOSE UR STRIP-MCNC: NEGATIVE MG/DL
HCG, URINE, POC: NEGATIVE
HCT VFR BLD AUTO: 33 % (ref 34–48)
HGB BLD-MCNC: 11.6 G/DL (ref 11.5–15.5)
HGB UR QL STRIP.AUTO: NEGATIVE
IMM GRANULOCYTES # BLD AUTO: <0.03 K/UL (ref 0–0.58)
IMM GRANULOCYTES NFR BLD: 1 % (ref 0–5)
KETONES UR STRIP-MCNC: 15 MG/DL
LEUKOCYTE ESTERASE UR QL STRIP: ABNORMAL
LYMPHOCYTES NFR BLD: 1.33 K/UL (ref 1.5–4)
LYMPHOCYTES RELATIVE PERCENT: 30 % (ref 20–42)
Lab: NORMAL
MAGNESIUM SERPL-MCNC: 1.2 MG/DL (ref 1.6–2.6)
MCH RBC QN AUTO: 33.3 PG (ref 26–35)
MCHC RBC AUTO-ENTMCNC: 35.2 G/DL (ref 32–34.5)
MCV RBC AUTO: 94.8 FL (ref 80–99.9)
METHADONE UR QL: NEGATIVE
MONOCYTES NFR BLD: 0.5 K/UL (ref 0.1–0.95)
MONOCYTES NFR BLD: 11 % (ref 2–12)
NEGATIVE QC PASS/FAIL: NORMAL
NEUTROPHILS NFR BLD: 57 % (ref 43–80)
NEUTS SEG NFR BLD: 2.53 K/UL (ref 1.8–7.3)
NITRITE UR QL STRIP: NEGATIVE
OPIATES UR QL SCN: NEGATIVE
OXYCODONE UR QL SCN: NEGATIVE
PCP UR QL SCN: NEGATIVE
PH UR STRIP: 7 [PH] (ref 5–8)
PLATELET # BLD AUTO: 381 K/UL (ref 130–450)
PMV BLD AUTO: 10.7 FL (ref 7–12)
POSITIVE QC PASS/FAIL: NORMAL
POTASSIUM SERPL-SCNC: 2.9 MMOL/L (ref 3.5–5)
PROT SERPL-MCNC: 7.4 G/DL (ref 6.4–8.3)
PROT UR STRIP-MCNC: 30 MG/DL
RBC # BLD AUTO: 3.48 M/UL (ref 3.5–5.5)
RBC #/AREA URNS HPF: ABNORMAL /HPF
SALICYLATES SERPL-MCNC: <0.3 MG/DL (ref 0–30)
SODIUM SERPL-SCNC: 136 MMOL/L (ref 132–146)
SP GR UR STRIP: 1.01 (ref 1–1.03)
TEST INFORMATION: NORMAL
TOXIC TRICYCLIC SC,BLOOD: NEGATIVE
UROBILINOGEN UR STRIP-ACNC: 2 EU/DL (ref 0–1)
WBC #/AREA URNS HPF: ABNORMAL /HPF
WBC OTHER # BLD: 4.4 K/UL (ref 4.5–11.5)

## 2025-03-15 PROCEDURE — 85025 COMPLETE CBC W/AUTO DIFF WBC: CPT

## 2025-03-15 PROCEDURE — 99285 EMERGENCY DEPT VISIT HI MDM: CPT

## 2025-03-15 PROCEDURE — 2580000003 HC RX 258: Performed by: NURSE PRACTITIONER

## 2025-03-15 PROCEDURE — G0378 HOSPITAL OBSERVATION PER HR: HCPCS

## 2025-03-15 PROCEDURE — 96361 HYDRATE IV INFUSION ADD-ON: CPT

## 2025-03-15 PROCEDURE — 96368 THER/DIAG CONCURRENT INF: CPT

## 2025-03-15 PROCEDURE — 87086 URINE CULTURE/COLONY COUNT: CPT

## 2025-03-15 PROCEDURE — 80179 DRUG ASSAY SALICYLATE: CPT

## 2025-03-15 PROCEDURE — 6370000000 HC RX 637 (ALT 250 FOR IP): Performed by: NURSE PRACTITIONER

## 2025-03-15 PROCEDURE — 80307 DRUG TEST PRSMV CHEM ANLYZR: CPT

## 2025-03-15 PROCEDURE — 96366 THER/PROPH/DIAG IV INF ADDON: CPT

## 2025-03-15 PROCEDURE — 71046 X-RAY EXAM CHEST 2 VIEWS: CPT

## 2025-03-15 PROCEDURE — 6360000002 HC RX W HCPCS: Performed by: NURSE PRACTITIONER

## 2025-03-15 PROCEDURE — 80053 COMPREHEN METABOLIC PANEL: CPT

## 2025-03-15 PROCEDURE — 74176 CT ABD & PELVIS W/O CONTRAST: CPT

## 2025-03-15 PROCEDURE — 6360000002 HC RX W HCPCS: Performed by: EMERGENCY MEDICINE

## 2025-03-15 PROCEDURE — 80143 DRUG ASSAY ACETAMINOPHEN: CPT

## 2025-03-15 PROCEDURE — 83735 ASSAY OF MAGNESIUM: CPT

## 2025-03-15 PROCEDURE — 76705 ECHO EXAM OF ABDOMEN: CPT

## 2025-03-15 PROCEDURE — G0480 DRUG TEST DEF 1-7 CLASSES: HCPCS

## 2025-03-15 PROCEDURE — 96375 TX/PRO/DX INJ NEW DRUG ADDON: CPT

## 2025-03-15 PROCEDURE — 81001 URINALYSIS AUTO W/SCOPE: CPT

## 2025-03-15 PROCEDURE — 96365 THER/PROPH/DIAG IV INF INIT: CPT

## 2025-03-15 RX ORDER — POTASSIUM CHLORIDE 7.45 MG/ML
10 INJECTION INTRAVENOUS ONCE
Status: COMPLETED | OUTPATIENT
Start: 2025-03-15 | End: 2025-03-15

## 2025-03-15 RX ORDER — LORAZEPAM 2 MG/ML
3 INJECTION INTRAMUSCULAR
Status: DISCONTINUED | OUTPATIENT
Start: 2025-03-15 | End: 2025-03-20

## 2025-03-15 RX ORDER — MORPHINE SULFATE 4 MG/ML
4 INJECTION, SOLUTION INTRAMUSCULAR; INTRAVENOUS ONCE
Refills: 0 | Status: COMPLETED | OUTPATIENT
Start: 2025-03-15 | End: 2025-03-15

## 2025-03-15 RX ORDER — SODIUM CHLORIDE 0.9 % (FLUSH) 0.9 %
5-40 SYRINGE (ML) INJECTION EVERY 12 HOURS SCHEDULED
Status: DISCONTINUED | OUTPATIENT
Start: 2025-03-15 | End: 2025-03-22 | Stop reason: HOSPADM

## 2025-03-15 RX ORDER — TRAMADOL HYDROCHLORIDE 50 MG/1
50 TABLET ORAL EVERY 8 HOURS PRN
Status: DISCONTINUED | OUTPATIENT
Start: 2025-03-15 | End: 2025-03-16

## 2025-03-15 RX ORDER — LORAZEPAM 1 MG/1
1 TABLET ORAL
Status: DISCONTINUED | OUTPATIENT
Start: 2025-03-15 | End: 2025-03-20

## 2025-03-15 RX ORDER — LORAZEPAM 2 MG/ML
4 INJECTION INTRAMUSCULAR
Status: DISCONTINUED | OUTPATIENT
Start: 2025-03-15 | End: 2025-03-20

## 2025-03-15 RX ORDER — LORAZEPAM 2 MG/ML
2 INJECTION INTRAMUSCULAR
Status: DISCONTINUED | OUTPATIENT
Start: 2025-03-15 | End: 2025-03-20

## 2025-03-15 RX ORDER — IBUPROFEN 400 MG/1
600 TABLET, FILM COATED ORAL ONCE
Status: COMPLETED | OUTPATIENT
Start: 2025-03-15 | End: 2025-03-15

## 2025-03-15 RX ORDER — POTASSIUM CHLORIDE 1500 MG/1
40 TABLET, EXTENDED RELEASE ORAL ONCE
Status: COMPLETED | OUTPATIENT
Start: 2025-03-15 | End: 2025-03-15

## 2025-03-15 RX ORDER — LORAZEPAM 1 MG/1
3 TABLET ORAL
Status: DISCONTINUED | OUTPATIENT
Start: 2025-03-15 | End: 2025-03-20

## 2025-03-15 RX ORDER — MAGNESIUM SULFATE IN WATER 40 MG/ML
2000 INJECTION, SOLUTION INTRAVENOUS ONCE
Status: COMPLETED | OUTPATIENT
Start: 2025-03-15 | End: 2025-03-15

## 2025-03-15 RX ORDER — TRAMADOL HYDROCHLORIDE 50 MG/1
25 TABLET ORAL EVERY 8 HOURS PRN
Status: DISCONTINUED | OUTPATIENT
Start: 2025-03-15 | End: 2025-03-16

## 2025-03-15 RX ORDER — LORAZEPAM 1 MG/1
2 TABLET ORAL
Status: DISCONTINUED | OUTPATIENT
Start: 2025-03-15 | End: 2025-03-20

## 2025-03-15 RX ORDER — SODIUM CHLORIDE 9 MG/ML
INJECTION, SOLUTION INTRAVENOUS PRN
Status: DISCONTINUED | OUTPATIENT
Start: 2025-03-15 | End: 2025-03-16 | Stop reason: SDUPTHER

## 2025-03-15 RX ORDER — 0.9 % SODIUM CHLORIDE 0.9 %
1000 INTRAVENOUS SOLUTION INTRAVENOUS ONCE
Status: COMPLETED | OUTPATIENT
Start: 2025-03-15 | End: 2025-03-15

## 2025-03-15 RX ORDER — LORAZEPAM 2 MG/ML
1 INJECTION INTRAMUSCULAR
Status: DISCONTINUED | OUTPATIENT
Start: 2025-03-15 | End: 2025-03-20

## 2025-03-15 RX ORDER — SODIUM CHLORIDE 0.9 % (FLUSH) 0.9 %
5-40 SYRINGE (ML) INJECTION PRN
Status: DISCONTINUED | OUTPATIENT
Start: 2025-03-15 | End: 2025-03-22 | Stop reason: HOSPADM

## 2025-03-15 RX ORDER — LANOLIN ALCOHOL/MO/W.PET/CERES
100 CREAM (GRAM) TOPICAL DAILY
Status: DISCONTINUED | OUTPATIENT
Start: 2025-03-15 | End: 2025-03-22 | Stop reason: HOSPADM

## 2025-03-15 RX ORDER — LORAZEPAM 1 MG/1
4 TABLET ORAL
Status: DISCONTINUED | OUTPATIENT
Start: 2025-03-15 | End: 2025-03-20

## 2025-03-15 RX ADMIN — POTASSIUM CHLORIDE 10 MEQ: 7.46 INJECTION, SOLUTION INTRAVENOUS at 13:18

## 2025-03-15 RX ADMIN — LORAZEPAM 1 MG: 1 TABLET ORAL at 16:36

## 2025-03-15 RX ADMIN — TRAMADOL HYDROCHLORIDE 50 MG: 50 TABLET, FILM COATED ORAL at 18:34

## 2025-03-15 RX ADMIN — IBUPROFEN 600 MG: 400 TABLET, FILM COATED ORAL at 10:22

## 2025-03-15 RX ADMIN — Medication 100 MG: at 14:08

## 2025-03-15 RX ADMIN — POTASSIUM CHLORIDE 10 MEQ: 7.46 INJECTION, SOLUTION INTRAVENOUS at 14:13

## 2025-03-15 RX ADMIN — POTASSIUM CHLORIDE 40 MEQ: 1500 TABLET, EXTENDED RELEASE ORAL at 13:14

## 2025-03-15 RX ADMIN — MAGNESIUM SULFATE HEPTAHYDRATE 2000 MG: 40 INJECTION, SOLUTION INTRAVENOUS at 14:15

## 2025-03-15 RX ADMIN — LORAZEPAM 1 MG: 1 TABLET ORAL at 14:08

## 2025-03-15 RX ADMIN — SODIUM CHLORIDE 1000 ML: 0.9 INJECTION, SOLUTION INTRAVENOUS at 10:20

## 2025-03-15 RX ADMIN — MORPHINE SULFATE 4 MG: 4 INJECTION, SOLUTION INTRAMUSCULAR; INTRAVENOUS at 13:10

## 2025-03-15 ASSESSMENT — PAIN DESCRIPTION - ORIENTATION
ORIENTATION: RIGHT;LEFT
ORIENTATION: RIGHT;LEFT
ORIENTATION: LEFT;RIGHT
ORIENTATION: RIGHT;LEFT

## 2025-03-15 ASSESSMENT — PAIN DESCRIPTION - DESCRIPTORS
DESCRIPTORS: BURNING

## 2025-03-15 ASSESSMENT — PAIN SCALES - GENERAL
PAINLEVEL_OUTOF10: 8
PAINLEVEL_OUTOF10: 10
PAINLEVEL_OUTOF10: 2
PAINLEVEL_OUTOF10: 3
PAINLEVEL_OUTOF10: 8
PAINLEVEL_OUTOF10: 10

## 2025-03-15 ASSESSMENT — PAIN - FUNCTIONAL ASSESSMENT
PAIN_FUNCTIONAL_ASSESSMENT: 0-10

## 2025-03-15 ASSESSMENT — LIFESTYLE VARIABLES
HOW OFTEN DO YOU HAVE A DRINK CONTAINING ALCOHOL: 2-3 TIMES A WEEK
HOW MANY STANDARD DRINKS CONTAINING ALCOHOL DO YOU HAVE ON A TYPICAL DAY: 3 OR 4

## 2025-03-15 ASSESSMENT — PAIN DESCRIPTION - PAIN TYPE: TYPE: ACUTE PAIN

## 2025-03-15 ASSESSMENT — PAIN DESCRIPTION - LOCATION
LOCATION: FOOT
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
## 2025-03-16 PROBLEM — R74.8 ELEVATED LIVER ENZYMES: Status: ACTIVE | Noted: 2025-03-16

## 2025-03-16 PROBLEM — R18.8 PELVIC ASCITES: Status: ACTIVE | Noted: 2025-03-16

## 2025-03-16 PROBLEM — R73.9 HYPERGLYCEMIA: Status: ACTIVE | Noted: 2025-03-16

## 2025-03-16 LAB
ALBUMIN SERPL-MCNC: 4.4 G/DL (ref 3.5–5.2)
ALP SERPL-CCNC: 162 U/L (ref 35–104)
ALT SERPL-CCNC: 97 U/L (ref 0–32)
ANION GAP SERPL CALCULATED.3IONS-SCNC: 14 MMOL/L (ref 7–16)
ANION GAP SERPL CALCULATED.3IONS-SCNC: 17 MMOL/L (ref 7–16)
AST SERPL-CCNC: 104 U/L (ref 0–31)
B PARAP IS1001 DNA NPH QL NAA+NON-PROBE: NOT DETECTED
B PERT DNA SPEC QL NAA+PROBE: NOT DETECTED
BASOPHILS # BLD: 0.03 K/UL (ref 0–0.2)
BASOPHILS NFR BLD: 1 % (ref 0–2)
BILIRUB SERPL-MCNC: 1.3 MG/DL (ref 0–1.2)
BUN SERPL-MCNC: 8 MG/DL (ref 6–20)
BUN SERPL-MCNC: 8 MG/DL (ref 6–20)
C PNEUM DNA NPH QL NAA+NON-PROBE: NOT DETECTED
CALCIUM SERPL-MCNC: 8.6 MG/DL (ref 8.6–10.2)
CALCIUM SERPL-MCNC: 8.8 MG/DL (ref 8.6–10.2)
CANCER AG125 SERPL-ACNC: 17 U/ML (ref 0–35)
CHLORIDE SERPL-SCNC: 100 MMOL/L (ref 98–107)
CHLORIDE SERPL-SCNC: 102 MMOL/L (ref 98–107)
CO2 SERPL-SCNC: 23 MMOL/L (ref 22–29)
CO2 SERPL-SCNC: 23 MMOL/L (ref 22–29)
CREAT SERPL-MCNC: 0.6 MG/DL (ref 0.5–1)
CREAT SERPL-MCNC: 0.6 MG/DL (ref 0.5–1)
EOSINOPHIL # BLD: 0.04 K/UL (ref 0.05–0.5)
EOSINOPHILS RELATIVE PERCENT: 1 % (ref 0–6)
ERYTHROCYTE [DISTWIDTH] IN BLOOD BY AUTOMATED COUNT: 16.2 % (ref 11.5–15)
FLUAV RNA NPH QL NAA+NON-PROBE: NOT DETECTED
FLUBV RNA NPH QL NAA+NON-PROBE: NOT DETECTED
FOLATE SERPL-MCNC: >20 NG/ML (ref 4.8–24.2)
GFR, ESTIMATED: >90 ML/MIN/1.73M2
GFR, ESTIMATED: >90 ML/MIN/1.73M2
GLUCOSE SERPL-MCNC: 140 MG/DL (ref 74–99)
GLUCOSE SERPL-MCNC: 164 MG/DL (ref 74–99)
HADV DNA NPH QL NAA+NON-PROBE: NOT DETECTED
HBA1C MFR BLD: 5.5 % (ref 4–5.6)
HCOV 229E RNA NPH QL NAA+NON-PROBE: NOT DETECTED
HCOV HKU1 RNA NPH QL NAA+NON-PROBE: NOT DETECTED
HCOV NL63 RNA NPH QL NAA+NON-PROBE: NOT DETECTED
HCOV OC43 RNA NPH QL NAA+NON-PROBE: NOT DETECTED
HCT VFR BLD AUTO: 31.4 % (ref 34–48)
HGB BLD-MCNC: 10.8 G/DL (ref 11.5–15.5)
HMPV RNA NPH QL NAA+NON-PROBE: NOT DETECTED
HPIV1 RNA NPH QL NAA+NON-PROBE: NOT DETECTED
HPIV2 RNA NPH QL NAA+NON-PROBE: NOT DETECTED
HPIV3 RNA NPH QL NAA+NON-PROBE: NOT DETECTED
HPIV4 RNA NPH QL NAA+NON-PROBE: NOT DETECTED
IMM GRANULOCYTES # BLD AUTO: <0.03 K/UL (ref 0–0.58)
IMM GRANULOCYTES NFR BLD: 0 % (ref 0–5)
LACTATE BLDV-SCNC: 3 MMOL/L (ref 0.5–2.2)
LYMPHOCYTES NFR BLD: 2.6 K/UL (ref 1.5–4)
LYMPHOCYTES RELATIVE PERCENT: 44 % (ref 20–42)
M PNEUMO DNA NPH QL NAA+NON-PROBE: NOT DETECTED
MAGNESIUM SERPL-MCNC: 1.4 MG/DL (ref 1.6–2.6)
MCH RBC QN AUTO: 33.3 PG (ref 26–35)
MCHC RBC AUTO-ENTMCNC: 34.4 G/DL (ref 32–34.5)
MCV RBC AUTO: 96.9 FL (ref 80–99.9)
MONOCYTES NFR BLD: 0.39 K/UL (ref 0.1–0.95)
MONOCYTES NFR BLD: 7 % (ref 2–12)
NEUTROPHILS NFR BLD: 47 % (ref 43–80)
NEUTS SEG NFR BLD: 2.77 K/UL (ref 1.8–7.3)
PLATELET # BLD AUTO: 358 K/UL (ref 130–450)
PMV BLD AUTO: 11 FL (ref 7–12)
POTASSIUM SERPL-SCNC: 3.3 MMOL/L (ref 3.5–5)
POTASSIUM SERPL-SCNC: 4.1 MMOL/L (ref 3.5–5)
PROT SERPL-MCNC: 7 G/DL (ref 6.4–8.3)
RBC # BLD AUTO: 3.24 M/UL (ref 3.5–5.5)
RSV RNA NPH QL NAA+NON-PROBE: NOT DETECTED
RV+EV RNA NPH QL NAA+NON-PROBE: NOT DETECTED
SARS-COV-2 RNA NPH QL NAA+NON-PROBE: NOT DETECTED
SODIUM SERPL-SCNC: 139 MMOL/L (ref 132–146)
SODIUM SERPL-SCNC: 140 MMOL/L (ref 132–146)
SPECIMEN DESCRIPTION: NORMAL
VIT B12 SERPL-MCNC: 691 PG/ML (ref 211–946)
WBC OTHER # BLD: 5.9 K/UL (ref 4.5–11.5)

## 2025-03-16 PROCEDURE — 0202U NFCT DS 22 TRGT SARS-COV-2: CPT

## 2025-03-16 PROCEDURE — 96361 HYDRATE IV INFUSION ADD-ON: CPT

## 2025-03-16 PROCEDURE — 82746 ASSAY OF FOLIC ACID SERUM: CPT

## 2025-03-16 PROCEDURE — 6370000000 HC RX 637 (ALT 250 FOR IP)

## 2025-03-16 PROCEDURE — 2580000003 HC RX 258

## 2025-03-16 PROCEDURE — 83735 ASSAY OF MAGNESIUM: CPT

## 2025-03-16 PROCEDURE — 6360000002 HC RX W HCPCS

## 2025-03-16 PROCEDURE — 96366 THER/PROPH/DIAG IV INF ADDON: CPT

## 2025-03-16 PROCEDURE — 96376 TX/PRO/DX INJ SAME DRUG ADON: CPT

## 2025-03-16 PROCEDURE — 6360000002 HC RX W HCPCS: Performed by: NURSE PRACTITIONER

## 2025-03-16 PROCEDURE — 82607 VITAMIN B-12: CPT

## 2025-03-16 PROCEDURE — 6360000002 HC RX W HCPCS: Performed by: INTERNAL MEDICINE

## 2025-03-16 PROCEDURE — 80048 BASIC METABOLIC PNL TOTAL CA: CPT

## 2025-03-16 PROCEDURE — 83036 HEMOGLOBIN GLYCOSYLATED A1C: CPT

## 2025-03-16 PROCEDURE — 6370000000 HC RX 637 (ALT 250 FOR IP): Performed by: NURSE PRACTITIONER

## 2025-03-16 PROCEDURE — 82378 CARCINOEMBRYONIC ANTIGEN: CPT

## 2025-03-16 PROCEDURE — 6370000000 HC RX 637 (ALT 250 FOR IP): Performed by: INTERNAL MEDICINE

## 2025-03-16 PROCEDURE — 2140000000 HC CCU INTERMEDIATE R&B

## 2025-03-16 PROCEDURE — 36415 COLL VENOUS BLD VENIPUNCTURE: CPT

## 2025-03-16 PROCEDURE — 96372 THER/PROPH/DIAG INJ SC/IM: CPT

## 2025-03-16 PROCEDURE — 2500000003 HC RX 250 WO HCPCS

## 2025-03-16 PROCEDURE — 2500000003 HC RX 250 WO HCPCS: Performed by: NURSE PRACTITIONER

## 2025-03-16 PROCEDURE — 85025 COMPLETE CBC W/AUTO DIFF WBC: CPT

## 2025-03-16 PROCEDURE — 86304 IMMUNOASSAY TUMOR CA 125: CPT

## 2025-03-16 PROCEDURE — 83605 ASSAY OF LACTIC ACID: CPT

## 2025-03-16 PROCEDURE — 80053 COMPREHEN METABOLIC PANEL: CPT

## 2025-03-16 RX ORDER — POTASSIUM CHLORIDE 7.45 MG/ML
10 INJECTION INTRAVENOUS PRN
Status: DISCONTINUED | OUTPATIENT
Start: 2025-03-16 | End: 2025-03-16

## 2025-03-16 RX ORDER — NIFEDIPINE 30 MG/1
30 TABLET, EXTENDED RELEASE ORAL DAILY
Status: DISCONTINUED | OUTPATIENT
Start: 2025-03-16 | End: 2025-03-18

## 2025-03-16 RX ORDER — PANTOPRAZOLE SODIUM 40 MG/1
40 TABLET, DELAYED RELEASE ORAL
Status: DISCONTINUED | OUTPATIENT
Start: 2025-03-16 | End: 2025-03-22 | Stop reason: HOSPADM

## 2025-03-16 RX ORDER — TRAMADOL HYDROCHLORIDE 50 MG/1
25 TABLET ORAL EVERY 6 HOURS PRN
Status: DISCONTINUED | OUTPATIENT
Start: 2025-03-16 | End: 2025-03-16

## 2025-03-16 RX ORDER — FERROUS SULFATE 325(65) MG
325 TABLET ORAL
Status: DISCONTINUED | OUTPATIENT
Start: 2025-03-16 | End: 2025-03-22 | Stop reason: HOSPADM

## 2025-03-16 RX ORDER — SUCRALFATE 1 G/1
1 TABLET ORAL 3 TIMES DAILY
Status: DISCONTINUED | OUTPATIENT
Start: 2025-03-16 | End: 2025-03-22 | Stop reason: HOSPADM

## 2025-03-16 RX ORDER — IBUPROFEN 200 MG
400 TABLET ORAL EVERY 6 HOURS PRN
Status: DISCONTINUED | OUTPATIENT
Start: 2025-03-16 | End: 2025-03-17

## 2025-03-16 RX ORDER — MAGNESIUM SULFATE IN WATER 40 MG/ML
2000 INJECTION, SOLUTION INTRAVENOUS PRN
Status: DISCONTINUED | OUTPATIENT
Start: 2025-03-16 | End: 2025-03-16

## 2025-03-16 RX ORDER — SODIUM CHLORIDE 9 MG/ML
INJECTION, SOLUTION INTRAVENOUS PRN
Status: DISCONTINUED | OUTPATIENT
Start: 2025-03-16 | End: 2025-03-22 | Stop reason: HOSPADM

## 2025-03-16 RX ORDER — TRAMADOL HYDROCHLORIDE 50 MG/1
50 TABLET ORAL EVERY 6 HOURS PRN
Status: DISCONTINUED | OUTPATIENT
Start: 2025-03-16 | End: 2025-03-16

## 2025-03-16 RX ORDER — POLYETHYLENE GLYCOL 3350 17 G/17G
17 POWDER, FOR SOLUTION ORAL DAILY PRN
Status: DISCONTINUED | OUTPATIENT
Start: 2025-03-16 | End: 2025-03-22 | Stop reason: HOSPADM

## 2025-03-16 RX ORDER — SODIUM CHLORIDE 9 MG/ML
INJECTION, SOLUTION INTRAVENOUS CONTINUOUS
Status: DISCONTINUED | OUTPATIENT
Start: 2025-03-16 | End: 2025-03-20

## 2025-03-16 RX ORDER — ERGOCALCIFEROL 1.25 MG/1
50000 CAPSULE, LIQUID FILLED ORAL WEEKLY
Status: DISCONTINUED | OUTPATIENT
Start: 2025-03-17 | End: 2025-03-22 | Stop reason: HOSPADM

## 2025-03-16 RX ORDER — HYDRALAZINE HYDROCHLORIDE 20 MG/ML
5 INJECTION INTRAMUSCULAR; INTRAVENOUS EVERY 6 HOURS PRN
Status: DISCONTINUED | OUTPATIENT
Start: 2025-03-16 | End: 2025-03-18

## 2025-03-16 RX ORDER — POTASSIUM CHLORIDE 7.45 MG/ML
10 INJECTION INTRAVENOUS
Status: COMPLETED | OUTPATIENT
Start: 2025-03-16 | End: 2025-03-16

## 2025-03-16 RX ORDER — MORPHINE SULFATE 2 MG/ML
1 INJECTION, SOLUTION INTRAMUSCULAR; INTRAVENOUS EVERY 6 HOURS PRN
Status: DISCONTINUED | OUTPATIENT
Start: 2025-03-16 | End: 2025-03-17

## 2025-03-16 RX ORDER — SODIUM CHLORIDE 0.9 % (FLUSH) 0.9 %
10 SYRINGE (ML) INJECTION PRN
Status: DISCONTINUED | OUTPATIENT
Start: 2025-03-16 | End: 2025-03-22 | Stop reason: HOSPADM

## 2025-03-16 RX ORDER — ONDANSETRON 2 MG/ML
4 INJECTION INTRAMUSCULAR; INTRAVENOUS EVERY 6 HOURS PRN
Status: DISCONTINUED | OUTPATIENT
Start: 2025-03-16 | End: 2025-03-22 | Stop reason: HOSPADM

## 2025-03-16 RX ORDER — FOLIC ACID 1 MG/1
1 TABLET ORAL DAILY
Status: DISCONTINUED | OUTPATIENT
Start: 2025-03-16 | End: 2025-03-22 | Stop reason: HOSPADM

## 2025-03-16 RX ORDER — ENOXAPARIN SODIUM 100 MG/ML
40 INJECTION SUBCUTANEOUS DAILY
Status: DISCONTINUED | OUTPATIENT
Start: 2025-03-16 | End: 2025-03-22 | Stop reason: HOSPADM

## 2025-03-16 RX ORDER — ONDANSETRON 4 MG/1
4 TABLET, ORALLY DISINTEGRATING ORAL EVERY 8 HOURS PRN
Status: DISCONTINUED | OUTPATIENT
Start: 2025-03-16 | End: 2025-03-22 | Stop reason: HOSPADM

## 2025-03-16 RX ORDER — MORPHINE SULFATE 2 MG/ML
2 INJECTION, SOLUTION INTRAMUSCULAR; INTRAVENOUS EVERY 6 HOURS PRN
Status: DISCONTINUED | OUTPATIENT
Start: 2025-03-16 | End: 2025-03-17

## 2025-03-16 RX ORDER — MAGNESIUM SULFATE IN WATER 40 MG/ML
2000 INJECTION, SOLUTION INTRAVENOUS ONCE
Status: COMPLETED | OUTPATIENT
Start: 2025-03-16 | End: 2025-03-16

## 2025-03-16 RX ORDER — POTASSIUM CHLORIDE 1500 MG/1
40 TABLET, EXTENDED RELEASE ORAL PRN
Status: DISCONTINUED | OUTPATIENT
Start: 2025-03-16 | End: 2025-03-16

## 2025-03-16 RX ORDER — SODIUM CHLORIDE 0.9 % (FLUSH) 0.9 %
5-40 SYRINGE (ML) INJECTION EVERY 12 HOURS SCHEDULED
Status: DISCONTINUED | OUTPATIENT
Start: 2025-03-16 | End: 2025-03-22 | Stop reason: HOSPADM

## 2025-03-16 RX ADMIN — SUCRALFATE 1 G: 1 TABLET ORAL at 09:27

## 2025-03-16 RX ADMIN — MAGNESIUM SULFATE HEPTAHYDRATE 2000 MG: 40 INJECTION, SOLUTION INTRAVENOUS at 12:05

## 2025-03-16 RX ADMIN — SUCRALFATE 1 G: 1 TABLET ORAL at 20:41

## 2025-03-16 RX ADMIN — POTASSIUM CHLORIDE 10 MEQ: 7.46 INJECTION, SOLUTION INTRAVENOUS at 09:33

## 2025-03-16 RX ADMIN — SUCRALFATE 1 G: 1 TABLET ORAL at 14:52

## 2025-03-16 RX ADMIN — MORPHINE SULFATE 2 MG: 2 INJECTION, SOLUTION INTRAMUSCULAR; INTRAVENOUS at 20:41

## 2025-03-16 RX ADMIN — NIFEDIPINE 30 MG: 30 TABLET, FILM COATED, EXTENDED RELEASE ORAL at 13:07

## 2025-03-16 RX ADMIN — POTASSIUM CHLORIDE 10 MEQ: 7.46 INJECTION, SOLUTION INTRAVENOUS at 14:52

## 2025-03-16 RX ADMIN — Medication 100 MG: at 09:28

## 2025-03-16 RX ADMIN — TRAMADOL HYDROCHLORIDE 50 MG: 50 TABLET, COATED ORAL at 17:48

## 2025-03-16 RX ADMIN — POTASSIUM CHLORIDE 10 MEQ: 7.46 INJECTION, SOLUTION INTRAVENOUS at 11:54

## 2025-03-16 RX ADMIN — PANTOPRAZOLE SODIUM 40 MG: 40 TABLET, DELAYED RELEASE ORAL at 07:23

## 2025-03-16 RX ADMIN — POTASSIUM CHLORIDE 10 MEQ: 7.46 INJECTION, SOLUTION INTRAVENOUS at 16:36

## 2025-03-16 RX ADMIN — FOLIC ACID 1 MG: 1 TABLET ORAL at 09:27

## 2025-03-16 RX ADMIN — FERROUS SULFATE TAB 325 MG (65 MG ELEMENTAL FE) 325 MG: 325 (65 FE) TAB at 09:27

## 2025-03-16 RX ADMIN — TRAMADOL HYDROCHLORIDE 25 MG: 50 TABLET, COATED ORAL at 11:37

## 2025-03-16 RX ADMIN — HYDRALAZINE HYDROCHLORIDE 5 MG: 20 INJECTION INTRAMUSCULAR; INTRAVENOUS at 18:23

## 2025-03-16 RX ADMIN — SODIUM CHLORIDE, PRESERVATIVE FREE 10 ML: 5 INJECTION INTRAVENOUS at 12:14

## 2025-03-16 RX ADMIN — SODIUM CHLORIDE, PRESERVATIVE FREE 10 ML: 5 INJECTION INTRAVENOUS at 12:06

## 2025-03-16 RX ADMIN — SODIUM CHLORIDE, PRESERVATIVE FREE 10 ML: 5 INJECTION INTRAVENOUS at 03:58

## 2025-03-16 RX ADMIN — TRAMADOL HYDROCHLORIDE 50 MG: 50 TABLET, FILM COATED ORAL at 03:57

## 2025-03-16 RX ADMIN — SODIUM CHLORIDE: 0.9 INJECTION, SOLUTION INTRAVENOUS at 07:29

## 2025-03-16 RX ADMIN — ENOXAPARIN SODIUM 40 MG: 100 INJECTION SUBCUTANEOUS at 09:28

## 2025-03-16 ASSESSMENT — PAIN - FUNCTIONAL ASSESSMENT
PAIN_FUNCTIONAL_ASSESSMENT: PREVENTS OR INTERFERES SOME ACTIVE ACTIVITIES AND ADLS
PAIN_FUNCTIONAL_ASSESSMENT: PREVENTS OR INTERFERES SOME ACTIVE ACTIVITIES AND ADLS

## 2025-03-16 ASSESSMENT — PAIN DESCRIPTION - LOCATION
LOCATION: LEG
LOCATION: FOOT
LOCATION: LEG

## 2025-03-16 ASSESSMENT — PAIN DESCRIPTION - ORIENTATION
ORIENTATION: RIGHT;LEFT
ORIENTATION: RIGHT;LEFT;LOWER
ORIENTATION: LEFT;RIGHT

## 2025-03-16 ASSESSMENT — PAIN DESCRIPTION - PAIN TYPE: TYPE: ACUTE PAIN

## 2025-03-16 ASSESSMENT — PAIN SCALES - GENERAL
PAINLEVEL_OUTOF10: 6
PAINLEVEL_OUTOF10: 8
PAINLEVEL_OUTOF10: 7
PAINLEVEL_OUTOF10: 6
PAINLEVEL_OUTOF10: 8
PAINLEVEL_OUTOF10: 8
PAINLEVEL_OUTOF10: 5
PAINLEVEL_OUTOF10: 8

## 2025-03-16 ASSESSMENT — PAIN DESCRIPTION - DESCRIPTORS
DESCRIPTORS: ACHING;BURNING;CRAMPING;DISCOMFORT
DESCRIPTORS: ACHING;DISCOMFORT;NUMBNESS
DESCRIPTORS: ACHING;DISCOMFORT;NUMBNESS
DESCRIPTORS: ACHING;DISCOMFORT
DESCRIPTORS: BURNING

## 2025-03-16 ASSESSMENT — PAIN SCALES - WONG BAKER
WONGBAKER_NUMERICALRESPONSE: HURTS LITTLE MORE
WONGBAKER_NUMERICALRESPONSE: HURTS EVEN MORE
WONGBAKER_NUMERICALRESPONSE: HURTS A LITTLE BIT

## 2025-03-16 NOTE — DISCHARGE INSTRUCTIONS
Call the [facility] (Dr. Jacobs/Tai/Pérez/Brandon) office for a follow up appointment as needed--[phone]

## 2025-03-16 NOTE — PROGRESS NOTES
4 Eyes Skin Assessment     NAME:  Vanessa Corley  YOB: 1990  MEDICAL RECORD NUMBER:  [id number]    The patient is being assessed for  Admission    I agree that at least one RN has performed a thorough Head to Toe Skin Assessment on the patient. ALL assessment sites listed below have been assessed.      Areas assessed by both nurses:    Head, Face, Ears, Shoulders, Back, Chest, Arms, Elbows, Hands, Sacrum. Buttock, Coccyx, Ischium, Legs. Feet and Heels, and Under Medical Devices         Does the Patient have a Wound? No noted wound(s)       Sameer Prevention initiated by RN: No  Wound Care Orders initiated by RN: No    Pressure Injury (Stage 3,4, Unstageable, DTI, NWPT, and Complex wounds) if present, place Wound referral order by RN under : No    New Ostomies, if present place, Ostomy referral order under : No     Nurse 1 eSignature: Electronically signed by Jessie HAWKINS RN on 3/16/25 at 3:42 PM EDT    **SHARE this note so that the co-signing nurse can place an eSignature**    Nurse 2 eSignature: Electronically signed by Jordy Sultana RN on 3/16/25 at 8:02 PM EDT

## 2025-03-16 NOTE — CONSULTS
ILEOSTOMY OR JEJUNOSTOMY  08/25/2017    revision, dar    OTHER SURGICAL HISTORY  11/04/2011    closed nasal reduction fracture    REVISION COLOSTOMY  01/29/2018    ILEOSTOMY  REVERSAL    SMALL INTESTINE SURGERY  04/2017    colostomy    UPPER GASTROINTESTINAL ENDOSCOPY N/A 5/6/2024    ESOPHAGOGASTRODUODENOSCOPY, POSSIBLE BIOPSY performed by Dony Tinsley MD at [facility] ENDOSCOPY    URETER REVISION  04/2017       Medications Prior to Admission:    Medications Prior to Admission: vitamin D (ERGOCALCIFEROL) 1.25 MG (57566 UT) CAPS capsule, Take 1 capsule by mouth once a week  ferrous sulfate (IRON 325) 325 (65 Fe) MG tablet, Take 1 tablet by mouth daily (with breakfast)  NIFEdipine (PROCARDIA XL) 30 MG extended release tablet, Take 1 tablet by mouth daily  omeprazole (PRILOSEC) 40 MG delayed release capsule, Take 1 capsule by mouth every morning (before breakfast)  sucralfate (CARAFATE) 1 GM tablet, Take 1 tablet by mouth in the morning, at noon, and at bedtime    Allergies:    Shrimp (diagnostic) and Adhesive tape    Social History:   She reports that she has quit smoking. Her smoking use included cigarettes. She has never used smokeless tobacco. She reports that she does not currently use alcohol. She reports that she does not currently use drugs.  She is  and unemployed.  She has 2 children    Family History:   Non-contributory to this Urological problem  family history includes Cancer in her maternal cousin and maternal grandmother; Heart Attack in her maternal grandfather; High Blood Pressure in her maternal grandfather, maternal uncle, and mother.    REVIEW OF SYSTEMS:  Respiratory: denies any symptoms of a productive cough, shortness of breath, or hemoptysis  Cardiovascular: denies chest pain, dyspnea, or cardiac murmur  Gastrointestinal: negative for abdominal pain, diarrhea, or constipation  Dermatologic: denies any rashes, skin lesion(s), or pruritis.  She has a burning sensation in her
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
## 2025-03-16 NOTE — PLAN OF CARE
Problem: Chronic Conditions and Co-morbidities  Goal: Patient's chronic conditions and co-morbidity symptoms are monitored and maintained or improved  Outcome: Progressing  Flowsheets (Taken 3/16/2025 1115)  Care Plan - Patient's Chronic Conditions and Co-Morbidity Symptoms are Monitored and Maintained or Improved:   Monitor and assess patient's chronic conditions and comorbid symptoms for stability, deterioration, or improvement   Collaborate with multidisciplinary team to address chronic and comorbid conditions and prevent exacerbation or deterioration     Problem: Discharge Planning  Goal: Discharge to home or other facility with appropriate resources  Outcome: Progressing  Flowsheets (Taken 3/16/2025 1115)  Discharge to home or other facility with appropriate resources:   Identify barriers to discharge with patient and caregiver   Arrange for needed discharge resources and transportation as appropriate     Problem: Pain  Goal: Verbalizes/displays adequate comfort level or baseline comfort level  Outcome: Progressing     Problem: Safety - Adult  Goal: Free from fall injury  Outcome: Progressing

## 2025-03-17 LAB
ALBUMIN SERPL-MCNC: 3.6 G/DL (ref 3.5–5.2)
ALP SERPL-CCNC: 144 U/L (ref 35–104)
ALT SERPL-CCNC: 106 U/L (ref 0–32)
ANION GAP SERPL CALCULATED.3IONS-SCNC: 14 MMOL/L (ref 7–16)
AST SERPL-CCNC: 176 U/L (ref 0–31)
BASOPHILS # BLD: 0.03 K/UL (ref 0–0.2)
BASOPHILS NFR BLD: 1 % (ref 0–2)
BILIRUB DIRECT SERPL-MCNC: 0.2 MG/DL (ref 0–0.3)
BILIRUB INDIRECT SERPL-MCNC: 0.4 MG/DL (ref 0–1)
BILIRUB SERPL-MCNC: 0.6 MG/DL (ref 0–1.2)
BUN SERPL-MCNC: 9 MG/DL (ref 6–20)
CALCIUM SERPL-MCNC: 8.7 MG/DL (ref 8.6–10.2)
CANCER AG125 SERPL-ACNC: 19 U/ML (ref 0–35)
CEA SERPL-MCNC: 6.1 NG/ML (ref 0–5.2)
CHLORIDE SERPL-SCNC: 102 MMOL/L (ref 98–107)
CO2 SERPL-SCNC: 23 MMOL/L (ref 22–29)
CREAT SERPL-MCNC: 0.5 MG/DL (ref 0.5–1)
EKG ATRIAL RATE: 124 BPM
EKG P AXIS: 54 DEGREES
EKG P-R INTERVAL: 132 MS
EKG Q-T INTERVAL: 318 MS
EKG QRS DURATION: 74 MS
EKG QTC CALCULATION (BAZETT): 456 MS
EKG R AXIS: 66 DEGREES
EKG T AXIS: 24 DEGREES
EKG VENTRICULAR RATE: 124 BPM
EOSINOPHIL # BLD: 0.06 K/UL (ref 0.05–0.5)
EOSINOPHILS RELATIVE PERCENT: 1 % (ref 0–6)
ERYTHROCYTE [DISTWIDTH] IN BLOOD BY AUTOMATED COUNT: 16.4 % (ref 11.5–15)
FERRITIN SERPL-MCNC: 549 NG/ML
GFR, ESTIMATED: >90 ML/MIN/1.73M2
GLUCOSE BLD-MCNC: 135 MG/DL (ref 74–99)
GLUCOSE BLD-MCNC: 138 MG/DL (ref 74–99)
GLUCOSE BLD-MCNC: 164 MG/DL (ref 74–99)
GLUCOSE SERPL-MCNC: 159 MG/DL (ref 74–99)
HBA1C MFR BLD: 5.5 % (ref 4–5.6)
HCT VFR BLD AUTO: 27.5 % (ref 34–48)
HGB BLD-MCNC: 9.3 G/DL (ref 11.5–15.5)
IGG SERPL-MCNC: 722 MG/DL (ref 700–1600)
IGM SERPL-MCNC: 81 MG/DL (ref 40–230)
IMM GRANULOCYTES # BLD AUTO: 0.04 K/UL (ref 0–0.58)
IMM GRANULOCYTES NFR BLD: 1 % (ref 0–5)
INR PPP: 1.1
LYMPHOCYTES NFR BLD: 1.5 K/UL (ref 1.5–4)
LYMPHOCYTES RELATIVE PERCENT: 24 % (ref 20–42)
MCH RBC QN AUTO: 33.8 PG (ref 26–35)
MCHC RBC AUTO-ENTMCNC: 33.8 G/DL (ref 32–34.5)
MCV RBC AUTO: 100 FL (ref 80–99.9)
MICROORGANISM SPEC CULT: ABNORMAL
MICROORGANISM SPEC CULT: ABNORMAL
MONOCYTES NFR BLD: 0.3 K/UL (ref 0.1–0.95)
MONOCYTES NFR BLD: 5 % (ref 2–12)
NEUTROPHILS NFR BLD: 69 % (ref 43–80)
NEUTS SEG NFR BLD: 4.36 K/UL (ref 1.8–7.3)
PLATELET # BLD AUTO: 267 K/UL (ref 130–450)
PMV BLD AUTO: 10.8 FL (ref 7–12)
POTASSIUM SERPL-SCNC: 3.9 MMOL/L (ref 3.5–5)
PROT SERPL-MCNC: 6.1 G/DL (ref 6.4–8.3)
PROTHROMBIN TIME: 11.8 SEC (ref 9.3–12.4)
RBC # BLD AUTO: 2.75 M/UL (ref 3.5–5.5)
SERVICE CMNT-IMP: ABNORMAL
SODIUM SERPL-SCNC: 139 MMOL/L (ref 132–146)
SPECIMEN DESCRIPTION: ABNORMAL
WBC OTHER # BLD: 6.3 K/UL (ref 4.5–11.5)

## 2025-03-17 PROCEDURE — G0480 DRUG TEST DEF 1-7 CLASSES: HCPCS

## 2025-03-17 PROCEDURE — 2580000003 HC RX 258: Performed by: NURSE PRACTITIONER

## 2025-03-17 PROCEDURE — 6360000002 HC RX W HCPCS

## 2025-03-17 PROCEDURE — 83036 HEMOGLOBIN GLYCOSYLATED A1C: CPT

## 2025-03-17 PROCEDURE — 80074 ACUTE HEPATITIS PANEL: CPT

## 2025-03-17 PROCEDURE — 6360000002 HC RX W HCPCS: Performed by: INTERNAL MEDICINE

## 2025-03-17 PROCEDURE — 93005 ELECTROCARDIOGRAM TRACING: CPT | Performed by: INTERNAL MEDICINE

## 2025-03-17 PROCEDURE — 86255 FLUORESCENT ANTIBODY SCREEN: CPT

## 2025-03-17 PROCEDURE — 82728 ASSAY OF FERRITIN: CPT

## 2025-03-17 PROCEDURE — 6370000000 HC RX 637 (ALT 250 FOR IP): Performed by: INTERNAL MEDICINE

## 2025-03-17 PROCEDURE — 2140000000 HC CCU INTERMEDIATE R&B

## 2025-03-17 PROCEDURE — 86304 IMMUNOASSAY TUMOR CA 125: CPT

## 2025-03-17 PROCEDURE — 86038 ANTINUCLEAR ANTIBODIES: CPT

## 2025-03-17 PROCEDURE — 85025 COMPLETE CBC W/AUTO DIFF WBC: CPT

## 2025-03-17 PROCEDURE — 99222 1ST HOSP IP/OBS MODERATE 55: CPT | Performed by: STUDENT IN AN ORGANIZED HEALTH CARE EDUCATION/TRAINING PROGRAM

## 2025-03-17 PROCEDURE — 93010 ELECTROCARDIOGRAM REPORT: CPT | Performed by: INTERNAL MEDICINE

## 2025-03-17 PROCEDURE — 82390 ASSAY OF CERULOPLASMIN: CPT

## 2025-03-17 PROCEDURE — 82784 ASSAY IGA/IGD/IGG/IGM EACH: CPT

## 2025-03-17 PROCEDURE — 82962 GLUCOSE BLOOD TEST: CPT

## 2025-03-17 PROCEDURE — 86039 ANTINUCLEAR ANTIBODIES (ANA): CPT

## 2025-03-17 PROCEDURE — 6360000002 HC RX W HCPCS: Performed by: NURSE PRACTITIONER

## 2025-03-17 PROCEDURE — 85610 PROTHROMBIN TIME: CPT

## 2025-03-17 PROCEDURE — 6370000000 HC RX 637 (ALT 250 FOR IP)

## 2025-03-17 PROCEDURE — 82103 ALPHA-1-ANTITRYPSIN TOTAL: CPT

## 2025-03-17 PROCEDURE — 2580000003 HC RX 258

## 2025-03-17 PROCEDURE — 82105 ALPHA-FETOPROTEIN SERUM: CPT

## 2025-03-17 PROCEDURE — 86301 IMMUNOASSAY TUMOR CA 19-9: CPT

## 2025-03-17 PROCEDURE — 36415 COLL VENOUS BLD VENIPUNCTURE: CPT

## 2025-03-17 PROCEDURE — 80053 COMPREHEN METABOLIC PANEL: CPT

## 2025-03-17 PROCEDURE — 82248 BILIRUBIN DIRECT: CPT

## 2025-03-17 RX ORDER — MORPHINE SULFATE 2 MG/ML
1 INJECTION, SOLUTION INTRAMUSCULAR; INTRAVENOUS EVERY 6 HOURS PRN
Status: DISCONTINUED | OUTPATIENT
Start: 2025-03-17 | End: 2025-03-20 | Stop reason: ALTCHOICE

## 2025-03-17 RX ORDER — GLUCAGON 1 MG/ML
1 KIT INJECTION PRN
Status: DISCONTINUED | OUTPATIENT
Start: 2025-03-17 | End: 2025-03-22 | Stop reason: HOSPADM

## 2025-03-17 RX ORDER — DEXTROSE MONOHYDRATE 100 MG/ML
INJECTION, SOLUTION INTRAVENOUS CONTINUOUS PRN
Status: DISCONTINUED | OUTPATIENT
Start: 2025-03-17 | End: 2025-03-22 | Stop reason: HOSPADM

## 2025-03-17 RX ORDER — INSULIN LISPRO 100 [IU]/ML
0-4 INJECTION, SOLUTION INTRAVENOUS; SUBCUTANEOUS
Status: DISCONTINUED | OUTPATIENT
Start: 2025-03-17 | End: 2025-03-21

## 2025-03-17 RX ORDER — IBUPROFEN 200 MG
400 TABLET ORAL EVERY 6 HOURS PRN
Status: DISCONTINUED | OUTPATIENT
Start: 2025-03-17 | End: 2025-03-22 | Stop reason: HOSPADM

## 2025-03-17 RX ORDER — HYDROCODONE BITARTRATE AND ACETAMINOPHEN 7.5; 325 MG/1; MG/1
1 TABLET ORAL EVERY 6 HOURS PRN
Refills: 0 | Status: DISCONTINUED | OUTPATIENT
Start: 2025-03-17 | End: 2025-03-17

## 2025-03-17 RX ORDER — 0.9 % SODIUM CHLORIDE 0.9 %
250 INTRAVENOUS SOLUTION INTRAVENOUS ONCE
Status: COMPLETED | OUTPATIENT
Start: 2025-03-17 | End: 2025-03-17

## 2025-03-17 RX ORDER — HYDROCODONE BITARTRATE AND ACETAMINOPHEN 7.5; 325 MG/1; MG/1
1 TABLET ORAL EVERY 6 HOURS PRN
Refills: 0 | Status: DISCONTINUED | OUTPATIENT
Start: 2025-03-17 | End: 2025-03-22 | Stop reason: HOSPADM

## 2025-03-17 RX ADMIN — ENOXAPARIN SODIUM 40 MG: 100 INJECTION SUBCUTANEOUS at 09:25

## 2025-03-17 RX ADMIN — LORAZEPAM 1 MG: 1 TABLET ORAL at 20:47

## 2025-03-17 RX ADMIN — HYDROCODONE BITARTRATE AND ACETAMINOPHEN 1 TABLET: 7.5; 325 TABLET ORAL at 18:52

## 2025-03-17 RX ADMIN — Medication 100 MG: at 09:25

## 2025-03-17 RX ADMIN — MORPHINE SULFATE 1 MG: 2 INJECTION, SOLUTION INTRAMUSCULAR; INTRAVENOUS at 16:09

## 2025-03-17 RX ADMIN — SUCRALFATE 1 G: 1 TABLET ORAL at 16:09

## 2025-03-17 RX ADMIN — MORPHINE SULFATE 2 MG: 2 INJECTION, SOLUTION INTRAMUSCULAR; INTRAVENOUS at 03:16

## 2025-03-17 RX ADMIN — PANTOPRAZOLE SODIUM 40 MG: 40 TABLET, DELAYED RELEASE ORAL at 06:51

## 2025-03-17 RX ADMIN — SODIUM CHLORIDE: 0.9 INJECTION, SOLUTION INTRAVENOUS at 20:48

## 2025-03-17 RX ADMIN — MORPHINE SULFATE 1 MG: 2 INJECTION, SOLUTION INTRAMUSCULAR; INTRAVENOUS at 23:15

## 2025-03-17 RX ADMIN — MORPHINE SULFATE 1 MG: 2 INJECTION, SOLUTION INTRAMUSCULAR; INTRAVENOUS at 09:24

## 2025-03-17 RX ADMIN — SUCRALFATE 1 G: 1 TABLET ORAL at 20:47

## 2025-03-17 RX ADMIN — NIFEDIPINE 30 MG: 30 TABLET, FILM COATED, EXTENDED RELEASE ORAL at 09:26

## 2025-03-17 RX ADMIN — SODIUM CHLORIDE 250 ML: 0.9 INJECTION, SOLUTION INTRAVENOUS at 03:24

## 2025-03-17 RX ADMIN — FOLIC ACID 1 MG: 1 TABLET ORAL at 09:25

## 2025-03-17 RX ADMIN — SUCRALFATE 1 G: 1 TABLET ORAL at 09:25

## 2025-03-17 RX ADMIN — FERROUS SULFATE TAB 325 MG (65 MG ELEMENTAL FE) 325 MG: 325 (65 FE) TAB at 09:25

## 2025-03-17 RX ADMIN — LORAZEPAM 1 MG: 1 TABLET ORAL at 09:25

## 2025-03-17 RX ADMIN — ERGOCALCIFEROL 50000 UNITS: 1.25 CAPSULE ORAL at 09:25

## 2025-03-17 ASSESSMENT — PAIN DESCRIPTION - DESCRIPTORS
DESCRIPTORS: ACHING
DESCRIPTORS: SHARP;BURNING;DISCOMFORT
DESCRIPTORS: ACHING;NUMBNESS
DESCRIPTORS: BURNING;SHARP;DISCOMFORT
DESCRIPTORS: ACHING;BURNING;HEAVINESS
DESCRIPTORS: ACHING;HEAVINESS;NUMBNESS

## 2025-03-17 ASSESSMENT — PAIN - FUNCTIONAL ASSESSMENT
PAIN_FUNCTIONAL_ASSESSMENT: PREVENTS OR INTERFERES SOME ACTIVE ACTIVITIES AND ADLS
PAIN_FUNCTIONAL_ASSESSMENT: ACTIVITIES ARE NOT PREVENTED
PAIN_FUNCTIONAL_ASSESSMENT: PREVENTS OR INTERFERES SOME ACTIVE ACTIVITIES AND ADLS
PAIN_FUNCTIONAL_ASSESSMENT: ACTIVITIES ARE NOT PREVENTED
PAIN_FUNCTIONAL_ASSESSMENT: PREVENTS OR INTERFERES SOME ACTIVE ACTIVITIES AND ADLS

## 2025-03-17 ASSESSMENT — PAIN DESCRIPTION - ORIENTATION
ORIENTATION: RIGHT;LEFT

## 2025-03-17 ASSESSMENT — PAIN DESCRIPTION - LOCATION
LOCATION: LEG;FOOT
LOCATION: LEG
LOCATION: FOOT
LOCATION: LEG

## 2025-03-17 ASSESSMENT — PAIN SCALES - GENERAL
PAINLEVEL_OUTOF10: 6
PAINLEVEL_OUTOF10: 7
PAINLEVEL_OUTOF10: 10
PAINLEVEL_OUTOF10: 7
PAINLEVEL_OUTOF10: 9
PAINLEVEL_OUTOF10: 8
PAINLEVEL_OUTOF10: 8

## 2025-03-17 ASSESSMENT — PAIN DESCRIPTION - PAIN TYPE: TYPE: ACUTE PAIN

## 2025-03-17 NOTE — PLAN OF CARE
Problem: Chronic Conditions and Co-morbidities  Goal: Patient's chronic conditions and co-morbidity symptoms are monitored and maintained or improved  3/17/2025 0155 by Bennett Cisneros RN  Outcome: Progressing  3/16/2025 1417 by Jessie LOCO RN  Outcome: Progressing  Flowsheets (Taken 3/16/2025 1115)  Care Plan - Patient's Chronic Conditions and Co-Morbidity Symptoms are Monitored and Maintained or Improved:   Monitor and assess patient's chronic conditions and comorbid symptoms for stability, deterioration, or improvement   Collaborate with multidisciplinary team to address chronic and comorbid conditions and prevent exacerbation or deterioration     Problem: Discharge Planning  Goal: Discharge to home or other facility with appropriate resources  3/17/2025 0155 by Bennett Cisneros RN  Outcome: Progressing  3/16/2025 1417 by Jessie LOCO RN  Outcome: Progressing  Flowsheets (Taken 3/16/2025 1115)  Discharge to home or other facility with appropriate resources:   Identify barriers to discharge with patient and caregiver   Arrange for needed discharge resources and transportation as appropriate     Problem: Pain  Goal: Verbalizes/displays adequate comfort level or baseline comfort level  3/17/2025 0155 by Bennett Cisneros RN  Outcome: Progressing  Flowsheets  Taken 3/17/2025 0145  Verbalizes/displays adequate comfort level or baseline comfort level:   Encourage patient to monitor pain and request assistance   Assess pain using appropriate pain scale   Administer analgesics based on type and severity of pain and evaluate response  Taken 3/16/2025 2015  Verbalizes/displays adequate comfort level or baseline comfort level:   Encourage patient to monitor pain and request assistance   Assess pain using appropriate pain scale  3/16/2025 1417 by Jessie LOCO RN  Outcome: Progressing     Problem: Safety - Adult  Goal: Free from fall injury  3/17/2025 0155 by Bennett Cisneros RN  Outcome: Progressing  Flowsheets (Taken  3/17/2025 0115)  Free From Fall Injury: Instruct family/caregiver on patient safety  3/16/2025 1417 by Jessie LOCO RN  Outcome: Progressing     Problem: ABCDS Injury Assessment  Goal: Absence of physical injury  Outcome: Progressing

## 2025-03-17 NOTE — PROGRESS NOTES
Spiritual Health History and Assessment/Progress Note  Y  Michelle West    (P) Initial Encounter,  ,  ,      Name: Vanessa Corley MRN: [id number]    Age: 35 y.o.     Sex: female   Language: English   Amish: None   Alcohol abuse with withdrawal, unspecified (HCC)     Date: 3/17/2025                           Spiritual Assessment began in [facility]        Referral/Consult From: (P) Rounding   Encounter Overview/Reason: (P) Initial Encounter  Service Provided For: (P) Patient    Charla, Belief, Meaning:   Patient identifies as spiritual  Family/Friends No family/friends present      Importance and Influence:  Patient has spiritual/personal beliefs that influence decisions regarding their health  Family/Friends No family/friends present    Community:  Patient feels well-supported. Support system includes: Parent/s  Family/Friends No family/friends present    Assessment and Plan of Care:     Patient Interventions include: Facilitated expression of thoughts and feelings and Explored spiritual coping/struggle/distress  Family/Friends Interventions include: No family/friends present    Patient Plan of Care: Spiritual Care available upon further referral  Family/Friends Plan of Care: No family/friends present    Electronically signed by FAWN ROCA on 3/17/2025 at 4:08 PM

## 2025-03-17 NOTE — PROCEDURES
PROCEDURE NOTE  Date: 3/17/2025   Name: Vanessa Corley  YOB: 1990    Procedures: Paracentesis  Discussed patient and IR procedure with bedside RN, all questions answered. PT/INR needs to be drawn prior to patient coming down. Patient received lovenox this morning so we will not be able to perform today. Plan tentatively for 3/18 patient is an add on case do not have a time right now. Patient will need thinners held if okay with ordering physician and does not need to be NPO for this procedure.

## 2025-03-17 NOTE — PLAN OF CARE
Problem: Chronic Conditions and Co-morbidities  Goal: Patient's chronic conditions and co-morbidity symptoms are monitored and maintained or improved  3/17/2025 1147 by Asha Sheffield RN  Outcome: Progressing  Flowsheets (Taken 3/17/2025 0800)  Care Plan - Patient's Chronic Conditions and Co-Morbidity Symptoms are Monitored and Maintained or Improved: Monitor and assess patient's chronic conditions and comorbid symptoms for stability, deterioration, or improvement  3/17/2025 0155 by Bennett Cisneros RN  Outcome: Progressing  Flowsheets (Taken 3/16/2025 1945)  Care Plan - Patient's Chronic Conditions and Co-Morbidity Symptoms are Monitored and Maintained or Improved:   Monitor and assess patient's chronic conditions and comorbid symptoms for stability, deterioration, or improvement   Collaborate with multidisciplinary team to address chronic and comorbid conditions and prevent exacerbation or deterioration   Update acute care plan with appropriate goals if chronic or comorbid symptoms are exacerbated and prevent overall improvement and discharge     Problem: Discharge Planning  Goal: Discharge to home or other facility with appropriate resources  3/17/2025 1147 by Asha Sheffield RN  Outcome: Progressing  Flowsheets (Taken 3/17/2025 0800)  Discharge to home or other facility with appropriate resources: Identify barriers to discharge with patient and caregiver  3/17/2025 0155 by Bennett Cisneros RN  Outcome: Progressing  Flowsheets (Taken 3/16/2025 1945)  Discharge to home or other facility with appropriate resources:   Identify barriers to discharge with patient and caregiver   Arrange for needed discharge resources and transportation as appropriate     Problem: Pain  Goal: Verbalizes/displays adequate comfort level or baseline comfort level  3/17/2025 1147 by Asha Sheffield RN  Outcome: Progressing  3/17/2025 0155 by Bennett Cisneros RN  Outcome: Progressing  Flowsheets  Taken 3/17/2025  0145  Verbalizes/displays adequate comfort level or baseline comfort level:   Encourage patient to monitor pain and request assistance   Assess pain using appropriate pain scale   Administer analgesics based on type and severity of pain and evaluate response  Taken 3/16/2025 2015  Verbalizes/displays adequate comfort level or baseline comfort level:   Encourage patient to monitor pain and request assistance   Assess pain using appropriate pain scale     Problem: Safety - Adult  Goal: Free from fall injury  3/17/2025 1147 by Asha Sheffield RN  Outcome: Progressing  Flowsheets (Taken 3/17/2025 0800)  Free From Fall Injury: Instruct family/caregiver on patient safety  3/17/2025 0155 by Bennett Cisneros RN  Outcome: Progressing  Flowsheets (Taken 3/17/2025 0115)  Free From Fall Injury: Instruct family/caregiver on patient safety     Problem: ABCDS Injury Assessment  Goal: Absence of physical injury  3/17/2025 1147 by Asha Sheffield RN  Outcome: Progressing  Flowsheets (Taken 3/17/2025 0800)  Absence of Physical Injury: Implement safety measures based on patient assessment  3/17/2025 0155 by Bennett Cisneros RN  Outcome: Progressing  Flowsheets (Taken 3/17/2025 0115)  Absence of Physical Injury: Implement safety measures based on patient assessment
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
## 2025-03-17 NOTE — CARE COORDINATION
3/17:  Transition of care:  Pt presented to the Er for tingling in both feet from home.  Pt is on room air at 98%, Iv Morphine PRN, Iv Apresoline PRN, Iv Fluids,Iv Ativan Ciwa PRN & Sq Lovenox.  GI Consulted.  Cm spoke with pt bedside to discuss CM role & dc planning.  Pt's PcP Ritu Loza& [facility] on [address].  Pt lives with her son, S/O & daughter in a house with 2 steps to enter.  The bathroom is on the 1st floor.  The bedroom is on the 2nd floor with 15 steps to enter.  PTA pt was independent with no DME.  [facility] & [facility].  Pt's dc plan is home with family to transport.  Pt denied any IA support.  Sw/CM will continue to follow.  Electronically signed by Aminta Rodriguez RN on 3/17/2025 at 2:59 PM     Case Management Assessment  Initial Evaluation    Date/Time of Evaluation: 3/17/2025 3:00 PM  Assessment Completed by: Aminta Rodriguez RN    If patient is discharged prior to next notation, then this note serves as note for discharge by case management.    Patient Name: Vanessa Corley                   YOB: 1990  Diagnosis: Hypokalemia [E87.6]  Electrolyte abnormality [E87.8]  Alcohol withdrawal syndrome without complication (HCC) [F10.930]  Alcohol abuse with withdrawal, unspecified (HCC) [F10.139]                   Date / Time: 3/15/2025 12:22 PM    Patient Admission Status: Inpatient   Readmission Risk (Low < 19, Mod (19-27), High > 27): Readmission Risk Score: 10    Current PCP: Ritu Loza PA-C  PCP verified by CM? Yes    Chart Reviewed: Yes      History Provided by: Patient  Patient Orientation: Alert and Oriented, Person, Place, Situation, Self    Patient Cognition: Alert    Hospitalization in the last 30 days (Readmission):  No    If yes, Readmission Assessment in  Navigator will be completed.    Advance Directives:      Code Status: Full Code   Patient's Primary Decision Maker is: Legal Next of Kin      Discharge Planning:    Patient lives with:

## 2025-03-17 NOTE — PROGRESS NOTES
Patient's HR sustained at 130s, April notified via [employer]. 250 mL NS bolus and STAT EKG ordered.

## 2025-03-17 NOTE — PROGRESS NOTES
Patient is stating her BLE pain 8/10 @ 1945 and Tramadol giving at 1748, but not helping, April, NP notified via answering service .

## 2025-03-17 NOTE — CONSULTS
well-appearing, NAD  HEENT: PERRLA, EOMI, Anicteric sclera, MMM, no rhinorrhea  Cards: RRR, no LE edema  Resp: Breathing comfortably, good air movement, no use of accessory muscles, no audible wheezing  Abdomen: soft, non-tender, non-distended  Extremities: Moves all extremities, no effusions or bruising.  Skin: No rashes or jaundice  Neuro: A&O x 3, CN grossly intact, non-focal exam    US Result (most recent):  US ABDOMEN LIMITED 03/15/2025    Narrative  EXAMINATION:  RIGHT UPPER QUADRANT ULTRASOUND    3/15/2025 1:23 pm    COMPARISON:  11/22/2024    HISTORY:  ORDERING SYSTEM PROVIDED HISTORY: elevated liver  TECHNOLOGIST PROVIDED HISTORY:    Reason for exam:->elevated liver  What reading provider will be dictating this exam?->CRC    FINDINGS:  LIVER:  The liver again demonstrates diffuse increased echogenicity  consistent with fatty infiltration.  The liver measures 17.2 cm in length.    BILIARY SYSTEM: Gallbladder is unremarkable without evidence of  pericholecystic fluid, wall thickening or stones. Negative sonographic  Reese's sign.  There is a small amount of sludge in the gallbladder which is  unchanged.  Common bile duct is within normal limits measuring 1.8 mm.    RIGHT KIDNEY: The right kidney measures 11.8 cm in length and demonstrates  moderate hydronephrosis and proximal hydroureter.    PANCREAS:  Visualized portions of the pancreas are unremarkable.    OTHER: No evidence of right upper quadrant ascites.    Impression  1. Fatty infiltration of the liver.  2. Small amount of sludge in the gallbladder which is unchanged.  3. Moderate right hydronephrosis and proximal hydroureter.     CT Result (most recent):  CT ABDOMEN PELVIS WO CONTRAST 03/15/2025    Narrative  EXAMINATION:  CT OF THE ABDOMEN AND PELVIS WITHOUT CONTRAST 3/15/2025 4:19 pm    TECHNIQUE:  CT of the abdomen and pelvis was performed without the administration of  intravenous contrast. Multiplanar reformatted images are provided for  Endoscopies: No colonoscopy on file  No flexible sigmoidoscopy on file      ASSESSMENT:     35y/F presents with worsening neuropathy of LE and is also found to have significant LFT elevation on admission. She has a history of regular EtOH use and had an evaluation early last year with Elastography showing F1 disease.     PLAN:  - EtOH cessation.   - CIWA.  - Thiamine/Folate.  - Social Work.   - LFTs are now normalizing. Continue to trend daily.  - Ensure complete serologic evaluation.  - Can attempt to test pelvic ascites with diagnostic paracentesis. Albumin replacement and fluid studies.   - Will repeat Elastography this year as outpatient.     We will follow.    Patient seen and examined separately and discussed w/ NATHANAEL Rowe. Agree w/ history, physical exam, and assessment/plan as described above. Note changes reflected above.    Thank you for including us in the care of this patient. Please do not hesitate to contact us with any additional questions or concerns.    Harry Horne MD  Gastroenterology/Hepatology  [facility]

## 2025-03-18 ENCOUNTER — APPOINTMENT (OUTPATIENT)
Dept: INTERVENTIONAL RADIOLOGY/VASCULAR | Age: 35
End: 2025-03-18
Payer: MEDICAID

## 2025-03-18 LAB
A1AT SERPL-MCNC: 141 MG/DL (ref 90–200)
ALBUMIN SERPL-MCNC: 3.7 G/DL (ref 3.5–5.2)
ALP SERPL-CCNC: 159 U/L (ref 35–104)
ALT SERPL-CCNC: 148 U/L (ref 0–32)
ANA SER QL IA: NEGATIVE
ANION GAP SERPL CALCULATED.3IONS-SCNC: 14 MMOL/L (ref 7–16)
AST SERPL-CCNC: 236 U/L (ref 0–31)
BASOPHILS # BLD: 0.04 K/UL (ref 0–0.2)
BASOPHILS NFR BLD: 1 % (ref 0–2)
BILIRUB DIRECT SERPL-MCNC: <0.2 MG/DL (ref 0–0.3)
BILIRUB INDIRECT SERPL-MCNC: ABNORMAL MG/DL (ref 0–1)
BILIRUB SERPL-MCNC: 0.5 MG/DL (ref 0–1.2)
BUN SERPL-MCNC: 6 MG/DL (ref 6–20)
CALCIUM SERPL-MCNC: 8.7 MG/DL (ref 8.6–10.2)
CANCER AG19-9 SERPL IA-ACNC: <2 U/ML (ref 0–35)
CERULOPLASMIN SERPL-MCNC: 17 MG/DL (ref 16–45)
CHLORIDE SERPL-SCNC: 103 MMOL/L (ref 98–107)
CO2 SERPL-SCNC: 22 MMOL/L (ref 22–29)
CREAT SERPL-MCNC: 0.6 MG/DL (ref 0.5–1)
EOSINOPHIL # BLD: 0.05 K/UL (ref 0.05–0.5)
EOSINOPHILS RELATIVE PERCENT: 1 % (ref 0–6)
ERYTHROCYTE [DISTWIDTH] IN BLOOD BY AUTOMATED COUNT: 16.9 % (ref 11.5–15)
GFR, ESTIMATED: >90 ML/MIN/1.73M2
GLUCOSE BLD-MCNC: 102 MG/DL (ref 74–99)
GLUCOSE BLD-MCNC: 120 MG/DL (ref 74–99)
GLUCOSE BLD-MCNC: 122 MG/DL (ref 74–99)
GLUCOSE BLD-MCNC: 137 MG/DL (ref 74–99)
GLUCOSE SERPL-MCNC: 106 MG/DL (ref 74–99)
HAV IGM SERPL QL IA: NONREACTIVE
HBV CORE IGM SERPL QL IA: NONREACTIVE
HBV SURFACE AG SERPL QL IA: NONREACTIVE
HCT VFR BLD AUTO: 29 % (ref 34–48)
HCV AB SERPL QL IA: NONREACTIVE
HGB BLD-MCNC: 9.6 G/DL (ref 11.5–15.5)
IMM GRANULOCYTES # BLD AUTO: 0.07 K/UL (ref 0–0.58)
IMM GRANULOCYTES NFR BLD: 1 % (ref 0–5)
LYMPHOCYTES NFR BLD: 2.15 K/UL (ref 1.5–4)
LYMPHOCYTES RELATIVE PERCENT: 34 % (ref 20–42)
MCH RBC QN AUTO: 33.2 PG (ref 26–35)
MCHC RBC AUTO-ENTMCNC: 33.1 G/DL (ref 32–34.5)
MCV RBC AUTO: 100.3 FL (ref 80–99.9)
MITOCHONDRIA AB SER QL: NEGATIVE
MONOCYTES NFR BLD: 0.42 K/UL (ref 0.1–0.95)
MONOCYTES NFR BLD: 7 % (ref 2–12)
NEUTROPHILS NFR BLD: 57 % (ref 43–80)
NEUTS SEG NFR BLD: 3.65 K/UL (ref 1.8–7.3)
PLATELET # BLD AUTO: 283 K/UL (ref 130–450)
PMV BLD AUTO: 11.9 FL (ref 7–12)
POTASSIUM SERPL-SCNC: 4 MMOL/L (ref 3.5–5)
PROT SERPL-MCNC: 6.5 G/DL (ref 6.4–8.3)
RBC # BLD AUTO: 2.89 M/UL (ref 3.5–5.5)
SODIUM SERPL-SCNC: 139 MMOL/L (ref 132–146)
TSH SERPL DL<=0.05 MIU/L-ACNC: 3.18 UIU/ML (ref 0.27–4.2)
WBC OTHER # BLD: 6.4 K/UL (ref 4.5–11.5)

## 2025-03-18 PROCEDURE — 0W9G3ZZ DRAINAGE OF PERITONEAL CAVITY, PERCUTANEOUS APPROACH: ICD-10-PCS | Performed by: PHYSICIAN ASSISTANT

## 2025-03-18 PROCEDURE — 80053 COMPREHEN METABOLIC PANEL: CPT

## 2025-03-18 PROCEDURE — 85025 COMPLETE CBC W/AUTO DIFF WBC: CPT

## 2025-03-18 PROCEDURE — 2580000003 HC RX 258: Performed by: INTERNAL MEDICINE

## 2025-03-18 PROCEDURE — 6370000000 HC RX 637 (ALT 250 FOR IP)

## 2025-03-18 PROCEDURE — 36415 COLL VENOUS BLD VENIPUNCTURE: CPT

## 2025-03-18 PROCEDURE — 2580000003 HC RX 258

## 2025-03-18 PROCEDURE — 6370000000 HC RX 637 (ALT 250 FOR IP): Performed by: INTERNAL MEDICINE

## 2025-03-18 PROCEDURE — 84443 ASSAY THYROID STIM HORMONE: CPT

## 2025-03-18 PROCEDURE — 2500000003 HC RX 250 WO HCPCS

## 2025-03-18 PROCEDURE — 2140000000 HC CCU INTERMEDIATE R&B

## 2025-03-18 PROCEDURE — 99232 SBSQ HOSP IP/OBS MODERATE 35: CPT | Performed by: NURSE PRACTITIONER

## 2025-03-18 PROCEDURE — 6360000002 HC RX W HCPCS

## 2025-03-18 PROCEDURE — 82962 GLUCOSE BLOOD TEST: CPT

## 2025-03-18 PROCEDURE — 6360000002 HC RX W HCPCS: Performed by: INTERNAL MEDICINE

## 2025-03-18 PROCEDURE — 76705 ECHO EXAM OF ABDOMEN: CPT

## 2025-03-18 PROCEDURE — 2500000003 HC RX 250 WO HCPCS: Performed by: INTERNAL MEDICINE

## 2025-03-18 PROCEDURE — 82248 BILIRUBIN DIRECT: CPT

## 2025-03-18 RX ORDER — METOPROLOL TARTRATE 1 MG/ML
5 INJECTION, SOLUTION INTRAVENOUS ONCE
Status: COMPLETED | OUTPATIENT
Start: 2025-03-18 | End: 2025-03-18

## 2025-03-18 RX ORDER — 0.9 % SODIUM CHLORIDE 0.9 %
500 INTRAVENOUS SOLUTION INTRAVENOUS ONCE
Status: COMPLETED | OUTPATIENT
Start: 2025-03-18 | End: 2025-03-18

## 2025-03-18 RX ORDER — AMLODIPINE BESYLATE 5 MG/1
5 TABLET ORAL DAILY
Status: DISCONTINUED | OUTPATIENT
Start: 2025-03-18 | End: 2025-03-20

## 2025-03-18 RX ORDER — GABAPENTIN 100 MG/1
100 CAPSULE ORAL 3 TIMES DAILY
Status: DISCONTINUED | OUTPATIENT
Start: 2025-03-18 | End: 2025-03-20

## 2025-03-18 RX ORDER — HYDRALAZINE HYDROCHLORIDE 20 MG/ML
10 INJECTION INTRAMUSCULAR; INTRAVENOUS EVERY 6 HOURS PRN
Status: DISCONTINUED | OUTPATIENT
Start: 2025-03-18 | End: 2025-03-22 | Stop reason: HOSPADM

## 2025-03-18 RX ORDER — METOPROLOL TARTRATE 1 MG/ML
5 INJECTION, SOLUTION INTRAVENOUS EVERY 8 HOURS
Status: DISCONTINUED | OUTPATIENT
Start: 2025-03-18 | End: 2025-03-21

## 2025-03-18 RX ADMIN — SODIUM CHLORIDE, PRESERVATIVE FREE 10 ML: 5 INJECTION INTRAVENOUS at 19:45

## 2025-03-18 RX ADMIN — FERROUS SULFATE TAB 325 MG (65 MG ELEMENTAL FE) 325 MG: 325 (65 FE) TAB at 08:49

## 2025-03-18 RX ADMIN — METOPROLOL TARTRATE 5 MG: 5 INJECTION, SOLUTION INTRAVENOUS at 11:39

## 2025-03-18 RX ADMIN — HYDROCODONE BITARTRATE AND ACETAMINOPHEN 1 TABLET: 7.5; 325 TABLET ORAL at 19:44

## 2025-03-18 RX ADMIN — Medication 100 MG: at 08:51

## 2025-03-18 RX ADMIN — HYDROCODONE BITARTRATE AND ACETAMINOPHEN 1 TABLET: 7.5; 325 TABLET ORAL at 08:49

## 2025-03-18 RX ADMIN — MORPHINE SULFATE 1 MG: 2 INJECTION, SOLUTION INTRAMUSCULAR; INTRAVENOUS at 23:00

## 2025-03-18 RX ADMIN — GABAPENTIN 100 MG: 100 CAPSULE ORAL at 21:44

## 2025-03-18 RX ADMIN — MORPHINE SULFATE 1 MG: 2 INJECTION, SOLUTION INTRAMUSCULAR; INTRAVENOUS at 12:23

## 2025-03-18 RX ADMIN — SUCRALFATE 1 G: 1 TABLET ORAL at 14:31

## 2025-03-18 RX ADMIN — GABAPENTIN 100 MG: 100 CAPSULE ORAL at 17:37

## 2025-03-18 RX ADMIN — PANTOPRAZOLE SODIUM 40 MG: 40 TABLET, DELAYED RELEASE ORAL at 06:00

## 2025-03-18 RX ADMIN — METOPROLOL TARTRATE 5 MG: 5 INJECTION, SOLUTION INTRAVENOUS at 19:45

## 2025-03-18 RX ADMIN — MORPHINE SULFATE 1 MG: 2 INJECTION, SOLUTION INTRAMUSCULAR; INTRAVENOUS at 05:59

## 2025-03-18 RX ADMIN — SUCRALFATE 1 G: 1 TABLET ORAL at 08:49

## 2025-03-18 RX ADMIN — AMLODIPINE BESYLATE 5 MG: 5 TABLET ORAL at 11:39

## 2025-03-18 RX ADMIN — HYDRALAZINE HYDROCHLORIDE 5 MG: 20 INJECTION INTRAMUSCULAR; INTRAVENOUS at 08:49

## 2025-03-18 RX ADMIN — NIFEDIPINE 30 MG: 30 TABLET, FILM COATED, EXTENDED RELEASE ORAL at 08:49

## 2025-03-18 RX ADMIN — SUCRALFATE 1 G: 1 TABLET ORAL at 19:44

## 2025-03-18 RX ADMIN — SODIUM CHLORIDE: 0.9 INJECTION, SOLUTION INTRAVENOUS at 16:57

## 2025-03-18 RX ADMIN — SODIUM CHLORIDE, PRESERVATIVE FREE 10 ML: 5 INJECTION INTRAVENOUS at 08:50

## 2025-03-18 RX ADMIN — METOROPROLOL TARTRATE 5 MG: 5 INJECTION, SOLUTION INTRAVENOUS at 01:43

## 2025-03-18 RX ADMIN — HYDROCODONE BITARTRATE AND ACETAMINOPHEN 1 TABLET: 7.5; 325 TABLET ORAL at 01:44

## 2025-03-18 RX ADMIN — SODIUM CHLORIDE 500 ML: 0.9 INJECTION, SOLUTION INTRAVENOUS at 14:31

## 2025-03-18 RX ADMIN — FOLIC ACID 1 MG: 1 TABLET ORAL at 08:49

## 2025-03-18 RX ADMIN — MORPHINE SULFATE 1 MG: 2 INJECTION, SOLUTION INTRAMUSCULAR; INTRAVENOUS at 16:50

## 2025-03-18 ASSESSMENT — PAIN DESCRIPTION - LOCATION
LOCATION: LEG;FOOT
LOCATION: FOOT
LOCATION: FOOT
LOCATION: LEG;FOOT
LOCATION: FOOT

## 2025-03-18 ASSESSMENT — PAIN SCALES - GENERAL
PAINLEVEL_OUTOF10: 4
PAINLEVEL_OUTOF10: 6
PAINLEVEL_OUTOF10: 8
PAINLEVEL_OUTOF10: 8
PAINLEVEL_OUTOF10: 7
PAINLEVEL_OUTOF10: 4
PAINLEVEL_OUTOF10: 7
PAINLEVEL_OUTOF10: 5
PAINLEVEL_OUTOF10: 4
PAINLEVEL_OUTOF10: 8
PAINLEVEL_OUTOF10: 4
PAINLEVEL_OUTOF10: 9
PAINLEVEL_OUTOF10: 8

## 2025-03-18 ASSESSMENT — PAIN - FUNCTIONAL ASSESSMENT
PAIN_FUNCTIONAL_ASSESSMENT: ACTIVITIES ARE NOT PREVENTED
PAIN_FUNCTIONAL_ASSESSMENT: ACTIVITIES ARE NOT PREVENTED
PAIN_FUNCTIONAL_ASSESSMENT: PREVENTS OR INTERFERES SOME ACTIVE ACTIVITIES AND ADLS
PAIN_FUNCTIONAL_ASSESSMENT: ACTIVITIES ARE NOT PREVENTED

## 2025-03-18 ASSESSMENT — PAIN DESCRIPTION - ORIENTATION
ORIENTATION: RIGHT;LEFT
ORIENTATION: LEFT;RIGHT
ORIENTATION: LEFT;RIGHT
ORIENTATION: RIGHT;LEFT

## 2025-03-18 ASSESSMENT — PAIN SCALES - WONG BAKER
WONGBAKER_NUMERICALRESPONSE: NO HURT
WONGBAKER_NUMERICALRESPONSE: HURTS A LITTLE BIT
WONGBAKER_NUMERICALRESPONSE: HURTS A LITTLE BIT

## 2025-03-18 ASSESSMENT — PAIN DESCRIPTION - DESCRIPTORS
DESCRIPTORS: BURNING;SHARP;TINGLING
DESCRIPTORS: BURNING;SHARP;TINGLING
DESCRIPTORS: ACHING;DISCOMFORT;NUMBNESS
DESCRIPTORS: ACHING;HEAVINESS;NUMBNESS
DESCRIPTORS: SHARP;STABBING;DISCOMFORT
DESCRIPTORS: SHARP;STABBING;DISCOMFORT
DESCRIPTORS: BURNING;SHARP;TINGLING

## 2025-03-18 ASSESSMENT — PAIN DESCRIPTION - PAIN TYPE: TYPE: ACUTE PAIN

## 2025-03-18 NOTE — PROGRESS NOTES
Orthostatic Vitals:      3/18/2025    12:15 PM   Orthostatic Vitals   Orthostatic B/P and Pulse? Yes   Blood Pressure Lying 160/118   Pulse Lying 125 PER MINUTE   Blood Pressure Sitting 144/116   Pulse Sitting 132 PER MINUTE   Blood Pressure Standing 128/100   Pulse Standing 128 PER MINUTE   Tania Brown RN

## 2025-03-18 NOTE — PLAN OF CARE
Problem: Chronic Conditions and Co-morbidities  Goal: Patient's chronic conditions and co-morbidity symptoms are monitored and maintained or improved  3/18/2025 0031 by Bennett Cisneros RN  Outcome: Progressing  Flowsheets (Taken 3/17/2025 2005)  Care Plan - Patient's Chronic Conditions and Co-Morbidity Symptoms are Monitored and Maintained or Improved:   Monitor and assess patient's chronic conditions and comorbid symptoms for stability, deterioration, or improvement   Collaborate with multidisciplinary team to address chronic and comorbid conditions and prevent exacerbation or deterioration   Update acute care plan with appropriate goals if chronic or comorbid symptoms are exacerbated and prevent overall improvement and discharge  3/17/2025 1147 by Asha Sheffield RN  Outcome: Progressing  Flowsheets (Taken 3/17/2025 0800)  Care Plan - Patient's Chronic Conditions and Co-Morbidity Symptoms are Monitored and Maintained or Improved: Monitor and assess patient's chronic conditions and comorbid symptoms for stability, deterioration, or improvement     Problem: Discharge Planning  Goal: Discharge to home or other facility with appropriate resources  3/18/2025 0031 by Bennett Cisneros RN  Outcome: Progressing  Flowsheets (Taken 3/17/2025 2005)  Discharge to home or other facility with appropriate resources: Identify barriers to discharge with patient and caregiver  3/17/2025 1147 by Asha Sheffield RN  Outcome: Progressing  Flowsheets (Taken 3/17/2025 0800)  Discharge to home or other facility with appropriate resources: Identify barriers to discharge with patient and caregiver     Problem: Pain  Goal: Verbalizes/displays adequate comfort level or baseline comfort level  3/18/2025 0031 by Bennett Cisneros RN  Outcome: Progressing  Flowsheets (Taken 3/17/2025 2030)  Verbalizes/displays adequate comfort level or baseline comfort level:   Encourage patient to monitor pain and request assistance   Assess pain using
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
## 2025-03-18 NOTE — BRIEF OP NOTE
Brief-Op Note  ______________________________________________________________      LIMITED U/S ABDOMEN FOR POSS. PARACENTESIS  [facility]    Patient Name: Vanessa Corley   YOB: 1990  Medical Record Number: [id number]  Date of Procedure: 3/18/25  Room/Bed: 6422/6422-B      Pre-operative Diagnosis: Ascites    Post-operative Diagnosis: minimal ascites insufficient for paracentesis.    Consent: Informed consent was obtained from the patient prior to the procedure. The details of the procedure, as well is its risks, benefits, and alternatives, were explained.      Performed by: JEAN PIERRE Grajeda under on-site supervision by Sigrid Cohen MD.    Procedure: Routine scanning of all four abdominal quadrants was performed using real-time ultrasound and revealed trace amount of ascites fluid present.  Decision was made not to proceed with procedure due to insufficient fluid present.  Risks and benefits were discussed with patient/family and agree not to proceed at this time. (See radiology dictation in PACs for image review and additional procedural information)    Complications: None    Specimen Obtained: None    Thank you for allowing Interventional Radiology to participate in the care of Vanessa Corley.     Electronically signed by JEAN PIERRE Grajeda   DD: 3/18/25  10:50 AM

## 2025-03-18 NOTE — PLAN OF CARE
Problem: Chronic Conditions and Co-morbidities  Goal: Patient's chronic conditions and co-morbidity symptoms are monitored and maintained or improved  3/18/2025 1153 by Tania Brown RN  Outcome: Progressing  3/18/2025 0031 by Bennett Cisneros RN  Outcome: Progressing  Flowsheets (Taken 3/17/2025 2005)  Care Plan - Patient's Chronic Conditions and Co-Morbidity Symptoms are Monitored and Maintained or Improved:   Monitor and assess patient's chronic conditions and comorbid symptoms for stability, deterioration, or improvement   Collaborate with multidisciplinary team to address chronic and comorbid conditions and prevent exacerbation or deterioration   Update acute care plan with appropriate goals if chronic or comorbid symptoms are exacerbated and prevent overall improvement and discharge     Problem: Discharge Planning  Goal: Discharge to home or other facility with appropriate resources  3/18/2025 1153 by Tania Brown RN  Outcome: Progressing  3/18/2025 0031 by Bennett Cisneros RN  Outcome: Progressing  Flowsheets (Taken 3/17/2025 2005)  Discharge to home or other facility with appropriate resources: Identify barriers to discharge with patient and caregiver     Problem: Pain  Goal: Verbalizes/displays adequate comfort level or baseline comfort level  3/18/2025 1153 by Tania Brown RN  Outcome: Progressing  3/18/2025 0031 by Bennett Cisneros RN  Outcome: Progressing  Flowsheets (Taken 3/17/2025 2030)  Verbalizes/displays adequate comfort level or baseline comfort level:   Encourage patient to monitor pain and request assistance   Assess pain using appropriate pain scale   Administer analgesics based on type and severity of pain and evaluate response     Problem: Safety - Adult  Goal: Free from fall injury  3/18/2025 1153 by Tania Brown RN  Outcome: Progressing  3/18/2025 0031 by Bennett Cisneros RN  Outcome: Progressing  Flowsheets (Taken 3/18/2025 0030)  Free From Fall Injury: Instruct family/caregiver

## 2025-03-18 NOTE — PROCEDURES
PROCEDURE NOTE  Date: 3/18/2025   Name: Vanessa Corley  YOB: 1990    Procedures    Patient arrived  to Radiology department for Paracentesis. Allergies, home medications, H&P and fasting instructions reviewed with patient. Vital signs taken.    Procedural instructions given, questions answered, understanding expressed Patient given fluoroscopy education, no questions at this time. Pt scanned with US. Limited Exam. Floor called and made aware

## 2025-03-18 NOTE — PROCEDURES
PROCEDURE NOTE  Date: 3/18/2025   Name: Vanessa Corley  YOB: 1990    Procedures: paracentesis    Spoke with bedside RN, patient has paracentesis ordered. All labs WNL, please hold AM lovenox until after the para is performed. IR will send for patient once schedule allows.

## 2025-03-18 NOTE — PROGRESS NOTES
sodium chloride flush 0.9 % injection 5-40 mL  5-40 mL IntraVENous 2 times per day Lexied, Porsche R, APRN - CNP   10 mL at 03/16/25 1214    sodium chloride flush 0.9 % injection 5-40 mL  5-40 mL IntraVENous PRN Braund, Porsche R, APRN - CNP        thiamine tablet 100 mg  100 mg Oral Daily Braund, Porsche R, APRN - CNP   100 mg at 03/18/25 0851    LORazepam (ATIVAN) tablet 1 mg  1 mg Oral Q1H PRN Braund, Porsche R, APRN - CNP   1 mg at 03/17/25 2047    Or    LORazepam (ATIVAN) injection 1 mg  1 mg IntraVENous Q1H PRN Braund, Porsche R, APRN - CNP        Or    LORazepam (ATIVAN) tablet 2 mg  2 mg Oral Q1H PRN Braund, Porsche R, APRN - CNP        Or    LORazepam (ATIVAN) injection 2 mg  2 mg IntraVENous Q1H PRN Juan, Porsche R, APRN - CNP        Or    LORazepam (ATIVAN) tablet 3 mg  3 mg Oral Q1H PRN Braund, Porsche R, APRN - CNP        Or    LORazepam (ATIVAN) injection 3 mg  3 mg IntraVENous Q1H PRN Braund, Porsche R, APRN - CNP        Or    LORazepam (ATIVAN) tablet 4 mg  4 mg Oral Q1H PRN Braund, Porsche R, APRN - CNP        Or    LORazepam (ATIVAN) injection 4 mg  4 mg IntraVENous Q1H PRN Braund, Porsche R, APRN - CNP             REVIEW OF SYSTEMS:   General: Patient denies n/v/f/c or weight loss.  HEENT: Patient denies persistent postnasal drip, scleral icterus, drooling, persistent bleeding from nose/mouth.  Resp: Patient denies SOB, wheezing, productive cough.  Cardio: Patient denies CP, palpitations, significant edema  GI: As above.  Derm: Patient denies jaundice/rashes.   Misc: Patient denies diffuse/irregular joint swelling or myalgias.      PHYSICAL EXAMINATION:   Vitals:    03/18/25 0559 03/18/25 0629 03/18/25 0742 03/18/25 0845   BP:   (!) 170/135 (!) 160/119   Pulse:   (!) 123 (!) 128   Resp: 20 20 30 18   Temp:   97.8 °F (36.6 °C)    TempSrc:   Temporal    SpO2:   98%    Weight:       Height:         General: Overall well-appearing, NAD  HEENT: PERRLA, EOMI, Anicteric sclera, MMM, no rhinorrhea  Cards: RRR, no  mA/kV was utilized to reduce the radiation dose to as low  as reasonably achievable.    COMPARISON:  01/29/2024    HISTORY:  ORDERING SYSTEM PROVIDED HISTORY: Hydroureter  TECHNOLOGIST PROVIDED HISTORY:  Reason for exam:->Hydroureter  Additional Contrast?->None  Decision Support Exception - unselect if not a suspected or confirmed  emergency medical condition->Emergency Medical Condition (MA)  What reading provider will be dictating this exam?->CRC    FINDINGS:  Lower Chest: No significant parenchymal abnormalities are noted at the lung  bases.  There are no signs of consolidation or pleural effusion.  The heart  is not appear enlarged.  There are no signs of pericardial effusion    Organs: The liver is very heterogeneous and dense.  The right lobe measures  17 cm that is within normal range.  The gallbladder reveals no signs of  stones or wall thickening.  The region of the pancreas, spleen and adrenal  glands appear within the normal range    The left kidney appears normal in size shape and position without stones or  hydronephrosis.    The right kidney appears normal in size shape and position without stones or  hydronephrosis.  The visualized right ureter appears normal in course and  caliber.    GI/Bowel: No acute abnormalities of the stomach were observed.  There are  postoperative changes of the colon, suggestive of a partial colectomy.  The  small bowel pattern is nonobstructive.  The region of the terminal ileum and  cecum appears within the normal range.  There are diverticula of the  descending and sigmoid colon.    Pelvis: The bladder reveals no signs of focal wall thickening.  No acute  abnormalities of the uterus were observed.  There is a large volume of  ascites within the pelvis no significant lymphadenopathy was observed    Peritoneum/Retroperitoneum: There are no signs of retroperitoneal  lymphadenopathy, aneurysm or signs of ascites.    Bones/Soft Tissues: There are no signs of fracture or

## 2025-03-18 NOTE — PROGRESS NOTES
Patient is ST HR sustained on 130s-140s BP  142/108, Dr Dunn notified via [employer] and awaiting for reply.

## 2025-03-18 NOTE — CARE COORDINATION
3/18:  Update CM Note:  Pt presented to the Er for tingling in both feet from home.  Pt is on room air at 98%, Iv Apresoline PRN, Iv Lopressor, Iv Morphine PRN, Iv Fluids,Iv Ativan Ciwa PRN & Sq Lovenox.  GI is consulted.  Pt is orthostatic today & Liver enzymes climbing.  Pt is for IR paracentesis today.  Pt's dc plan is home with family to transport.  Pt denied any [place] support.  Sw/SCAR will continue to follow.  Electronically signed by Aminta Rodriguez RN on 3/18/2025 at 1:14 PM

## 2025-03-19 PROBLEM — K70.9 ALCOHOLIC LIVER DISEASE: Status: ACTIVE | Noted: 2025-03-19

## 2025-03-19 LAB
ALBUMIN SERPL-MCNC: 3.6 G/DL (ref 3.5–5.2)
ALP SERPL-CCNC: 120 U/L (ref 35–104)
ALT SERPL-CCNC: 96 U/L (ref 0–32)
ANION GAP SERPL CALCULATED.3IONS-SCNC: 14 MMOL/L (ref 7–16)
AST SERPL-CCNC: 79 U/L (ref 0–31)
BASOPHILS # BLD: 0.03 K/UL (ref 0–0.2)
BASOPHILS NFR BLD: 0 % (ref 0–2)
BILIRUB DIRECT SERPL-MCNC: <0.2 MG/DL (ref 0–0.3)
BILIRUB INDIRECT SERPL-MCNC: ABNORMAL MG/DL (ref 0–1)
BILIRUB SERPL-MCNC: 0.4 MG/DL (ref 0–1.2)
BUN SERPL-MCNC: 5 MG/DL (ref 6–20)
CALCIUM SERPL-MCNC: 8.6 MG/DL (ref 8.6–10.2)
CHLORIDE SERPL-SCNC: 104 MMOL/L (ref 98–107)
CO2 SERPL-SCNC: 21 MMOL/L (ref 22–29)
CREAT SERPL-MCNC: 0.4 MG/DL (ref 0.5–1)
EOSINOPHIL # BLD: 0.06 K/UL (ref 0.05–0.5)
EOSINOPHILS RELATIVE PERCENT: 1 % (ref 0–6)
ERYTHROCYTE [DISTWIDTH] IN BLOOD BY AUTOMATED COUNT: 17.4 % (ref 11.5–15)
GFR, ESTIMATED: >90 ML/MIN/1.73M2
GLUCOSE BLD-MCNC: 124 MG/DL (ref 74–99)
GLUCOSE BLD-MCNC: 124 MG/DL (ref 74–99)
GLUCOSE BLD-MCNC: 152 MG/DL (ref 74–99)
GLUCOSE BLD-MCNC: 155 MG/DL (ref 74–99)
GLUCOSE SERPL-MCNC: 115 MG/DL (ref 74–99)
HCT VFR BLD AUTO: 27.2 % (ref 34–48)
HGB BLD-MCNC: 9.2 G/DL (ref 11.5–15.5)
IMM GRANULOCYTES # BLD AUTO: 0.05 K/UL (ref 0–0.58)
IMM GRANULOCYTES NFR BLD: 1 % (ref 0–5)
LYMPHOCYTES NFR BLD: 1.83 K/UL (ref 1.5–4)
LYMPHOCYTES RELATIVE PERCENT: 24 % (ref 20–42)
MCH RBC QN AUTO: 33.5 PG (ref 26–35)
MCHC RBC AUTO-ENTMCNC: 33.8 G/DL (ref 32–34.5)
MCV RBC AUTO: 98.9 FL (ref 80–99.9)
MONOCYTES NFR BLD: 0.59 K/UL (ref 0.1–0.95)
MONOCYTES NFR BLD: 8 % (ref 2–12)
NEUTROPHILS NFR BLD: 66 % (ref 43–80)
NEUTS SEG NFR BLD: 4.99 K/UL (ref 1.8–7.3)
PLATELET # BLD AUTO: 238 K/UL (ref 130–450)
PMV BLD AUTO: 11.5 FL (ref 7–12)
POTASSIUM SERPL-SCNC: 3.7 MMOL/L (ref 3.5–5)
PROT SERPL-MCNC: 6 G/DL (ref 6.4–8.3)
RBC # BLD AUTO: 2.75 M/UL (ref 3.5–5.5)
SODIUM SERPL-SCNC: 139 MMOL/L (ref 132–146)
WBC OTHER # BLD: 7.6 K/UL (ref 4.5–11.5)

## 2025-03-19 PROCEDURE — 2140000000 HC CCU INTERMEDIATE R&B

## 2025-03-19 PROCEDURE — 6370000000 HC RX 637 (ALT 250 FOR IP)

## 2025-03-19 PROCEDURE — 36415 COLL VENOUS BLD VENIPUNCTURE: CPT

## 2025-03-19 PROCEDURE — 2580000003 HC RX 258

## 2025-03-19 PROCEDURE — 6360000002 HC RX W HCPCS: Performed by: INTERNAL MEDICINE

## 2025-03-19 PROCEDURE — 6370000000 HC RX 637 (ALT 250 FOR IP): Performed by: INTERNAL MEDICINE

## 2025-03-19 PROCEDURE — 2500000003 HC RX 250 WO HCPCS: Performed by: INTERNAL MEDICINE

## 2025-03-19 PROCEDURE — 2500000003 HC RX 250 WO HCPCS

## 2025-03-19 PROCEDURE — 99232 SBSQ HOSP IP/OBS MODERATE 35: CPT | Performed by: NURSE PRACTITIONER

## 2025-03-19 PROCEDURE — 85025 COMPLETE CBC W/AUTO DIFF WBC: CPT

## 2025-03-19 PROCEDURE — 82962 GLUCOSE BLOOD TEST: CPT

## 2025-03-19 PROCEDURE — 82248 BILIRUBIN DIRECT: CPT

## 2025-03-19 PROCEDURE — 6360000002 HC RX W HCPCS

## 2025-03-19 PROCEDURE — 80053 COMPREHEN METABOLIC PANEL: CPT

## 2025-03-19 RX ADMIN — GABAPENTIN 100 MG: 100 CAPSULE ORAL at 14:19

## 2025-03-19 RX ADMIN — METOPROLOL TARTRATE 5 MG: 5 INJECTION, SOLUTION INTRAVENOUS at 04:41

## 2025-03-19 RX ADMIN — SODIUM CHLORIDE, PRESERVATIVE FREE 10 ML: 5 INJECTION INTRAVENOUS at 08:28

## 2025-03-19 RX ADMIN — PANTOPRAZOLE SODIUM 40 MG: 40 TABLET, DELAYED RELEASE ORAL at 05:23

## 2025-03-19 RX ADMIN — HYDRALAZINE HYDROCHLORIDE 10 MG: 20 INJECTION INTRAMUSCULAR; INTRAVENOUS at 12:16

## 2025-03-19 RX ADMIN — MORPHINE SULFATE 1 MG: 2 INJECTION, SOLUTION INTRAMUSCULAR; INTRAVENOUS at 05:22

## 2025-03-19 RX ADMIN — SODIUM CHLORIDE: 0.9 INJECTION, SOLUTION INTRAVENOUS at 12:40

## 2025-03-19 RX ADMIN — FERROUS SULFATE TAB 325 MG (65 MG ELEMENTAL FE) 325 MG: 325 (65 FE) TAB at 08:30

## 2025-03-19 RX ADMIN — HYDROCODONE BITARTRATE AND ACETAMINOPHEN 1 TABLET: 7.5; 325 TABLET ORAL at 21:53

## 2025-03-19 RX ADMIN — METOPROLOL TARTRATE 5 MG: 5 INJECTION, SOLUTION INTRAVENOUS at 12:15

## 2025-03-19 RX ADMIN — METOPROLOL TARTRATE 5 MG: 5 INJECTION, SOLUTION INTRAVENOUS at 21:53

## 2025-03-19 RX ADMIN — FOLIC ACID 1 MG: 1 TABLET ORAL at 08:30

## 2025-03-19 RX ADMIN — SODIUM CHLORIDE, PRESERVATIVE FREE 10 ML: 5 INJECTION INTRAVENOUS at 21:54

## 2025-03-19 RX ADMIN — LORAZEPAM 1 MG: 1 TABLET ORAL at 14:31

## 2025-03-19 RX ADMIN — HYDROCODONE BITARTRATE AND ACETAMINOPHEN 1 TABLET: 7.5; 325 TABLET ORAL at 08:29

## 2025-03-19 RX ADMIN — Medication 100 MG: at 08:30

## 2025-03-19 RX ADMIN — GABAPENTIN 100 MG: 100 CAPSULE ORAL at 08:30

## 2025-03-19 RX ADMIN — AMLODIPINE BESYLATE 5 MG: 5 TABLET ORAL at 08:30

## 2025-03-19 RX ADMIN — MORPHINE SULFATE 1 MG: 2 INJECTION, SOLUTION INTRAMUSCULAR; INTRAVENOUS at 18:37

## 2025-03-19 RX ADMIN — MORPHINE SULFATE 1 MG: 2 INJECTION, SOLUTION INTRAMUSCULAR; INTRAVENOUS at 12:15

## 2025-03-19 RX ADMIN — HYDROCODONE BITARTRATE AND ACETAMINOPHEN 1 TABLET: 7.5; 325 TABLET ORAL at 15:39

## 2025-03-19 RX ADMIN — SUCRALFATE 1 G: 1 TABLET ORAL at 14:19

## 2025-03-19 RX ADMIN — SUCRALFATE 1 G: 1 TABLET ORAL at 08:29

## 2025-03-19 RX ADMIN — SUCRALFATE 1 G: 1 TABLET ORAL at 21:53

## 2025-03-19 RX ADMIN — GABAPENTIN 100 MG: 100 CAPSULE ORAL at 21:53

## 2025-03-19 RX ADMIN — HYDROCODONE BITARTRATE AND ACETAMINOPHEN 1 TABLET: 7.5; 325 TABLET ORAL at 01:47

## 2025-03-19 RX ADMIN — ENOXAPARIN SODIUM 40 MG: 100 INJECTION SUBCUTANEOUS at 08:28

## 2025-03-19 ASSESSMENT — PAIN SCALES - GENERAL
PAINLEVEL_OUTOF10: 6
PAINLEVEL_OUTOF10: 8
PAINLEVEL_OUTOF10: 7
PAINLEVEL_OUTOF10: 8
PAINLEVEL_OUTOF10: 9
PAINLEVEL_OUTOF10: 7
PAINLEVEL_OUTOF10: 7
PAINLEVEL_OUTOF10: 8
PAINLEVEL_OUTOF10: 8

## 2025-03-19 ASSESSMENT — PAIN - FUNCTIONAL ASSESSMENT
PAIN_FUNCTIONAL_ASSESSMENT: ACTIVITIES ARE NOT PREVENTED
PAIN_FUNCTIONAL_ASSESSMENT: PREVENTS OR INTERFERES SOME ACTIVE ACTIVITIES AND ADLS
PAIN_FUNCTIONAL_ASSESSMENT: ACTIVITIES ARE NOT PREVENTED

## 2025-03-19 ASSESSMENT — PAIN DESCRIPTION - LOCATION
LOCATION: FOOT

## 2025-03-19 ASSESSMENT — PAIN SCALES - WONG BAKER
WONGBAKER_NUMERICALRESPONSE: NO HURT

## 2025-03-19 ASSESSMENT — PAIN DESCRIPTION - ORIENTATION
ORIENTATION: RIGHT;LEFT

## 2025-03-19 ASSESSMENT — PAIN DESCRIPTION - DESCRIPTORS
DESCRIPTORS: BURNING;TINGLING;SHARP
DESCRIPTORS: TINGLING;SHARP;PINS AND NEEDLES
DESCRIPTORS: SHARP;BURNING
DESCRIPTORS: BURNING;TENDER;SHARP
DESCRIPTORS: BURNING;TINGLING;PINS AND NEEDLES

## 2025-03-19 ASSESSMENT — PAIN DESCRIPTION - ONSET: ONSET: ON-GOING

## 2025-03-19 ASSESSMENT — PAIN DESCRIPTION - PAIN TYPE: TYPE: ACUTE PAIN

## 2025-03-19 ASSESSMENT — PAIN DESCRIPTION - FREQUENCY: FREQUENCY: CONTINUOUS

## 2025-03-19 NOTE — PLAN OF CARE
Problem: Chronic Conditions and Co-morbidities  Goal: Patient's chronic conditions and co-morbidity symptoms are monitored and maintained or improved  3/18/2025 2327 by Jenny Cheung RN  Outcome: Progressing     Problem: Discharge Planning  Goal: Discharge to home or other facility with appropriate resources  3/18/2025 2327 by Jenny Cheung RN  Outcome: Progressing     Problem: Pain  Goal: Verbalizes/displays adequate comfort level or baseline comfort level  3/18/2025 2327 by Jenny Cheung RN  Outcome: Progressing     Problem: Safety - Adult  Goal: Free from fall injury  3/18/2025 2327 by Jenny Cheung RN  Outcome: Progressing     Problem: ABCDS Injury Assessment  Goal: Absence of physical injury  3/18/2025 2327 by Jenny Cheung RN  Outcome: Progressing

## 2025-03-19 NOTE — PROGRESS NOTES
[facility]                                Progress note    Subjective:    Resting in bed  States she feels very tired  Tolerating oral intake    Objective:    BP (!) 136/101   Pulse (!) 131   Temp 98.1 °F (36.7 °C) (Temporal)   Resp 17   Ht 1.701 m (5' 6.97\")   Wt 58.2 kg (128 lb 4.9 oz)   SpO2 99%   BMI 20.11 kg/m²     No intake/output data recorded.  No intake/output data recorded.    General appearance: Fatigued  HEENT: AT/NC, MMM  Neck: FROM, supple  Lungs: Clear to auscultation  CV: Tachycardic, no MRGs  Vasc: Radial pulses 2+  Abdomen: Soft, non-tender; no masses or HSM  Extremities: No peripheral edema or digital cyanosis  Skin: no rash, lesions or ulcers  Psych: Alert and oriented to person, place and time  Neuro: Alert and interactive     Recent Labs     03/17/25  0505 03/18/25  0416 03/19/25  0451   WBC 6.3 6.4 7.6   HGB 9.3* 9.6* 9.2*   HCT 27.5* 29.0* 27.2*    283 238       Recent Labs     03/17/25  0505 03/18/25  0416 03/19/25  0451    139 139   K 3.9 4.0 3.7    103 104   CO2 23 22 21*   BUN 9 6 5*   CREATININE 0.5 0.6 0.4*   CALCIUM 8.7 8.7 8.6       Assessment:    Principal Problem:    Alcohol abuse with withdrawal, unspecified (HCC)  Active Problems:    Primary hypertension    Elevated liver enzymes    Pelvic ascites    Hyperglycemia    Alcoholic liver disease  Resolved Problems:    * No resolved hospital problems. *      Plan:    Patient is a 35-year-old female admitted to [facility] for  Alcohol abuse with withdrawal  -Unable to have paracentesis yesterday due to minimal fluid noted on ultrasound  -Improving LFTs  -Continue IVF NS at 100  -Tolerating p.o. intake  -Patient has been worked up before for tachycardia and appears to be a chronic issue  -Continue to monitor vital signs  -May reconsult cardiology if no real significant improvement in heart rates tomorrow  -Had been on lopressor in the past by previous provider however not currently taking at

## 2025-03-19 NOTE — PLAN OF CARE
Problem: Chronic Conditions and Co-morbidities  Goal: Patient's chronic conditions and co-morbidity symptoms are monitored and maintained or improved  3/19/2025 1127 by Tania Brown RN  Outcome: Progressing  3/18/2025 2327 by Jenny Cheung RN  Outcome: Progressing     Problem: Discharge Planning  Goal: Discharge to home or other facility with appropriate resources  3/19/2025 1127 by Tania Brown RN  Outcome: Progressing  3/18/2025 2327 by Jenny Cheung RN  Outcome: Progressing     Problem: Pain  Goal: Verbalizes/displays adequate comfort level or baseline comfort level  3/19/2025 1127 by Tania Brown RN  Outcome: Progressing  3/18/2025 2327 by Jenny Cheung RN  Outcome: Progressing     Problem: Safety - Adult  Goal: Free from fall injury  3/19/2025 1127 by Tania Brown RN  Outcome: Progressing  3/18/2025 2327 by Jenny Cheung RN  Outcome: Progressing     Problem: ABCDS Injury Assessment  Goal: Absence of physical injury  3/19/2025 1127 by Tania Brown RN  Outcome: Progressing  3/18/2025 2327 by Jenny Cheung RN  Outcome: Progressing
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
## 2025-03-19 NOTE — CARE COORDINATION
3/19:  Update CM Note:  Pt presented to the Er for tingling in both feet from home.  Pt is on room air at 98%, Iv Apresoline PRN, Iv Lopressor, Iv Morphine PRN, Iv Fluids,Iv Ativan Ciwa PRN & Sq Lovenox.  GI cleared but pt's HR in the 140's.  Pt's dc plan is home with family to transport.  Pt denied any [place] support.  Sw/CM will continue to follow.  Electronically signed by Aminta Rodriguez RN on 3/19/2025 at 2:21 PM

## 2025-03-20 ENCOUNTER — APPOINTMENT (OUTPATIENT)
Age: 35
End: 2025-03-20
Payer: MEDICAID

## 2025-03-20 PROBLEM — R01.1 HEART MURMUR: Status: ACTIVE | Noted: 2025-03-20

## 2025-03-20 PROBLEM — E87.6 HYPOKALEMIA: Status: ACTIVE | Noted: 2025-03-20

## 2025-03-20 LAB
ALBUMIN SERPL-MCNC: 3.9 G/DL (ref 3.5–5.2)
ALP SERPL-CCNC: 114 U/L (ref 35–104)
ALT SERPL-CCNC: 79 U/L (ref 0–32)
ANION GAP SERPL CALCULATED.3IONS-SCNC: 17 MMOL/L (ref 7–16)
AST SERPL-CCNC: 56 U/L (ref 0–31)
BASOPHILS # BLD: 0.02 K/UL (ref 0–0.2)
BASOPHILS NFR BLD: 0 % (ref 0–2)
BILIRUB DIRECT SERPL-MCNC: <0.2 MG/DL (ref 0–0.3)
BILIRUB INDIRECT SERPL-MCNC: ABNORMAL MG/DL (ref 0–1)
BILIRUB SERPL-MCNC: 0.4 MG/DL (ref 0–1.2)
BUN SERPL-MCNC: 6 MG/DL (ref 6–20)
CALCIUM SERPL-MCNC: 9 MG/DL (ref 8.6–10.2)
CHLORIDE SERPL-SCNC: 104 MMOL/L (ref 98–107)
CO2 SERPL-SCNC: 18 MMOL/L (ref 22–29)
CREAT SERPL-MCNC: 0.5 MG/DL (ref 0.5–1)
D-DIMER QUANTITATIVE: 573 NG/ML DDU (ref 0–230)
ECHO AO ASC DIAM: 3.1 CM
ECHO AO ASCENDING AORTA INDEX: 1.82 CM/M2
ECHO AV AREA PEAK VELOCITY: 3.5 CM2
ECHO AV AREA VTI: 3.8 CM2
ECHO AV AREA/BSA PEAK VELOCITY: 2.1 CM2/M2
ECHO AV AREA/BSA VTI: 2.2 CM2/M2
ECHO AV CUSP MM: 2.2 CM
ECHO AV MEAN GRADIENT: 2 MMHG
ECHO AV MEAN VELOCITY: 0.7 M/S
ECHO AV PEAK GRADIENT: 4 MMHG
ECHO AV PEAK VELOCITY: 1 M/S
ECHO AV VELOCITY RATIO: 1.1
ECHO AV VTI: 16.4 CM
ECHO LA DIAMETER INDEX: 1.41 CM/M2
ECHO LA DIAMETER: 2.4 CM
ECHO LA VOL A-L A2C: 41 ML (ref 22–52)
ECHO LA VOL A-L A4C: 20 ML (ref 22–52)
ECHO LA VOL BP: 29 ML (ref 22–52)
ECHO LA VOL MOD A2C: 38 ML (ref 22–52)
ECHO LA VOL MOD A4C: 19 ML (ref 22–52)
ECHO LA VOL/BSA BIPLANE: 17 ML/M2 (ref 16–34)
ECHO LA VOLUME AREA LENGTH: 31 ML
ECHO LA VOLUME INDEX A-L A2C: 24 ML/M2 (ref 16–34)
ECHO LA VOLUME INDEX A-L A4C: 12 ML/M2 (ref 16–34)
ECHO LA VOLUME INDEX AREA LENGTH: 18 ML/M2 (ref 16–34)
ECHO LA VOLUME INDEX MOD A2C: 22 ML/M2 (ref 16–34)
ECHO LA VOLUME INDEX MOD A4C: 11 ML/M2 (ref 16–34)
ECHO LV EF PHYSICIAN: 55 %
ECHO LV FRACTIONAL SHORTENING: 42 % (ref 28–44)
ECHO LV INTERNAL DIMENSION DIASTOLE INDEX: 1.82 CM/M2
ECHO LV INTERNAL DIMENSION DIASTOLIC: 3.1 CM (ref 3.9–5.3)
ECHO LV INTERNAL DIMENSION SYSTOLIC INDEX: 1.06 CM/M2
ECHO LV INTERNAL DIMENSION SYSTOLIC: 1.8 CM
ECHO LV ISOVOLUMETRIC RELAXATION TIME (IVRT): 99 MS
ECHO LV IVSD: 1 CM (ref 0.6–0.9)
ECHO LV MASS 2D: 92.8 G (ref 67–162)
ECHO LV MASS INDEX 2D: 54.6 G/M2 (ref 43–95)
ECHO LV POSTERIOR WALL DIASTOLIC: 1.1 CM (ref 0.6–0.9)
ECHO LV RELATIVE WALL THICKNESS RATIO: 0.71
ECHO LVOT AREA: 3.1 CM2
ECHO LVOT AV VTI INDEX: 1.15
ECHO LVOT DIAM: 2 CM
ECHO LVOT MEAN GRADIENT: 2 MMHG
ECHO LVOT PEAK GRADIENT: 4 MMHG
ECHO LVOT PEAK VELOCITY: 1.1 M/S
ECHO LVOT STROKE VOLUME INDEX: 34.7 ML/M2
ECHO LVOT SV: 59 ML
ECHO LVOT VTI: 18.8 CM
ECHO MV "A" WAVE DURATION: 91.3 MSEC
ECHO MV A VELOCITY: 1.13 M/S
ECHO MV AREA PHT: 9.3 CM2
ECHO MV AREA VTI: 4.4 CM2
ECHO MV E DECELERATION TIME (DT): 74.1 MS
ECHO MV E VELOCITY: 0.72 M/S
ECHO MV E/A RATIO: 0.64
ECHO MV LVOT VTI INDEX: 0.72
ECHO MV MAX VELOCITY: 1.2 M/S
ECHO MV MEAN GRADIENT: 3 MMHG
ECHO MV MEAN VELOCITY: 0.8 M/S
ECHO MV PEAK GRADIENT: 6 MMHG
ECHO MV PRESSURE HALF TIME (PHT): 23.7 MS
ECHO MV VTI: 13.5 CM
ECHO PV MAX VELOCITY: 0.9 M/S
ECHO PV MEAN GRADIENT: 2 MMHG
ECHO PV MEAN VELOCITY: 0.7 M/S
ECHO PV PEAK GRADIENT: 3 MMHG
ECHO PV VTI: 12.2 CM
ECHO RV INTERNAL DIMENSION: 2.6 CM
ECHO RV LONGITUDINAL DIMENSION: 6.5 CM
ECHO RV MID DIMENSION: 2.1 CM
EOSINOPHIL # BLD: 0.05 K/UL (ref 0.05–0.5)
EOSINOPHILS RELATIVE PERCENT: 1 % (ref 0–6)
ERYTHROCYTE [DISTWIDTH] IN BLOOD BY AUTOMATED COUNT: 17.2 % (ref 11.5–15)
GFR, ESTIMATED: >90 ML/MIN/1.73M2
GLUCOSE BLD-MCNC: 124 MG/DL (ref 74–99)
GLUCOSE BLD-MCNC: 126 MG/DL (ref 74–99)
GLUCOSE BLD-MCNC: 137 MG/DL (ref 74–99)
GLUCOSE BLD-MCNC: 169 MG/DL (ref 74–99)
GLUCOSE SERPL-MCNC: 138 MG/DL (ref 74–99)
HCT VFR BLD AUTO: 28.4 % (ref 34–48)
HGB BLD-MCNC: 9.5 G/DL (ref 11.5–15.5)
IMM GRANULOCYTES # BLD AUTO: 0.04 K/UL (ref 0–0.58)
IMM GRANULOCYTES NFR BLD: 1 % (ref 0–5)
LYMPHOCYTES NFR BLD: 1.67 K/UL (ref 1.5–4)
LYMPHOCYTES RELATIVE PERCENT: 30 % (ref 20–42)
MCH RBC QN AUTO: 33.2 PG (ref 26–35)
MCHC RBC AUTO-ENTMCNC: 33.5 G/DL (ref 32–34.5)
MCV RBC AUTO: 99.3 FL (ref 80–99.9)
MONOCYTES NFR BLD: 0.56 K/UL (ref 0.1–0.95)
MONOCYTES NFR BLD: 10 % (ref 2–12)
NEUTROPHILS NFR BLD: 58 % (ref 43–80)
NEUTS SEG NFR BLD: 3.28 K/UL (ref 1.8–7.3)
PLATELET # BLD AUTO: 217 K/UL (ref 130–450)
PMV BLD AUTO: 11.8 FL (ref 7–12)
POTASSIUM SERPL-SCNC: 3.7 MMOL/L (ref 3.5–5)
PROT SERPL-MCNC: 6.6 G/DL (ref 6.4–8.3)
RBC # BLD AUTO: 2.86 M/UL (ref 3.5–5.5)
SODIUM SERPL-SCNC: 139 MMOL/L (ref 132–146)
TROPONIN I SERPL HS-MCNC: 7 NG/L (ref 0–9)
WBC OTHER # BLD: 5.6 K/UL (ref 4.5–11.5)

## 2025-03-20 PROCEDURE — 2580000003 HC RX 258

## 2025-03-20 PROCEDURE — 6360000002 HC RX W HCPCS

## 2025-03-20 PROCEDURE — 85379 FIBRIN DEGRADATION QUANT: CPT

## 2025-03-20 PROCEDURE — 2500000003 HC RX 250 WO HCPCS

## 2025-03-20 PROCEDURE — 6370000000 HC RX 637 (ALT 250 FOR IP): Performed by: NURSE PRACTITIONER

## 2025-03-20 PROCEDURE — 93306 TTE W/DOPPLER COMPLETE: CPT | Performed by: INTERNAL MEDICINE

## 2025-03-20 PROCEDURE — 99232 SBSQ HOSP IP/OBS MODERATE 35: CPT | Performed by: NURSE PRACTITIONER

## 2025-03-20 PROCEDURE — 93306 TTE W/DOPPLER COMPLETE: CPT

## 2025-03-20 PROCEDURE — 6360000002 HC RX W HCPCS: Performed by: INTERNAL MEDICINE

## 2025-03-20 PROCEDURE — 85025 COMPLETE CBC W/AUTO DIFF WBC: CPT

## 2025-03-20 PROCEDURE — 84484 ASSAY OF TROPONIN QUANT: CPT

## 2025-03-20 PROCEDURE — 6370000000 HC RX 637 (ALT 250 FOR IP): Performed by: INTERNAL MEDICINE

## 2025-03-20 PROCEDURE — 80053 COMPREHEN METABOLIC PANEL: CPT

## 2025-03-20 PROCEDURE — 6370000000 HC RX 637 (ALT 250 FOR IP)

## 2025-03-20 PROCEDURE — 82248 BILIRUBIN DIRECT: CPT

## 2025-03-20 PROCEDURE — 99222 1ST HOSP IP/OBS MODERATE 55: CPT | Performed by: INTERNAL MEDICINE

## 2025-03-20 PROCEDURE — APPSS180 APP SPLIT SHARED TIME > 60 MINUTES: Performed by: NURSE PRACTITIONER

## 2025-03-20 PROCEDURE — 2500000003 HC RX 250 WO HCPCS: Performed by: INTERNAL MEDICINE

## 2025-03-20 PROCEDURE — 2140000000 HC CCU INTERMEDIATE R&B

## 2025-03-20 PROCEDURE — 36415 COLL VENOUS BLD VENIPUNCTURE: CPT

## 2025-03-20 PROCEDURE — 82962 GLUCOSE BLOOD TEST: CPT

## 2025-03-20 RX ORDER — GABAPENTIN 300 MG/1
300 CAPSULE ORAL 3 TIMES DAILY
Status: DISCONTINUED | OUTPATIENT
Start: 2025-03-20 | End: 2025-03-21

## 2025-03-20 RX ORDER — LORAZEPAM 0.5 MG/1
0.5 TABLET ORAL EVERY 4 HOURS PRN
Status: DISCONTINUED | OUTPATIENT
Start: 2025-03-20 | End: 2025-03-22 | Stop reason: HOSPADM

## 2025-03-20 RX ORDER — DILTIAZEM HYDROCHLORIDE 120 MG/1
240 CAPSULE, COATED, EXTENDED RELEASE ORAL DAILY
Status: DISCONTINUED | OUTPATIENT
Start: 2025-03-20 | End: 2025-03-21

## 2025-03-20 RX ADMIN — GABAPENTIN 100 MG: 100 CAPSULE ORAL at 08:32

## 2025-03-20 RX ADMIN — METOPROLOL TARTRATE 5 MG: 5 INJECTION, SOLUTION INTRAVENOUS at 04:19

## 2025-03-20 RX ADMIN — SODIUM CHLORIDE: 0.9 INJECTION, SOLUTION INTRAVENOUS at 01:30

## 2025-03-20 RX ADMIN — HYDROCODONE BITARTRATE AND ACETAMINOPHEN 1 TABLET: 7.5; 325 TABLET ORAL at 11:27

## 2025-03-20 RX ADMIN — SODIUM CHLORIDE, PRESERVATIVE FREE 10 ML: 5 INJECTION INTRAVENOUS at 08:30

## 2025-03-20 RX ADMIN — AMLODIPINE BESYLATE 5 MG: 5 TABLET ORAL at 08:32

## 2025-03-20 RX ADMIN — SUCRALFATE 1 G: 1 TABLET ORAL at 21:09

## 2025-03-20 RX ADMIN — METOPROLOL TARTRATE 5 MG: 5 INJECTION, SOLUTION INTRAVENOUS at 12:29

## 2025-03-20 RX ADMIN — MORPHINE SULFATE 1 MG: 2 INJECTION, SOLUTION INTRAMUSCULAR; INTRAVENOUS at 08:29

## 2025-03-20 RX ADMIN — METOPROLOL TARTRATE 5 MG: 5 INJECTION, SOLUTION INTRAVENOUS at 21:10

## 2025-03-20 RX ADMIN — FOLIC ACID 1 MG: 1 TABLET ORAL at 08:32

## 2025-03-20 RX ADMIN — SUCRALFATE 1 G: 1 TABLET ORAL at 16:10

## 2025-03-20 RX ADMIN — SODIUM CHLORIDE: 0.9 INJECTION, SOLUTION INTRAVENOUS at 10:45

## 2025-03-20 RX ADMIN — FERROUS SULFATE TAB 325 MG (65 MG ELEMENTAL FE) 325 MG: 325 (65 FE) TAB at 08:32

## 2025-03-20 RX ADMIN — HYDROCODONE BITARTRATE AND ACETAMINOPHEN 1 TABLET: 7.5; 325 TABLET ORAL at 18:02

## 2025-03-20 RX ADMIN — SUCRALFATE 1 G: 1 TABLET ORAL at 08:32

## 2025-03-20 RX ADMIN — DILTIAZEM HYDROCHLORIDE 240 MG: 120 CAPSULE, EXTENDED RELEASE ORAL at 14:52

## 2025-03-20 RX ADMIN — ENOXAPARIN SODIUM 40 MG: 100 INJECTION SUBCUTANEOUS at 08:32

## 2025-03-20 RX ADMIN — HYDROCODONE BITARTRATE AND ACETAMINOPHEN 1 TABLET: 7.5; 325 TABLET ORAL at 04:19

## 2025-03-20 RX ADMIN — IBUPROFEN 400 MG: 200 TABLET, FILM COATED ORAL at 14:56

## 2025-03-20 RX ADMIN — PANTOPRAZOLE SODIUM 40 MG: 40 TABLET, DELAYED RELEASE ORAL at 06:03

## 2025-03-20 RX ADMIN — GABAPENTIN 300 MG: 300 CAPSULE ORAL at 14:52

## 2025-03-20 RX ADMIN — Medication 100 MG: at 08:33

## 2025-03-20 RX ADMIN — SODIUM CHLORIDE, PRESERVATIVE FREE 10 ML: 5 INJECTION INTRAVENOUS at 21:10

## 2025-03-20 RX ADMIN — GABAPENTIN 300 MG: 300 CAPSULE ORAL at 21:10

## 2025-03-20 RX ADMIN — MORPHINE SULFATE 1 MG: 2 INJECTION, SOLUTION INTRAMUSCULAR; INTRAVENOUS at 01:24

## 2025-03-20 ASSESSMENT — PAIN DESCRIPTION - ONSET
ONSET: ON-GOING
ONSET: ON-GOING

## 2025-03-20 ASSESSMENT — PAIN DESCRIPTION - LOCATION
LOCATION: FOOT

## 2025-03-20 ASSESSMENT — PAIN SCALES - GENERAL
PAINLEVEL_OUTOF10: 6
PAINLEVEL_OUTOF10: 5
PAINLEVEL_OUTOF10: 6
PAINLEVEL_OUTOF10: 0
PAINLEVEL_OUTOF10: 9
PAINLEVEL_OUTOF10: 0
PAINLEVEL_OUTOF10: 0
PAINLEVEL_OUTOF10: 8
PAINLEVEL_OUTOF10: 4
PAINLEVEL_OUTOF10: 8
PAINLEVEL_OUTOF10: 9
PAINLEVEL_OUTOF10: 8
PAINLEVEL_OUTOF10: 6
PAINLEVEL_OUTOF10: 9
PAINLEVEL_OUTOF10: 0

## 2025-03-20 ASSESSMENT — PAIN DESCRIPTION - DESCRIPTORS
DESCRIPTORS: BURNING;ACHING;DISCOMFORT
DESCRIPTORS: BURNING;ACHING;DISCOMFORT
DESCRIPTORS: SHARP;ACHING;DISCOMFORT
DESCRIPTORS: TINGLING;SHARP;PINS AND NEEDLES
DESCRIPTORS: BURNING;ACHING;DISCOMFORT
DESCRIPTORS: TINGLING;SHARP;PINS AND NEEDLES
DESCRIPTORS: DISCOMFORT;BURNING

## 2025-03-20 ASSESSMENT — PAIN DESCRIPTION - ORIENTATION
ORIENTATION: LEFT;RIGHT
ORIENTATION: RIGHT;LEFT

## 2025-03-20 ASSESSMENT — PAIN SCALES - WONG BAKER
WONGBAKER_NUMERICALRESPONSE: NO HURT
WONGBAKER_NUMERICALRESPONSE: NO HURT

## 2025-03-20 ASSESSMENT — PAIN DESCRIPTION - FREQUENCY
FREQUENCY: CONTINUOUS
FREQUENCY: CONTINUOUS

## 2025-03-20 ASSESSMENT — PAIN DESCRIPTION - PAIN TYPE
TYPE: ACUTE PAIN
TYPE: ACUTE PAIN

## 2025-03-20 NOTE — PROGRESS NOTES
[facility]                                Progress note    Subjective:    Resting comfortably in bed  Much more awake and alert today    Objective:    BP (!) 146/110   Pulse (!) 122   Temp 97.9 °F (36.6 °C) (Temporal)   Resp 13   Ht 1.701 m (5' 6.97\")   Wt 60.5 kg (133 lb 6.1 oz)   SpO2 99%   BMI 20.91 kg/m²     In: 400 [I.V.:400]  Out: -   In: 400   Out: -     General appearance: Fatigued  HEENT: AT/NC, MMM  Neck: FROM, supple  Lungs: Clear to auscultation  CV: Tachycardic, no MRGs  Vasc: Radial pulses 2+  Abdomen: Soft, non-tender; no masses or HSM  Extremities: No peripheral edema or digital cyanosis  Skin: no rash, lesions or ulcers  Psych: Alert and oriented to person, place and time  Neuro: Alert and interactive     Recent Labs     03/18/25  0416 03/19/25  0451 03/20/25  0433   WBC 6.4 7.6 5.6   HGB 9.6* 9.2* 9.5*   HCT 29.0* 27.2* 28.4*    238 217       Recent Labs     03/18/25  0416 03/19/25  0451 03/20/25  0433    139 139   K 4.0 3.7 3.7    104 104   CO2 22 21* 18*   BUN 6 5* 6   CREATININE 0.6 0.4* 0.5   CALCIUM 8.7 8.6 9.0       Assessment:    Principal Problem:    Alcohol abuse with withdrawal, unspecified (HCC)  Active Problems:    Tachycardia    Primary hypertension    Elevated liver enzymes    Pelvic ascites    Hyperglycemia    Alcoholic liver disease    Heart murmur  Resolved Problems:    * No resolved hospital problems. *      Plan:    Patient is a 35-year-old female admitted to [facility] for  Alcohol abuse with withdrawal  -Unable to have paracentesis yesterday due to minimal fluid noted on ultrasound  -Improving LFTs  -Continue IVF NS at 100  -Tolerating p.o. intake  -Patient has been worked up before for tachycardia and appears to be a chronic issue  -Continue to monitor vital signs  -May reconsult cardiology if no real significant improvement in heart rates tomorrow  -Had been on lopressor in the past by previous provider however not currently
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
## 2025-03-20 NOTE — PROGRESS NOTES
Orthostatic Vitals:      3/20/2025     4:00 AM   Orthostatic Vitals   Orthostatic B/P and Pulse? Yes   Blood Pressure Lying 161/115   Pulse Lying 131 PER MINUTE   Blood Pressure Sitting 175/129   Pulse Sitting 139 PER MINUTE   Blood Pressure Standing 159/116   Pulse Standing 151 PER MINUTE     Shirley Cortez, RN

## 2025-03-20 NOTE — CARE COORDINATION
3/20:  Update CM Note:  Pt presented to the Er for tingling in both feet from home.  Pt is on room air at 100%, Iv Apresoline PRN, Iv Lopressor, Iv Fluids & Sq Lovenox.  GI cleared.  Per Cardiology changed Norvac to Cardizem, 2 D echo once HR < 100 & consider Ct scan.  Pt's dc plan is home with family to transport.  Pt denied any [place] support.  Sw/CM will continue to follow.  Electronically signed by Aminta Rodriguez RN on 3/20/2025 at 2:31 PM

## 2025-03-20 NOTE — PROGRESS NOTES
parenchymal abnormalities are noted at the lung  bases.  There are no signs of consolidation or pleural effusion.  The heart  is not appear enlarged.  There are no signs of pericardial effusion    Organs: The liver is very heterogeneous and dense.  The right lobe measures  17 cm that is within normal range.  The gallbladder reveals no signs of  stones or wall thickening.  The region of the pancreas, spleen and adrenal  glands appear within the normal range    The left kidney appears normal in size shape and position without stones or  hydronephrosis.    The right kidney appears normal in size shape and position without stones or  hydronephrosis.  The visualized right ureter appears normal in course and  caliber.    GI/Bowel: No acute abnormalities of the stomach were observed.  There are  postoperative changes of the colon, suggestive of a partial colectomy.  The  small bowel pattern is nonobstructive.  The region of the terminal ileum and  cecum appears within the normal range.  There are diverticula of the  descending and sigmoid colon.    Pelvis: The bladder reveals no signs of focal wall thickening.  No acute  abnormalities of the uterus were observed.  There is a large volume of  ascites within the pelvis no significant lymphadenopathy was observed    Peritoneum/Retroperitoneum: There are no signs of retroperitoneal  lymphadenopathy, aneurysm or signs of ascites.    Bones/Soft Tissues: There are no signs of fracture or spondylolisthesis of  the lumbosacral spine.  There is a questionable sclerotic density along the  left aspect of the L4 vertebral body.  Its margins are sclerotic and this  could be benign.  There may be posttraumatic changes of the retroperitoneum  on the left involving the iliac bone.  This is felt to be benign and stable  when compared the previous study.    Impression  1. There is a large volume of ascites within the pelvis.  2. Dense appearance of the liver suggests steatosis.  3.  appearance of pelvic ascites, fatty liver on US and CT. States a known history elevated LFT's.  CEA 6.1   19  CA 19.9 <2  MELD 8   Maddrey's DF 0.3    PLAN: Clinically stable from GI POV.  -PT/INR  -CIWA  -EtOH cessation  -Social work intervention  -Hepatic serology - negative  -Phosphatidylethanol ordered pending  -Paracentesis with fluids for analysis, to be performed today. Not enough fluid to tap  -Monitor LFT trend, trending downward daily  - Elastography as OP      Thank you for including us in the care of this patient. Please do not hesitate to reach out with any questions.    Greater than or equal to 35 minutes was spent providing face-to-face patient care, including:  and coordinating care, reviewing the chart, labs, and diagnostics, as well as medical decision making. Greater than 50% of this time was spent instructing and counseling the patient face to face regarding findings and recommendations.     Discussed with Dr. TOM  Plan per Dr. VAISHALI Loza, MSN,CRNP 3/20/2025 10:44 AM For Dr. TOM

## 2025-03-20 NOTE — PLAN OF CARE
Problem: Chronic Conditions and Co-morbidities  Goal: Patient's chronic conditions and co-morbidity symptoms are monitored and maintained or improved  Outcome: Progressing  Flowsheets (Taken 3/19/2025 2153)  Care Plan - Patient's Chronic Conditions and Co-Morbidity Symptoms are Monitored and Maintained or Improved: Monitor and assess patient's chronic conditions and comorbid symptoms for stability, deterioration, or improvement     Problem: Discharge Planning  Goal: Discharge to home or other facility with appropriate resources  Outcome: Progressing  Flowsheets (Taken 3/19/2025 2153)  Discharge to home or other facility with appropriate resources: Identify barriers to discharge with patient and caregiver     Problem: Pain  Goal: Verbalizes/displays adequate comfort level or baseline comfort level  Outcome: Progressing     Problem: Safety - Adult  Goal: Free from fall injury  Outcome: Progressing     Problem: ABCDS Injury Assessment  Goal: Absence of physical injury  Outcome: Progressing

## 2025-03-20 NOTE — PROGRESS NOTES
Perfect serve message sent to [facility], Madeleine Loo NP, r/t persistent tachycardia.

## 2025-03-21 ENCOUNTER — APPOINTMENT (OUTPATIENT)
Dept: CT IMAGING | Age: 35
End: 2025-03-21
Payer: MEDICAID

## 2025-03-21 LAB
AFP SERPL-MCNC: 3.4 UG/L
ALBUMIN SERPL-MCNC: 3.7 G/DL (ref 3.5–5.2)
ALP SERPL-CCNC: 100 U/L (ref 35–104)
ALT SERPL-CCNC: 62 U/L (ref 0–32)
ANION GAP SERPL CALCULATED.3IONS-SCNC: 15 MMOL/L (ref 7–16)
AST SERPL-CCNC: 41 U/L (ref 0–31)
BILIRUB DIRECT SERPL-MCNC: <0.2 MG/DL (ref 0–0.3)
BILIRUB INDIRECT SERPL-MCNC: ABNORMAL MG/DL (ref 0–1)
BILIRUB SERPL-MCNC: 0.5 MG/DL (ref 0–1.2)
BUN SERPL-MCNC: 5 MG/DL (ref 6–20)
CALCIUM SERPL-MCNC: 9.3 MG/DL (ref 8.6–10.2)
CHLORIDE SERPL-SCNC: 101 MMOL/L (ref 98–107)
CO2 SERPL-SCNC: 19 MMOL/L (ref 22–29)
CREAT SERPL-MCNC: 0.5 MG/DL (ref 0.5–1)
GFR, ESTIMATED: >90 ML/MIN/1.73M2
GLUCOSE BLD-MCNC: 117 MG/DL (ref 74–99)
GLUCOSE BLD-MCNC: 179 MG/DL (ref 74–99)
GLUCOSE SERPL-MCNC: 122 MG/DL (ref 74–99)
LABORATORY REPORT: NORMAL
MAGNESIUM SERPL-MCNC: 1.1 MG/DL (ref 1.6–2.6)
PETH INTERPRETATION: NORMAL
PLPETH BLD-MCNC: 639 NG/ML
POPETH BLD-MCNC: 1209 NG/ML
POTASSIUM SERPL-SCNC: 3.5 MMOL/L (ref 3.5–5)
PROT SERPL-MCNC: 6.5 G/DL (ref 6.4–8.3)
SODIUM SERPL-SCNC: 135 MMOL/L (ref 132–146)

## 2025-03-21 PROCEDURE — 36415 COLL VENOUS BLD VENIPUNCTURE: CPT

## 2025-03-21 PROCEDURE — 2500000003 HC RX 250 WO HCPCS

## 2025-03-21 PROCEDURE — 82248 BILIRUBIN DIRECT: CPT

## 2025-03-21 PROCEDURE — 80053 COMPREHEN METABOLIC PANEL: CPT

## 2025-03-21 PROCEDURE — 6370000000 HC RX 637 (ALT 250 FOR IP): Performed by: NURSE PRACTITIONER

## 2025-03-21 PROCEDURE — 2500000003 HC RX 250 WO HCPCS: Performed by: NURSE PRACTITIONER

## 2025-03-21 PROCEDURE — 6360000004 HC RX CONTRAST MEDICATION: Performed by: RADIOLOGY

## 2025-03-21 PROCEDURE — 6370000000 HC RX 637 (ALT 250 FOR IP): Performed by: INTERNAL MEDICINE

## 2025-03-21 PROCEDURE — 6370000000 HC RX 637 (ALT 250 FOR IP)

## 2025-03-21 PROCEDURE — 2500000003 HC RX 250 WO HCPCS: Performed by: INTERNAL MEDICINE

## 2025-03-21 PROCEDURE — 71275 CT ANGIOGRAPHY CHEST: CPT

## 2025-03-21 PROCEDURE — 83735 ASSAY OF MAGNESIUM: CPT

## 2025-03-21 PROCEDURE — 2140000000 HC CCU INTERMEDIATE R&B

## 2025-03-21 PROCEDURE — 6360000002 HC RX W HCPCS

## 2025-03-21 PROCEDURE — 6360000002 HC RX W HCPCS: Performed by: NURSE PRACTITIONER

## 2025-03-21 PROCEDURE — 99231 SBSQ HOSP IP/OBS SF/LOW 25: CPT | Performed by: NURSE PRACTITIONER

## 2025-03-21 PROCEDURE — 99232 SBSQ HOSP IP/OBS MODERATE 35: CPT | Performed by: INTERNAL MEDICINE

## 2025-03-21 PROCEDURE — 82962 GLUCOSE BLOOD TEST: CPT

## 2025-03-21 RX ORDER — IOPAMIDOL 755 MG/ML
75 INJECTION, SOLUTION INTRAVASCULAR
Status: COMPLETED | OUTPATIENT
Start: 2025-03-21 | End: 2025-03-21

## 2025-03-21 RX ORDER — DILTIAZEM HYDROCHLORIDE 120 MG/1
360 CAPSULE, COATED, EXTENDED RELEASE ORAL DAILY
Status: DISCONTINUED | OUTPATIENT
Start: 2025-03-22 | End: 2025-03-22 | Stop reason: HOSPADM

## 2025-03-21 RX ORDER — MAGNESIUM SULFATE IN WATER 40 MG/ML
2000 INJECTION, SOLUTION INTRAVENOUS PRN
Status: DISCONTINUED | OUTPATIENT
Start: 2025-03-21 | End: 2025-03-22 | Stop reason: HOSPADM

## 2025-03-21 RX ORDER — SODIUM CHLORIDE 0.9 % (FLUSH) 0.9 %
10 SYRINGE (ML) INJECTION
Status: DISCONTINUED | OUTPATIENT
Start: 2025-03-21 | End: 2025-03-22 | Stop reason: HOSPADM

## 2025-03-21 RX ORDER — GABAPENTIN 100 MG/1
200 CAPSULE ORAL 3 TIMES DAILY
Status: DISCONTINUED | OUTPATIENT
Start: 2025-03-21 | End: 2025-03-22 | Stop reason: HOSPADM

## 2025-03-21 RX ADMIN — METOPROLOL TARTRATE 5 MG: 5 INJECTION, SOLUTION INTRAVENOUS at 03:47

## 2025-03-21 RX ADMIN — GABAPENTIN 200 MG: 100 CAPSULE ORAL at 21:01

## 2025-03-21 RX ADMIN — IOPAMIDOL 75 ML: 755 INJECTION, SOLUTION INTRAVENOUS at 09:07

## 2025-03-21 RX ADMIN — MAGNESIUM SULFATE HEPTAHYDRATE 2000 MG: 40 INJECTION, SOLUTION INTRAVENOUS at 17:21

## 2025-03-21 RX ADMIN — LORAZEPAM 0.5 MG: 0.5 TABLET ORAL at 22:09

## 2025-03-21 RX ADMIN — SUCRALFATE 1 G: 1 TABLET ORAL at 21:01

## 2025-03-21 RX ADMIN — SODIUM CHLORIDE, PRESERVATIVE FREE 10 ML: 5 INJECTION INTRAVENOUS at 08:34

## 2025-03-21 RX ADMIN — PANTOPRAZOLE SODIUM 40 MG: 40 TABLET, DELAYED RELEASE ORAL at 05:34

## 2025-03-21 RX ADMIN — MAGNESIUM SULFATE HEPTAHYDRATE 2000 MG: 40 INJECTION, SOLUTION INTRAVENOUS at 19:43

## 2025-03-21 RX ADMIN — ENOXAPARIN SODIUM 40 MG: 100 INJECTION SUBCUTANEOUS at 08:33

## 2025-03-21 RX ADMIN — GABAPENTIN 200 MG: 100 CAPSULE ORAL at 15:00

## 2025-03-21 RX ADMIN — DILTIAZEM HYDROCHLORIDE 240 MG: 120 CAPSULE, EXTENDED RELEASE ORAL at 08:33

## 2025-03-21 RX ADMIN — HYDROCODONE BITARTRATE AND ACETAMINOPHEN 1 TABLET: 7.5; 325 TABLET ORAL at 19:38

## 2025-03-21 RX ADMIN — HYDROCODONE BITARTRATE AND ACETAMINOPHEN 1 TABLET: 7.5; 325 TABLET ORAL at 06:20

## 2025-03-21 RX ADMIN — IBUPROFEN 400 MG: 200 TABLET, FILM COATED ORAL at 11:01

## 2025-03-21 RX ADMIN — HYDROCODONE BITARTRATE AND ACETAMINOPHEN 1 TABLET: 7.5; 325 TABLET ORAL at 12:49

## 2025-03-21 RX ADMIN — HYDROCODONE BITARTRATE AND ACETAMINOPHEN 1 TABLET: 7.5; 325 TABLET ORAL at 00:03

## 2025-03-21 RX ADMIN — SUCRALFATE 1 G: 1 TABLET ORAL at 08:33

## 2025-03-21 RX ADMIN — Medication 100 MG: at 08:33

## 2025-03-21 RX ADMIN — IBUPROFEN 400 MG: 200 TABLET, FILM COATED ORAL at 03:47

## 2025-03-21 RX ADMIN — GABAPENTIN 300 MG: 300 CAPSULE ORAL at 08:33

## 2025-03-21 RX ADMIN — METOPROLOL TARTRATE 5 MG: 5 INJECTION, SOLUTION INTRAVENOUS at 12:49

## 2025-03-21 RX ADMIN — FERROUS SULFATE TAB 325 MG (65 MG ELEMENTAL FE) 325 MG: 325 (65 FE) TAB at 08:33

## 2025-03-21 RX ADMIN — LORAZEPAM 0.5 MG: 0.5 TABLET ORAL at 11:07

## 2025-03-21 RX ADMIN — FOLIC ACID 1 MG: 1 TABLET ORAL at 08:33

## 2025-03-21 RX ADMIN — MICONAZOLE NITRATE: 20.6 POWDER TOPICAL at 21:06

## 2025-03-21 RX ADMIN — SUCRALFATE 1 G: 1 TABLET ORAL at 15:00

## 2025-03-21 RX ADMIN — SODIUM CHLORIDE, PRESERVATIVE FREE 10 ML: 5 INJECTION INTRAVENOUS at 21:01

## 2025-03-21 ASSESSMENT — PAIN DESCRIPTION - ORIENTATION
ORIENTATION: RIGHT;LEFT
ORIENTATION: LEFT;RIGHT
ORIENTATION: RIGHT;LEFT
ORIENTATION: RIGHT;LEFT

## 2025-03-21 ASSESSMENT — PAIN DESCRIPTION - LOCATION
LOCATION: FOOT

## 2025-03-21 ASSESSMENT — PAIN SCALES - GENERAL
PAINLEVEL_OUTOF10: 6
PAINLEVEL_OUTOF10: 8
PAINLEVEL_OUTOF10: 3
PAINLEVEL_OUTOF10: 7
PAINLEVEL_OUTOF10: 5
PAINLEVEL_OUTOF10: 8
PAINLEVEL_OUTOF10: 9
PAINLEVEL_OUTOF10: 8
PAINLEVEL_OUTOF10: 5
PAINLEVEL_OUTOF10: 9

## 2025-03-21 ASSESSMENT — PAIN DESCRIPTION - DESCRIPTORS
DESCRIPTORS: ACHING;NUMBNESS;SORE
DESCRIPTORS: ACHING;NUMBNESS;SORE
DESCRIPTORS: DISCOMFORT;ACHING;NUMBNESS

## 2025-03-21 ASSESSMENT — PAIN - FUNCTIONAL ASSESSMENT
PAIN_FUNCTIONAL_ASSESSMENT: PREVENTS OR INTERFERES SOME ACTIVE ACTIVITIES AND ADLS
PAIN_FUNCTIONAL_ASSESSMENT: PREVENTS OR INTERFERES SOME ACTIVE ACTIVITIES AND ADLS
PAIN_FUNCTIONAL_ASSESSMENT: PREVENTS OR INTERFERES WITH ALL ACTIVE AND SOME PASSIVE ACTIVITIES

## 2025-03-21 ASSESSMENT — PAIN SCALES - WONG BAKER: WONGBAKER_NUMERICALRESPONSE: NO HURT

## 2025-03-21 NOTE — CARE COORDINATION
3/21:  Update CM Note:  Pt presented to the Er for tingling in both feet from home.  Pt is on room air at 98%, Iv Apresoline PRN, Iv Lopressor & Sq Lovenox.  GI cleared.  Per Cardiology changed Norvac to Cardizem, 2 D echo once HR < 100.  CT Scan needed.  Pt's dc plan is home with family to transport.  If family unable to transport [facility] offers transportation at [phone].  Pt denied any [facility] support.  Sw/CM will continue to follow.  Electronically signed by Aminta Rodriguez RN on 3/21/2025 at 10:50 AM

## 2025-03-21 NOTE — PROGRESS NOTES
[facility]                                Progress note    Subjective:    Resting comfortably in bed  States that she did not like the increased dose of gabapentin    Objective:    BP (!) 155/115   Pulse (!) 120   Temp 98.1 °F (36.7 °C) (Temporal)   Resp 18   Ht 1.701 m (5' 6.97\")   Wt 60.5 kg (133 lb 6.1 oz)   SpO2 100%   BMI 20.91 kg/m²     In: 1730 [P.O.:1320; I.V.:410]  Out: -   In: 1730   Out: -     General appearance: Fatigued  HEENT: AT/NC, MMM  Neck: FROM, supple  Lungs: Clear to auscultation  CV: Tachycardic, no MRGs  Vasc: Radial pulses 2+  Abdomen: Soft, non-tender; no masses or HSM  Extremities: No peripheral edema or digital cyanosis  Skin: no rash, lesions or ulcers  Psych: Alert and oriented to person, place and time  Neuro: Alert and interactive     Recent Labs     03/19/25 0451 03/20/25  0433   WBC 7.6 5.6   HGB 9.2* 9.5*   HCT 27.2* 28.4*    217       Recent Labs     03/19/25  0451 03/20/25  0433 03/21/25  0639    139 135   K 3.7 3.7 3.5    104 101   CO2 21* 18* 19*   BUN 5* 6 5*   CREATININE 0.4* 0.5 0.5   CALCIUM 8.6 9.0 9.3       Assessment:    Principal Problem:    Alcohol abuse with withdrawal, unspecified (HCC)  Active Problems:    Tachycardia    Uncontrolled hypertension    Elevated liver enzymes    Pelvic ascites    Hyperglycemia    Alcoholic liver disease    Heart murmur    Hypokalemia  Resolved Problems:    * No resolved hospital problems. *      Plan:    Patient is a 35-year-old female admitted to [facility] for  Alcohol abuse with withdrawal  -Unable to have paracentesis yesterday due to minimal fluid noted on ultrasound  -Improving LFTs  -Continue IVF NS at 100  -Tolerating p.o. intake  -Patient has been worked up before for tachycardia and appears to be a chronic issue  -Continue to monitor vital signs  -May reconsult cardiology if no real significant improvement in heart rates tomorrow  -Had been on lopressor in the past by previous

## 2025-03-21 NOTE — PROGRESS NOTES
Palpitations/arrhythmias  3/26/2012 Holter monitor: No events are noted.  Basic rhythm is sinus in origin.  Sinus rhythm is seen.  No ventricular beats are seen.  A rare supraventricular beat is seen and single beat form.  No positives are noted.  3/26/2012 TTE (Dr. Montgomery): EF 65%.  Normal LV size and function.  No evidence of mitral valve prolapse or pericardial effusion.  11/22/2022 - 12/6/2022 ZIO XT event monitor: Patient had minimum HR of 70 bpm, maximum heart rate of 164 bpm, average  bpm.  Predominant underlying rhythm was sinus rhythm.  Rare PACs/PVCs.  No symptoms reported       Impression  Sinus tachycardia  Alcohol withdrawal  Uncontrolled hypertension  History of PE-saddle  History of DVT  Anemia     Recommendations:  Overall, appears stable and euvolemic from a cardiovascular standpoint.  Sinus tachycardia appears to be secondary to ongoing issues  Switched Norvasc to Cardizem.  Increase dose to 360  D-dimer elevated.  CT negative for PE.  Troponin negative  Echocardiogram as above.  Preserved biventricular systolic function.  Normal RV function.  No evidence of strain.  Normal diastolic function  Alcohol cessation counseling provided.  She expressed understanding.  Otherwise stable from a cardiac standpoint.  Outpatient follow-up on an as-needed basis.  Please call with any questions or concerns      Vineet Soliz MD, [facility]    NOTE: This report was transcribed using voice recognition software. Every effort was made to ensure accuracy; however, inadvertent computerized transcription errors may be present.

## 2025-03-21 NOTE — PROGRESS NOTES
Marcelo Murphy, DO   10 mg at 03/19/25 1216    HYDROcodone-acetaminophen (NORCO) 7.5-325 MG per tablet 1 tablet  1 tablet Oral Q6H PRN Marcelo Dunn DO   1 tablet at 03/21/25 1249    insulin lispro (HUMALOG,ADMELOG) injection vial 0-4 Units  0-4 Units SubCUTAneous 4x Daily AC & HS Marcelo Dunn, DO        glucose chewable tablet 16 g  4 tablet Oral PRN Marcelo Dunn, DO        dextrose bolus 10% 125 mL  125 mL IntraVENous PRN Marcelo Dunn, DO        Or    dextrose bolus 10% 250 mL  250 mL IntraVENous PRN Marcelo Dunn, DO        glucagon injection 1 mg  1 mg SubCUTAneous PRN Marcelo Dunn,         dextrose 10 % infusion   IntraVENous Continuous PRN Marcelo Dunn, DO        dextrose 10 % infusion   IntraVENous Continuous PRN Marcelo Dunn, DO        ibuprofen (ADVIL;MOTRIN) tablet 400 mg  400 mg Oral Q6H PRN Porsche Martinez APRN - CNP   400 mg at 03/21/25 1101    ferrous sulfate (IRON 325) tablet 325 mg  325 mg Oral Daily with breakfast Porsche Martinez APRN - CNP   325 mg at 03/21/25 0833    pantoprazole (PROTONIX) tablet 40 mg  40 mg Oral QAM AC Jacquelyn Martinezsa DEVAN, APRN - CNP   40 mg at 03/21/25 0534    sucralfate (CARAFATE) tablet 1 g  1 g Oral TID Porsche Martinez APRN - CNP   1 g at 03/21/25 0833    vitamin D (ERGOCALCIFEROL) capsule 50,000 Units  50,000 Units Oral Weekly Jacquelyn Martinezsa DEVAN, APRN - CNP   50,000 Units at 03/17/25 0925    sodium chloride flush 0.9 % injection 5-40 mL  5-40 mL IntraVENous 2 times per day Porsche Martinez, APRN - CNP   10 mL at 03/21/25 0834    sodium chloride flush 0.9 % injection 10 mL  10 mL IntraVENous PRN Porsche Martinez APRN - CNP        0.9 % sodium chloride infusion   IntraVENous PRN Porsche Martinez APRN - CNP        enoxaparin (LOVENOX) injection 40 mg  40 mg SubCUTAneous Daily Porsche Martinez APRN - CNP   40 mg at 03/21/25 0833    ondansetron (ZOFRAN-ODT) disintegrating tablet 4 mg  4 mg Oral Q8H PRN Porsche Martinez,  APRN - CNP        Or    ondansetron (ZOFRAN) injection 4 mg  4 mg IntraVENous Q6H PRN Porsche Martinez R, APRN - CNP        polyethylene glycol (GLYCOLAX) packet 17 g  17 g Oral Daily PRN Porshce Martinez, APRN - CNP        folic acid (FOLVITE) tablet 1 mg  1 mg Oral Daily Marcelo Dunn, DO   1 mg at 03/21/25 0833    sodium chloride flush 0.9 % injection 5-40 mL  5-40 mL IntraVENous 2 times per day Porsche Martinez APRN - CNP   10 mL at 03/16/25 1214    sodium chloride flush 0.9 % injection 5-40 mL  5-40 mL IntraVENous PRN Porsche Martinez, AMERICO - CNP        thiamine tablet 100 mg  100 mg Oral Daily Porsche Martinez R, APRN - CNP   100 mg at 03/21/25 0833         REVIEW OF SYSTEMS:   General: Patient denies n/v/f/c or weight loss.  HEENT: Patient denies persistent postnasal drip, scleral icterus, drooling, persistent bleeding from nose/mouth.  Resp: Patient denies SOB, wheezing, productive cough.  Cardio: Patient denies CP, palpitations, significant edema  GI: As above.  Derm: Patient denies jaundice/rashes.   Misc: Patient denies diffuse/irregular joint swelling or myalgias.      PHYSICAL EXAMINATION:   Vitals:    03/21/25 0620 03/21/25 0800 03/21/25 1115 03/21/25 1249   BP:  (!) 161/106 122/84    Pulse:  (!) 122 (!) 122 (!) 136   Resp: 18 16 16 18   Temp:  98.6 °F (37 °C) 98.1 °F (36.7 °C)    TempSrc:  Temporal Temporal    SpO2:   97%    Weight:       Height:         General: Overall well-appearing, NAD  HEENT: PERRLA, EOMI, Anicteric sclera, MMM, no rhinorrhea  Cards: RRR, no LE edema  Resp: Breathing comfortably, good air movement, no use of accessory muscles, no audible wheezing  Abdomen: soft, non-tender, non-distended  Extremities: Moves all extremities, no effusions or bruising.  Skin: No rashes or jaundice  Neuro: A&O x 3, CN grossly intact, non-focal exam    US Result (most recent):  US ABDOMEN LIMITED 03/15/2025    Narrative  EXAMINATION:  RIGHT UPPER QUADRANT ULTRASOUND    3/15/2025 1:23

## 2025-03-21 NOTE — PLAN OF CARE
Problem: Chronic Conditions and Co-morbidities  Goal: Patient's chronic conditions and co-morbidity symptoms are monitored and maintained or improved  Outcome: Progressing     Problem: Discharge Planning  Goal: Discharge to home or other facility with appropriate resources  3/21/2025 1257 by Jenny Akhtar RN  Outcome: Progressing     Problem: Pain  Goal: Verbalizes/displays adequate comfort level or baseline comfort level  3/21/2025 1257 by Jenny Akhtar RN  Outcome: Progressing     Problem: Safety - Adult  Goal: Free from fall injury  Outcome: Progressing  Flowsheets (Taken 3/21/2025 1256)  Free From Fall Injury: Instruct family/caregiver on patient safety     Problem: ABCDS Injury Assessment  Goal: Absence of physical injury  Outcome: Progressing  Flowsheets (Taken 3/21/2025 1256)  Absence of Physical Injury: Implement safety measures based on patient assessment

## 2025-03-22 VITALS
SYSTOLIC BLOOD PRESSURE: 132 MMHG | DIASTOLIC BLOOD PRESSURE: 102 MMHG | RESPIRATION RATE: 18 BRPM | HEIGHT: 67 IN | WEIGHT: 133.38 LBS | TEMPERATURE: 98.2 F | OXYGEN SATURATION: 99 % | HEART RATE: 106 BPM | BODY MASS INDEX: 20.93 KG/M2

## 2025-03-22 LAB
ALBUMIN SERPL-MCNC: 4 G/DL (ref 3.5–5.2)
ALP SERPL-CCNC: 96 U/L (ref 35–104)
ALT SERPL-CCNC: 51 U/L (ref 0–32)
ANION GAP SERPL CALCULATED.3IONS-SCNC: 15 MMOL/L (ref 7–16)
AST SERPL-CCNC: 36 U/L (ref 0–31)
BILIRUB DIRECT SERPL-MCNC: <0.2 MG/DL (ref 0–0.3)
BILIRUB INDIRECT SERPL-MCNC: ABNORMAL MG/DL (ref 0–1)
BILIRUB SERPL-MCNC: 0.3 MG/DL (ref 0–1.2)
BUN SERPL-MCNC: 6 MG/DL (ref 6–20)
CALCIUM SERPL-MCNC: 8.9 MG/DL (ref 8.6–10.2)
CHLORIDE SERPL-SCNC: 102 MMOL/L (ref 98–107)
CO2 SERPL-SCNC: 20 MMOL/L (ref 22–29)
CREAT SERPL-MCNC: 0.5 MG/DL (ref 0.5–1)
GFR, ESTIMATED: >90 ML/MIN/1.73M2
GLUCOSE SERPL-MCNC: 117 MG/DL (ref 74–99)
MAGNESIUM SERPL-MCNC: 2 MG/DL (ref 1.6–2.6)
POTASSIUM SERPL-SCNC: 3.5 MMOL/L (ref 3.5–5)
PROT SERPL-MCNC: 6.3 G/DL (ref 6.4–8.3)
SODIUM SERPL-SCNC: 137 MMOL/L (ref 132–146)

## 2025-03-22 PROCEDURE — 6370000000 HC RX 637 (ALT 250 FOR IP): Performed by: INTERNAL MEDICINE

## 2025-03-22 PROCEDURE — 80053 COMPREHEN METABOLIC PANEL: CPT

## 2025-03-22 PROCEDURE — 6370000000 HC RX 637 (ALT 250 FOR IP): Performed by: NURSE PRACTITIONER

## 2025-03-22 PROCEDURE — 6360000002 HC RX W HCPCS

## 2025-03-22 PROCEDURE — 36415 COLL VENOUS BLD VENIPUNCTURE: CPT

## 2025-03-22 PROCEDURE — 83735 ASSAY OF MAGNESIUM: CPT

## 2025-03-22 PROCEDURE — 82248 BILIRUBIN DIRECT: CPT

## 2025-03-22 PROCEDURE — 6370000000 HC RX 637 (ALT 250 FOR IP)

## 2025-03-22 PROCEDURE — 2500000003 HC RX 250 WO HCPCS

## 2025-03-22 RX ORDER — CARVEDILOL 6.25 MG/1
6.25 TABLET ORAL 2 TIMES DAILY WITH MEALS
Status: DISCONTINUED | OUTPATIENT
Start: 2025-03-22 | End: 2025-03-22 | Stop reason: HOSPADM

## 2025-03-22 RX ORDER — FOLIC ACID 1 MG/1
1 TABLET ORAL DAILY
Qty: 30 TABLET | Refills: 3 | Status: SHIPPED | OUTPATIENT
Start: 2025-03-23

## 2025-03-22 RX ORDER — DILTIAZEM HYDROCHLORIDE 360 MG/1
360 CAPSULE, EXTENDED RELEASE ORAL DAILY
Qty: 30 CAPSULE | Refills: 0 | Status: SHIPPED | OUTPATIENT
Start: 2025-03-23

## 2025-03-22 RX ORDER — LANOLIN ALCOHOL/MO/W.PET/CERES
100 CREAM (GRAM) TOPICAL DAILY
Qty: 30 TABLET | Refills: 3 | Status: SHIPPED | OUTPATIENT
Start: 2025-03-23

## 2025-03-22 RX ORDER — HYDROCODONE BITARTRATE AND ACETAMINOPHEN 5; 325 MG/1; MG/1
1 TABLET ORAL EVERY 8 HOURS PRN
Qty: 3 TABLET | Refills: 0 | Status: SHIPPED | OUTPATIENT
Start: 2025-03-22 | End: 2025-03-23

## 2025-03-22 RX ORDER — CARVEDILOL 6.25 MG/1
6.25 TABLET ORAL 2 TIMES DAILY WITH MEALS
Qty: 60 TABLET | Refills: 0 | Status: SHIPPED | OUTPATIENT
Start: 2025-03-23

## 2025-03-22 RX ORDER — GABAPENTIN 100 MG/1
200 CAPSULE ORAL 3 TIMES DAILY
Qty: 180 CAPSULE | Refills: 0 | Status: SHIPPED | OUTPATIENT
Start: 2025-03-22 | End: 2025-04-21

## 2025-03-22 RX ADMIN — FOLIC ACID 1 MG: 1 TABLET ORAL at 08:01

## 2025-03-22 RX ADMIN — SUCRALFATE 1 G: 1 TABLET ORAL at 14:01

## 2025-03-22 RX ADMIN — MICONAZOLE NITRATE: 20.6 POWDER TOPICAL at 08:02

## 2025-03-22 RX ADMIN — HYDROCODONE BITARTRATE AND ACETAMINOPHEN 1 TABLET: 7.5; 325 TABLET ORAL at 14:01

## 2025-03-22 RX ADMIN — SODIUM CHLORIDE, PRESERVATIVE FREE 10 ML: 5 INJECTION INTRAVENOUS at 08:04

## 2025-03-22 RX ADMIN — FERROUS SULFATE TAB 325 MG (65 MG ELEMENTAL FE) 325 MG: 325 (65 FE) TAB at 07:59

## 2025-03-22 RX ADMIN — GABAPENTIN 200 MG: 100 CAPSULE ORAL at 08:00

## 2025-03-22 RX ADMIN — GABAPENTIN 200 MG: 100 CAPSULE ORAL at 14:02

## 2025-03-22 RX ADMIN — DILTIAZEM HYDROCHLORIDE 360 MG: 120 CAPSULE, EXTENDED RELEASE ORAL at 07:59

## 2025-03-22 RX ADMIN — PANTOPRAZOLE SODIUM 40 MG: 40 TABLET, DELAYED RELEASE ORAL at 05:43

## 2025-03-22 RX ADMIN — ENOXAPARIN SODIUM 40 MG: 100 INJECTION SUBCUTANEOUS at 08:01

## 2025-03-22 RX ADMIN — SUCRALFATE 1 G: 1 TABLET ORAL at 08:00

## 2025-03-22 RX ADMIN — CARVEDILOL 6.25 MG: 6.25 TABLET, FILM COATED ORAL at 14:02

## 2025-03-22 RX ADMIN — HYDROCODONE BITARTRATE AND ACETAMINOPHEN 1 TABLET: 7.5; 325 TABLET ORAL at 01:52

## 2025-03-22 RX ADMIN — Medication 100 MG: at 08:00

## 2025-03-22 RX ADMIN — HYDROCODONE BITARTRATE AND ACETAMINOPHEN 1 TABLET: 7.5; 325 TABLET ORAL at 08:00

## 2025-03-22 ASSESSMENT — PAIN DESCRIPTION - LOCATION
LOCATION: FOOT

## 2025-03-22 ASSESSMENT — PAIN SCALES - WONG BAKER: WONGBAKER_NUMERICALRESPONSE: NO HURT

## 2025-03-22 ASSESSMENT — PAIN DESCRIPTION - ORIENTATION
ORIENTATION: RIGHT;LEFT

## 2025-03-22 ASSESSMENT — PAIN SCALES - GENERAL
PAINLEVEL_OUTOF10: 8
PAINLEVEL_OUTOF10: 8

## 2025-03-22 NOTE — PROGRESS NOTES
Spiritual Health History and Assessment/Progress Note  [facility]    (P) Initial Encounter, Spiritual/Emotional Needs,  ,  ,      Name: Vanessa Corley MRN: [id number]    Age: 35 y.o.     Sex: female   Language: English   Jainism: Religious   Alcohol abuse with withdrawal, unspecified (HCC)     Date: 3/22/2025                           Spiritual Assessment began in [facility]        Referral/Consult From: (P) Rounding   Encounter Overview/Reason: (P) Initial Encounter, Spiritual/Emotional Needs  Service Provided For: (P) Patient    Charla, Belief, Meaning:   Patient identifies as spiritual, is connected with a charla tradition or spiritual practice, and has beliefs or practices that help with coping during difficult times  Family/Friends No family/friends present      Importance and Influence:  Patient has spiritual/personal beliefs that influence decisions regarding their health  Family/Friends No family/friends present    Community:  Patient feels well-supported. Support system includes: Spouse/Partner, Parent/s, Children, and Friends  Family/Friends No family/friends present    Assessment and Plan of Care:     Patient Interventions include: Facilitated expression of thoughts and feelings, Explored spiritual coping/struggle/distress, Engaged in theological reflection, Affirmed coping skills/support systems, and Facilitated life review and/ or legacy  Family/Friends Interventions include: No family/friends present    Patient Plan of Care: Other: The  will follow as needed.  Family/Friends Plan of Care: No family/friends present    Electronically signed by Chaplain Rashida on 3/22/2025 at 3:13 PM

## 2025-03-22 NOTE — PLAN OF CARE
Problem: Discharge Planning  Goal: Discharge to home or other facility with appropriate resources  3/22/2025 0520 by Andrew Dominguez RN  Outcome: Progressing  3/22/2025 0519 by Andrew Dominguez RN  Outcome: Progressing     Problem: Pain  Goal: Verbalizes/displays adequate comfort level or baseline comfort level  3/22/2025 0520 by Andrew Dominguez RN  Outcome: Progressing  3/22/2025 0519 by Andrew Dominguez RN  Outcome: Progressing     Problem: Safety - Adult  Goal: Free from fall injury  3/22/2025 0520 by Andrew Dominguez RN  Outcome: Progressing  3/22/2025 0519 by Andrew Dominguez RN  Outcome: Progressing     Problem: Cardiovascular - Adult  Goal: Maintains optimal cardiac output and hemodynamic stability  Outcome: Progressing

## 2025-03-22 NOTE — PLAN OF CARE
Problem: Chronic Conditions and Co-morbidities  Goal: Patient's chronic conditions and co-morbidity symptoms are monitored and maintained or improved  Outcome: Progressing     Problem: Discharge Planning  Goal: Discharge to home or other facility with appropriate resources  3/22/2025 1138 by Jordy Sultana RN  Outcome: Progressing  3/22/2025 0520 by Andrew Dominguez RN  Outcome: Progressing  3/22/2025 0519 by Andrew Dominguez RN  Outcome: Progressing     Problem: Pain  Goal: Verbalizes/displays adequate comfort level or baseline comfort level  3/22/2025 1138 by Jordy Sultana RN  Outcome: Progressing  3/22/2025 0520 by Andrew Dominguez RN  Outcome: Progressing  3/22/2025 0519 by Andrew Dominguez RN  Outcome: Progressing     Problem: Safety - Adult  Goal: Free from fall injury  3/22/2025 1138 by Jordy Sultana RN  Outcome: Progressing  3/22/2025 0520 by Andrew Dominguez RN  Outcome: Progressing  3/22/2025 0519 by Andrew Dominguez RN  Outcome: Progressing     Problem: ABCDS Injury Assessment  Goal: Absence of physical injury  Outcome: Progressing     Problem: Cardiovascular - Adult  Goal: Maintains optimal cardiac output and hemodynamic stability  3/22/2025 1138 by Jordy Sultana RN  Outcome: Progressing  3/22/2025 0520 by Andrew Dominguez RN  Outcome: Progressing

## 2025-03-22 NOTE — PROGRESS NOTES
[facility]                                Progress note    Subjective:    Resting in bed  Getting anxious for discharge    Objective:    BP (!) 139/98   Pulse (!) 133   Temp 98.6 °F (37 °C) (Oral)   Resp 18   Ht 1.701 m (5' 6.97\")   Wt 60.5 kg (133 lb 6.1 oz)   SpO2 99%   BMI 20.91 kg/m²     In: 1080 [P.O.:1080]  Out: -   In: 1080   Out: -     General appearance: Fatigued  HEENT: AT/NC, MMM  Neck: FROM, supple  Lungs: Clear to auscultation  CV: Tachycardic, no MRGs  Vasc: Radial pulses 2+  Abdomen: Soft, non-tender; no masses or HSM  Extremities: No peripheral edema or digital cyanosis  Skin: no rash, lesions or ulcers  Psych: Alert and oriented to person, place and time  Neuro: Alert and interactive     Recent Labs     03/20/25  0433   WBC 5.6   HGB 9.5*   HCT 28.4*          Recent Labs     03/20/25  0433 03/21/25  0639 03/22/25  0429    135 137   K 3.7 3.5 3.5    101 102   CO2 18* 19* 20*   BUN 6 5* 6   CREATININE 0.5 0.5 0.5   CALCIUM 9.0 9.3 8.9       Assessment:    Principal Problem:    Alcohol abuse with withdrawal, unspecified (HCC)  Active Problems:    Tachycardia    Uncontrolled hypertension    Elevated liver enzymes    Pelvic ascites    Hyperglycemia    Alcoholic liver disease    Heart murmur    Hypokalemia  Resolved Problems:    * No resolved hospital problems. *      Plan:    Patient is a 35-year-old female admitted to [facility] for  Alcohol abuse with withdrawal  -Unable to have paracentesis yesterday due to minimal fluid noted on ultrasound  -Improving LFTs  -Continue IVF NS at 100  -Tolerating p.o. intake  -Patient has been worked up before for tachycardia and appears to be a chronic issue  -Continue to monitor vital signs  -May reconsult cardiology if no real significant improvement in heart rates tomorrow  -Had been on lopressor in the past by previous provider however not currently taking at home     3/20/25  -Continued improvement in LFTs  -Increase

## 2025-03-22 NOTE — DISCHARGE SUMMARY
[facility]   Discharge summary   Patient ID:  Vanessa Corley  [id number]  35 y.o.  1990    Admit date: 3/15/2025    Discharge date and time: 3/22/2025    Admission Diagnoses:   Patient Active Problem List   Diagnosis    H/O colostomy    Tachycardia    History of hypertension    History of blood transfusion    History of DVT (deep vein thrombosis)    History of pulmonary embolism    Low ferritin    Hypothyroxinemia    Low vitamin B12 level    Anemia    Abdominal wall hernia    Uncontrolled hypertension    Left upper quadrant pain    Gastroesophageal reflux disease with esophagitis without hemorrhage    Borderline diabetes    Alcohol abuse with withdrawal, unspecified (HCC)    Elevated liver enzymes    Pelvic ascites    Hyperglycemia    Alcoholic liver disease    Heart murmur    Hypokalemia       Discharge Diagnoses: Alcohol withdrawal, elevated LFTs, cirrhosis, ascites, tachycardia    Consults: cardiology and GI    Procedures: none    Hospital Course: The patient is a 35 y.o. female of Ritu Loza PA-C     Patient is a 35-year-old female admitted to [facility] for  Alcohol abuse with withdrawal  -Unable to have paracentesis yesterday due to minimal fluid noted on ultrasound  -Improving LFTs  -Continue IVF NS at 100  -Tolerating p.o. intake  -Patient has been worked up before for tachycardia and appears to be a chronic issue  -Continue to monitor vital signs  -May reconsult cardiology if no real significant improvement in heart rates tomorrow  -Had been on lopressor in the past by previous provider however not currently taking at home      3/20/25  -Continued improvement in LFTs  -Increase gabapentin 300 3 times daily for neuropathy pain  -Cardiology consulted for persistent tachycardia  -Norvasc discontinued and started on Cardizem 240 daily per cardiology  -Echocardiogram pending  -Continue to monitor labs and vitals     3/21/25  -Started on p.o. Cardizem yesterday by cardiology with some  extended release tablet  Commonly known as: PROCARDIA XL               Where to Get Your Medications        These medications were sent to [id number] - [place], [place] - [address] - P [phone] - F [phone]  [address] [address]      Phone: [phone]   carvedilol 6.25 MG tablet  dilTIAZem 360 MG extended release capsule  folic acid 1 MG tablet  gabapentin 100 MG capsule  HYDROcodone-acetaminophen 5-325 MG per tablet  miconazole 2 % powder  thiamine 100 MG tablet       Activity: {discharge activity:95100}  Diet: {diet:63145}    Pt has been advised to:    Follow-up with Ritu Loza PA-C in 1 week.  Follow-up with consultants as recommended by them      Signed:  AMERICO Valentino CNP  3/22/2025  3:48 PM

## 2025-03-24 LAB
EKG ATRIAL RATE: 126 BPM
EKG P-R INTERVAL: 104 MS
EKG Q-T INTERVAL: 412 MS
EKG QRS DURATION: 78 MS
EKG QTC CALCULATION (BAZETT): 596 MS
EKG R AXIS: 83 DEGREES
EKG T AXIS: 39 DEGREES
EKG VENTRICULAR RATE: 126 BPM

## 2025-03-26 ENCOUNTER — TELEPHONE (OUTPATIENT)
Dept: PRIMARY CARE CLINIC | Age: 35
End: 2025-03-26

## 2025-03-26 NOTE — TELEPHONE ENCOUNTER
..  Care Transitions Initial Follow Up Call    Outreach made within 2 business days of discharge: Yes    Patient: Vanessa Corley Patient : 1990   MRN: [id number]  Reason for Admission: neuropathy   Discharge Date: 3/22/25       Spoke with: PATIENT    Discharge department/facility: [facility] Patient Contact:  Was patient able to fill all prescriptions: Yes  Was patient instructed to bring all medications to the follow-up visit: Yes  Is patient taking all medications as directed in the discharge summary? Yes  Does patient understand their discharge instructions: Yes  Does patient have questions or concerns that need addressed prior to 7-14 day follow up office visit: no    Additional needs identified to be addressed with provider  na             Scheduled appointment with PCP within 7-14 days    Follow Up  Future Appointments   Date Time Provider Department Center   3/31/2025  1:00 PM Ritu Loza PA-C TRAIL PC [facility] E[facility] DEP   2025  8:20 AM Ansley Carlson APRN - CNP [facility]   10/2/2025  9:40 AM Sonya Shea DO BD [facility] CTR [facility]       Ijeoma Brantley MA

## 2025-03-31 ENCOUNTER — OFFICE VISIT (OUTPATIENT)
Dept: PRIMARY CARE CLINIC | Age: 35
End: 2025-03-31
Payer: MEDICAID

## 2025-03-31 VITALS
TEMPERATURE: 97 F | HEIGHT: 67 IN | WEIGHT: 133 LBS | HEART RATE: 83 BPM | BODY MASS INDEX: 20.88 KG/M2 | SYSTOLIC BLOOD PRESSURE: 118 MMHG | OXYGEN SATURATION: 98 % | DIASTOLIC BLOOD PRESSURE: 88 MMHG | RESPIRATION RATE: 18 BRPM

## 2025-03-31 DIAGNOSIS — R74.8 ELEVATED LIVER ENZYMES: ICD-10-CM

## 2025-03-31 DIAGNOSIS — K70.9 ALCOHOLIC LIVER DISEASE: ICD-10-CM

## 2025-03-31 DIAGNOSIS — D64.9 ANEMIA, UNSPECIFIED TYPE: ICD-10-CM

## 2025-03-31 DIAGNOSIS — I10 ESSENTIAL HYPERTENSION: ICD-10-CM

## 2025-03-31 DIAGNOSIS — G62.1 ALCOHOLIC PERIPHERAL NEUROPATHY: ICD-10-CM

## 2025-03-31 DIAGNOSIS — F10.139 ALCOHOL ABUSE WITH WITHDRAWAL, UNSPECIFIED (HCC): Primary | ICD-10-CM

## 2025-03-31 DIAGNOSIS — Z09 HOSPITAL DISCHARGE FOLLOW-UP: ICD-10-CM

## 2025-03-31 DIAGNOSIS — R73.03 BORDERLINE DIABETES: ICD-10-CM

## 2025-03-31 PROBLEM — R10.12 LEFT UPPER QUADRANT PAIN: Status: RESOLVED | Noted: 2024-04-04 | Resolved: 2025-03-31

## 2025-03-31 PROCEDURE — 99214 OFFICE O/P EST MOD 30 MIN: CPT | Performed by: PHYSICIAN ASSISTANT

## 2025-03-31 PROCEDURE — 1111F DSCHRG MED/CURRENT MED MERGE: CPT | Performed by: PHYSICIAN ASSISTANT

## 2025-03-31 RX ORDER — KETOROLAC TROMETHAMINE 10 MG/1
10 TABLET, FILM COATED ORAL EVERY 6 HOURS PRN
Qty: 20 TABLET | Refills: 0 | Status: SHIPPED | OUTPATIENT
Start: 2025-03-31 | End: 2026-03-31

## 2025-03-31 ASSESSMENT — PATIENT HEALTH QUESTIONNAIRE - PHQ9
1. LITTLE INTEREST OR PLEASURE IN DOING THINGS: NOT AT ALL
SUM OF ALL RESPONSES TO PHQ QUESTIONS 1-9: 0
2. FEELING DOWN, DEPRESSED OR HOPELESS: NOT AT ALL
SUM OF ALL RESPONSES TO PHQ QUESTIONS 1-9: 0

## 2025-03-31 NOTE — PROGRESS NOTES
Post-Discharge Transitional Care  Follow Up      Vanessa Corley   YOB: 1990    Date of Office Visit:  3/31/2025  Date of Hospital Admission: 3/15/25  Date of Hospital Discharge: 3/22/25  Risk of hospital readmission (high >=14%. Medium >=10%) :Readmission Risk Score: 10.9      Care management risk score Rising risk (score 2-5) and Complex Care (Scores >=6): No Risk Score On File     Non face to face  following discharge, date last encounter closed (first attempt may have been earlier): 03/26/2025    Call initiated 2 business days of discharge: Yes    ASSESSMENT/PLAN:   Alcohol abuse with withdrawal, unspecified (HCC)  -no alcohol since 3/14  Alcoholic liver disease  Anemia, unspecified type  Elevated liver enzymes  Borderline diabetes  Essential hypertension  Hospital discharge follow-up  -     CA DISCHARGE MEDS RECONCILED W/ CURRENT OUTPATIENT MED LIST  Alcoholic peripheral neuropathy  -     ketorolac (TORADOL) 10 MG tablet; Take 1 tablet by mouth every 6 hours as needed for Pain, Disp-20 tablet, R-0Normal  -     Tiff Leroy DO, Pain Medicine, [place]      Medical Decision Making: high complexity  Return in about 4 weeks (around 4/28/2025).    On this date 3/31/2025 I have spent >30 minutes reviewing previous notes, test results and face to face with the patient discussing the diagnosis and importance of compliance with the treatment plan as well as documenting on the day of the visit.       Subjective:   HPI:  Follow up of Hospital problems/diagnosis(es):  Went to ER bc her feet were hurting. \"Kept me because my bp was high. My heart rate was high.\"  She has alcohol induced neuropathy. Increased gabapentin isnt helping.   Controlled Substance Monitoring:    Acute and Chronic Pain Monitoring:   RX Monitoring Periodic Controlled Substance Monitoring   3/31/2025   1:58 PM No signs of potential drug abuse or diversion identified.       Inpatient course: Discharge summary reviewed- see

## 2025-04-09 ENCOUNTER — OFFICE VISIT (OUTPATIENT)
Age: 35
End: 2025-04-09
Payer: MEDICAID

## 2025-04-09 ENCOUNTER — TELEPHONE (OUTPATIENT)
Dept: PRIMARY CARE CLINIC | Age: 35
End: 2025-04-09

## 2025-04-09 VITALS
BODY MASS INDEX: 18.68 KG/M2 | WEIGHT: 119 LBS | HEART RATE: 79 BPM | TEMPERATURE: 97 F | OXYGEN SATURATION: 100 % | HEIGHT: 67 IN | SYSTOLIC BLOOD PRESSURE: 134 MMHG | DIASTOLIC BLOOD PRESSURE: 90 MMHG

## 2025-04-09 DIAGNOSIS — R16.0 HEPATOMEGALY: ICD-10-CM

## 2025-04-09 DIAGNOSIS — K70.9 ALCOHOLIC LIVER DISEASE: ICD-10-CM

## 2025-04-09 DIAGNOSIS — K21.00 GASTROESOPHAGEAL REFLUX DISEASE WITH ESOPHAGITIS WITHOUT HEMORRHAGE: Primary | ICD-10-CM

## 2025-04-09 PROCEDURE — G8420 CALC BMI NORM PARAMETERS: HCPCS | Performed by: NURSE PRACTITIONER

## 2025-04-09 PROCEDURE — 99212 OFFICE O/P EST SF 10 MIN: CPT | Performed by: NURSE PRACTITIONER

## 2025-04-09 PROCEDURE — G8428 CUR MEDS NOT DOCUMENT: HCPCS | Performed by: NURSE PRACTITIONER

## 2025-04-09 PROCEDURE — 1036F TOBACCO NON-USER: CPT | Performed by: NURSE PRACTITIONER

## 2025-04-09 PROCEDURE — 3075F SYST BP GE 130 - 139MM HG: CPT | Performed by: NURSE PRACTITIONER

## 2025-04-09 PROCEDURE — 3080F DIAST BP >= 90 MM HG: CPT | Performed by: NURSE PRACTITIONER

## 2025-04-09 PROCEDURE — 1111F DSCHRG MED/CURRENT MED MERGE: CPT | Performed by: NURSE PRACTITIONER

## 2025-04-09 NOTE — TELEPHONE ENCOUNTER
Pt calling she is requesting an increase on her gabapentin current dose not helping. Also she is requesting blood work to check her sugar because she's was told before she was borderline diabetic

## 2025-04-09 NOTE — PROGRESS NOTES
Results   Component Value Date     03/22/2025    K 3.5 03/22/2025     03/22/2025    CO2 20 (L) 03/22/2025    BUN 6 03/22/2025    CREATININE 0.5 03/22/2025    GLUCOSE 117 (H) 03/22/2025    CALCIUM 8.9 03/22/2025    BILITOT 0.3 03/22/2025    ALKPHOS 96 03/22/2025    AST 36 (H) 03/22/2025    ALT 51 (H) 03/22/2025    LABGLOM >90 03/22/2025    GFRAA >60 09/26/2022             Assessment & Plan          ASSESSMENT/PLAN:    1. Gastroesophageal reflux disease with esophagitis without hemorrhage  -     Protime-INR; Future  -     Comprehensive Metabolic Panel; Future  -     CBC with Auto Differential; Future    -continue PPI as directed      2. Hepatomegaly  -     Protime-INR; Future  -     Comprehensive Metabolic Panel; Future  -     CBC with Auto Differential; Future    3. Alcoholic liver disease  -     Protime-INR; Future  -     Comprehensive Metabolic Panel; Future  -     CBC with Auto Differential; Future  -     US LIVER; Future    -continued avoidance of alcohol recommended.   -low fat, low sodium diet advised  -HCC surveillance with imaging every 6 months  -CMP, PT/INR, and CBC every 6 months  -patient will follow up with Dr. Horne.       Return for Follow up secured.    An electronic signature was used to authenticate this note.    --AMERICO Bailey - CNP

## 2025-04-11 ENCOUNTER — APPOINTMENT (OUTPATIENT)
Dept: ULTRASOUND IMAGING | Age: 35
End: 2025-04-11
Payer: MEDICAID

## 2025-04-11 ENCOUNTER — APPOINTMENT (OUTPATIENT)
Dept: GENERAL RADIOLOGY | Age: 35
End: 2025-04-11
Payer: MEDICAID

## 2025-04-11 ENCOUNTER — HOSPITAL ENCOUNTER (EMERGENCY)
Age: 35
Discharge: HOME | End: 2025-04-12
Payer: MEDICAID

## 2025-04-11 DIAGNOSIS — R42 DIZZINESS: ICD-10-CM

## 2025-04-11 DIAGNOSIS — R23.8 SKIN IRRITATION: ICD-10-CM

## 2025-04-11 DIAGNOSIS — G62.9 NEUROPATHY: Primary | ICD-10-CM

## 2025-04-11 LAB
ALBUMIN SERPL-MCNC: 4 G/DL (ref 3.5–5.2)
ALP SERPL-CCNC: 77 U/L (ref 35–104)
ALT SERPL-CCNC: 23 U/L (ref 0–32)
ANION GAP SERPL CALCULATED.3IONS-SCNC: 14 MMOL/L (ref 7–16)
AST SERPL-CCNC: 21 U/L (ref 0–31)
BASOPHILS # BLD: 0.03 K/UL (ref 0–0.2)
BASOPHILS NFR BLD: 0 % (ref 0–2)
BILIRUB SERPL-MCNC: 0.6 MG/DL (ref 0–1.2)
BUN SERPL-MCNC: 10 MG/DL (ref 6–20)
CALCIUM SERPL-MCNC: 9.4 MG/DL (ref 8.6–10.2)
CHLORIDE SERPL-SCNC: 103 MMOL/L (ref 98–107)
CO2 SERPL-SCNC: 19 MMOL/L (ref 22–29)
CREAT SERPL-MCNC: 0.7 MG/DL (ref 0.5–1)
EOSINOPHIL # BLD: 0.02 K/UL (ref 0.05–0.5)
EOSINOPHILS RELATIVE PERCENT: 0 % (ref 0–6)
ERYTHROCYTE [DISTWIDTH] IN BLOOD BY AUTOMATED COUNT: 14.6 % (ref 11.5–15)
GFR, ESTIMATED: >90 ML/MIN/1.73M2
GLUCOSE SERPL-MCNC: 102 MG/DL (ref 74–99)
HCG, URINE, POC: NEGATIVE
HCT VFR BLD AUTO: 36.4 % (ref 34–48)
HGB BLD-MCNC: 11.8 G/DL (ref 11.5–15.5)
IMM GRANULOCYTES # BLD AUTO: <0.03 K/UL (ref 0–0.58)
IMM GRANULOCYTES NFR BLD: 0 % (ref 0–5)
LYMPHOCYTES NFR BLD: 2.08 K/UL (ref 1.5–4)
LYMPHOCYTES RELATIVE PERCENT: 30 % (ref 20–42)
Lab: NORMAL
MAGNESIUM SERPL-MCNC: 1.9 MG/DL (ref 1.6–2.6)
MCH RBC QN AUTO: 31.7 PG (ref 26–35)
MCHC RBC AUTO-ENTMCNC: 32.4 G/DL (ref 32–34.5)
MCV RBC AUTO: 97.8 FL (ref 80–99.9)
MONOCYTES NFR BLD: 0.54 K/UL (ref 0.1–0.95)
MONOCYTES NFR BLD: 8 % (ref 2–12)
NEGATIVE QC PASS/FAIL: NORMAL
NEUTROPHILS NFR BLD: 62 % (ref 43–80)
NEUTS SEG NFR BLD: 4.35 K/UL (ref 1.8–7.3)
PLATELET # BLD AUTO: 319 K/UL (ref 130–450)
PMV BLD AUTO: 11.9 FL (ref 7–12)
POSITIVE QC PASS/FAIL: NORMAL
POTASSIUM SERPL-SCNC: 3.9 MMOL/L (ref 3.5–5)
PROT SERPL-MCNC: 7.1 G/DL (ref 6.4–8.3)
RBC # BLD AUTO: 3.72 M/UL (ref 3.5–5.5)
SODIUM SERPL-SCNC: 136 MMOL/L (ref 132–146)
TROPONIN I SERPL HS-MCNC: <6 NG/L (ref 0–9)
WBC OTHER # BLD: 7 K/UL (ref 4.5–11.5)

## 2025-04-11 PROCEDURE — 99285 EMERGENCY DEPT VISIT HI MDM: CPT

## 2025-04-11 PROCEDURE — 93970 EXTREMITY STUDY: CPT

## 2025-04-11 PROCEDURE — 6370000000 HC RX 637 (ALT 250 FOR IP): Performed by: NURSE PRACTITIONER

## 2025-04-11 PROCEDURE — 84484 ASSAY OF TROPONIN QUANT: CPT

## 2025-04-11 PROCEDURE — 73630 X-RAY EXAM OF FOOT: CPT

## 2025-04-11 PROCEDURE — 96361 HYDRATE IV INFUSION ADD-ON: CPT

## 2025-04-11 PROCEDURE — 96372 THER/PROPH/DIAG INJ SC/IM: CPT

## 2025-04-11 PROCEDURE — 2580000003 HC RX 258: Performed by: NURSE PRACTITIONER

## 2025-04-11 PROCEDURE — 80053 COMPREHEN METABOLIC PANEL: CPT

## 2025-04-11 PROCEDURE — 71045 X-RAY EXAM CHEST 1 VIEW: CPT

## 2025-04-11 PROCEDURE — 85025 COMPLETE CBC W/AUTO DIFF WBC: CPT

## 2025-04-11 PROCEDURE — 83735 ASSAY OF MAGNESIUM: CPT

## 2025-04-11 PROCEDURE — 6360000002 HC RX W HCPCS: Performed by: NURSE PRACTITIONER

## 2025-04-11 PROCEDURE — 93005 ELECTROCARDIOGRAM TRACING: CPT | Performed by: NURSE PRACTITIONER

## 2025-04-11 PROCEDURE — 96374 THER/PROPH/DIAG INJ IV PUSH: CPT

## 2025-04-11 RX ORDER — MORPHINE SULFATE 4 MG/ML
4 INJECTION, SOLUTION INTRAMUSCULAR; INTRAVENOUS ONCE
Status: COMPLETED | OUTPATIENT
Start: 2025-04-11 | End: 2025-04-11

## 2025-04-11 RX ORDER — HYDROCODONE BITARTRATE AND ACETAMINOPHEN 5; 325 MG/1; MG/1
1 TABLET ORAL EVERY 4 HOURS PRN
Qty: 8 TABLET | Refills: 0 | Status: SHIPPED | OUTPATIENT
Start: 2025-04-11 | End: 2025-04-14

## 2025-04-11 RX ORDER — ONDANSETRON 2 MG/ML
4 INJECTION INTRAMUSCULAR; INTRAVENOUS ONCE
Status: COMPLETED | OUTPATIENT
Start: 2025-04-11 | End: 2025-04-11

## 2025-04-11 RX ORDER — CARVEDILOL 6.25 MG/1
6.25 TABLET ORAL ONCE
Status: COMPLETED | OUTPATIENT
Start: 2025-04-11 | End: 2025-04-11

## 2025-04-11 RX ORDER — 0.9 % SODIUM CHLORIDE 0.9 %
1000 INTRAVENOUS SOLUTION INTRAVENOUS ONCE
Status: COMPLETED | OUTPATIENT
Start: 2025-04-11 | End: 2025-04-11

## 2025-04-11 RX ORDER — OXYCODONE AND ACETAMINOPHEN 5; 325 MG/1; MG/1
1 TABLET ORAL ONCE
Refills: 0 | Status: COMPLETED | OUTPATIENT
Start: 2025-04-11 | End: 2025-04-11

## 2025-04-11 RX ADMIN — OXYCODONE HYDROCHLORIDE AND ACETAMINOPHEN 1 TABLET: 5; 325 TABLET ORAL at 22:43

## 2025-04-11 RX ADMIN — MORPHINE SULFATE 4 MG: 4 INJECTION, SOLUTION INTRAMUSCULAR; INTRAVENOUS at 19:58

## 2025-04-11 RX ADMIN — CARVEDILOL 6.25 MG: 6.25 TABLET, FILM COATED ORAL at 23:02

## 2025-04-11 RX ADMIN — SODIUM CHLORIDE 1000 ML: 0.9 INJECTION, SOLUTION INTRAVENOUS at 19:54

## 2025-04-11 RX ADMIN — ONDANSETRON 4 MG: 2 INJECTION, SOLUTION INTRAMUSCULAR; INTRAVENOUS at 19:58

## 2025-04-11 ASSESSMENT — PAIN SCALES - GENERAL
PAINLEVEL_OUTOF10: 8
PAINLEVEL_OUTOF10: 8

## 2025-04-11 ASSESSMENT — PAIN - FUNCTIONAL ASSESSMENT: PAIN_FUNCTIONAL_ASSESSMENT: 0-10

## 2025-04-11 ASSESSMENT — PAIN DESCRIPTION - PAIN TYPE: TYPE: ACUTE PAIN;CHRONIC PAIN

## 2025-04-11 ASSESSMENT — PAIN DESCRIPTION - FREQUENCY: FREQUENCY: CONTINUOUS

## 2025-04-11 ASSESSMENT — PAIN DESCRIPTION - ORIENTATION
ORIENTATION: RIGHT;LEFT
ORIENTATION: RIGHT;LEFT

## 2025-04-11 ASSESSMENT — PAIN DESCRIPTION - LOCATION
LOCATION: FOOT
LOCATION: FOOT

## 2025-04-11 ASSESSMENT — PAIN DESCRIPTION - DESCRIPTORS: DESCRIPTORS: SHARP;BURNING
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
## 2025-04-12 VITALS
HEIGHT: 67 IN | RESPIRATION RATE: 18 BRPM | SYSTOLIC BLOOD PRESSURE: 142 MMHG | TEMPERATURE: 97.8 F | DIASTOLIC BLOOD PRESSURE: 92 MMHG | OXYGEN SATURATION: 95 % | WEIGHT: 123 LBS | HEART RATE: 98 BPM | BODY MASS INDEX: 19.3 KG/M2

## 2025-04-12 LAB
EKG ATRIAL RATE: 106 BPM
EKG P AXIS: 69 DEGREES
EKG P-R INTERVAL: 112 MS
EKG Q-T INTERVAL: 366 MS
EKG QRS DURATION: 78 MS
EKG QTC CALCULATION (BAZETT): 486 MS
EKG R AXIS: 81 DEGREES
EKG T AXIS: 51 DEGREES
EKG VENTRICULAR RATE: 106 BPM

## 2025-04-12 PROCEDURE — 93010 ELECTROCARDIOGRAM REPORT: CPT | Performed by: INTERNAL MEDICINE

## 2025-04-12 NOTE — DISCHARGE INSTRUCTIONS
Follow-up with primary care doctor also follow-up with podiatry regarding skin condition of peeling feet.  You were prescribed miconazole powder continue using that as well.

## 2025-04-12 NOTE — DISCHARGE INSTR - COC
Continuity of Care Form    Patient Name: Vanessa Corley   :  1990  MRN:  [id number]    Admit date:  2025  Discharge date:  ***    Code Status Order: Prior   Advance Directives:     Admitting Physician:  No admitting provider for patient encounter.  PCP: Ritu Loza PA-C    Discharging Nurse: ***  Discharging Hospital Unit/Room#: ST02/RZ-02  Discharging Unit Phone Number: ***    Emergency Contact:   Extended Emergency Contact Information  Primary Emergency Contact: Ella Corley  Address: [address] [place] [address]  Home Phone: [phone]  Mobile Phone: [phone]  Relation: Parent  Low vision? Yes  Hearing or visual needs: Glasses  Other needs: None  Preferred language: English   needed? No    Past Surgical History:  Past Surgical History:   Procedure Laterality Date    CYSTOSCOPY  2017    CR RIGHT STENT    CYSTOSCOPY Right 07/10/2017    cysto right stent removal Dr DEVAN Taylor    HAND SURGERY      Left.    ILEOSTOMY OR JEJUNOSTOMY  2017    eleostomy revision    ILEOSTOMY OR JEJUNOSTOMY  2017    revision, dar    OTHER SURGICAL HISTORY  2011    closed nasal reduction fracture    REVISION COLOSTOMY  2018    ILEOSTOMY  REVERSAL    SMALL INTESTINE SURGERY  2017    colostomy    UPPER GASTROINTESTINAL ENDOSCOPY N/A 2024    ESOPHAGOGASTRODUODENOSCOPY, POSSIBLE BIOPSY performed by Dony Tinsley MD at [facility]    URETER REVISION  2017       Immunization History:   There is no immunization history for the selected administration types on file for this patient.    Active Problems:  Patient Active Problem List   Diagnosis Code    H/O colostomy [id number]    Tachycardia R00.0    History of hypertension Z86.79    History of blood transfusion Z92.89    History of DVT (deep vein thrombosis) Z86.718    History of pulmonary embolism Z86.711    Low ferritin R79.0    Hypothyroxinemia R79.89    Low vitamin B12 level

## 2025-04-12 NOTE — ED NOTES
Discharge instructions reviewed , including diagnosis, medications, follow up appointments, home care, and also when to call 911.  All discharge instructions questions answered.     IV removed    Pt left ED wheelchair to private car

## 2025-04-21 RX ORDER — DILTIAZEM HYDROCHLORIDE 360 MG/1
360 CAPSULE, EXTENDED RELEASE ORAL DAILY
Qty: 30 CAPSULE | Refills: 0 | Status: SHIPPED | OUTPATIENT
Start: 2025-04-21

## 2025-04-21 NOTE — TELEPHONE ENCOUNTER
Name of Medication(s) Requested:  Requested Prescriptions     Pending Prescriptions Disp Refills    dilTIAZem (CARDIZEM CD) 360 MG extended release capsule 30 capsule 0     Sig: Take 1 capsule by mouth daily       Medication is on current medication list Yes    Dosage and directions were verified? Yes    Quantity verified: 30 day supply     Pharmacy Verified?  Yes    Last Appointment:  3/31/2025    Future appts:  Future Appointments   Date Time Provider Department Center   4/30/2025 10:45 AM Ritu Loza PA-C TRAIL PC [facility]   5/8/2025  9:15 AM Ishaan Guillen MD BDM PAIN MAR [facility]   8/4/2025  8:30 AM [facility] US RM 1 SEYZ US [facility] Rad/Car   10/[facility]/2025 10:30 [facility]   10/2/2025  9:40 AM Sonya Shea DO BDM WMNS CTR [facility]        (If no appt send self scheduling link. .REFILLAPPT)  Scheduling request sent?     [] Yes  [] No    Does patient need updated?  [] Yes  [] No

## 2025-04-25 RX ORDER — GABAPENTIN 100 MG/1
200 CAPSULE ORAL 3 TIMES DAILY
Qty: 180 CAPSULE | Refills: 0 | Status: SHIPPED | OUTPATIENT
Start: 2025-04-25 | End: 2025-05-25

## 2025-04-25 NOTE — TELEPHONE ENCOUNTER
Pt states she took her last 2 pills this morning and will not have enough until follow up visit on 4/30/2025

## 2025-04-30 ENCOUNTER — OFFICE VISIT (OUTPATIENT)
Dept: PRIMARY CARE CLINIC | Age: 35
End: 2025-04-30
Payer: MEDICAID

## 2025-04-30 VITALS
HEART RATE: 75 BPM | WEIGHT: 123 LBS | RESPIRATION RATE: 18 BRPM | HEIGHT: 67 IN | SYSTOLIC BLOOD PRESSURE: 134 MMHG | OXYGEN SATURATION: 97 % | DIASTOLIC BLOOD PRESSURE: 86 MMHG | TEMPERATURE: 97 F | BODY MASS INDEX: 19.3 KG/M2

## 2025-04-30 DIAGNOSIS — G62.1 ALCOHOLIC PERIPHERAL NEUROPATHY: ICD-10-CM

## 2025-04-30 DIAGNOSIS — B37.31 YEAST VAGINITIS: ICD-10-CM

## 2025-04-30 DIAGNOSIS — D64.9 ANEMIA, UNSPECIFIED TYPE: ICD-10-CM

## 2025-04-30 DIAGNOSIS — Z79.899 MEDICATION MANAGEMENT: ICD-10-CM

## 2025-04-30 DIAGNOSIS — L30.9 DERMATITIS: ICD-10-CM

## 2025-04-30 DIAGNOSIS — R30.0 DYSURIA: Primary | ICD-10-CM

## 2025-04-30 LAB
BILIRUBIN, POC: NEGATIVE
BLOOD URINE, POC: NORMAL
CLARITY, POC: CLEAR
COLOR, POC: YELLOW
GLUCOSE URINE, POC: NEGATIVE MG/DL
KETONES, POC: NEGATIVE MG/DL
LEUKOCYTE EST, POC: NEGATIVE
NITRITE, POC: NEGATIVE
PH, POC: 7.5
PROTEIN, POC: NEGATIVE MG/DL
SPECIFIC GRAVITY, POC: 1.02
UROBILINOGEN, POC: 0.2 MG/DL

## 2025-04-30 PROCEDURE — 81002 URINALYSIS NONAUTO W/O SCOPE: CPT | Performed by: PHYSICIAN ASSISTANT

## 2025-04-30 PROCEDURE — G8427 DOCREV CUR MEDS BY ELIG CLIN: HCPCS | Performed by: PHYSICIAN ASSISTANT

## 2025-04-30 PROCEDURE — 3075F SYST BP GE 130 - 139MM HG: CPT | Performed by: PHYSICIAN ASSISTANT

## 2025-04-30 PROCEDURE — 99214 OFFICE O/P EST MOD 30 MIN: CPT | Performed by: PHYSICIAN ASSISTANT

## 2025-04-30 PROCEDURE — 1036F TOBACCO NON-USER: CPT | Performed by: PHYSICIAN ASSISTANT

## 2025-04-30 PROCEDURE — 3079F DIAST BP 80-89 MM HG: CPT | Performed by: PHYSICIAN ASSISTANT

## 2025-04-30 PROCEDURE — G8420 CALC BMI NORM PARAMETERS: HCPCS | Performed by: PHYSICIAN ASSISTANT

## 2025-04-30 RX ORDER — FLUCONAZOLE 150 MG/1
150 TABLET ORAL ONCE
Qty: 1 TABLET | Refills: 1 | Status: SHIPPED | OUTPATIENT
Start: 2025-04-30 | End: 2025-04-30

## 2025-04-30 RX ORDER — HYDROCODONE BITARTRATE AND ACETAMINOPHEN 7.5; 325 MG/1; MG/1
1 TABLET ORAL 2 TIMES DAILY PRN
Qty: 12 TABLET | Refills: 0 | Status: SHIPPED | OUTPATIENT
Start: 2025-04-30 | End: 2025-05-07

## 2025-04-30 RX ORDER — GABAPENTIN 300 MG/1
300 CAPSULE ORAL 3 TIMES DAILY
Qty: 90 CAPSULE | Refills: 1 | Status: SHIPPED | OUTPATIENT
Start: 2025-04-30 | End: 2025-10-27

## 2025-04-30 RX ORDER — HYDROPHOR 42 G/100G
OINTMENT TOPICAL
Qty: 100 G | Refills: 1 | Status: SHIPPED | OUTPATIENT
Start: 2025-04-30

## 2025-04-30 NOTE — PROGRESS NOTES
month follow-up, foot pain and vaginal itching.    Diagnoses and all orders for this visit:    Dysuria  -     POCT Urinalysis no Micro    Yeast vaginitis  -     fluconazole (DIFLUCAN) 150 MG tablet; Take 1 tablet by mouth once for 1 dose    Anemia, unspecified type  -hgb stable at 11.8  Alcoholic peripheral neuropathy  -     gabapentin (NEURONTIN) 300 MG capsule; Take 1 capsule by mouth 3 times daily for 180 days. Intended supply: 30 days  -awaiting to establish with pain mgnt on 5/8    Medication management  -oarrs reviewed  -consider EMG  As above.  Call or go to ED immediately if symptoms worsen or persist.  Return in about 6 weeks (around 6/11/2025)., or sooner if necessary.      Educational materials and/or home exercises printed for patient's review and were included in patient instructions on his/her After Visit Summary and given to patient at the end of visit.      Counseled regarding above diagnosis, including possible risks and complications,  especially if left uncontrolled.    Counseled regarding the possible side effects, risks, benefits and alternatives to treatment; patient and/or guardian verbalizes understanding, agrees, feels comfortable with and wishes to proceed with above treatment plan.    Advised patient to call with any new medication issues, and read all Rx info from pharmacy to assure aware of all possible risks and side effects of medication before taking.    Reviewed age and gender appropriate health screening exams and vaccinations.  Advised patient regarding importance of keeping up with recommended health maintenance and to schedule as soon as possible if overdue, as this is important in assessing for undiagnosed pathology, especially cancer, as well as protecting against potentially harmful/life threatening disease.        Patient and/or guardian verbalizes understanding and agrees with above counseling, assessment and plan.    All questions answered.    CHRIS PAPPAS,

## 2025-05-02 RX ORDER — CARVEDILOL 6.25 MG/1
6.25 TABLET ORAL 2 TIMES DAILY WITH MEALS
Qty: 60 TABLET | Refills: 3 | Status: SHIPPED | OUTPATIENT
Start: 2025-05-02

## 2025-05-02 NOTE — TELEPHONE ENCOUNTER
Name of Medication(s) Requested:  Requested Prescriptions     Pending Prescriptions Disp Refills    carvedilol (COREG) 6.25 MG tablet 60 tablet 3     Sig: Take 1 tablet by mouth 2 times daily (with meals)       Medication is on current medication list Yes    Dosage and directions were verified? Yes    Quantity verified: 30 day supply     Pharmacy Verified?  Yes    Last Appointment:  4/30/2025    Future appts:  Future Appointments   Date Time Provider Department Center   5/8/2025  9:15 AM Ishaan Guillen MD BDM PAIN MAR [facility]   8/4/2025  8:30 AM [facility] RM 1 SEYZ [facility] Rad/Car   10/1/2025 10:30 AM Harry Horne MD COLUMB [facility]   10/2/2025  9:40 AM [facility]        (If no appt send self scheduling link. .REFILLAPPT)  Scheduling request sent?     [] Yes  [] No    Does patient need updated?  [] Yes  [] No

## 2025-05-08 ENCOUNTER — OFFICE VISIT (OUTPATIENT)
Dept: PAIN MANAGEMENT | Age: 35
End: 2025-05-08
Payer: MEDICAID

## 2025-05-08 VITALS
HEART RATE: 65 BPM | SYSTOLIC BLOOD PRESSURE: 128 MMHG | BODY MASS INDEX: 19.31 KG/M2 | HEIGHT: 67 IN | OXYGEN SATURATION: 93 % | TEMPERATURE: 97.5 F | WEIGHT: 123.02 LBS | DIASTOLIC BLOOD PRESSURE: 91 MMHG | RESPIRATION RATE: 18 BRPM

## 2025-05-08 DIAGNOSIS — G62.9 NEUROPATHY: ICD-10-CM

## 2025-05-08 DIAGNOSIS — M79.2 NEURALGIA AND NEURITIS: Primary | ICD-10-CM

## 2025-05-08 DIAGNOSIS — F10.11 H/O ALCOHOL ABUSE: ICD-10-CM

## 2025-05-08 PROCEDURE — 1036F TOBACCO NON-USER: CPT | Performed by: ANESTHESIOLOGY

## 2025-05-08 PROCEDURE — 99204 OFFICE O/P NEW MOD 45 MIN: CPT | Performed by: ANESTHESIOLOGY

## 2025-05-08 PROCEDURE — 3080F DIAST BP >= 90 MM HG: CPT | Performed by: ANESTHESIOLOGY

## 2025-05-08 PROCEDURE — G8427 DOCREV CUR MEDS BY ELIG CLIN: HCPCS | Performed by: ANESTHESIOLOGY

## 2025-05-08 PROCEDURE — G8420 CALC BMI NORM PARAMETERS: HCPCS | Performed by: ANESTHESIOLOGY

## 2025-05-08 PROCEDURE — 3074F SYST BP LT 130 MM HG: CPT | Performed by: ANESTHESIOLOGY

## 2025-05-08 RX ORDER — DULOXETIN HYDROCHLORIDE 30 MG/1
30 CAPSULE, DELAYED RELEASE ORAL DAILY
Qty: 30 CAPSULE | Refills: 2 | Status: SHIPPED | OUTPATIENT
Start: 2025-05-08

## 2025-05-08 NOTE — PROGRESS NOTES
Patient:  Vanessa Corley,  1990  Date of Service:  25      Patient presents to [facility] with complaints of ankle and foot pain that started 2 months ago and has been getting unchanged.     She states the pain began following having neuropathy in feet    Pain is constant and is described as aching, stabbing, sharp, and burning. She rates the pain as a 10/10 on her worst day , 4/10 on her best day, and a 6/10 on average on the VAS scale.     Pain does radiate to both lower extremities. She  has numbness of the both lower extremities.    Alleviating factors include: OTC NSAIDS and rest.  Aggravating factors include:  movement, walking, bending. She states that the pain does keep her from sleeping at night. She took her last dose of Neurontin today.     She is not on NSAIDS and  is not on anticoagulation medications to include none.     Previous treatments: medications..      Personal Expectations from this treatment: increase activity and decrease pain    Do you want someone present when the provider examines you? No    Resp 18   Ht 1.702 m (5' 7.01\")   Wt 55.8 kg (123 lb 0.3 oz)   LMP  (LMP Unknown)   BMI 19.26 kg/m²     No LMP recorded (lmp unknown).
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
## 2025-05-23 DIAGNOSIS — I10 ESSENTIAL HYPERTENSION: Primary | ICD-10-CM

## 2025-05-23 RX ORDER — DILTIAZEM HYDROCHLORIDE 360 MG/1
360 CAPSULE, EXTENDED RELEASE ORAL DAILY
Qty: 30 CAPSULE | Refills: 3 | Status: SHIPPED | OUTPATIENT
Start: 2025-05-23

## 2025-05-23 NOTE — TELEPHONE ENCOUNTER
Patient wants to know if she should be taking B12 with gabapentin?  If yes please order B12 also.        Please advise

## 2025-06-06 ENCOUNTER — OFFICE VISIT (OUTPATIENT)
Dept: NEUROLOGY | Age: 35
End: 2025-06-06
Payer: MEDICAID

## 2025-06-06 VITALS
DIASTOLIC BLOOD PRESSURE: 83 MMHG | HEART RATE: 85 BPM | SYSTOLIC BLOOD PRESSURE: 118 MMHG | WEIGHT: 142.3 LBS | HEIGHT: 67 IN | OXYGEN SATURATION: 96 % | BODY MASS INDEX: 22.34 KG/M2

## 2025-06-06 DIAGNOSIS — G62.9 NEUROPATHY: Primary | ICD-10-CM

## 2025-06-06 PROCEDURE — G8420 CALC BMI NORM PARAMETERS: HCPCS | Performed by: PHYSICIAN ASSISTANT

## 2025-06-06 PROCEDURE — 3074F SYST BP LT 130 MM HG: CPT | Performed by: PHYSICIAN ASSISTANT

## 2025-06-06 PROCEDURE — 3079F DIAST BP 80-89 MM HG: CPT | Performed by: PHYSICIAN ASSISTANT

## 2025-06-06 PROCEDURE — 99203 OFFICE O/P NEW LOW 30 MIN: CPT | Performed by: PHYSICIAN ASSISTANT

## 2025-06-06 PROCEDURE — G8427 DOCREV CUR MEDS BY ELIG CLIN: HCPCS | Performed by: PHYSICIAN ASSISTANT

## 2025-06-06 PROCEDURE — 1036F TOBACCO NON-USER: CPT | Performed by: PHYSICIAN ASSISTANT

## 2025-06-06 RX ORDER — PERPHENAZINE 16 MG
600 TABLET ORAL DAILY
Qty: 90 CAPSULE | Refills: 3 | Status: SHIPPED | OUTPATIENT
Start: 2025-06-06

## 2025-06-06 NOTE — PROGRESS NOTES
[facility]    Date:  6/6/2025  Patient Name:  Vanessa Corley  YOB: 1990  MRN: [id number]     PCP:  Ritu Loza PA-C   Referring:  No ref. provider found      Chief Complaint: b/l lower extremity pain     History obtained from: patient, chart     Assessment  Vanessa Corley is a 35 y.o. female with suspected peripheral neuropathy 2/2 alcohol abuse, however additional labs pending to evaluate for other treatable causes.       Plan  Gabapentin, cymbalta per pain management  Agree with EMG and will follow up results   Alpha lipoic acid 600 mg daily   Labs  Will review testing and give recommendations over phone when available   RTO prn         History of Present Illness:  Vanessa Corley is a 35 y.o.  female presenting for evaluation of pain in the b/l lower extremities.    Referred by pain management for further evaluation to r/o reversible causes of her neuropathy.     Hx of alcohol abuse. Quit drinking 3/14/25. Reports drinking approximately 5 drinks per day. Was admitted to hospital in March for alcohol withdrawal, elevated LFTs, cirrhosis, ascites.     Reports symptoms of burning, stabbing in the feet and legs and hands starting around 2/2025. She does report symptoms are somewhat better since stopping drinking. Seeing pain management and receiving gabapentin, cymbalta (though makes her drowsy), topical compound cream (reports it burns). Says she is unable to wear tennis shoes because of the discomfort. Is walking better. Reports some swelling in the ankles.     EMG ordered by pain management, but not scheduled yet.     Hx of chronic nerve damage in the [facility] 2/2 [facility] in 2017     Medical History:   Past Medical History:   Diagnosis Date    Anemia 10/13/2022    Arrhythmia     11/4/11-states arrythmia is (fast heart rate)-not on medication, last epis=1 month ago    Deep vein thrombosis (DVT) of left lower extremity (HCC) 06/11/2017    Fracture of nasal bone 2011    With

## 2025-09-03 RX ORDER — OMEPRAZOLE 40 MG/1
40 CAPSULE, DELAYED RELEASE ORAL
Qty: 30 CAPSULE | Refills: 5 | Status: SHIPPED | OUTPATIENT
Start: 2025-09-03

## (undated) DEVICE — BITEBLOCK 54FR W/ DENT RIM BLOX

## (undated) DEVICE — GAUZE,SPONGE,4"X4",8PLY,STRL,LF,10/TRAY: Brand: MEDLINE

## (undated) DEVICE — DEFENDO AIR WATER SUCTION AND BIOPSY VALVE KIT FOR  OLYMPUS: Brand: DEFENDO AIR/WATER/SUCTION AND BIOPSY VALVE

## (undated) DEVICE — CONTAINER SPEC 60ML PH 7NEUTRAL BUFF FRMLN RDY TO USE

## (undated) DEVICE — FORCEPS BX L160CM JAW DIA2.4MM YEL L CAP W/ NDL DISP RAD